# Patient Record
Sex: FEMALE | Race: WHITE | Employment: OTHER | URBAN - NONMETROPOLITAN AREA
[De-identification: names, ages, dates, MRNs, and addresses within clinical notes are randomized per-mention and may not be internally consistent; named-entity substitution may affect disease eponyms.]

---

## 2017-01-12 ENCOUNTER — TELEPHONE (OUTPATIENT)
Dept: INTERNAL MEDICINE | Age: 72
End: 2017-01-12

## 2017-01-12 DIAGNOSIS — E78.2 MIXED HYPERLIPIDEMIA: ICD-10-CM

## 2017-01-12 DIAGNOSIS — I10 UNSPECIFIED ESSENTIAL HYPERTENSION: ICD-10-CM

## 2017-01-12 RX ORDER — PRAVASTATIN SODIUM 40 MG
40 TABLET ORAL DAILY
Qty: 90 TABLET | Refills: 1 | Status: SHIPPED | OUTPATIENT
Start: 2017-01-12 | End: 2017-07-14 | Stop reason: SDUPTHER

## 2017-01-12 RX ORDER — LISINOPRIL AND HYDROCHLOROTHIAZIDE 25; 20 MG/1; MG/1
TABLET ORAL
Qty: 90 TABLET | Refills: 1 | Status: SHIPPED | OUTPATIENT
Start: 2017-01-12 | End: 2017-07-14 | Stop reason: SDUPTHER

## 2017-01-17 ENCOUNTER — OFFICE VISIT (OUTPATIENT)
Dept: INTERNAL MEDICINE | Age: 72
End: 2017-01-17

## 2017-01-17 VITALS
WEIGHT: 163 LBS | BODY MASS INDEX: 27.16 KG/M2 | HEIGHT: 65 IN | DIASTOLIC BLOOD PRESSURE: 74 MMHG | SYSTOLIC BLOOD PRESSURE: 130 MMHG | HEART RATE: 64 BPM

## 2017-01-17 DIAGNOSIS — Z23 NEED FOR PROPHYLACTIC VACCINATION AND INOCULATION AGAINST INFLUENZA: ICD-10-CM

## 2017-01-17 DIAGNOSIS — N32.81 OVERACTIVE BLADDER: ICD-10-CM

## 2017-01-17 DIAGNOSIS — I10 ESSENTIAL HYPERTENSION: Primary | ICD-10-CM

## 2017-01-17 DIAGNOSIS — E78.2 MIXED HYPERLIPIDEMIA: ICD-10-CM

## 2017-01-17 DIAGNOSIS — R10.13 DYSPEPSIA: ICD-10-CM

## 2017-01-17 PROCEDURE — G0008 ADMIN INFLUENZA VIRUS VAC: HCPCS | Performed by: INTERNAL MEDICINE

## 2017-01-17 PROCEDURE — 99214 OFFICE O/P EST MOD 30 MIN: CPT | Performed by: INTERNAL MEDICINE

## 2017-01-17 PROCEDURE — 90686 IIV4 VACC NO PRSV 0.5 ML IM: CPT | Performed by: INTERNAL MEDICINE

## 2017-01-17 RX ORDER — OXYBUTYNIN CHLORIDE 5 MG/1
5 TABLET ORAL 2 TIMES DAILY
Qty: 180 TABLET | Refills: 1 | Status: SHIPPED | OUTPATIENT
Start: 2017-01-17 | End: 2017-07-18 | Stop reason: SDUPTHER

## 2017-01-17 RX ORDER — FAMOTIDINE 40 MG/1
TABLET, FILM COATED ORAL
Qty: 90 TABLET | Refills: 1 | Status: SHIPPED | OUTPATIENT
Start: 2017-01-17 | End: 2017-03-20

## 2017-01-17 RX ORDER — POLYETHYLENE GLYCOL 3350 17 G/17G
17 POWDER, FOR SOLUTION ORAL NIGHTLY
COMMUNITY
End: 2017-02-08

## 2017-01-17 RX ORDER — AMLODIPINE BESYLATE 10 MG/1
10 TABLET ORAL DAILY
Qty: 90 TABLET | Refills: 1 | Status: SHIPPED | OUTPATIENT
Start: 2017-01-17 | End: 2017-07-18 | Stop reason: SDUPTHER

## 2017-02-03 DIAGNOSIS — K59.01 SLOW TRANSIT CONSTIPATION: ICD-10-CM

## 2017-02-08 RX ORDER — POLYETHYLENE GLYCOL 3350 17 G/17G
POWDER, FOR SOLUTION ORAL
Qty: 527 G | Refills: 3 | Status: SHIPPED | OUTPATIENT
Start: 2017-02-08 | End: 2017-07-18 | Stop reason: SDUPTHER

## 2017-07-14 DIAGNOSIS — E78.2 MIXED HYPERLIPIDEMIA: ICD-10-CM

## 2017-07-14 DIAGNOSIS — I10 UNSPECIFIED ESSENTIAL HYPERTENSION: ICD-10-CM

## 2017-07-14 RX ORDER — LISINOPRIL AND HYDROCHLOROTHIAZIDE 25; 20 MG/1; MG/1
TABLET ORAL
Qty: 90 TABLET | Refills: 1 | Status: SHIPPED | OUTPATIENT
Start: 2017-07-14 | End: 2018-01-17 | Stop reason: SDUPTHER

## 2017-07-14 RX ORDER — FAMOTIDINE 40 MG/1
40 TABLET, FILM COATED ORAL EVERY EVENING
Qty: 90 TABLET | Refills: 1 | OUTPATIENT
Start: 2017-07-14 | End: 2018-01-09 | Stop reason: SDUPTHER

## 2017-07-14 RX ORDER — PRAVASTATIN SODIUM 40 MG
40 TABLET ORAL EVERY EVENING
Qty: 90 TABLET | Refills: 1 | Status: SHIPPED | OUTPATIENT
Start: 2017-07-14 | End: 2018-01-09 | Stop reason: SDUPTHER

## 2017-07-18 ENCOUNTER — HOSPITAL ENCOUNTER (OUTPATIENT)
Age: 72
Discharge: HOME | End: 2017-07-18
Payer: MEDICARE

## 2017-07-18 ENCOUNTER — OFFICE VISIT (OUTPATIENT)
Dept: INTERNAL MEDICINE CLINIC | Age: 72
End: 2017-07-18
Payer: MEDICARE

## 2017-07-18 VITALS
DIASTOLIC BLOOD PRESSURE: 74 MMHG | WEIGHT: 171 LBS | SYSTOLIC BLOOD PRESSURE: 134 MMHG | HEIGHT: 65 IN | BODY MASS INDEX: 28.49 KG/M2 | HEART RATE: 68 BPM

## 2017-07-18 DIAGNOSIS — K59.01 SLOW TRANSIT CONSTIPATION: ICD-10-CM

## 2017-07-18 DIAGNOSIS — N32.81 OVERACTIVE BLADDER: ICD-10-CM

## 2017-07-18 DIAGNOSIS — I10 ESSENTIAL HYPERTENSION: ICD-10-CM

## 2017-07-18 DIAGNOSIS — N30.00 ACUTE CYSTITIS WITHOUT HEMATURIA: Primary | ICD-10-CM

## 2017-07-18 DIAGNOSIS — R73.9 HYPERGLYCEMIA: ICD-10-CM

## 2017-07-18 DIAGNOSIS — M17.11 PRIMARY OSTEOARTHRITIS OF RIGHT KNEE: ICD-10-CM

## 2017-07-18 DIAGNOSIS — J30.2 SEASONAL ALLERGIC RHINITIS, UNSPECIFIED ALLERGIC RHINITIS TRIGGER: ICD-10-CM

## 2017-07-18 DIAGNOSIS — Z23 NEED FOR PROPHYLACTIC VACCINATION AGAINST STREPTOCOCCUS PNEUMONIAE (PNEUMOCOCCUS): ICD-10-CM

## 2017-07-18 DIAGNOSIS — E78.2 MIXED HYPERLIPIDEMIA: ICD-10-CM

## 2017-07-18 LAB
ALT SERPL-CCNC: 13 U/L (ref 11–66)
ANION GAP SERPL CALCULATED.3IONS-SCNC: 13 MEQ/L (ref 8–16)
AVERAGE GLUCOSE: 105 MG/DL (ref 70–126)
BACTERIA: ABNORMAL /HPF
BILIRUBIN URINE: NEGATIVE
BLOOD, URINE: NEGATIVE
BUN BLDV-MCNC: 13 MG/DL (ref 7–22)
CALCIUM SERPL-MCNC: 10.2 MG/DL (ref 8.5–10.5)
CASTS 2: ABNORMAL /LPF
CASTS UA: ABNORMAL /LPF
CHARACTER, URINE: CLEAR
CHLORIDE BLD-SCNC: 99 MEQ/L (ref 98–111)
CO2: 30 MEQ/L (ref 23–33)
COLOR: YELLOW
CREAT SERPL-MCNC: 0.6 MG/DL (ref 0.4–1.2)
CRYSTALS, UA: ABNORMAL
EPITHELIAL CELLS, UA: ABNORMAL /HPF
GFR SERPL CREATININE-BSD FRML MDRD: > 90 ML/MIN/1.73M2
GLUCOSE BLD-MCNC: 108 MG/DL (ref 70–108)
GLUCOSE URINE: NEGATIVE MG/DL
HBA1C MFR BLD: 5.5 % (ref 4.4–6.4)
KETONES, URINE: NEGATIVE
LEUKOCYTE ESTERASE, URINE: ABNORMAL
MISCELLANEOUS 2: ABNORMAL
NITRITE, URINE: NEGATIVE
PH UA: 6
POTASSIUM SERPL-SCNC: 3.7 MEQ/L (ref 3.5–5.2)
PROTEIN UA: NEGATIVE
RBC URINE: ABNORMAL /HPF
RENAL EPITHELIAL, UA: ABNORMAL
SODIUM BLD-SCNC: 142 MEQ/L (ref 135–145)
SPECIFIC GRAVITY, URINE: 1.01 (ref 1–1.03)
UROBILINOGEN, URINE: 0.2 EU/DL
WBC UA: ABNORMAL /HPF
YEAST: ABNORMAL

## 2017-07-18 PROCEDURE — 83036 HEMOGLOBIN GLYCOSYLATED A1C: CPT

## 2017-07-18 PROCEDURE — G0009 ADMIN PNEUMOCOCCAL VACCINE: HCPCS | Performed by: INTERNAL MEDICINE

## 2017-07-18 PROCEDURE — 87184 SC STD DISK METHOD PER PLATE: CPT

## 2017-07-18 PROCEDURE — 84460 ALANINE AMINO (ALT) (SGPT): CPT

## 2017-07-18 PROCEDURE — 80048 BASIC METABOLIC PNL TOTAL CA: CPT

## 2017-07-18 PROCEDURE — 87077 CULTURE AEROBIC IDENTIFY: CPT

## 2017-07-18 PROCEDURE — 87186 SC STD MICRODIL/AGAR DIL: CPT

## 2017-07-18 PROCEDURE — 87086 URINE CULTURE/COLONY COUNT: CPT

## 2017-07-18 PROCEDURE — 81001 URINALYSIS AUTO W/SCOPE: CPT

## 2017-07-18 PROCEDURE — 36415 COLL VENOUS BLD VENIPUNCTURE: CPT

## 2017-07-18 PROCEDURE — 99214 OFFICE O/P EST MOD 30 MIN: CPT | Performed by: INTERNAL MEDICINE

## 2017-07-18 PROCEDURE — 90670 PCV13 VACCINE IM: CPT | Performed by: INTERNAL MEDICINE

## 2017-07-18 RX ORDER — POLYETHYLENE GLYCOL 3350 17 G/17G
POWDER, FOR SOLUTION ORAL
Qty: 527 G | Refills: 3 | Status: SHIPPED | OUTPATIENT
Start: 2017-07-18 | End: 2018-01-09 | Stop reason: SDUPTHER

## 2017-07-18 RX ORDER — OXYBUTYNIN CHLORIDE 5 MG/1
5 TABLET ORAL 2 TIMES DAILY
Qty: 180 TABLET | Refills: 1 | Status: SHIPPED | OUTPATIENT
Start: 2017-07-18 | End: 2018-01-09 | Stop reason: SDUPTHER

## 2017-07-18 RX ORDER — AMLODIPINE BESYLATE 10 MG/1
10 TABLET ORAL DAILY
Qty: 90 TABLET | Refills: 1 | Status: SHIPPED | OUTPATIENT
Start: 2017-07-18 | End: 2018-01-09 | Stop reason: SDUPTHER

## 2017-07-18 RX ORDER — CIPROFLOXACIN 500 MG/1
500 TABLET, FILM COATED ORAL 2 TIMES DAILY
Qty: 20 TABLET | Refills: 0 | Status: SHIPPED | OUTPATIENT
Start: 2017-07-18 | End: 2017-07-28

## 2017-07-18 RX ORDER — FLUTICASONE PROPIONATE 50 MCG
1 SPRAY, SUSPENSION (ML) NASAL DAILY
Qty: 1 BOTTLE | Refills: 5 | Status: SHIPPED | OUTPATIENT
Start: 2017-07-18 | End: 2018-05-17

## 2017-07-18 ASSESSMENT — PATIENT HEALTH QUESTIONNAIRE - PHQ9
SUM OF ALL RESPONSES TO PHQ QUESTIONS 1-9: 0
2. FEELING DOWN, DEPRESSED OR HOPELESS: 0
1. LITTLE INTEREST OR PLEASURE IN DOING THINGS: 0
SUM OF ALL RESPONSES TO PHQ9 QUESTIONS 1 & 2: 0

## 2017-07-20 ENCOUNTER — TELEPHONE (OUTPATIENT)
Dept: INTERNAL MEDICINE CLINIC | Age: 72
End: 2017-07-20

## 2017-07-20 LAB
ORGANISM: ABNORMAL
URINE CULTURE REFLEX: ABNORMAL

## 2017-08-17 ENCOUNTER — TELEPHONE (OUTPATIENT)
Dept: INTERNAL MEDICINE CLINIC | Age: 72
End: 2017-08-17

## 2017-08-21 DIAGNOSIS — R30.0 DYSURIA: Primary | ICD-10-CM

## 2017-08-22 ENCOUNTER — HOSPITAL ENCOUNTER (OUTPATIENT)
Age: 72
Discharge: HOME | End: 2017-08-22
Payer: MEDICARE

## 2017-08-22 LAB
BACTERIA: ABNORMAL /HPF
BILIRUBIN URINE: NEGATIVE
BLOOD, URINE: NEGATIVE
CASTS 2: ABNORMAL /LPF
CASTS UA: ABNORMAL /LPF
CHARACTER, URINE: CLEAR
COLOR: YELLOW
CRYSTALS, UA: ABNORMAL
EPITHELIAL CELLS, UA: ABNORMAL /HPF
GLUCOSE URINE: NEGATIVE MG/DL
KETONES, URINE: NEGATIVE
LEUKOCYTE ESTERASE, URINE: ABNORMAL
MISCELLANEOUS 2: ABNORMAL
NITRITE, URINE: NEGATIVE
PH UA: 6.5
PROTEIN UA: NEGATIVE
RBC URINE: ABNORMAL /HPF
RENAL EPITHELIAL, UA: ABNORMAL
SPECIFIC GRAVITY, URINE: 1.01 (ref 1–1.03)
UROBILINOGEN, URINE: 0.2 EU/DL
WBC UA: ABNORMAL /HPF
YEAST: ABNORMAL

## 2017-08-22 PROCEDURE — 87086 URINE CULTURE/COLONY COUNT: CPT

## 2017-08-22 PROCEDURE — 81001 URINALYSIS AUTO W/SCOPE: CPT

## 2017-08-23 ENCOUNTER — TELEPHONE (OUTPATIENT)
Dept: INTERNAL MEDICINE CLINIC | Age: 72
End: 2017-08-23

## 2017-08-23 LAB
ORGANISM: ABNORMAL
URINE CULTURE REFLEX: ABNORMAL

## 2017-10-27 ENCOUNTER — HOSPITAL ENCOUNTER (EMERGENCY)
Age: 72
Discharge: HOME | End: 2017-10-27
Payer: MEDICARE

## 2017-10-27 VITALS
DIASTOLIC BLOOD PRESSURE: 72 MMHG | WEIGHT: 155 LBS | BODY MASS INDEX: 25.82 KG/M2 | TEMPERATURE: 98.7 F | HEART RATE: 108 BPM | OXYGEN SATURATION: 96 % | RESPIRATION RATE: 18 BRPM | SYSTOLIC BLOOD PRESSURE: 132 MMHG

## 2017-10-27 DIAGNOSIS — F51.02 ADJUSTMENT INSOMNIA: ICD-10-CM

## 2017-10-27 DIAGNOSIS — B37.81 THRUSH OF MOUTH AND ESOPHAGUS (HCC): Primary | ICD-10-CM

## 2017-10-27 DIAGNOSIS — Z96.651 STATUS POST RIGHT KNEE REPLACEMENT: ICD-10-CM

## 2017-10-27 DIAGNOSIS — B37.0 THRUSH OF MOUTH AND ESOPHAGUS (HCC): Primary | ICD-10-CM

## 2017-10-27 LAB
BASOPHILS # BLD: 0.4 %
BASOPHILS ABSOLUTE: 0 THOU/MM3 (ref 0–0.1)
EOSINOPHIL # BLD: 0.9 %
EOSINOPHILS ABSOLUTE: 0.1 THOU/MM3 (ref 0–0.4)
HCT VFR BLD CALC: 41.5 % (ref 37–47)
HEMOGLOBIN: 13.9 GM/DL (ref 12–16)
LYMPHOCYTES # BLD: 12.4 %
LYMPHOCYTES ABSOLUTE: 1.1 THOU/MM3 (ref 1–4.8)
MCH RBC QN AUTO: 28.3 PG (ref 27–31)
MCHC RBC AUTO-ENTMCNC: 33.4 GM/DL (ref 33–37)
MCV RBC AUTO: 85 FL (ref 81–99)
MONOCYTES # BLD: 8.5 %
MONOCYTES ABSOLUTE: 0.7 THOU/MM3 (ref 0.4–1.3)
NUCLEATED RED BLOOD CELLS: 0 /100 WBC
PDW BLD-RTO: 12.6 % (ref 11.5–14.5)
PLATELET # BLD: 278 THOU/MM3 (ref 130–400)
PMV BLD AUTO: 7.9 MCM (ref 7.4–10.4)
RBC # BLD: 4.9 MILL/MM3 (ref 4.2–5.4)
SEG NEUTROPHILS: 77.8 %
SEGMENTED NEUTROPHILS ABSOLUTE COUNT: 6.7 THOU/MM3 (ref 1.8–7.7)
WBC # BLD: 8.6 THOU/MM3 (ref 4.8–10.8)

## 2017-10-27 PROCEDURE — 85025 COMPLETE CBC W/AUTO DIFF WBC: CPT

## 2017-10-27 PROCEDURE — 36415 COLL VENOUS BLD VENIPUNCTURE: CPT

## 2017-10-27 PROCEDURE — 99214 OFFICE O/P EST MOD 30 MIN: CPT

## 2017-10-27 PROCEDURE — 99213 OFFICE O/P EST LOW 20 MIN: CPT | Performed by: NURSE PRACTITIONER

## 2017-10-27 RX ORDER — LANOLIN ALCOHOL/MO/W.PET/CERES
3 CREAM (GRAM) TOPICAL DAILY
COMMUNITY
End: 2018-05-17 | Stop reason: ALTCHOICE

## 2017-10-27 RX ORDER — ONDANSETRON 4 MG/1
4 TABLET, ORALLY DISINTEGRATING ORAL EVERY 8 HOURS PRN
Qty: 15 TABLET | Refills: 0 | Status: SHIPPED | OUTPATIENT
Start: 2017-10-27 | End: 2017-11-01

## 2017-10-27 RX ORDER — ACETAMINOPHEN 500 MG
1000 TABLET ORAL EVERY 6 HOURS PRN
COMMUNITY
End: 2018-05-17

## 2017-10-27 RX ORDER — LIDOCAINE 50 MG/G
OINTMENT TOPICAL
Qty: 10 G | Refills: 0 | Status: SHIPPED | OUTPATIENT
Start: 2017-10-27 | End: 2017-11-13

## 2017-10-27 RX ORDER — OXYCODONE HYDROCHLORIDE 5 MG/1
5 CAPSULE ORAL EVERY 4 HOURS PRN
COMMUNITY
End: 2017-11-13 | Stop reason: ALTCHOICE

## 2017-10-27 RX ORDER — HYDROCODONE BITARTRATE AND ACETAMINOPHEN 5; 325 MG/1; MG/1
1 TABLET ORAL EVERY 6 HOURS PRN
COMMUNITY
End: 2017-11-13

## 2017-10-27 ASSESSMENT — ENCOUNTER SYMPTOMS
SHORTNESS OF BREATH: 0
SINUS PAIN: 0
CHEST TIGHTNESS: 0
SORE THROAT: 0
COLOR CHANGE: 0
BACK PAIN: 0
ABDOMINAL PAIN: 0
NAUSEA: 1
DIARRHEA: 0
WHEEZING: 0
COUGH: 0
CHOKING: 0
STRIDOR: 0
SINUS PRESSURE: 0
APNEA: 0
RHINORRHEA: 0

## 2017-10-27 ASSESSMENT — PAIN DESCRIPTION - FREQUENCY: FREQUENCY: CONTINUOUS

## 2017-10-27 ASSESSMENT — PAIN SCALES - GENERAL: PAINLEVEL_OUTOF10: 3

## 2017-10-27 ASSESSMENT — PAIN DESCRIPTION - PAIN TYPE: TYPE: ACUTE PAIN

## 2017-10-27 ASSESSMENT — PAIN DESCRIPTION - DESCRIPTORS: DESCRIPTORS: CRAMPING

## 2017-10-27 ASSESSMENT — PAIN DESCRIPTION - ORIENTATION: ORIENTATION: LOWER

## 2017-10-27 ASSESSMENT — PAIN DESCRIPTION - LOCATION: LOCATION: ABDOMEN

## 2017-10-27 NOTE — ED TRIAGE NOTES
Pt ambulatory into [facility] with c/o having insomnia, lower belly pain and a bad taste in mouth  For the past five days. Pt states she had a knee replacement on 09/25/17 and had been taking oxycodone around the clock every 4 hours up until 10/9/17 then was switched to norco and took every 6 hours until 10/16/17 then changed to every 12 hours until last dose 10/23/17. Pt states at that changed to tylenol pm every 12 hours and then just extra strength tylenol. Pt called surgeon and was instructed to go to dr to be checked. Pt states belly pain 3. Pt states her last bm was a couple days ago and was soft brown. Pt states she is wondering if symptoms  could be from taking too much pain medication. Pt states at times she is nauseated. Pt states at night she is only getting 4-5 hours of sleep.

## 2017-10-27 NOTE — ED PROVIDER NOTES
tablet, R-1Print      polyethylene glycol (GLYCOLAX) powder take 17GM (DISSOLVED IN WATER) by mouth once daily, Disp-527 g, R-3Print      lisinopril-hydrochlorothiazide (PRINZIDE;ZESTORETIC) 20-25 MG per tablet take 1 tablet by mouth once daily, Disp-90 tablet, R-1Normal      famotidine (PEPCID) 40 MG tablet Take 1 tablet by mouth every evening, Disp-90 tablet, R-1Phone In      pravastatin (PRAVACHOL) 40 MG tablet Take 1 tablet by mouth every evening, Disp-90 tablet, R-1Normal      aspirin 81 MG tablet Take 81 mg by mouth daily Nurse in [place] wrote to take twice a day for 6 weeks after surgery 09/25/17Historical Med      Calcium (CALCIUM 500 + D) 500-125 MG-UNIT TABS Take by mouth daily Historical Med      fluticasone (FLONASE) 50 MCG/ACT nasal spray 1 spray by Nasal route daily, Disp-1 Bottle, R-5Print      Loratadine-Pseudoephedrine (CLARITIN-D 24 HOUR PO) Take by mouth daily as needed Historical Med      naproxen-diphenhydramine 220-25 MG TABS Take 1 tablet by mouth nightly as needed Historical Med      CRANBERRY-VITAMIN C PO Take 4,200 mg by mouth dailyHistorical Med      cephALEXin (KEFLEX) 500 MG capsule Take 500 mg by mouth Take 4 caps prior to dental appointment. Historical Med      vitamin D 1000 UNITS CAPS Take by mouth 2 times daily Historical Med      vitamin E 400 UNIT capsule Take 400 Units by mouth daily. Historical Med             ALLERGIES     Patient is is allergic to pcn [penicillins] and sulfa antibiotics. FAMILY HISTORY     Patient's family history includes Cancer in her father; Heart Failure in her mother. SOCIAL HISTORY     Patient  reports that she has never smoked. She has never used smokeless tobacco. She reports that she drinks alcohol. She reports that she does not use drugs. PHYSICAL EXAM     ED TRIAGE VITALS  BP: 132/72, Temp: 98.7 °F (37.1 °C), Pulse: 108, Resp: 18, SpO2: 96 %  Physical Exam   Constitutional: She is oriented to person, place, and time.  Vital signs are normal. She appears well-developed and well-nourished. She is active and cooperative. Non-toxic appearance. She does not have a sickly appearance. She does not appear ill. No distress. HENT:   Head: Normocephalic and atraumatic. Right Ear: Hearing, tympanic membrane, external ear and ear canal normal.   Left Ear: Hearing, tympanic membrane, external ear and ear canal normal.   Nose: Nose normal. Right sinus exhibits no maxillary sinus tenderness and no frontal sinus tenderness. Left sinus exhibits no maxillary sinus tenderness and no frontal sinus tenderness. Mouth/Throat: Uvula is midline, oropharynx is clear and moist and mucous membranes are normal. Oral lesions present. No oropharyngeal exudate. Thick discoloration noted unable to wipe off. Eyes: Conjunctivae, EOM and lids are normal. Pupils are equal, round, and reactive to light. Neck: Trachea normal and normal range of motion. Neck supple. Cardiovascular: Normal rate, regular rhythm, S1 normal, S2 normal, normal heart sounds and intact distal pulses. Pulmonary/Chest: Effort normal and breath sounds normal. She has no decreased breath sounds. She has no wheezes. She has no rhonchi. She has no rales. Musculoskeletal: Normal range of motion. Neurological: She is alert and oriented to person, place, and time. No cranial nerve deficit or sensory deficit. GCS eye subscore is 4. GCS verbal subscore is 5. GCS motor subscore is 6. Skin: Skin is warm and dry. She is not diaphoretic. Psychiatric: She has a normal mood and affect. Her behavior is normal. Judgment and thought content normal.   Nursing note and vitals reviewed.       DIAGNOSTIC RESULTS   Labs:  Results for orders placed or performed during the hospital encounter of 10/27/17   CBC Auto Differential   Result Value Ref Range    WBC 8.6 4.8 - 10.8 thou/mm3    RBC 4.90 4.20 - 5.40 mill/mm3    Hemoglobin 13.9 12.0 - 16.0 gm/dl    Hematocrit 41.5 37.0 - 47.0 %    MCV 85 81 - 99 fL    M[facility]

## 2017-10-30 ENCOUNTER — NURSE TRIAGE (OUTPATIENT)
Dept: ADMINISTRATIVE | Age: 72
End: 2017-10-30

## 2017-10-30 NOTE — TELEPHONE ENCOUNTER
Answer Assessment - Initial Assessment Questions  1. REASON FOR CALL or QUESTION: \"What is your reason for calling today? \" or \"How can I best help you? \" or \"What question do you have that I can help answer? \"      Blessinggina Bourgeois was told to swish and swallow her Mystatin for Thrush. She was calling to double check the prescription instructions.     Protocols used: INFORMATION ONLY CALL-ADULT-

## 2017-11-13 ENCOUNTER — OFFICE VISIT (OUTPATIENT)
Dept: INTERNAL MEDICINE CLINIC | Age: 72
End: 2017-11-13
Payer: MEDICARE

## 2017-11-13 ENCOUNTER — HOSPITAL ENCOUNTER (OUTPATIENT)
Age: 72
Discharge: HOME | End: 2017-11-13
Payer: MEDICARE

## 2017-11-13 VITALS
HEIGHT: 65 IN | WEIGHT: 150 LBS | SYSTOLIC BLOOD PRESSURE: 128 MMHG | DIASTOLIC BLOOD PRESSURE: 70 MMHG | BODY MASS INDEX: 24.99 KG/M2 | HEART RATE: 88 BPM

## 2017-11-13 DIAGNOSIS — R43.8 METALLIC TASTE: ICD-10-CM

## 2017-11-13 DIAGNOSIS — R68.83 CHILLS (WITHOUT FEVER): ICD-10-CM

## 2017-11-13 DIAGNOSIS — R53.83 FATIGUE, UNSPECIFIED TYPE: ICD-10-CM

## 2017-11-13 DIAGNOSIS — R73.9 HYPERGLYCEMIA: ICD-10-CM

## 2017-11-13 DIAGNOSIS — B37.0 THRUSH: Primary | ICD-10-CM

## 2017-11-13 DIAGNOSIS — J30.2 ACUTE SEASONAL ALLERGIC RHINITIS DUE TO OTHER ALLERGEN: ICD-10-CM

## 2017-11-13 DIAGNOSIS — I10 ESSENTIAL HYPERTENSION: ICD-10-CM

## 2017-11-13 DIAGNOSIS — E78.2 MIXED HYPERLIPIDEMIA: ICD-10-CM

## 2017-11-13 DIAGNOSIS — R00.1 BRADYCARDIA: ICD-10-CM

## 2017-11-13 LAB
ALBUMIN SERPL-MCNC: 5.1 G/DL (ref 3.5–5.1)
ALP BLD-CCNC: 60 U/L (ref 38–126)
ALT SERPL-CCNC: 11 U/L (ref 11–66)
AMORPHOUS: ABNORMAL
ANION GAP SERPL CALCULATED.3IONS-SCNC: 18 MEQ/L (ref 8–16)
AST SERPL-CCNC: 15 U/L (ref 5–40)
BACTERIA: ABNORMAL /HPF
BILIRUB SERPL-MCNC: 0.6 MG/DL (ref 0.3–1.2)
BILIRUBIN URINE: ABNORMAL
BLOOD, URINE: ABNORMAL
BUN BLDV-MCNC: 12 MG/DL (ref 7–22)
CALCIUM SERPL-MCNC: 10.4 MG/DL (ref 8.5–10.5)
CASTS 2: ABNORMAL /LPF
CASTS UA: ABNORMAL /LPF
CHARACTER, URINE: CLEAR
CHLORIDE BLD-SCNC: 97 MEQ/L (ref 98–111)
CO2: 29 MEQ/L (ref 23–33)
COLOR: YELLOW
CREAT SERPL-MCNC: 0.6 MG/DL (ref 0.4–1.2)
CRYSTALS, UA: ABNORMAL
EPITHELIAL CELLS, UA: ABNORMAL /HPF
GFR SERPL CREATININE-BSD FRML MDRD: > 90 ML/MIN/1.73M2
GLUCOSE BLD-MCNC: 117 MG/DL (ref 70–108)
GLUCOSE URINE: NEGATIVE MG/DL
ICTOTEST: NEGATIVE
KETONES, URINE: NEGATIVE
LDL CHOLESTEROL DIRECT: 90.9 MG/DL
LEUKOCYTE ESTERASE, URINE: ABNORMAL
MISCELLANEOUS 2: ABNORMAL
MUCUS: ABNORMAL
NITRITE, URINE: NEGATIVE
PH UA: 6.5
POTASSIUM SERPL-SCNC: 3.3 MEQ/L (ref 3.5–5.2)
PROTEIN UA: ABNORMAL
RBC URINE: ABNORMAL /HPF
RENAL EPITHELIAL, UA: ABNORMAL
SODIUM BLD-SCNC: 144 MEQ/L (ref 135–145)
SPECIFIC GRAVITY, URINE: 1.02 (ref 1–1.03)
TOTAL PROTEIN: 7.7 G/DL (ref 6.1–8)
TSH SERPL DL<=0.05 MIU/L-ACNC: 1.05 UIU/ML (ref 0.4–4.2)
UROBILINOGEN, URINE: 1 EU/DL
VITAMIN B-12: 460 PG/ML (ref 211–911)
WBC UA: ABNORMAL /HPF
YEAST: ABNORMAL

## 2017-11-13 PROCEDURE — 82607 VITAMIN B-12: CPT

## 2017-11-13 PROCEDURE — 80053 COMPREHEN METABOLIC PANEL: CPT

## 2017-11-13 PROCEDURE — 81001 URINALYSIS AUTO W/SCOPE: CPT

## 2017-11-13 PROCEDURE — 83721 ASSAY OF BLOOD LIPOPROTEIN: CPT

## 2017-11-13 PROCEDURE — 36415 COLL VENOUS BLD VENIPUNCTURE: CPT

## 2017-11-13 PROCEDURE — 84443 ASSAY THYROID STIM HORMONE: CPT

## 2017-11-13 PROCEDURE — 87086 URINE CULTURE/COLONY COUNT: CPT

## 2017-11-13 PROCEDURE — 99214 OFFICE O/P EST MOD 30 MIN: CPT | Performed by: INTERNAL MEDICINE

## 2017-11-13 PROCEDURE — 84630 ASSAY OF ZINC: CPT

## 2017-11-13 RX ORDER — CLOTRIMAZOLE 10 MG/1
10 LOZENGE ORAL; TOPICAL
Qty: 50 TABLET | Refills: 0 | Status: SHIPPED | OUTPATIENT
Start: 2017-11-13 | End: 2017-11-23

## 2017-11-13 NOTE — PROGRESS NOTES
Chief Complaint   Patient presents with    Other     ER vs 10/27/17 - Thrush    Other     SP-Rt total knee    Nasal Congestion     X 1 month-little drainage-productive cough off and on    Other     trouble sleeping    Hypertension    Hyperlipidemia       This patient presents for medical evaluation. This is a 6 months follow up visit of chronic issues but has multiple acute issues - poor appetite, metallic taste, coating on tongue, trouble sleeping, nasal congestion, cough. She had right TKA with Dr. Belvie Oppenheim at [facility] 9/25/17 - had some issues with hypoxia and was there 3 days. She is complaining of fatigue, poor appetite, nausea, even since being off pain medication since 10/23/17. She states it has improved slowly after surgery. She was in ER and diagnosed with thrush 10/27/17 - treated with Nystatin x 12 days, given Lidocaine ointment for right knee pain but didn't like the feel of it. Doing better with blue emu spray. Her tongue has changed from black to white/yellow coating. Still has a metallic taste. Will check B12, zinc, TSH, and standard labs -see below. Will give a round of Clotrimazole troches - still looks like thrush. Fatigue may be from not sleeping well due to knee pain - continue Bakari Emu spray, try melatonin, or Tylenol PM.    Allergic rhinitis - she is having nasal congestion, and PND with cough. Likely due to allergic rhinitis as no longer using Flonase or antihistamine - suggested she get back on this. She tripped over string at [facility] and fell onto right arm - skinned arm. 2 months later the arm was still aching so went to Dr. Libby Olivares, initially given steroid injection and then MRI. She has a torn RTC and she was planning on having surgery in May 2017 when daughter is out of school to help her. She still has not had this done, as symptoms improved. Hypokalemia - normal potassium 3/2017, 7/2017. Check now.     GERD/NSAID use -She had left knee arthroplasty weakness  Dermatological ROS: negative for - rash or skin lesion changes    Blood pressure 128/70, pulse 88, height 5' 4.96\" (1.65 m), weight 150 lb (68 kg), not currently breastfeeding. Physical Examination: General appearance - alert, well appearing, and in no distress  Mental status - alert, oriented to person, place, and time  Mouth - yellow/brown coating on tongue. Neck - supple, no significant adenopathy, no JVD, or carotid bruits  Chest - clear to auscultation, no wheezes, rales or rhonchi, symmetric air entry  Heart - normal rate, regular rhythm, no murmurs, rubs, clicks or gallops  Abdomen - soft, nontender, nondistended  Neurological - alert, oriented, normal speech, no focal findings or movement disorder noted  Extremities - peripheral pulses normal, no edema, no clubbing or cyanosis  Skin - normal coloration and turgor, warm, dry    Diagnostic Data:  Reviewed labs from 10/2017. Results for orders placed or performed during the hospital encounter of 10/27/17   CBC Auto Differential   Result Value Ref Range    WBC 8.6 4.8 - 10.8 thou/mm3    RBC 4.90 4.20 - 5.40 mill/mm3    Hemoglobin 13.9 12.0 - 16.0 gm/dl    Hematocrit 41.5 37.0 - 47.0 %    MCV 85 81 - 99 fL    MCH 28.3 27.0 - 31.0 pg    MCHC 33.4 33.0 - 37.0 gm/dl    RDW 12.6 11.5 - 14.5 %    Platelets 589 933 - 157 thou/mm3    MPV 7.9 7.4 - 10.4 mcm   Differential   Result Value Ref Range    Seg Neutrophils 77.8 %    Lymphocytes 12.4 %    Monocytes 8.5 %    Eosinophils 0.9 %    Basophils 0.4 %    nRBC 0 /100 wbc    Segs Absolute 6.7 1.8 - 7.7 thou/mm3    Lymphocytes # 1.1 1.0 - 4.8 thou/mm3    Monocytes # 0.7 0.4 - 1.3 thou/mm3    Eosinophils # 0.1 0.0 - 0.4 thou/mm3    Basophils # 0.0 0.0 - 0.1 thou/mm3       Assessment/Plan:  1. Thrush  clotrimazole (MYCELEX) 10 MG dl   2. Metallic taste  Vitamin Q24    Zinc   3. Fatigue, unspecified type  TSH with Reflex    Urinalysis Reflex to Culture   4.  Chills (without fever)  Urinalysis Reflex to Culture   5. Essential hypertension  Comprehensive Metabolic Panel   6. Bradycardia     7. Acute seasonal allergic rhinitis due to other allergen     8. Mixed hyperlipidemia  Comprehensive Metabolic Panel    LDL Cholesterol, Direct   9. Hyperglycemia  Comprehensive Metabolic Panel     Orders Placed This Encounter   Procedures    Vitamin B12     Standing Status:   Future     Number of Occurrences:   1     Standing Expiration Date:   11/13/2018    Zinc     Standing Status:   Future     Number of Occurrences:   1     Standing Expiration Date:   11/13/2018    TSH with Reflex     Standing Status:   Future     Number of Occurrences:   1     Standing Expiration Date:   11/13/2018    Comprehensive Metabolic Panel     Standing Status:   Future     Number of Occurrences:   1     Standing Expiration Date:   11/13/2018    LDL Cholesterol, Direct     Standing Status:   Future     Number of Occurrences:   1     Standing Expiration Date:   11/13/2018    Urinalysis Reflex to Culture     Standing Status:   Future     Number of Occurrences:   1     Standing Expiration Date:   11/13/2018     Order Specific Question:   SPECIFY(EX-CATH,MIDSTREAM,CYSTO,ETC)? Answer:   Midstream     Labs due now. Thrush, metallic taste, fatigue, chills - even after Nystatin she is still having symptoms of thrush - treat with troches, order labs to rule out nutritional issues. Check TSH, UA for fatigue and chills - normal CBC a couple weeks ago. Hypertension/bradycardia -off Metoprolol and HR in the 60-70's now. Monitor edema as on higher dose of Norvasc - none now. Asymptomatic. BP controlled. Continue lisinopril/HCTZ and Norvasc. Allergic rhinitis -restart Zyrtec and Flonase daily and monitor symptoms. Hyperlipidemia - LDL and Triglycerides are at goal on 40 mg Pravastatin and no fenofibrate. Continue to hold fenofibrate. Check CMP and LDL now.     Hyperglycemia - stopped Glucophage as HgA1c 5.7 1/2015 and 5.7 7/2016, 5.5

## 2017-11-14 LAB
ORGANISM: ABNORMAL
URINE CULTURE REFLEX: ABNORMAL

## 2017-11-15 LAB — ZINC: 91 UG/DL (ref 60–120)

## 2017-11-22 ENCOUNTER — TELEPHONE (OUTPATIENT)
Dept: INTERNAL MEDICINE CLINIC | Age: 72
End: 2017-11-22

## 2017-11-22 DIAGNOSIS — R82.90 ABNORMAL URINE FINDINGS: Primary | ICD-10-CM

## 2017-11-22 RX ORDER — POTASSIUM CHLORIDE 20 MEQ/1
20 TABLET, EXTENDED RELEASE ORAL DAILY
Qty: 30 TABLET | Refills: 0 | OUTPATIENT
Start: 2017-11-22 | End: 2018-01-09

## 2017-11-22 NOTE — TELEPHONE ENCOUNTER
----- Message from Harmony Euceda MD sent at 11/22/2017  9:44 AM EST -----  Ask patient how her tongue is after troches. Her potassium a little low - start KCL 20 meq daily #30 no refills. Repeat potassium level on Monday. She has RBC's in urine with mixed growth. Order renal ultrasound, repeat UA/reflux culture with potassium level on Monday.

## 2017-11-24 ENCOUNTER — HOSPITAL ENCOUNTER (OUTPATIENT)
Dept: ULTRASOUND IMAGING | Age: 72
Discharge: HOME | End: 2017-11-24
Payer: MEDICARE

## 2017-11-24 DIAGNOSIS — R82.90 ABNORMAL URINE FINDINGS: ICD-10-CM

## 2017-11-24 PROCEDURE — 76775 US EXAM ABDO BACK WALL LIM: CPT

## 2017-11-26 DIAGNOSIS — R31.29 MICROSCOPIC HEMATURIA: Primary | ICD-10-CM

## 2017-11-26 DIAGNOSIS — E87.6 HYPOKALEMIA: ICD-10-CM

## 2017-11-27 ENCOUNTER — OFFICE VISIT (OUTPATIENT)
Dept: INTERNAL MEDICINE CLINIC | Age: 72
End: 2017-11-27
Payer: MEDICARE

## 2017-11-27 ENCOUNTER — HOSPITAL ENCOUNTER (OUTPATIENT)
Age: 72
Discharge: HOME | End: 2017-11-27
Payer: MEDICARE

## 2017-11-27 VITALS
DIASTOLIC BLOOD PRESSURE: 80 MMHG | HEIGHT: 65 IN | SYSTOLIC BLOOD PRESSURE: 148 MMHG | WEIGHT: 151 LBS | HEART RATE: 80 BPM | BODY MASS INDEX: 25.16 KG/M2

## 2017-11-27 DIAGNOSIS — R31.29 MICROSCOPIC HEMATURIA: ICD-10-CM

## 2017-11-27 DIAGNOSIS — B37.0 THRUSH: Primary | ICD-10-CM

## 2017-11-27 DIAGNOSIS — E87.6 HYPOKALEMIA: ICD-10-CM

## 2017-11-27 LAB
BACTERIA: ABNORMAL /HPF
BILIRUBIN URINE: NEGATIVE
BLOOD, URINE: ABNORMAL
CASTS 2: ABNORMAL /LPF
CASTS UA: ABNORMAL /LPF
CHARACTER, URINE: CLEAR
COLOR: YELLOW
CRYSTALS, UA: ABNORMAL
EPITHELIAL CELLS, UA: ABNORMAL /HPF
GLUCOSE URINE: NEGATIVE MG/DL
KETONES, URINE: NEGATIVE
LEUKOCYTE ESTERASE, URINE: ABNORMAL
MISCELLANEOUS 2: ABNORMAL
NITRITE, URINE: NEGATIVE
PH UA: 7
POTASSIUM SERPL-SCNC: 3.6 MEQ/L (ref 3.5–5.2)
PROTEIN UA: NEGATIVE
RBC URINE: ABNORMAL /HPF
RENAL EPITHELIAL, UA: ABNORMAL
SPECIFIC GRAVITY, URINE: 1.01 (ref 1–1.03)
UROBILINOGEN, URINE: 0.2 EU/DL
WBC UA: ABNORMAL /HPF
YEAST: ABNORMAL

## 2017-11-27 PROCEDURE — 84132 ASSAY OF SERUM POTASSIUM: CPT

## 2017-11-27 PROCEDURE — 87086 URINE CULTURE/COLONY COUNT: CPT

## 2017-11-27 PROCEDURE — 36415 COLL VENOUS BLD VENIPUNCTURE: CPT

## 2017-11-27 PROCEDURE — 99213 OFFICE O/P EST LOW 20 MIN: CPT | Performed by: INTERNAL MEDICINE

## 2017-11-27 PROCEDURE — 81001 URINALYSIS AUTO W/SCOPE: CPT

## 2017-11-27 RX ORDER — CIPROFLOXACIN 500 MG/1
500 TABLET, FILM COATED ORAL 2 TIMES DAILY
Qty: 6 TABLET | Refills: 0 | Status: SHIPPED | OUTPATIENT
Start: 2017-11-27 | End: 2018-01-09 | Stop reason: ALTCHOICE

## 2017-11-27 RX ORDER — CLOTRIMAZOLE 10 MG/1
10 LOZENGE ORAL; TOPICAL
COMMUNITY
End: 2017-11-27 | Stop reason: SDUPTHER

## 2017-11-27 RX ORDER — CLOTRIMAZOLE 10 MG/1
10 LOZENGE ORAL; TOPICAL
Qty: 60 TROCHE | Refills: 0 | Status: SHIPPED | OUTPATIENT
Start: 2017-11-27 | End: 2018-01-09 | Stop reason: ALTCHOICE

## 2017-11-27 NOTE — PROGRESS NOTES
Refill    ciprofloxacin (CIPRO) 500 MG tablet Take 1 tablet by mouth 2 times daily 6 tablet 0    clotrimazole (MYCELEX) 10 MG santiago Take 1 tablet by mouth 5 times daily 60 Santiago 0    potassium chloride (KLOR-CON M) 20 MEQ extended release tablet Take 1 tablet by mouth daily 30 tablet 0    melatonin 3 MG TABS tablet Take 3 mg by mouth daily      acetaminophen (TYLENOL) 500 MG tablet Take 1,000 mg by mouth every 6 hours as needed for Pain      amLODIPine (NORVASC) 10 MG tablet Take 1 tablet by mouth daily 90 tablet 1    oxybutynin (DITROPAN) 5 MG tablet Take 1 tablet by mouth 2 times daily 180 tablet 1    polyethylene glycol (GLYCOLAX) powder take 17GM (DISSOLVED IN WATER) by mouth once daily 527 g 3    fluticasone (FLONASE) 50 MCG/ACT nasal spray 1 spray by Nasal route daily 1 Bottle 5    lisinopril-hydrochlorothiazide (PRINZIDE;ZESTORETIC) 20-25 MG per tablet take 1 tablet by mouth once daily 90 tablet 1    famotidine (PEPCID) 40 MG tablet Take 1 tablet by mouth every evening 90 tablet 1    pravastatin (PRAVACHOL) 40 MG tablet Take 1 tablet by mouth every evening 90 tablet 1    Loratadine-Pseudoephedrine (CLARITIN-D 24 HOUR PO) Take by mouth daily as needed       aspirin 81 MG tablet Take 81 mg by mouth daily       vitamin D 1000 UNITS CAPS Take by mouth 2 times daily       Calcium (CALCIUM 500 + D) 500-125 MG-UNIT TABS Take by mouth daily       CRANBERRY-VITAMIN C PO Take 4,200 mg by mouth daily      cephALEXin (KEFLEX) 500 MG capsule Take 500 mg by mouth Take 4 caps prior to dental appointment.  vitamin E 400 UNIT capsule Take 400 Units by mouth daily. No current facility-administered medications for this visit.         Allergies   Allergen Reactions    Pcn [Penicillins] Hives and Rash    Sulfa Antibiotics Rash       Review of Systems - General ROS: negative for - fever or chills   Psychological ROS: negative for - anxiety, depression  Hematological and Lymphatic ROS: No history in 7 years - 12/1/15 (but had normal colonoscopy except tortuous colon and hemorrhoids - patient wants to wait till 2018). Reportedly per Flavio Pino there is no vaccine record in paper chart. Will need patient to check at home and if no documentation - will start administering. Natali Reese MD    [facility]  [facility]. [place] [address]  [address]  [address]  Dept: [phone]  Dept Fax: [phone] : [phone]

## 2017-11-28 LAB
ORGANISM: ABNORMAL
URINE CULTURE REFLEX: ABNORMAL

## 2017-11-30 ENCOUNTER — TELEPHONE (OUTPATIENT)
Dept: INTERNAL MEDICINE CLINIC | Age: 72
End: 2017-11-30

## 2017-11-30 NOTE — TELEPHONE ENCOUNTER
----- Message from Mani Contreras MD sent at 11/29/2017  8:06 AM EST -----  Let her know there is no specific growth on culture.

## 2017-12-11 ENCOUNTER — TELEPHONE (OUTPATIENT)
Dept: INTERNAL MEDICINE CLINIC | Age: 72
End: 2017-12-11

## 2017-12-11 NOTE — TELEPHONE ENCOUNTER
----- Message from Alex Harman MD sent at 12/10/2017  8:32 PM EST -----  She was to take Cipro x 3 days then repeat UA but I don't see that she did this. Please call her and find out what happened. I also thought she was going to finish up the first set of troches and I was going to order more - don't see that I ordered it during visit. Call her and ask if tongue still an issue and order another round of troches if she only did one.

## 2018-01-08 ENCOUNTER — HOSPITAL ENCOUNTER (OUTPATIENT)
Age: 73
Discharge: HOME | End: 2018-01-08
Payer: MEDICARE

## 2018-01-08 DIAGNOSIS — R31.29 MICROSCOPIC HEMATURIA: ICD-10-CM

## 2018-01-08 LAB
BACTERIA: NORMAL
BILIRUBIN URINE: NEGATIVE
BLOOD, URINE: NEGATIVE
CASTS: NORMAL /LPF
CASTS: NORMAL /LPF
CHARACTER, URINE: CLEAR
COLOR: YELLOW
CRYSTALS: NORMAL
EPITHELIAL CELLS, UA: NORMAL /HPF
GLUCOSE, URINE: NEGATIVE MG/DL
KETONES, URINE: NEGATIVE
LEUKOCYTE ESTERASE, URINE: NEGATIVE
MISCELLANEOUS LAB TEST RESULT: NORMAL
NITRITE, URINE: NEGATIVE
PH UA: 6.5
PROTEIN UA: NEGATIVE MG/DL
RBC URINE: NORMAL /HPF
RENAL EPITHELIAL, UA: NORMAL
SPECIFIC GRAVITY UA: 1.01 (ref 1–1.03)
UROBILINOGEN, URINE: 0.2 EU/DL
WBC UA: NORMAL /HPF
YEAST: NORMAL

## 2018-01-08 PROCEDURE — 81001 URINALYSIS AUTO W/SCOPE: CPT

## 2018-01-09 ENCOUNTER — OFFICE VISIT (OUTPATIENT)
Dept: INTERNAL MEDICINE CLINIC | Age: 73
End: 2018-01-09
Payer: MEDICARE

## 2018-01-09 ENCOUNTER — HOSPITAL ENCOUNTER (OUTPATIENT)
Age: 73
Discharge: HOME | End: 2018-01-09
Payer: MEDICARE

## 2018-01-09 VITALS
DIASTOLIC BLOOD PRESSURE: 82 MMHG | HEART RATE: 80 BPM | BODY MASS INDEX: 24.99 KG/M2 | WEIGHT: 150 LBS | HEIGHT: 65 IN | SYSTOLIC BLOOD PRESSURE: 138 MMHG

## 2018-01-09 DIAGNOSIS — R31.29 MICROSCOPIC HEMATURIA: Primary | ICD-10-CM

## 2018-01-09 DIAGNOSIS — E78.2 MIXED HYPERLIPIDEMIA: ICD-10-CM

## 2018-01-09 DIAGNOSIS — I10 ESSENTIAL HYPERTENSION: ICD-10-CM

## 2018-01-09 DIAGNOSIS — E87.6 HYPOKALEMIA: ICD-10-CM

## 2018-01-09 DIAGNOSIS — N32.81 OVERACTIVE BLADDER: ICD-10-CM

## 2018-01-09 DIAGNOSIS — K59.01 SLOW TRANSIT CONSTIPATION: ICD-10-CM

## 2018-01-09 LAB — POTASSIUM SERPL-SCNC: 3.7 MEQ/L (ref 3.5–5.2)

## 2018-01-09 PROCEDURE — 84132 ASSAY OF SERUM POTASSIUM: CPT

## 2018-01-09 PROCEDURE — 99214 OFFICE O/P EST MOD 30 MIN: CPT | Performed by: INTERNAL MEDICINE

## 2018-01-09 PROCEDURE — 36415 COLL VENOUS BLD VENIPUNCTURE: CPT

## 2018-01-09 RX ORDER — FAMOTIDINE 40 MG/1
40 TABLET, FILM COATED ORAL EVERY EVENING
Qty: 90 TABLET | Refills: 1 | Status: SHIPPED | OUTPATIENT
Start: 2018-01-09 | End: 2018-05-17

## 2018-01-09 RX ORDER — OXYBUTYNIN CHLORIDE 5 MG/1
5 TABLET ORAL 2 TIMES DAILY
Qty: 180 TABLET | Refills: 1 | Status: SHIPPED | OUTPATIENT
Start: 2018-01-09 | End: 2018-05-17 | Stop reason: SDUPTHER

## 2018-01-09 RX ORDER — AMLODIPINE BESYLATE 10 MG/1
10 TABLET ORAL DAILY
Qty: 90 TABLET | Refills: 1 | Status: SHIPPED | OUTPATIENT
Start: 2018-01-09 | End: 2018-05-17 | Stop reason: SDUPTHER

## 2018-01-09 RX ORDER — POLYETHYLENE GLYCOL 3350 17 G/17G
POWDER, FOR SOLUTION ORAL
Qty: 527 G | Refills: 3 | Status: SHIPPED | OUTPATIENT
Start: 2018-01-09 | End: 2019-01-09 | Stop reason: SDUPTHER

## 2018-01-09 RX ORDER — PRAVASTATIN SODIUM 40 MG
40 TABLET ORAL EVERY EVENING
Qty: 90 TABLET | Refills: 1 | Status: SHIPPED | OUTPATIENT
Start: 2018-01-09 | End: 2018-05-17 | Stop reason: SDUPTHER

## 2018-01-09 NOTE — PROGRESS NOTES
Chief Complaint   Patient presents with    Hypertension    Hyperlipidemia    Gastroesophageal Reflux    Other     hypokalemia    Other     microscopic hematuria     This patient presents for medical evaluation. This is a 6 month follow up of chronic issues. Microscopic hematuria - probable due to left renal cyst on u/s, persistent hematuria - added Cipro x 3 days and repeated UA. If still has microscopic hematuria after Cipro - send to Urology. UA still with RBC - send to Urology for further work-up. Hypokalemia - she supplemented for a month and has been off it for 2 weeks. She is complaining of leg cramps. Will check potassium now and consider continuing forever at 20 meq daily - most likely due to HCTZ. Offered to change HCTZ to something else but she would rather stay on this regimen. Reema Regalado - She is eating better and taste sensation more normal.      OA - No longer taking NSAID or any pain medication. She is just on Tylenol PM at bedtime. She is sleeping much better at night. Hypertension - BP controlled, asymptomatic. BMP 11/2017. Continue lisinopril/HCTZ, Norvasc. Hyperglycemia - 117, 11/2017. Continue diet control. Hyperlipidemia - LDL 90 11/2017, normal liver function 11/2017. Continue pravastatin. GERD - stable, continue Pepcid. OAB - stable, continue Ditropan. All other issues stable. Refilled meds - labs done 11/2017. Need to see what potassium is then order labs for 5/2018.     Patient Active Problem List   Diagnosis    Metabolic syndrome    Essential hypertension    Overactive bladder    Bradycardia    Insomnia    GERD (gastroesophageal reflux disease)    DJD (degenerative joint disease) of knee    Abnormal EKG    Hypoxemia    Pneumonia    HTN (hypertension)    Malignant melanoma ([facility] [address]    S/P knee replacement    Atelectasis of right lung    Dyspepsia    Mixed hyperlipidemia    Hyperglycemia       Current Outpatient Prescriptions Urology - Bunny Dominguez MD   2. Hypokalemia  Potassium   3. Essential hypertension  amLODIPine (NORVASC) 10 MG tablet   4. Mixed hyperlipidemia  pravastatin (PRAVACHOL) 40 MG tablet   5. Slow transit constipation  polyethylene glycol (GLYCOLAX) powder   6. Overactive bladder  oxybutynin (DITROPAN) 5 MG tablet     Orders Placed This Encounter   Procedures    Potassium     Standing Status:   Future     Standing Expiration Date:   1/9/2019   [facility] Urology - Bunny Dominguez MD     Referral Priority:   Routine     Referral Type:   Consult for Advice and Opinion     Referral Reason:   Specialty Services Required     Referred to Provider:   Bridger Adams MD     Requested Specialty:   Urology     Number of Visits Requested:   1     Labs due now. Microscopic hematuria - treaedt with Cipro x 3 days and repeated UA - still with increased RBCs. May be due to renal cyst seen on renal ultrasound. But will send to Urology for cystoscopy and to complete work up of microscopic hematuria. Hypokalemia - repeat potassium level, continue supplement for now. Hypertension - BP controlled, continue lisinopril/HCTZ, and Norvasc. Hyperlipidemia - controlled, continue pravastatin. Constipation - continue Miralax. OAB - stable, continue Ditropan. GERD - stable, continue Pepcid. Will schedule follow up appointment in 4 months. Continue medications at current doses. Potassium due now. Pneumovax 5/2011 per her report. She is not interested in tetanus today. She thought she may have had a tetanus vaccine at urgent care - will call to see if they have any record. She had mammogram 6/2017 and pap 2/24/14. She did not get Zoster vaccine. Colonoscopy was done 12/1/2008 with Dr. Florence Newton - due in 7 years - 12/1/15 (but had normal colonoscopy except tortuous colon and hemorrhoids - patient wants to wait till 2018). Reportedly per Enzo Rollins there is no vaccine record in paper chart.   Will need patient

## 2018-01-17 DIAGNOSIS — I10 ESSENTIAL HYPERTENSION: ICD-10-CM

## 2018-01-18 ENCOUNTER — TELEPHONE (OUTPATIENT)
Dept: INTERNAL MEDICINE CLINIC | Age: 73
End: 2018-01-18

## 2018-01-19 RX ORDER — LISINOPRIL AND HYDROCHLOROTHIAZIDE 25; 20 MG/1; MG/1
TABLET ORAL
Qty: 90 TABLET | Refills: 1 | Status: SHIPPED | OUTPATIENT
Start: 2018-01-19 | End: 2018-05-17 | Stop reason: SDUPTHER

## 2018-01-23 ENCOUNTER — TELEPHONE (OUTPATIENT)
Dept: INTERNAL MEDICINE CLINIC | Age: 73
End: 2018-01-23

## 2018-01-23 RX ORDER — POTASSIUM CHLORIDE 20 MEQ/1
20 TABLET, EXTENDED RELEASE ORAL DAILY
Qty: 90 TABLET | Refills: 1 | OUTPATIENT
Start: 2018-01-23 | End: 2018-05-17 | Stop reason: SDUPTHER

## 2018-01-23 NOTE — TELEPHONE ENCOUNTER
----- Message from Celeste Juárez MD sent at 1/21/2018  8:23 PM EST -----  Let her know potassium is low normal - continue potassium 20 meq daily. Give new script if needed.

## 2018-01-23 NOTE — TELEPHONE ENCOUNTER
Per pt HIP[employer], left message informing pt that the potassium level was on the low normal side and Dr. Blake Sims wants her to continue taking potassium 20 meq daily and a new prescription has been sent to   [facility] on  [address] [facility]. Asked that she call back to the office to let us know that she did receive this message.

## 2018-01-25 NOTE — TELEPHONE ENCOUNTER
I personally called patient and told her I want her to take lisinopril/HCTZ with potassium. She agreed and has been doing that.

## 2018-02-15 ENCOUNTER — OFFICE VISIT (OUTPATIENT)
Dept: UROLOGY | Age: 73
End: 2018-02-15
Payer: MEDICARE

## 2018-02-15 VITALS
BODY MASS INDEX: 25.66 KG/M2 | WEIGHT: 154 LBS | HEIGHT: 65 IN | SYSTOLIC BLOOD PRESSURE: 128 MMHG | DIASTOLIC BLOOD PRESSURE: 70 MMHG

## 2018-02-15 DIAGNOSIS — R31.29 MICROSCOPIC HEMATURIA: Primary | ICD-10-CM

## 2018-02-15 DIAGNOSIS — R33.9 INCOMPLETE EMPTYING OF BLADDER: ICD-10-CM

## 2018-02-15 LAB
BILIRUBIN URINE: NEGATIVE
BLOOD URINE, POC: NORMAL
CHARACTER, URINE: CLEAR
COLOR, URINE: YELLOW
GLUCOSE URINE: NEGATIVE MG/DL
KETONES, URINE: NEGATIVE
LEUKOCYTE CLUMPS, URINE: NORMAL
NITRITE, URINE: NEGATIVE
PH, URINE: 7
POST VOID RESIDUAL (PVR): 53 ML
PROTEIN, URINE: NEGATIVE MG/DL
SPECIFIC GRAVITY, URINE: 1.01 (ref 1–1.03)
UROBILINOGEN, URINE: 0.2 EU/DL

## 2018-02-15 PROCEDURE — 99204 OFFICE O/P NEW MOD 45 MIN: CPT | Performed by: UROLOGY

## 2018-02-15 PROCEDURE — 81003 URINALYSIS AUTO W/O SCOPE: CPT | Performed by: UROLOGY

## 2018-02-15 PROCEDURE — 51798 US URINE CAPACITY MEASURE: CPT | Performed by: UROLOGY

## 2018-02-15 PROCEDURE — 52000 CYSTOURETHROSCOPY: CPT | Performed by: UROLOGY

## 2018-02-15 ASSESSMENT — ENCOUNTER SYMPTOMS
NAUSEA: 0
SHORTNESS OF BREATH: 0
EYE REDNESS: 0
CHEST TIGHTNESS: 0
EYE PAIN: 0
BACK PAIN: 0
ABDOMINAL PAIN: 0

## 2018-02-15 ASSESSMENT — LIFESTYLE VARIABLES: TOBACCO_USE: 0

## 2018-02-15 NOTE — PROGRESS NOTES
(TYLENOL) 500 MG tablet, Take 1,000 mg by mouth every 6 hours as needed for Pain, Disp: , Rfl:     fluticasone (FLONASE) 50 MCG/ACT nasal spray, 1 spray by Nasal route daily, Disp: 1 Bottle, Rfl: 5    Loratadine-Pseudoephedrine (CLARITIN-D 24 HOUR PO), Take by mouth daily as needed , Disp: , Rfl:     aspirin 81 MG tablet, Take 81 mg by mouth daily , Disp: , Rfl:     vitamin D 1000 UNITS CAPS, Take by mouth 2 times daily , Disp: , Rfl:     Calcium (CALCIUM 500 + D) 500-125 MG-UNIT TABS, Take by mouth daily , Disp: , Rfl:     Review of Systems   Constitutional: Negative for chills and fever. Eyes: Negative for pain and redness. Respiratory: Negative for chest tightness and shortness of breath. Cardiovascular: Negative for chest pain and leg swelling. Gastrointestinal: Negative for abdominal pain and nausea. Endocrine: Negative for cold intolerance and heat intolerance. Genitourinary: Positive for hematuria (Micro). Negative for difficulty urinating, frequency and urgency. Musculoskeletal: Negative for back pain and joint swelling. Allergic/Immunologic: Negative for environmental allergies and food allergies. Neurological: Negative for dizziness and light-headedness. Hematological: Does not bruise/bleed easily. /70   Ht 5' 5\" (1.651 m)   Wt 154 lb (69.9 kg)   BMI 25.63 kg/m²     Objective:   Physical Exam   Constitutional: She is oriented to person, place, and time. Vital signs are normal. She appears well-developed and well-nourished. No distress. HENT:   Head: Normocephalic and atraumatic. Mouth/Throat: Oropharynx is clear and moist and mucous membranes are normal. No oropharyngeal exudate. Eyes: EOM are normal. Pupils are equal, round, and reactive to light. Right eye exhibits no discharge. Left eye exhibits no discharge. No scleral icterus. Neck: Trachea normal. No JVD present. No tracheal deviation present. No thyroid mass present.    Cardiovascular: Normal rate and

## 2018-03-02 ENCOUNTER — HOSPITAL ENCOUNTER (OUTPATIENT)
Dept: CT IMAGING | Age: 73
Discharge: HOME | End: 2018-03-02
Payer: MEDICARE

## 2018-03-02 DIAGNOSIS — R31.29 MICROSCOPIC HEMATURIA: ICD-10-CM

## 2018-03-02 LAB — POC CREATININE WHOLE BLOOD: 0.8 MG/DL (ref 0.5–1.2)

## 2018-03-02 PROCEDURE — 82565 ASSAY OF CREATININE: CPT

## 2018-03-02 PROCEDURE — 6360000004 HC RX CONTRAST MEDICATION: Performed by: UROLOGY

## 2018-03-02 PROCEDURE — 74178 CT ABD&PLV WO CNTR FLWD CNTR: CPT

## 2018-03-02 RX ADMIN — IOPAMIDOL 85 ML: 755 INJECTION, SOLUTION INTRAVENOUS at 08:22

## 2018-03-09 ENCOUNTER — OFFICE VISIT (OUTPATIENT)
Dept: UROLOGY | Age: 73
End: 2018-03-09
Payer: MEDICARE

## 2018-03-09 VITALS
SYSTOLIC BLOOD PRESSURE: 126 MMHG | DIASTOLIC BLOOD PRESSURE: 84 MMHG | BODY MASS INDEX: 25.83 KG/M2 | WEIGHT: 155 LBS | HEIGHT: 65 IN

## 2018-03-09 DIAGNOSIS — R31.29 MICROSCOPIC HEMATURIA: Primary | ICD-10-CM

## 2018-03-09 PROCEDURE — 99213 OFFICE O/P EST LOW 20 MIN: CPT | Performed by: NURSE PRACTITIONER

## 2018-03-09 ASSESSMENT — ENCOUNTER SYMPTOMS
VOMITING: 0
ABDOMINAL PAIN: 0
NAUSEA: 0

## 2018-05-17 ENCOUNTER — OFFICE VISIT (OUTPATIENT)
Dept: INTERNAL MEDICINE CLINIC | Age: 73
End: 2018-05-17
Payer: MEDICARE

## 2018-05-17 VITALS
DIASTOLIC BLOOD PRESSURE: 70 MMHG | HEIGHT: 65 IN | SYSTOLIC BLOOD PRESSURE: 136 MMHG | BODY MASS INDEX: 26.41 KG/M2 | WEIGHT: 158.5 LBS | HEART RATE: 68 BPM

## 2018-05-17 DIAGNOSIS — E78.2 MIXED HYPERLIPIDEMIA: ICD-10-CM

## 2018-05-17 DIAGNOSIS — I10 ESSENTIAL HYPERTENSION: ICD-10-CM

## 2018-05-17 DIAGNOSIS — E87.6 HYPOKALEMIA: ICD-10-CM

## 2018-05-17 DIAGNOSIS — N32.81 OVERACTIVE BLADDER: ICD-10-CM

## 2018-05-17 DIAGNOSIS — R73.9 HYPERGLYCEMIA: ICD-10-CM

## 2018-05-17 DIAGNOSIS — J30.89 CHRONIC NONSEASONAL ALLERGIC RHINITIS DUE TO OTHER ALLERGEN: Primary | ICD-10-CM

## 2018-05-17 DIAGNOSIS — R10.13 DYSPEPSIA: ICD-10-CM

## 2018-05-17 PROCEDURE — 99214 OFFICE O/P EST MOD 30 MIN: CPT | Performed by: INTERNAL MEDICINE

## 2018-05-17 PROCEDURE — 93000 ELECTROCARDIOGRAM COMPLETE: CPT | Performed by: INTERNAL MEDICINE

## 2018-05-17 RX ORDER — FLUTICASONE PROPIONATE 50 MCG
2 SPRAY, SUSPENSION (ML) NASAL DAILY
Qty: 1 BOTTLE | Refills: 5 | Status: SHIPPED | OUTPATIENT
Start: 2018-05-17 | End: 2018-08-30 | Stop reason: ALTCHOICE

## 2018-05-17 RX ORDER — FAMOTIDINE 40 MG/1
40 TABLET, FILM COATED ORAL EVERY EVENING
Qty: 90 TABLET | Refills: 1 | Status: CANCELLED | OUTPATIENT
Start: 2018-05-17

## 2018-05-17 RX ORDER — LEVOCETIRIZINE DIHYDROCHLORIDE 5 MG/1
5 TABLET, FILM COATED ORAL NIGHTLY
Qty: 30 TABLET | Refills: 5 | Status: SHIPPED | OUTPATIENT
Start: 2018-05-17 | End: 2018-08-23

## 2018-05-17 RX ORDER — OXYBUTYNIN CHLORIDE 5 MG/1
5 TABLET ORAL 2 TIMES DAILY
Qty: 180 TABLET | Refills: 1 | Status: SHIPPED | OUTPATIENT
Start: 2018-05-17 | End: 2019-01-08 | Stop reason: SDUPTHER

## 2018-05-17 RX ORDER — VITAMIN E 268 MG
400 CAPSULE ORAL DAILY
COMMUNITY
End: 2020-12-10

## 2018-05-17 RX ORDER — POTASSIUM CHLORIDE 20 MEQ/1
20 TABLET, EXTENDED RELEASE ORAL DAILY
Qty: 90 TABLET | Refills: 1 | Status: SHIPPED | OUTPATIENT
Start: 2018-05-17 | End: 2019-01-08 | Stop reason: SDUPTHER

## 2018-05-17 RX ORDER — FAMOTIDINE 20 MG/1
20 TABLET, FILM COATED ORAL 2 TIMES DAILY
Qty: 60 TABLET | Refills: 3 | Status: SHIPPED | OUTPATIENT
Start: 2018-05-17 | End: 2018-09-22 | Stop reason: SDUPTHER

## 2018-05-17 RX ORDER — AMLODIPINE BESYLATE 10 MG/1
10 TABLET ORAL DAILY
Qty: 90 TABLET | Refills: 1 | Status: SHIPPED | OUTPATIENT
Start: 2018-05-17 | End: 2019-01-08 | Stop reason: SDUPTHER

## 2018-05-17 RX ORDER — PRAVASTATIN SODIUM 40 MG
40 TABLET ORAL EVERY EVENING
Qty: 90 TABLET | Refills: 1 | Status: SHIPPED | OUTPATIENT
Start: 2018-05-17 | End: 2019-01-08 | Stop reason: SDUPTHER

## 2018-05-17 RX ORDER — LISINOPRIL AND HYDROCHLOROTHIAZIDE 25; 20 MG/1; MG/1
TABLET ORAL
Qty: 90 TABLET | Refills: 1 | Status: SHIPPED | OUTPATIENT
Start: 2018-05-17 | End: 2019-01-08 | Stop reason: SDUPTHER

## 2018-05-27 PROBLEM — J30.9 ALLERGIC RHINITIS DUE TO ALLERGEN: Status: ACTIVE | Noted: 2018-05-27

## 2018-07-10 ENCOUNTER — TELEPHONE (OUTPATIENT)
Dept: INTERNAL MEDICINE CLINIC | Age: 73
End: 2018-07-10

## 2018-07-10 NOTE — TELEPHONE ENCOUNTER
Received call from [employer] stating that Milvia Sunshine is scheduled for a breast biopsy on 7/13/18 and they would like patient to hold her ASA if okay with you. Okay to hold ?

## 2018-07-10 NOTE — TELEPHONE ENCOUNTER
Okay per verbal order Dr. Michelle Black to hold ASA for procedure. Left message for Bard Romo at [facility] that Dr. Michelle Black is okay with patient holding ASA for the breast biopsy.

## 2018-07-23 ENCOUNTER — TELEPHONE (OUTPATIENT)
Dept: INTERNAL MEDICINE CLINIC | Age: 73
End: 2018-07-23

## 2018-07-25 ENCOUNTER — TELEPHONE (OUTPATIENT)
Dept: INTERNAL MEDICINE CLINIC | Age: 73
End: 2018-07-25

## 2018-07-25 DIAGNOSIS — F41.8 SITUATIONAL ANXIETY: ICD-10-CM

## 2018-07-25 RX ORDER — LORAZEPAM 0.5 MG/1
0.5 TABLET ORAL SEE ADMIN INSTRUCTIONS
Qty: 4 TABLET | Refills: 0 | Status: SHIPPED | OUTPATIENT
Start: 2018-07-25 | End: 2018-08-24

## 2018-07-29 ENCOUNTER — TELEPHONE (OUTPATIENT)
Dept: INTERNAL MEDICINE CLINIC | Age: 73
End: 2018-07-29

## 2018-07-29 NOTE — TELEPHONE ENCOUNTER
Pt called on Saturday, 7/28 at 2:22pm concerning rx for Ativan. She says rx from her PCP did not go through.  I called in rx for Ativan 0.5mg, take 1/2 to 1 tablet 30 min before flight, may repeat if needed, #4.

## 2018-07-29 NOTE — TELEPHONE ENCOUNTER
Script was printed and waiting at office - Miguel Angel Nolen called in this weekend at request of patient. Please shred printed script at office.

## 2018-08-23 ENCOUNTER — HOSPITAL ENCOUNTER (EMERGENCY)
Age: 73
Discharge: HOME | End: 2018-08-23
Payer: MEDICARE

## 2018-08-23 ENCOUNTER — HOSPITAL ENCOUNTER (EMERGENCY)
Dept: GENERAL RADIOLOGY | Age: 73
Discharge: HOME | End: 2018-08-23
Payer: MEDICARE

## 2018-08-23 VITALS
HEIGHT: 65 IN | HEART RATE: 72 BPM | WEIGHT: 161.38 LBS | DIASTOLIC BLOOD PRESSURE: 70 MMHG | TEMPERATURE: 97.6 F | SYSTOLIC BLOOD PRESSURE: 136 MMHG | OXYGEN SATURATION: 98 % | BODY MASS INDEX: 26.89 KG/M2 | RESPIRATION RATE: 16 BRPM

## 2018-08-23 DIAGNOSIS — J20.9 ACUTE BRONCHITIS WITH BRONCHOSPASM: ICD-10-CM

## 2018-08-23 DIAGNOSIS — J01.10 ACUTE NON-RECURRENT FRONTAL SINUSITIS: Primary | ICD-10-CM

## 2018-08-23 PROCEDURE — 99213 OFFICE O/P EST LOW 20 MIN: CPT

## 2018-08-23 PROCEDURE — 99214 OFFICE O/P EST MOD 30 MIN: CPT | Performed by: NURSE PRACTITIONER

## 2018-08-23 PROCEDURE — 94640 AIRWAY INHALATION TREATMENT: CPT

## 2018-08-23 PROCEDURE — 6360000002 HC RX W HCPCS: Performed by: NURSE PRACTITIONER

## 2018-08-23 PROCEDURE — 71046 X-RAY EXAM CHEST 2 VIEWS: CPT

## 2018-08-23 RX ORDER — ALBUTEROL SULFATE 2.5 MG/3ML
2.5 SOLUTION RESPIRATORY (INHALATION) ONCE
Status: COMPLETED | OUTPATIENT
Start: 2018-08-23 | End: 2018-08-23

## 2018-08-23 RX ORDER — ALBUTEROL SULFATE 90 UG/1
2 AEROSOL, METERED RESPIRATORY (INHALATION) EVERY 4 HOURS PRN
Qty: 1 INHALER | Refills: 0 | Status: SHIPPED | OUTPATIENT
Start: 2018-08-23 | End: 2019-01-08

## 2018-08-23 RX ORDER — DEXTROMETHORPHAN HYDROBROMIDE AND PROMETHAZINE HYDROCHLORIDE 15; 6.25 MG/5ML; MG/5ML
5 SYRUP ORAL NIGHTLY PRN
Qty: 35 ML | Refills: 0 | Status: SHIPPED | OUTPATIENT
Start: 2018-08-23 | End: 2018-08-30

## 2018-08-23 RX ORDER — PREDNISONE 20 MG/1
20 TABLET ORAL 2 TIMES DAILY
Qty: 10 TABLET | Refills: 0 | Status: SHIPPED | OUTPATIENT
Start: 2018-08-23 | End: 2018-08-28

## 2018-08-23 RX ORDER — DOXYCYCLINE HYCLATE 100 MG/1
100 CAPSULE ORAL 2 TIMES DAILY
Qty: 14 CAPSULE | Refills: 0 | Status: SHIPPED | OUTPATIENT
Start: 2018-08-23 | End: 2018-08-30

## 2018-08-23 RX ADMIN — ALBUTEROL SULFATE 2.5 MG: 2.5 SOLUTION RESPIRATORY (INHALATION) at 15:19

## 2018-08-23 ASSESSMENT — ENCOUNTER SYMPTOMS
CHEST TIGHTNESS: 1
SINUS CONGESTION: 1
TROUBLE SWALLOWING: 0
VOICE CHANGE: 0
SINUS PAIN: 0
SORE THROAT: 1
NAUSEA: 0
SINUS PRESSURE: 1
COUGH: 1
RHINORRHEA: 1
SHORTNESS OF BREATH: 0
STRIDOR: 0
WHEEZING: 0
FACIAL SWELLING: 0
VOMITING: 0

## 2018-08-23 NOTE — ED PROVIDER NOTES
for congestion, ear pain, rhinorrhea, sinus pressure (frontal) and sore throat. Negative for ear discharge, facial swelling, nosebleeds, postnasal drip, sinus pain, sneezing, trouble swallowing and voice change. Eyes: Negative for photophobia and visual disturbance. Respiratory: Positive for cough and chest tightness. Negative for shortness of breath, wheezing and stridor. Cardiovascular: Negative for chest pain, palpitations and leg swelling. Gastrointestinal: Negative for nausea and vomiting. Musculoskeletal: Negative for myalgias. Skin: Negative for pallor and rash. Neurological: Positive for headaches (worse with coughing). Negative for dizziness. Hematological: Negative for adenopathy. PAST MEDICAL HISTORY         Diagnosis Date    Abnormal EKG     normal stress test 4/7719    Complication of anesthesia     difficulty breathing after surgery- transferrred from [facility] to [facility] [place]. Angeline's for 3 days, O2 for about 5 days    Diabetes mellitus ([address]     History type 2 - lost weight - diet controlled    Hyperlipidemia     Hypertension     Melanoma in situ ([address] 6/2012    of chest - Dr. Bejarano Stain - margins clear - neg sentinal lymph node    Melanoma in situ of lower extremity ([address]     excision- x2    Metabolic syndrome     much improved - normal triglycerides, weight loss of 50#    OA (osteoarthritis)     left knee       SURGICAL HISTORY     Patient  has a past surgical history that includes Bladder surgery; Knee arthroscopy (Left, 2007); Skin cancer excision; pre-malignant / benign skin lesion excision (Right, 2009); skin biopsy; Skin cancer excision (6/27/13); malignant skin lesion excision (Left, 7/31/2013); Knee arthroscopy (Left, 1/2014); Skin cancer excision; Tonsillectomy (1966); Hysterectomy (11/1971); eye surgery (Bilateral, 3/2016, 4/2016); Colonoscopy; Skin cancer excision (Left, 03/24/2017);  Total knee arthroplasty (Right, 09/2017); and Total knee arthroplasty (Left, 06/17/2015). CURRENT MEDICATIONS       Discharge Medication List as of 8/23/2018  3:58 PM      CONTINUE these medications which have NOT CHANGED    Details   LORazepam (ATIVAN) 0.5 MG tablet Take 1 tablet by mouth See Admin Instructions for 30 days. Take 1/2 to 1 tablet 30 minutes before flight - may repeat dose if not effective. ., Disp-4 tablet, R-0Print      Diphenhydramine-APAP, sleep, (TYLENOL PM EXTRA STRENGTH PO) Take 2 tablets by mouth nightlyHistorical Med      vitamin E 400 UNIT capsule Take 400 Units by mouth dailyHistorical Med      potassium chloride (KLOR-CON M) 20 MEQ extended release tablet Take 1 tablet by mouth daily, Disp-90 tablet, R-1Refill when dueNormal      lisinopril-hydrochlorothiazide (PRINZIDE;ZESTORETIC) 20-25 MG per tablet take 1 tablet by mouth once daily, Disp-90 tablet, R-1Refill when dueNormal      pravastatin (PRAVACHOL) 40 MG tablet Take 1 tablet by mouth every evening, Disp-90 tablet, R-1Refill when dueNormal      oxybutynin (DITROPAN) 5 MG tablet Take 1 tablet by mouth 2 times daily, Disp-180 tablet, R-1Refill when dueNormal      amLODIPine (NORVASC) 10 MG tablet Take 1 tablet by mouth daily, Disp-90 tablet, R-1Refill when dueNormal      famotidine (PEPCID) 20 MG tablet Take 1 tablet by mouth 2 times daily, Disp-60 tablet, R-3Normal      fluticasone (FLONASE) 50 MCG/ACT nasal spray 2 sprays by Nasal route daily, Disp-1 Bottle, R-5Normal      polyethylene glycol (GLYCOLAX) powder take 17GM (DISSOLVED IN WATER) by mouth once daily, Disp-527 g, R-3Normal      Loratadine-Pseudoephedrine (CLARITIN-D 24 HOUR PO) Take by mouth daily as needed Historical Med      aspirin 81 MG tablet Take 81 mg by mouth daily Historical Med      vitamin D 1000 UNITS CAPS Take by mouth 2 times daily Historical Med      Calcium (CALCIUM 500 + D) 500-125 MG-UNIT TABS Take by mouth daily Historical Med             ALLERGIES     Patient is is allergic to pcn [penicillins] and sulfa antibiotics.     FAMILY HISTORY     Patient's family history includes Cancer in her father; Heart Failure in her mother. SOCIAL HISTORY     Patient  reports that she has never smoked. She has never used smokeless tobacco. She reports that she drinks alcohol. She reports that she does not use drugs. PHYSICAL EXAM     ED TRIAGE VITALS  BP: 136/70, Temp: 97.6 °F (36.4 °C), Pulse: 72, Resp: 16, SpO2: 98 %  Physical Exam   Constitutional: She is oriented to person, place, and time. She appears well-developed and well-nourished. Non-toxic appearance. She does not have a sickly appearance. She does not appear ill. No distress. HENT:   Head: Normocephalic and atraumatic. Head is without right periorbital erythema and without left periorbital erythema. Right Ear: Hearing, tympanic membrane, external ear and ear canal normal.   Left Ear: Hearing, tympanic membrane, external ear and ear canal normal.   Nose: Mucosal edema (bilateral) present. No rhinorrhea. Right sinus exhibits no maxillary sinus tenderness and no frontal sinus tenderness. Left sinus exhibits no maxillary sinus tenderness and no frontal sinus tenderness. Mouth/Throat: Uvula is midline, oropharynx is clear and moist and mucous membranes are normal. No trismus in the jaw. No uvula swelling. No oropharyngeal exudate, posterior oropharyngeal edema or posterior oropharyngeal erythema. Neck: Normal range of motion. Neck supple. Cardiovascular: Normal rate, regular rhythm, S1 normal, S2 normal and normal heart sounds. Exam reveals no gallop, no S4, no distant heart sounds and no friction rub. No murmur heard. Pulmonary/Chest: Effort normal. No accessory muscle usage or stridor. No respiratory distress. She has no decreased breath sounds. She has wheezes (expiratory LLL). She has no rhonchi. She has no rales. She exhibits no tenderness. Lymphadenopathy:     She has no cervical adenopathy. Neurological: She is alert and oriented to person, place, and time.

## 2018-08-27 ASSESSMENT — ENCOUNTER SYMPTOMS: PHOTOPHOBIA: 0

## 2018-08-30 ENCOUNTER — HOSPITAL ENCOUNTER (EMERGENCY)
Age: 73
Discharge: HOME | End: 2018-08-30
Payer: MEDICARE

## 2018-08-30 VITALS
OXYGEN SATURATION: 98 % | BODY MASS INDEX: 26.63 KG/M2 | DIASTOLIC BLOOD PRESSURE: 77 MMHG | RESPIRATION RATE: 16 BRPM | HEART RATE: 93 BPM | WEIGHT: 160 LBS | SYSTOLIC BLOOD PRESSURE: 152 MMHG | TEMPERATURE: 97.5 F

## 2018-08-30 DIAGNOSIS — J01.11 ACUTE RECURRENT FRONTAL SINUSITIS: Primary | ICD-10-CM

## 2018-08-30 PROCEDURE — 99213 OFFICE O/P EST LOW 20 MIN: CPT

## 2018-08-30 PROCEDURE — 99213 OFFICE O/P EST LOW 20 MIN: CPT | Performed by: NURSE PRACTITIONER

## 2018-08-30 RX ORDER — DEXTROMETHORPHAN HYDROBROMIDE AND PROMETHAZINE HYDROCHLORIDE 15; 6.25 MG/5ML; MG/5ML
5 SYRUP ORAL NIGHTLY PRN
Qty: 35 ML | Refills: 0 | Status: SHIPPED | OUTPATIENT
Start: 2018-08-30 | End: 2018-09-06

## 2018-08-30 RX ORDER — ACETAMINOPHEN 325 MG/1
650 TABLET ORAL EVERY 6 HOURS PRN
COMMUNITY
End: 2020-08-28

## 2018-08-30 RX ORDER — GUAIFENESIN 600 MG/1
1200 TABLET, EXTENDED RELEASE ORAL 2 TIMES DAILY
Qty: 40 TABLET | Refills: 0 | Status: SHIPPED | OUTPATIENT
Start: 2018-08-30 | End: 2018-09-09

## 2018-08-30 ASSESSMENT — ENCOUNTER SYMPTOMS
NAUSEA: 0
EYE DISCHARGE: 0
VOMITING: 0
CHEST TIGHTNESS: 0
SINUS PRESSURE: 1
SORE THROAT: 1
DIARRHEA: 0
EYE ITCHING: 0
SINUS PAIN: 0
COLOR CHANGE: 0
RHINORRHEA: 1
WHEEZING: 0
COUGH: 1
TROUBLE SWALLOWING: 0
SHORTNESS OF BREATH: 0

## 2018-08-30 ASSESSMENT — PAIN DESCRIPTION - ORIENTATION: ORIENTATION: LEFT;RIGHT

## 2018-08-30 ASSESSMENT — PAIN SCALES - GENERAL: PAINLEVEL_OUTOF10: 8

## 2018-08-30 ASSESSMENT — PAIN DESCRIPTION - LOCATION: LOCATION: EAR;HEAD

## 2018-08-30 NOTE — ED PROVIDER NOTES
[address] [address] [address]  Urgent Care Encounter       CHIEF COMPLAINT       Chief Complaint   Patient presents with    Sinusitis     Seen last week at [facility] urgent care completed doxycyckline still sick- headache, slight cough    Otalgia     bilateral       Nurses Notes reviewed and I agree except as noted in the HPI. HISTORY OF PRESENT ILLNESS   Wally Rico is a 68 y.o. female who presents For complaints of sinus pressure and bilateral ear pain. The patient states that she was seen last week at [facility] [place] [address] [facility] urgent care and recently finished her prescription for doxycycline. She states that this provided her with no relief. She has been taking the prednisone as prescribed. She states this did not provide any significant relief for her symptoms so far. The patient has been using the cough syrup, but states this has provided her with relief of cough at night. She has been using her Flonase as prescribed by her PCP. She has not been using any saline washes. The patient denies fever, chills, nausea, vomiting, chest pain, shortness of breath, wheezing, nausea, vomiting, diarrhea, or headaches. Luiz Quiles HPI    REVIEW OF SYSTEMS     Review of Systems   Constitutional: Negative for activity change, appetite change, fatigue and fever. HENT: Positive for congestion, ear pain, postnasal drip, rhinorrhea, sinus pressure and sore throat. Negative for sinus pain, sneezing and trouble swallowing. Eyes: Negative for discharge and itching. Respiratory: Positive for cough. Negative for chest tightness, shortness of breath and wheezing. Cardiovascular: Negative for chest pain. Gastrointestinal: Negative for diarrhea, nausea and vomiting. Musculoskeletal: Negative for arthralgias and myalgias. Skin: Negative for color change, pallor and rash. Allergic/Immunologic: Negative for food allergies. Neurological: Negative for headaches.        PAST MEDICAL HISTORY         Diagnosis Date    Abnormal EKG lisinopril-hydrochlorothiazide (PRINZIDE;ZESTORETIC) 20-25 MG per tablet take 1 tablet by mouth once daily, Disp-90 tablet, R-1Refill when dueNormal      pravastatin (PRAVACHOL) 40 MG tablet Take 1 tablet by mouth every evening, Disp-90 tablet, R-1Refill when dueNormal      oxybutynin (DITROPAN) 5 MG tablet Take 1 tablet by mouth 2 times daily, Disp-180 tablet, R-1Refill when dueNormal      amLODIPine (NORVASC) 10 MG tablet Take 1 tablet by mouth daily, Disp-90 tablet, R-1Refill when dueNormal      famotidine (PEPCID) 20 MG tablet Take 1 tablet by mouth 2 times daily, Disp-60 tablet, R-3Normal      aspirin 81 MG tablet Take 81 mg by mouth daily Historical Med      vitamin D 1000 UNITS CAPS Take by mouth 2 times daily Historical Med      Calcium (CALCIUM 500 + D) 500-125 MG-UNIT TABS Take by mouth daily Historical Med      Diphenhydramine-APAP, sleep, (TYLENOL PM EXTRA STRENGTH PO) Take 2 tablets by mouth nightlyHistorical Med      polyethylene glycol (GLYCOLAX) powder take 17GM (DISSOLVED IN WATER) by mouth once daily, Disp-527 g, R-3Normal      Loratadine-Pseudoephedrine (CLARITIN-D 24 HOUR PO) Take by mouth daily as needed Historical Med             ALLERGIES     Patient is is allergic to pcn [penicillins] and sulfa antibiotics. Patients   Immunization History   Administered Date(s) Administered    Influenza Virus Vaccine 10/31/2013, 01/29/2015, 01/19/2016    Influenza, Tonya Kc, 3 yrs and older, IM, PF (Fluzone 3 yrs and older or Afluria 5 yrs and older) 01/17/2017    Pneumococcal 13-valent Conjugate Hejarod Wolfdy) 07/18/2017       FAMILY HISTORY     Patient's family history includes Cancer in her father; Heart Failure in her mother. SOCIAL HISTORY     Patient  reports that she has never smoked. She has never used smokeless tobacco. She reports that she drinks alcohol. She reports that she does not use drugs.     PHYSICAL EXAM     ED TRIAGE VITALS  BP: (!) 152/77, Temp: 97.5 °F (36.4 °C), Pulse: 93, Resp: 16, SpO2: 98 %,Estimated body mass index is 26.63 kg/m² as calculated from the following:    Height as of 8/23/18: 5' 5\" (1.651 m). Weight as of this encounter: 160 lb (72.6 kg). ,No LMP recorded. Patient has had a hysterectomy. Physical Exam   Constitutional: She is oriented to person, place, and time. Vital signs are normal. She appears well-developed and well-nourished. She is active and cooperative. Non-toxic appearance. She does not have a sickly appearance. She does not appear ill. No distress. HENT:   Head: Normocephalic and atraumatic. Right Ear: Hearing, external ear and ear canal normal. A middle ear effusion is present. Left Ear: Hearing, external ear and ear canal normal. A middle ear effusion is present. Nose: Sinus tenderness present. Right sinus exhibits maxillary sinus tenderness and frontal sinus tenderness. Left sinus exhibits maxillary sinus tenderness and frontal sinus tenderness. Mouth/Throat: Uvula is midline and mucous membranes are normal. Posterior oropharyngeal erythema present. No oropharyngeal exudate, posterior oropharyngeal edema or tonsillar abscesses. Cardiovascular: Normal rate and regular rhythm. Pulmonary/Chest: Effort normal and breath sounds normal.   Neurological: She is alert and oriented to person, place, and time. Skin: Skin is warm and dry. No rash noted. Nursing note and vitals reviewed. DIAGNOSTIC RESULTS     Labs:No results found for this visit on 08/30/18. IMAGING:    No orders to display         EKG:      URGENT CARE COURSE:     Vitals:    08/30/18 1153   BP: (!) 152/77   Pulse: 93   Resp: 16   Temp: 97.5 °F (36.4 °C)   SpO2: 98%   Weight: 160 lb (72.6 kg)       Medications - No data to display         PROCEDURES:  None    FINAL IMPRESSION      1. Acute recurrent frontal sinusitis          DISPOSITION/PLAN     The patient's physical exam was consistent with acute recurrent frontal sinusitis.   I did discuss the patient's most likely viral.

## 2018-08-30 NOTE — ED NOTES
MRN:[id number]                      After Visit Summary   8/7/2018    Carie Vaughn    MRN: [id number]           Visit Information        Department      8/7/2018  6:58 AM [facility]          Review of your medicines      UNREVIEWED medicines. Ask your doctor about these medicines        Dose / Directions    COD LIVER 4000-400 UNIT/5ML Oil        None Entered   Refills:  0       HYDROcodone-acetaminophen 5-325 MG per tablet   Commonly known as:  NORCO        Dose:  1 tablet   Take 1 tablet by mouth every 4 hours as needed for pain   Quantity:  15 tablet   Refills:  0       Levothyroxine Sodium 125 MCG Caps        Quantity:  30 capsule   Refills:  0       MACROBID 100 MG capsule   Used for:  Acute cystitis   Generic drug:  nitroFURantoin (macrocrystal-monohydrate)        1 CAPSULE EVERY 12 HOURS WITH FOOD   Quantity:  14   Refills:  0       ondansetron 4 MG ODT tab   Commonly known as:  ZOFRAN ODT   Ask about: Should I take this medication?        Dose:  4 mg   Take 1 tablet (4 mg) by mouth every 8 hours as needed for nausea   Quantity:  10 tablet   Refills:  0       PROBIOTIC DAILY Caps        Refills:  0       VITAMIN C PO        1 TABLET 3 TIMES DAILY   Refills:  0                Protect others around you: Learn how to safely use, store and throw away your medicines at www.disposemymeds.org.         Follow-ups after your visit        Your next 10 appointments already scheduled     Aug 07, 2018  8:30 AM CDT   US PARACENTESIS with DOMINICUS4,  IMAGING NURSE,  BODY RAD   [facility] ([facility])    [address] [place] [id number] [phone]           Bring a list of your medicines to the exam. Include vitamins, minerals and over-the-counter drugs.  Tell your doctor in advance:   If you are or may be pregnant.   If you are taking Coumadin (or any other blood thinners) 5 days prior to the exam for any special instructions.  Reviewed inhaler and netti pot use with candido Verbalized understanding     Sada Medina RN  08/30/18 5756   If you are diabetic to determine if your insulin needs have to be adjusted for the exam.  IF YOUR DOCTOR ALSO PRESCRIBED SEDATION DURING THE EXAM (medicine to help you relax): You will receive separate instructions about driving, eating, and additional tests that may be necessary prior to your exam day.  Please call the Imaging Department at your exam site with any questions.               Care Instructions        Further instructions from your care team       Paracentesis Discharge Instructions     After you go home:      You may resume your normal diet.    Care of Puncture Site:      For the first 48 hrs, check your puncture site every couple hours while you are awake     If there is a bandaid - you may remove it tomorrow morning    You may shower tomorrow    No tub baths, whirlpools or swimming until your puncture site has fully healed    Fluid may leak from the site. Change the bandaid as needed - keep the site dry    If the fluid leaks for more than 48 hours, call your ordering provider     Activity:      You may go back to normal activity in 24 hours     Wait 48 hours before lifting, straining, exercise or other strenuous activity    Medicines:      You may resume all your medications    For minor pain, you may take Acetaminophen (Tylenol) or Ibuprofen (Advil)            Call the provider who ordered this procedure if:      The site is red, swollen, hot or tender    Blood or fluid is draining from the site    Chills or a fever greater than 101 F (38 C)    Pain that is getting worse    Leaking from the site that does not stop    Any questions or concerns      If you have questions call:        [facility] Radiology Dept @ [phone]      The provider who performed your procedure was _________________.     Additional Information About Your Visit        [employer] Information     [employer] lets you send messages to your doctor, view your test results, renew your prescriptions, schedule appointments and  "more. To sign up, go to [email or web address] . Click on \"Log in\" on the left side of the screen, which will take you to the Welcome page. Then click on \"Sign up Now\" on the right side of the page.     You will be asked to enter the access code listed below, as well as some personal information. Please follow the directions to create your username and password.     Your access code is: [email or web address]  Expires: 2018  9:10 AM     Your access code will  in 90 days. If you need help or a new code, please call your [facility] clinic or [phone].        Care EveryWhere ID     This is your Care EveryWhere ID. This could be used by other organizations to access your [facility] medical records  [id number]        Your Vitals Were     Blood Pressure Pulse Temperature Respirations Pulse Oximetry       157/75 (BP Location: Left arm) 92 95.7  F (35.4  C) (Oral) 16 99%        Primary Care Provider Office Phone # Fax #    S[facility]ows Bharath Almeida [phone]      Equal Access to Services     [facility] AH: Hadii blas smith hadkaelo Soramonita, waaxda luqadaha, qaybta kaalmada ademandoyaandree, hernandez cazares . So [facility] [phone].    ATENCIÓN: Si habla español, tiene a sifuentes disposición servicios gratuitos de asistencia lingüística. Llame al [phone].    We comply with applicable federal civil rights laws and [place] laws. We do not discriminate on the basis of race, color, national origin, age, disability, sex, sexual orientation, or gender identity.            Thank you!     Thank you for choosing [facility] for your care. Our goal is always to provide you with excellent care. Hearing back from our patients is one way we can continue to improve our services. Please take a few minutes to complete the written survey that you may receive in the mail after you visit with us. Thank you!             Medication List: This is a list of all your medications and when to take them. Check marks below " indicate your daily home schedule. Keep this list as a reference.      Medications           Morning Afternoon Evening Bedtime As Needed    COD LIVER 4000-400 UNIT/5ML Oil   None Entered                                HYDROcodone-acetaminophen 5-325 MG per tablet   Commonly known as:  NORCO   Take 1 tablet by mouth every 4 hours as needed for pain                                Levothyroxine Sodium 125 MCG Caps                                MACROBID 100 MG capsule   1 CAPSULE EVERY 12 HOURS WITH FOOD   Generic drug:  nitroFURantoin (macrocrystal-monohydrate)                                PROBIOTIC DAILY Caps                                VITAMIN C PO   1 TABLET 3 TIMES DAILY                                  ASK your doctor about these medications           Morning Afternoon Evening Bedtime As Needed    ondansetron 4 MG ODT tab   Commonly known as:  ZOFRAN ODT   Take 1 tablet (4 mg) by mouth every 8 hours as needed for nausea   Ask about: Should I take this medication?

## 2018-09-14 ENCOUNTER — TELEPHONE (OUTPATIENT)
Dept: INTERNAL MEDICINE CLINIC | Age: 73
End: 2018-09-14

## 2018-09-14 NOTE — TELEPHONE ENCOUNTER
Patient is requesting an appt for sinus, if any available appt she would like to make one please.  Her appt is in November, just requesting sooner please Problem: Depressive Symptoms  Goal: Depressive Symptoms  Signs and symptoms of listed problems will be absent or manageable.     Interventions to focus on decreasing symptoms of depression, decreasing self-injurious behaviors, elimination of suicidal ideation and elevation of mood. Additional interventions to focus on identifying and managing feelings, stress management, exercise, and healthy coping skills.    Outcome: No Change  48 hour nursing assessment:  Pt evaluation continues. Assessed mood, anxiety, thoughts, and behavior. Is progressing towards goals. Encourage participation in groups and developing healthy coping skills. Pt denies auditory or visual  hallucinations. Refer to daily team meeting notes for individualized plan of care. Will continue to assess.    Comments:   48 hour nursing assessment:  Pt evaluation continues. Assessed mood, anxiety, thoughts, and behavior. Is progressing towards goals. Encourage participation in groups and developing healthy coping skills. Pt denies auditory or visual  hallucinations. Refer to daily team meeting notes for individualized plan of care. Will continue to assess.       In the milieu for lunch only. Denies any discomfort. States he is feeling very tired today and that is why he is staying in his room. Reading a book currently. Denies SI/SIB. Denies any side effects to medications.

## 2018-09-22 DIAGNOSIS — R10.13 DYSPEPSIA: ICD-10-CM

## 2018-09-25 RX ORDER — FAMOTIDINE 20 MG/1
TABLET, FILM COATED ORAL
Qty: 60 TABLET | Refills: 5 | Status: SHIPPED | OUTPATIENT
Start: 2018-09-25 | End: 2019-01-08 | Stop reason: SDUPTHER

## 2018-10-08 ENCOUNTER — OFFICE VISIT (OUTPATIENT)
Dept: INTERNAL MEDICINE CLINIC | Age: 73
End: 2018-10-08
Payer: MEDICARE

## 2018-10-08 VITALS
HEIGHT: 65 IN | BODY MASS INDEX: 27.49 KG/M2 | SYSTOLIC BLOOD PRESSURE: 124 MMHG | DIASTOLIC BLOOD PRESSURE: 68 MMHG | WEIGHT: 165 LBS | HEART RATE: 80 BPM

## 2018-10-08 DIAGNOSIS — J30.89 NON-SEASONAL ALLERGIC RHINITIS, UNSPECIFIED TRIGGER: ICD-10-CM

## 2018-10-08 DIAGNOSIS — E78.2 MIXED HYPERLIPIDEMIA: ICD-10-CM

## 2018-10-08 DIAGNOSIS — I10 ESSENTIAL HYPERTENSION: ICD-10-CM

## 2018-10-08 DIAGNOSIS — Z23 NEED FOR PROPHYLACTIC VACCINATION AGAINST STREPTOCOCCUS PNEUMONIAE (PNEUMOCOCCUS) AND INFLUENZA: ICD-10-CM

## 2018-10-08 DIAGNOSIS — Z23 NEED FOR PROPHYLACTIC VACCINATION AGAINST STREPTOCOCCUS PNEUMONIAE (PNEUMOCOCCUS): ICD-10-CM

## 2018-10-08 DIAGNOSIS — Z23 NEED FOR PROPHYLACTIC VACCINATION AND INOCULATION AGAINST INFLUENZA: ICD-10-CM

## 2018-10-08 DIAGNOSIS — Z85.820 HISTORY OF MELANOMA: ICD-10-CM

## 2018-10-08 DIAGNOSIS — J34.89 SINUS PRESSURE: Primary | ICD-10-CM

## 2018-10-08 PROCEDURE — G0008 ADMIN INFLUENZA VIRUS VAC: HCPCS | Performed by: INTERNAL MEDICINE

## 2018-10-08 PROCEDURE — 90732 PPSV23 VACC 2 YRS+ SUBQ/IM: CPT | Performed by: INTERNAL MEDICINE

## 2018-10-08 PROCEDURE — 90662 IIV NO PRSV INCREASED AG IM: CPT | Performed by: INTERNAL MEDICINE

## 2018-10-08 PROCEDURE — 99214 OFFICE O/P EST MOD 30 MIN: CPT | Performed by: INTERNAL MEDICINE

## 2018-10-08 PROCEDURE — G0009 ADMIN PNEUMOCOCCAL VACCINE: HCPCS | Performed by: INTERNAL MEDICINE

## 2018-10-08 RX ORDER — LEVOCETIRIZINE DIHYDROCHLORIDE 5 MG/1
5 TABLET, FILM COATED ORAL NIGHTLY
COMMUNITY
End: 2018-12-28

## 2018-10-08 ASSESSMENT — PATIENT HEALTH QUESTIONNAIRE - PHQ9
SUM OF ALL RESPONSES TO PHQ QUESTIONS 1-9: 0
SUM OF ALL RESPONSES TO PHQ QUESTIONS 1-9: 0
1. LITTLE INTEREST OR PLEASURE IN DOING THINGS: 0
SUM OF ALL RESPONSES TO PHQ9 QUESTIONS 1 & 2: 0
2. FEELING DOWN, DEPRESSED OR HOPELESS: 0

## 2018-10-08 NOTE — PROGRESS NOTES
After obtaining consent, and per orders of Dr. Chun López, injection of Fluzone HD given in Right deltoid by Dyana Carmen. Patient instructed to remain in clinic for 20 minutes afterwards, and to report any adverse reaction to me immediately.
record in paper chart. Will need patient to check at home and if no documentation - will start administering. Did flu and Pneumonia vaccine today - of note see called later and said right arm was red and sore, had one day of nausea, diarrhea but resolved quickly - unsure if related to vaccine. Veronica Saenz MD    [facility] PROF[facility]AL [facility]  PHYSICIANS [facility]. [place] [address]  [address]  [address] [place] [address]  Dept: [phone]  Dept Fax: [phone] : [phone]

## 2018-10-12 ENCOUNTER — TELEPHONE (OUTPATIENT)
Dept: INTERNAL MEDICINE CLINIC | Age: 73
End: 2018-10-12

## 2018-10-18 ENCOUNTER — HOSPITAL ENCOUNTER (OUTPATIENT)
Dept: CT IMAGING | Age: 73
Discharge: HOME | End: 2018-10-18
Payer: MEDICARE

## 2018-10-18 DIAGNOSIS — J30.89 NON-SEASONAL ALLERGIC RHINITIS, UNSPECIFIED TRIGGER: ICD-10-CM

## 2018-10-18 DIAGNOSIS — J34.89 SINUS PRESSURE: ICD-10-CM

## 2018-10-18 PROCEDURE — 70486 CT MAXILLOFACIAL W/O DYE: CPT

## 2018-12-28 RX ORDER — LEVOCETIRIZINE DIHYDROCHLORIDE 5 MG/1
TABLET, FILM COATED ORAL
Qty: 30 TABLET | Refills: 5 | Status: SHIPPED | OUTPATIENT
Start: 2018-12-28 | End: 2019-04-08 | Stop reason: SDUPTHER

## 2019-01-08 ENCOUNTER — OFFICE VISIT (OUTPATIENT)
Dept: INTERNAL MEDICINE CLINIC | Age: 74
End: 2019-01-08
Payer: MEDICARE

## 2019-01-08 VITALS
DIASTOLIC BLOOD PRESSURE: 82 MMHG | HEIGHT: 64 IN | HEART RATE: 80 BPM | WEIGHT: 170.5 LBS | SYSTOLIC BLOOD PRESSURE: 138 MMHG | BODY MASS INDEX: 29.11 KG/M2

## 2019-01-08 DIAGNOSIS — I10 ESSENTIAL HYPERTENSION: ICD-10-CM

## 2019-01-08 DIAGNOSIS — Z12.11 COLON CANCER SCREENING: ICD-10-CM

## 2019-01-08 DIAGNOSIS — J34.2 DEVIATED NASAL SEPTUM: ICD-10-CM

## 2019-01-08 DIAGNOSIS — J30.89 NON-SEASONAL ALLERGIC RHINITIS DUE TO OTHER ALLERGIC TRIGGER: Primary | ICD-10-CM

## 2019-01-08 DIAGNOSIS — R10.13 DYSPEPSIA: ICD-10-CM

## 2019-01-08 DIAGNOSIS — E87.6 HYPOKALEMIA: ICD-10-CM

## 2019-01-08 DIAGNOSIS — E78.2 MIXED HYPERLIPIDEMIA: ICD-10-CM

## 2019-01-08 DIAGNOSIS — Z78.0 POST-MENOPAUSAL: ICD-10-CM

## 2019-01-08 DIAGNOSIS — N32.81 OVERACTIVE BLADDER: ICD-10-CM

## 2019-01-08 PROCEDURE — 99213 OFFICE O/P EST LOW 20 MIN: CPT | Performed by: INTERNAL MEDICINE

## 2019-01-08 RX ORDER — FAMOTIDINE 20 MG/1
TABLET, FILM COATED ORAL
Qty: 180 TABLET | Refills: 1 | Status: SHIPPED | OUTPATIENT
Start: 2019-01-08 | End: 2019-04-08 | Stop reason: SDUPTHER

## 2019-01-08 RX ORDER — MONTELUKAST SODIUM 10 MG/1
10 TABLET ORAL EVERY MORNING
COMMUNITY
End: 2019-06-13 | Stop reason: ALTCHOICE

## 2019-01-08 RX ORDER — AMLODIPINE BESYLATE 10 MG/1
10 TABLET ORAL DAILY
Qty: 90 TABLET | Refills: 1 | Status: SHIPPED | OUTPATIENT
Start: 2019-01-08 | End: 2019-02-02

## 2019-01-08 RX ORDER — POTASSIUM CHLORIDE 20 MEQ/1
20 TABLET, EXTENDED RELEASE ORAL DAILY
Qty: 90 TABLET | Refills: 1 | Status: SHIPPED | OUTPATIENT
Start: 2019-01-08 | End: 2019-08-12 | Stop reason: SDUPTHER

## 2019-01-08 RX ORDER — PRAVASTATIN SODIUM 40 MG
40 TABLET ORAL EVERY EVENING
Qty: 90 TABLET | Refills: 1 | Status: SHIPPED | OUTPATIENT
Start: 2019-01-08 | End: 2019-08-03 | Stop reason: SDUPTHER

## 2019-01-08 RX ORDER — LISINOPRIL AND HYDROCHLOROTHIAZIDE 25; 20 MG/1; MG/1
TABLET ORAL
Qty: 90 TABLET | Refills: 1 | Status: SHIPPED | OUTPATIENT
Start: 2019-01-08 | End: 2019-08-03 | Stop reason: SDUPTHER

## 2019-01-08 RX ORDER — OXYBUTYNIN CHLORIDE 5 MG/1
5 TABLET ORAL 2 TIMES DAILY
Qty: 180 TABLET | Refills: 1 | Status: SHIPPED | OUTPATIENT
Start: 2019-01-08 | End: 2019-08-12 | Stop reason: SDUPTHER

## 2019-01-09 DIAGNOSIS — K59.01 SLOW TRANSIT CONSTIPATION: ICD-10-CM

## 2019-01-11 RX ORDER — POLYETHYLENE GLYCOL 3350 17 G/17G
POWDER, FOR SOLUTION ORAL
Qty: 527 G | Refills: 3 | Status: SHIPPED | OUTPATIENT
Start: 2019-01-11 | End: 2019-08-12 | Stop reason: SDUPTHER

## 2019-01-20 PROBLEM — J34.2 DEVIATED NASAL SEPTUM: Status: ACTIVE | Noted: 2019-01-20

## 2019-01-23 ENCOUNTER — HOSPITAL ENCOUNTER (OUTPATIENT)
Age: 74
Discharge: HOME | End: 2019-01-23
Payer: MEDICARE

## 2019-01-23 DIAGNOSIS — I10 ESSENTIAL HYPERTENSION: ICD-10-CM

## 2019-01-23 DIAGNOSIS — E78.2 MIXED HYPERLIPIDEMIA: ICD-10-CM

## 2019-01-23 DIAGNOSIS — E87.6 HYPOKALEMIA: ICD-10-CM

## 2019-01-23 DIAGNOSIS — R73.9 HYPERGLYCEMIA: ICD-10-CM

## 2019-01-23 LAB
ALT SERPL-CCNC: 12 U/L (ref 11–66)
ANION GAP SERPL CALCULATED.3IONS-SCNC: 14 MEQ/L (ref 8–16)
AVERAGE GLUCOSE: 108 MG/DL (ref 70–126)
BASOPHILS # BLD: 1.1 %
BASOPHILS ABSOLUTE: 0.1 THOU/MM3 (ref 0–0.1)
BUN BLDV-MCNC: 15 MG/DL (ref 7–22)
CALCIUM SERPL-MCNC: 10.1 MG/DL (ref 8.5–10.5)
CHLORIDE BLD-SCNC: 100 MEQ/L (ref 98–111)
CHOLESTEROL, TOTAL: 152 MG/DL (ref 100–199)
CO2: 29 MEQ/L (ref 23–33)
CREAT SERPL-MCNC: 0.7 MG/DL (ref 0.4–1.2)
EOSINOPHIL # BLD: 2.3 %
EOSINOPHILS ABSOLUTE: 0.2 THOU/MM3 (ref 0–0.4)
ERYTHROCYTE [DISTWIDTH] IN BLOOD BY AUTOMATED COUNT: 11.9 % (ref 11.5–14.5)
ERYTHROCYTE [DISTWIDTH] IN BLOOD BY AUTOMATED COUNT: 37.7 FL (ref 35–45)
GFR SERPL CREATININE-BSD FRML MDRD: 82 ML/MIN/1.73M2
GLUCOSE BLD-MCNC: 114 MG/DL (ref 70–108)
HBA1C MFR BLD: 5.6 % (ref 4.4–6.4)
HCT VFR BLD CALC: 45.2 % (ref 37–47)
HDLC SERPL-MCNC: 58 MG/DL
HEMOGLOBIN: 14.3 GM/DL (ref 12–16)
IMMATURE GRANS (ABS): 0.02 THOU/MM3 (ref 0–0.07)
IMMATURE GRANULOCYTES: 0.3 %
LDL CHOLESTEROL CALCULATED: 77 MG/DL
LYMPHOCYTES # BLD: 13.7 %
LYMPHOCYTES ABSOLUTE: 0.9 THOU/MM3 (ref 1–4.8)
MCH RBC QN AUTO: 27.4 PG (ref 26–33)
MCHC RBC AUTO-ENTMCNC: 31.6 GM/DL (ref 32.2–35.5)
MCV RBC AUTO: 86.6 FL (ref 81–99)
MONOCYTES # BLD: 7.8 %
MONOCYTES ABSOLUTE: 0.5 THOU/MM3 (ref 0.4–1.3)
NUCLEATED RED BLOOD CELLS: 0 /100 WBC
PLATELET # BLD: 249 THOU/MM3 (ref 130–400)
PMV BLD AUTO: 11.1 FL (ref 9.4–12.4)
POTASSIUM SERPL-SCNC: 4.3 MEQ/L (ref 3.5–5.2)
RBC # BLD: 5.22 MILL/MM3 (ref 4.2–5.4)
SEG NEUTROPHILS: 74.8 %
SEGMENTED NEUTROPHILS ABSOLUTE COUNT: 4.9 THOU/MM3 (ref 1.8–7.7)
SODIUM BLD-SCNC: 143 MEQ/L (ref 135–145)
TRIGL SERPL-MCNC: 84 MG/DL (ref 0–199)
WBC # BLD: 6.6 THOU/MM3 (ref 4.8–10.8)

## 2019-01-23 PROCEDURE — 80048 BASIC METABOLIC PNL TOTAL CA: CPT

## 2019-01-23 PROCEDURE — 36415 COLL VENOUS BLD VENIPUNCTURE: CPT

## 2019-01-23 PROCEDURE — 83036 HEMOGLOBIN GLYCOSYLATED A1C: CPT

## 2019-01-23 PROCEDURE — 85025 COMPLETE CBC W/AUTO DIFF WBC: CPT

## 2019-01-23 PROCEDURE — 84460 ALANINE AMINO (ALT) (SGPT): CPT

## 2019-01-23 PROCEDURE — 80061 LIPID PANEL: CPT

## 2019-01-25 ENCOUNTER — TELEPHONE (OUTPATIENT)
Dept: INTERNAL MEDICINE CLINIC | Age: 74
End: 2019-01-25

## 2019-01-31 DIAGNOSIS — I10 ESSENTIAL HYPERTENSION: ICD-10-CM

## 2019-02-02 RX ORDER — AMLODIPINE BESYLATE 10 MG/1
TABLET ORAL
Qty: 90 TABLET | Refills: 1 | Status: SHIPPED | OUTPATIENT
Start: 2019-02-02 | End: 2019-08-12 | Stop reason: SDUPTHER

## 2019-02-08 ENCOUNTER — HOSPITAL ENCOUNTER (OUTPATIENT)
Dept: WOMENS IMAGING | Age: 74
Discharge: HOME | End: 2019-02-08
Payer: MEDICARE

## 2019-02-08 DIAGNOSIS — Z78.0 POST-MENOPAUSAL: ICD-10-CM

## 2019-02-08 PROCEDURE — 77080 DXA BONE DENSITY AXIAL: CPT

## 2019-02-14 ENCOUNTER — TELEPHONE (OUTPATIENT)
Dept: INTERNAL MEDICINE CLINIC | Age: 74
End: 2019-02-14

## 2019-02-15 ENCOUNTER — HOSPITAL ENCOUNTER (EMERGENCY)
Age: 74
Discharge: HOME | End: 2019-02-15
Payer: MEDICARE

## 2019-02-15 VITALS
DIASTOLIC BLOOD PRESSURE: 59 MMHG | TEMPERATURE: 97.3 F | SYSTOLIC BLOOD PRESSURE: 121 MMHG | WEIGHT: 167 LBS | OXYGEN SATURATION: 99 % | HEART RATE: 69 BPM | BODY MASS INDEX: 28.44 KG/M2 | RESPIRATION RATE: 18 BRPM

## 2019-02-15 DIAGNOSIS — R30.0 DYSURIA: Primary | ICD-10-CM

## 2019-02-15 DIAGNOSIS — N30.00 ACUTE CYSTITIS WITHOUT HEMATURIA: ICD-10-CM

## 2019-02-15 LAB
BILIRUBIN URINE: NEGATIVE
BLOOD, URINE: ABNORMAL
CHARACTER, URINE: CLEAR
COLOR: YELLOW
GLUCOSE, URINE: NEGATIVE MG/DL
KETONES, URINE: NEGATIVE
LEUKOCYTES, UA: ABNORMAL
NITRATE, UA: NEGATIVE
PH UA: 7.5 (ref 5–9)
PROTEIN UA: NEGATIVE MG/DL
REFLEX TO URINE C & S: ABNORMAL
SPECIFIC GRAVITY UA: 1.01 (ref 1–1.03)
UROBILINOGEN, URINE: 1 EU/DL (ref 0–1)

## 2019-02-15 PROCEDURE — 87086 URINE CULTURE/COLONY COUNT: CPT

## 2019-02-15 PROCEDURE — 81003 URINALYSIS AUTO W/O SCOPE: CPT

## 2019-02-15 PROCEDURE — 99213 OFFICE O/P EST LOW 20 MIN: CPT | Performed by: NURSE PRACTITIONER

## 2019-02-15 PROCEDURE — 99213 OFFICE O/P EST LOW 20 MIN: CPT

## 2019-02-15 RX ORDER — NITROFURANTOIN 25; 75 MG/1; MG/1
100 CAPSULE ORAL 2 TIMES DAILY
Qty: 20 CAPSULE | Refills: 0 | Status: SHIPPED | OUTPATIENT
Start: 2019-02-15 | End: 2019-02-15

## 2019-02-15 RX ORDER — PHENAZOPYRIDINE HYDROCHLORIDE 100 MG/1
100 TABLET, FILM COATED ORAL 3 TIMES DAILY PRN
Qty: 9 TABLET | Refills: 0 | Status: SHIPPED | OUTPATIENT
Start: 2019-02-15 | End: 2019-02-18

## 2019-02-15 RX ORDER — FLUCONAZOLE 150 MG/1
150 TABLET ORAL ONCE
Qty: 1 TABLET | Refills: 0 | Status: SHIPPED | OUTPATIENT
Start: 2019-02-15 | End: 2019-02-15

## 2019-02-15 RX ORDER — NITROFURANTOIN 25; 75 MG/1; MG/1
100 CAPSULE ORAL 2 TIMES DAILY
Qty: 20 CAPSULE | Refills: 0 | Status: SHIPPED | OUTPATIENT
Start: 2019-02-15 | End: 2019-02-25

## 2019-02-15 ASSESSMENT — ENCOUNTER SYMPTOMS
DIARRHEA: 0
WHEEZING: 0
BACK PAIN: 0
COUGH: 0
SHORTNESS OF BREATH: 0
VOMITING: 0
ABDOMINAL PAIN: 1
CONSTIPATION: 0
CHEST TIGHTNESS: 0
STRIDOR: 0
NAUSEA: 0

## 2019-02-15 ASSESSMENT — PAIN DESCRIPTION - FREQUENCY: FREQUENCY: INTERMITTENT

## 2019-02-15 ASSESSMENT — PAIN DESCRIPTION - PAIN TYPE: TYPE: ACUTE PAIN

## 2019-02-15 ASSESSMENT — PAIN SCALES - GENERAL: PAINLEVEL_OUTOF10: 5

## 2019-02-15 ASSESSMENT — PAIN DESCRIPTION - DESCRIPTORS: DESCRIPTORS: BURNING

## 2019-02-16 LAB
ORGANISM: ABNORMAL
URINE CULTURE, ROUTINE: ABNORMAL

## 2019-02-18 ENCOUNTER — TELEPHONE (OUTPATIENT)
Dept: INTERNAL MEDICINE CLINIC | Age: 74
End: 2019-02-18

## 2019-04-08 ENCOUNTER — OFFICE VISIT (OUTPATIENT)
Dept: INTERNAL MEDICINE CLINIC | Age: 74
End: 2019-04-08
Payer: MEDICARE

## 2019-04-08 VITALS
DIASTOLIC BLOOD PRESSURE: 84 MMHG | HEART RATE: 68 BPM | HEIGHT: 64 IN | SYSTOLIC BLOOD PRESSURE: 132 MMHG | BODY MASS INDEX: 28.6 KG/M2 | WEIGHT: 167.5 LBS

## 2019-04-08 DIAGNOSIS — E78.2 MIXED HYPERLIPIDEMIA: ICD-10-CM

## 2019-04-08 DIAGNOSIS — I10 ESSENTIAL HYPERTENSION: Primary | ICD-10-CM

## 2019-04-08 DIAGNOSIS — J30.89 NON-SEASONAL ALLERGIC RHINITIS DUE TO OTHER ALLERGIC TRIGGER: ICD-10-CM

## 2019-04-08 DIAGNOSIS — K59.04 CHRONIC IDIOPATHIC CONSTIPATION: ICD-10-CM

## 2019-04-08 DIAGNOSIS — E87.6 HYPOKALEMIA: ICD-10-CM

## 2019-04-08 DIAGNOSIS — R73.9 HYPERGLYCEMIA: ICD-10-CM

## 2019-04-08 DIAGNOSIS — R10.13 DYSPEPSIA: ICD-10-CM

## 2019-04-08 DIAGNOSIS — R31.29 MICROSCOPIC HEMATURIA: ICD-10-CM

## 2019-04-08 DIAGNOSIS — N32.81 OAB (OVERACTIVE BLADDER): ICD-10-CM

## 2019-04-08 PROCEDURE — 99214 OFFICE O/P EST MOD 30 MIN: CPT | Performed by: INTERNAL MEDICINE

## 2019-04-08 PROCEDURE — 93000 ELECTROCARDIOGRAM COMPLETE: CPT | Performed by: INTERNAL MEDICINE

## 2019-04-08 RX ORDER — AZELASTINE 1 MG/ML
2 SPRAY, METERED NASAL NIGHTLY
Status: ON HOLD | COMMUNITY
End: 2019-06-28

## 2019-04-08 RX ORDER — FLUTICASONE PROPIONATE 50 MCG
2 SPRAY, SUSPENSION (ML) NASAL DAILY
Status: ON HOLD | COMMUNITY
End: 2019-06-28

## 2019-04-08 RX ORDER — FAMOTIDINE 20 MG/1
TABLET, FILM COATED ORAL
Qty: 90 TABLET | Refills: 1 | Status: SHIPPED | OUTPATIENT
Start: 2019-04-08 | End: 2019-08-12 | Stop reason: SDUPTHER

## 2019-04-08 RX ORDER — LEVOCETIRIZINE DIHYDROCHLORIDE 5 MG/1
TABLET, FILM COATED ORAL
Qty: 45 TABLET | Refills: 3 | Status: SHIPPED | OUTPATIENT
Start: 2019-04-08 | End: 2019-06-13 | Stop reason: ALTCHOICE

## 2019-04-08 RX ORDER — MULTIVIT-MIN/IRON/FOLIC ACID/K 18-600-40
1 CAPSULE ORAL 2 TIMES DAILY
COMMUNITY

## 2019-04-08 RX ORDER — ACETAMINOPHEN 160 MG
1 TABLET,DISINTEGRATING ORAL DAILY
COMMUNITY

## 2019-04-08 ASSESSMENT — PATIENT HEALTH QUESTIONNAIRE - PHQ9
2. FEELING DOWN, DEPRESSED OR HOPELESS: 0
SUM OF ALL RESPONSES TO PHQ QUESTIONS 1-9: 0
1. LITTLE INTEREST OR PLEASURE IN DOING THINGS: 0
SUM OF ALL RESPONSES TO PHQ QUESTIONS 1-9: 0
SUM OF ALL RESPONSES TO PHQ9 QUESTIONS 1 & 2: 0

## 2019-04-08 NOTE — PROGRESS NOTES
Chief Complaint   Patient presents with    Hypertension     3-4 Month Follow-up    Hyperlipidemia     This patient presents for medical evaluation. This is a 3 month follow up visit. She had her colonoscopy done - 2/18/19, impressively long and looping colon, hypertrophied anal papilla, grade 2 internal hemorrhoids without hemorrhage, repeat in 10 year but will be 83 and would not do unless very good health as difficult with looping colon. Allergic rhinitis - with PND, cough, clear sputum, sinus pressure. Increased Flonase to 2 sprays each nostril daily, stopped Claritin D and recommended Xyzal .  Suggested backing off Benadryl at night. She did those changes - and added alec pot. She saw ENT - Dr. Erick Bocanegra - he again gave same suggestions. If not improving - consider CT sinuses - Ordered at last visit as sinus pressure, cough, PND ongoing for the previous 6 months (seen by me, urgent care and ENT for same issues). Ordered allergist referral.   She did see allergist - she is allergic to dog and cat - she has a dog. She states she is sleeping with dog and will not give up dog. Suggested not sleeping with dog at least.  She states she started another nasal spray with allergist and combination has improved symptoms. Reviewed CT and explained results - anatomy may be contributing to symptoms. But she is feeling better with treatment as above, so not wanting to see ENT. CT results:  1. No evidence to suggest acute sinusitis.       2. Mild mucosal thickening is present within the bilateral maxillary sinuses. Mucosal coaptation also causes mild narrowing of the left ostiomeatal unit.       3. 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 She is now taking allergy shots, as she was having sedation with anti-histamine. She is continuing anti-histamine and nasal spray till shots are in her system.   Allergist is managing. Told her to decrease Xyzal to 1/2 tab at night. History of melanoma - multiple lesions removed. She is seen regularly by Dermatology but she is not satisfied with office at Dr. Austin Briceno - will send to Dr. Jw Tolentino. Due to see 4/2019 for follow up of multiple melanomas- appointment tomorrow. HM - DEXA reviewed from 2/8/19 - normal, mammogram - she had bx done 7/2018 - due for ultrasound 1/2019 and still not done - she will schdule, and colonoscopy done 2/2019 - due in 10 years - but will be 81 yo and high risk. Microscopic hematuria - probable due to left renal cyst on u/s, persistent hematuria - added Cipro x 3 days and repeated UA. If still has microscopic hematuria after Cipro - send to Urology. UA still with RBC - send to Urology for further work-up. She saw them and had normal cystoscopy, CT urogram and cytology. Need to do yearly cytology- - due in 2/2019 - I will order as no follow up with Urology. UA with trace blood 2/2019, cytology was not ordered. Will ask Urology how to order as not coming up in [address] [place] [address] Rd. Hypokalemia - most likely due to HCTZ. Offered to change HCTZ to something else but she would rather stay on this regimen. Potassium 3.7 1/2018 - repeat potassium 4.3 1/2019 on KCL 20 meq daily. OA - No longer taking NSAID or any pain medication. She is just on Tylenol PM at bedtime - now no longer taking it. She is sleeping much better at night. Hypertension - BP controlled, asymptomatic. BMP 1/2019. Continue lisinopril/HCTZ, Norvasc. Hyperglycemia - 114, 1/2019. Continue diet control. HgA1c 5.6 1/2019    Hyperlipidemia - LDL 77 1/2019, normal liver function 1/2019. Continue pravastatin. GERD - stable, decrease Pepcid 20 mg daily as off ibuprofen. Constipation - very happy with Miralax. OAB - stable, continue Ditropan. All other issues stable.       Patient Active Problem List   Diagnosis    Metabolic syndrome    Essential hypertension    Overactive bladder    Bradycardia    Insomnia    GERD (gastroesophageal reflux disease)    DJD (degenerative joint disease) of knee    Abnormal EKG    Hypoxemia    Pneumonia    HTN (hypertension)    Malignant melanoma (HCC)    S/P knee replacement    Atelectasis of right lung    Dyspepsia    Mixed hyperlipidemia    Hyperglycemia    Allergic rhinitis due to allergen    Deviated nasal septum       Current Outpatient Medications   Medication Sig Dispense Refill    Calcium Carb-Cholecalciferol (CALCIUM 500+D3) 500-400 MG-UNIT TABS Take by mouth 2 times daily      Cholecalciferol (VITAMIN D3) 2000 units CAPS Take by mouth 2 times daily      fluticasone (FLONASE) 50 MCG/ACT nasal spray 2 sprays by Each Nare route daily      azelastine (ASTELIN) 0.1 % nasal spray 2 sprays by Nasal route nightly Use in each nostril as directed      famotidine (PEPCID) 20 MG tablet take 1 tablet by mouth daily 90 tablet 1    levocetirizine (XYZAL) 5 MG tablet take 0.5 tablet at bedtime 45 tablet 3    amLODIPine (NORVASC) 10 MG tablet take 1 tablet by mouth once daily 90 tablet 1    polyethylene glycol (GLYCOLAX) powder take 17GM (DISSOLVED IN WATER) by mouth once daily 527 g 3    montelukast (SINGULAIR) 10 MG tablet Take 10 mg by mouth every morning      oxybutynin (DITROPAN) 5 MG tablet Take 1 tablet by mouth 2 times daily 180 tablet 1    pravastatin (PRAVACHOL) 40 MG tablet Take 1 tablet by mouth every evening 90 tablet 1    lisinopril-hydrochlorothiazide (PRINZIDE;ZESTORETIC) 20-25 MG per tablet take 1 tablet by mouth once daily 90 tablet 1    potassium chloride (KLOR-CON M) 20 MEQ extended release tablet Take 1 tablet by mouth daily 90 tablet 1    vitamin E 400 UNIT capsule Take 400 Units by mouth daily      aspirin 81 MG tablet Take 81 mg by mouth daily       acetaminophen (TYLENOL) 325 MG tablet Take 650 mg by mouth every 6 hours as needed for Pain      Diphenhydramine-APAP, sleep, (TYLENOL PM EXTRA STRENGTH PO) Take 2 tablets by mouth nightly as needed        No current facility-administered medications for this visit. Allergies   Allergen Reactions    Pcn [Penicillins] Hives and Rash    Sulfa Antibiotics Rash       Review of Systems - General ROS: negative for - fever or chills   Psychological ROS: negative for - anxiety, depression  Hematological and Lymphatic ROS: No history of blood clots or bleeding disorder. Respiratory ROS: no shortness of breath, wheezing, positive cough from PND  Cardiovascular ROS: no chest pain or dyspnea on exertion  Gastrointestinal ROS: no abdominal pain, change in bowel habits, or black or bloody stools - positive constipation - uses Miralax daily prn  Genito-Urinary ROS: no dysuria, trouble voiding, or hematuria  Musculoskeletal ROS: negative for - muscle pain or muscular weakness  Neurological ROS: negative for - dizziness, memory loss, seizures, tremors, visual changes or weakness  Dermatological ROS: negative for - rash or skin lesion changes    Blood pressure 132/84, pulse 68, height 5' 4.25\" (1.632 m), weight 167 lb 8 oz (76 kg), not currently breastfeeding. Physical Examination: General appearance - alert, well appearing, and in no distress  Mental status - alert, oriented to person, place, and time  Head - no maxillary sinus tenderness  Neck - supple, no significant adenopathy, no JVD, or carotid bruits  Chest - clear to auscultation, no wheezes, rales or rhonchi, symmetric air entry  Heart - normal rate, regular rhythm, no murmurs, rubs, clicks or gallops  Abdomen - soft, nontender, nondistended  Neurological - alert, oriented, normal speech, no focal findings or movement disorder noted  Extremities - peripheral pulses normal, no edema, no clubbing or cyanosis  Skin - normal coloration and turgor, warm, dry    Diagnostic Data:  Labs reviewed from 1/2019 with patient.     Results for orders placed or performed during the hospital encounter of 02/15/19 Urine Culture   Result Value Ref Range    Urine Culture, Routine (A)      Growth of Contaminants. The mixture of organisms present  are not a common cause of urinary tract infections and  probably represent skin saleem or distal urethral saleem. Organism Growth of Contaminants (A)    UA without Microscopic Reflex C&S   Result Value Ref Range    Glucose, Urine Negative NEGATIVE mg/dl    Bilirubin Urine Negative NEGATIVE    Ketones, Urine Negative NEGATIVE    Specific Gravity, UA 1.015 1.002 - 1.03    Blood, Urine Trace-intact NEGATIVE    pH, UA 7.50 5.0 - 9.0    Protein, UA Negative NEGATIVE mg/dl    Urobilinogen, Urine 1.00 0.0 - 1.0 eu/dl    Nitrate, UA Negative NEGATIVE    LEUKOCYTES, UA Small (A) NEGATIVE    Color, UA Yellow STRAW-YELL    Character, Urine Clear CLEAR-SL C    REFLEX TO URINE C & S NOT INDICATED        Assessment/Plan:   Diagnosis Orders   1. Essential hypertension  Basic Metabolic Panel   2. Mixed hyperlipidemia  ALT   3. Dyspepsia  famotidine (PEPCID) 20 MG tablet   4. OAB (overactive bladder)     5. Hypokalemia     6. Hyperglycemia  Hemoglobin A1C   7. Chronic idiopathic constipation     8. Microscopic hematuria     9. Non-seasonal allergic rhinitis due to other allergic trigger  levocetirizine (XYZAL) 5 MG tablet     Orders Placed This Encounter   Procedures    Basic Metabolic Panel     Standing Status:   Future     Standing Expiration Date:   4/7/2020    ALT     Standing Status:   Future     Standing Expiration Date:   4/7/2020    Hemoglobin A1C     Standing Status:   Future     Standing Expiration Date:   4/7/2020    EKG 12 Lead     Order Specific Question:   Reason for Exam?     Answer:   Hypertension     Hypertension - BP controlled, continue lisinopril/HCTZ, and Norvasc. Due for BMP in 7/2019. Hyperlipidemia - controlled, continue pravastatin. Dyspepsia - stable, continue Pepcid 20 mg daily. OAB - stable, continue Ditropan.     Hypokalemia - likely due to HCTZ, monitor potassium level on supplement. Hyperglycemia - mild and stable, monitor labs periodically. Constipation - continue Miralax. Microscopic hematuria - treated with Cipro x 3 days and repeated UA - still with increased RBCs. May be due to renal cyst seen on renal ultrasound. Sent to Urology for cystoscopy and to complete work up of microscopic hematuria. She saw them and had normal cystoscopy, CT urogram and cytology. Need to do yearly cytology. Allergic rhinitis/frequent sinusitis - continue Flonase and Astelin NS, ween antihistamine as allergy shots take effect. She is allergic to dog and cat - she has a dog. History of multiple melanomas - would like referral to new dermatologist.  She has appt tomorrow with Dr. Radha Castillo at the time she was to follow up with Dr. Phillip Cunha.  - She will make appointment for mammogram on her own. Will schedule follow up appointment in 4 months to review chronic issues. Continue medications at current doses. Labs due in July 2019.  - Pneumovax 5/2011 per her report. She is not interested in tetanus today. She thought she may have had a tetanus vaccine at urgent care - will call to see if they have any record. She had mammogram 6/2017 and pap 2/24/14. She did not get Zoster vaccine. Colonoscopy was done 12/1/2008 with Dr. Cassidy Jang - due in 7 years - 12/1/15 (but had normal colonoscopy except tortuous colon and hemorrhoids - patient wants to wait till 2018). Reportedly per Santi Kebede there is no vaccine record in paper chart. Will need patient to check at home and if no documentation - will start administering. Did flu and Pneumonia vaccine - of note she called later and said right arm was red and sore, had one day of nausea, diarrhea but resolved quickly - unsure if related to vaccine. Nabila Bond MD    [facility] [employer]  [employer] [place] [address] [address]  Dept: [phone]  Dept Fax: 21 : [phone]

## 2019-04-09 ENCOUNTER — OFFICE VISIT (OUTPATIENT)
Dept: DERMATOLOGY | Age: 74
End: 2019-04-09
Payer: MEDICARE

## 2019-04-09 VITALS — BODY MASS INDEX: 28.58 KG/M2 | WEIGHT: 167.8 LBS

## 2019-04-09 DIAGNOSIS — L81.4 LENTIGO: ICD-10-CM

## 2019-04-09 DIAGNOSIS — L81.9 DYSCHROMIA: ICD-10-CM

## 2019-04-09 DIAGNOSIS — L57.0 ACTINIC KERATOSIS: Primary | ICD-10-CM

## 2019-04-09 DIAGNOSIS — L82.1 SEBORRHEIC KERATOSES: ICD-10-CM

## 2019-04-09 DIAGNOSIS — L57.8 ACTINIC SKIN DAMAGE: ICD-10-CM

## 2019-04-09 DIAGNOSIS — D48.5 NEOPLASM OF UNCERTAIN BEHAVIOR OF SKIN: ICD-10-CM

## 2019-04-09 PROCEDURE — 88305 TISSUE EXAM BY PATHOLOGIST: CPT | Performed by: DERMATOLOGY

## 2019-04-09 PROCEDURE — 17000 DESTRUCT PREMALG LESION: CPT | Performed by: DERMATOLOGY

## 2019-04-09 PROCEDURE — 99203 OFFICE O/P NEW LOW 30 MIN: CPT | Performed by: DERMATOLOGY

## 2019-04-09 PROCEDURE — 11102 TANGNTL BX SKIN SINGLE LES: CPT | Performed by: DERMATOLOGY

## 2019-04-09 PROCEDURE — 17003 DESTRUCT PREMALG LES 2-14: CPT | Performed by: DERMATOLOGY

## 2019-04-09 NOTE — PATIENT INSTRUCTIONS
-Scab may take 1-2 weeks to fall off  -pain may last up to 3 days  -within hours after treatment a blister may form, do not break the blisters  -clear drainage is normal       We will call with biopsy results in 7-10 days  Call us in 2 weeks if you have not heard about your biopsy results       Keep bandage or dressing on for 24 hours  Remove bandage or dressing after 24 hours  Wash with warm water and soap  Apply Vaseline or Aquaphor daily until healed  Cover with bandage         Please feel free to call or email the office with any questions. Patient Education        Actinic Keratosis: Care Instructions  Your Care Instructions  Actinic keratosis is a skin growth caused by sun damage. It can turn into skin cancer, but this isn't common. Actinic keratoses, also called solar keratoses, are small red, brown, or skin-colored scaly patches. They are most common on the face, neck, hands, and forearms. Your doctor can remove these growths by freezing or scraping them off or by putting medicines on them. Follow-up care is a key part of your treatment and safety. Be sure to make and go to all appointments, and call your doctor if you are having problems. It's also a good idea to know your test results and keep a list of the medicines you take. How can you care for yourself at home? · If your doctor told you how to care for the treated area, follow your doctor's instructions. If you did not get instructions, follow this general advice:  ? Wash around the area with clean water 2 times a day. Don't use hydrogen peroxide or alcohol, which can slow healing. ? You may cover the area with a thin layer of petroleum jelly, such as Vaseline, and a nonstick bandage. ? Apply more petroleum jelly and replace the bandage as needed. To prevent actinic keratosis  · Always wear sunscreen on exposed skin. Make sure to use a broad-spectrum sunscreen that has a sun protection factor (SPF) of 30 or higher.  Use it every day, even when it is cloudy. · Wear long sleeves, a hat, and pants if you are going to be outdoors for a long time. · Avoid the sun between 10 a.m. and 4 p.m., the peak time for UV rays. · Do not use tanning booths or sunlamps. When should you call for help? Watch closely for changes in your health, and be sure to contact your doctor if:    · You have symptoms of infection, such as:  ? Increased pain, swelling, warmth, or redness. ? Red streaks leading from the area. ? Pus draining from the area. ? A fever. Where can you learn more? Go to https://[employer]. [employer]. org and sign in to your [employer] account. Enter L364 in the Songkick box to learn more about \"Actinic Keratosis: Care Instructions. \"     If you do not have an account, please click on the \"Sign Up Now\" link. Current as of: April 17, 2018  Content Version: 11.9  © 9856-8613 [employer]. Care instructions adapted under license by [facility] ([facility]). If you have questions about a medical condition or this instruction, always ask your healthcare professional. [place] [address] any warranty or liability for your use of this information. Patient Education        Seborrheic Keratosis: Care Instructions  Your Care Instructions  Seborrheic keratoses are raised skin growths that look scaly or warty. They usually look like they were stuck onto the skin. They most often grow in groups on the back or chest and are more common in older people. A seborrheic keratosis can be tan or dark brown. A seborrheic keratosis is not a mole and is almost always harmless. But it is still a good idea to check your skin regularly. Sometimes a seborrheic keratosis can itch. Scratching it can cause it to bleed and sometimes even scar. A seborrheic keratosis is removed only if it bothers you. The doctor will freeze it or scrape it off with a tool. The doctor can also use a laser to remove a seborrheic keratosis.  Treatment usually results in normal-looking skin, but it can leave a light or dark katie or even a scar on the skin. Follow-up care is a key part of your treatment and safety. Be sure to make and go to all appointments, and call your doctor if you are having problems. It's also a good idea to know your test results and keep a list of the medicines you take. How can you care for yourself at home? · If clothing irritates your seborrheic keratosis, cover it with a bandage to prevent rubbing and bleeding. · If you have a seborrheic keratosis removed, clean the area with soap and water two times a day unless your doctor gives you different instructions. Don't use hydrogen peroxide or alcohol, which can slow healing. ? You may cover the wound with a thin layer of petroleum jelly, such as Vaseline, and a nonstick bandage. · Check all the skin on your body once a month for skin growths or other changes, such as color and feel of the skin. ?  front of a full-length mirror. Look carefully at the front and back of your body. Then look at your right and left sides with your arms raised. ? Bend your elbows and look carefully at your forearms, the back of your upper arms, and your palms. ? Look at your feet, the soles of your feet, and the spaces between your toes. ? Use a hand mirror to look at the back of your legs, the back of your neck, and your back, rear end (buttocks), and genital area. Part the hair on your head to look at your scalp. · If you see a change in a skin growth, contact your doctor. Look for:  ? A mole that bleeds. ? A fast-growing mole. ? A scaly or crusted growth on the skin. ? A sore that will not heal.  When should you call for help?   Call your doctor now or seek immediate medical care if:    · You have an area of normal skin that suddenly changes in shape, size, or how it looks.     · Your skin is badly broken from scratching.     · You have signs of infection such as:  ? Pain, warmth, or

## 2019-04-09 NOTE — PROGRESS NOTES
4/9/2019    Subjective   CC:  Chief Complaint   Patient presents with    Rash     behind Rt. ear    Pruritis     rash behind Rt. ear     HPI:  Pt is a 68 y.o. female new pt h/o MM and NMSC presents for FBSE     scaly lesions behind Rt ear x 6 months. Itching   Pt. Of Dr. Angeli Carter with Hx, of SCCIS &  Lt. Shoulder  Removed 2013  Melanoma Lt.  Thigh Removed 2013  Melanoma In Situ Upper back -removed per Dr. Kem Norton, last surgery 2017  Having skin checks every 6 months    Pt denies h/o other solid organ malignancy     Sunscreen: yes   Burns: yes   Tanning bed use: no  Melanoma family or personal hx: yes   NMSC family or personal hx: yes -   H/o eczema: no  H/o psoriasis: no            Past Medical Hx:   Past Medical History:   Diagnosis Date    Abnormal EKG     normal stress test [id number]/4093    Complication of anesthesia     difficulty breathing after surgery- transferrred from [facility] to [address] [place] [address]. Angeline's for 3 days, O2 for about 5 days    Diabetes mellitus ([address]     History type 2 - lost weight - diet controlled    Hyperlipidemia     Hypertension     Melanoma in situ ([address] 6/2012    of chest - Dr. Kem Norton - margins clear - neg sentinal lymph node    Melanoma in situ of lower extremity ([address]     excision- x2    Metabolic syndrome     much improved - normal triglycerides, weight loss of 50#    OA (osteoarthritis)     left knee     Past Social Hx:  Social History     Tobacco Use    Smoking status: Never Smoker    Smokeless tobacco: Never Used   Substance Use Topics    Alcohol use: Yes     Comment: not very much     Past Family Hx:   Family History   Problem Relation Age of Onset    Heart Failure Mother     Cancer Father         lung       ROS: yes seasonal allergies, no fever, no  easy bruisability and/or prolonged bleeding after procedures, no joint pain, yes itching, no new lesions       Physical Examination:  Gen: alert,  Nad, normal mood/affect  Examination was performed of the following:   psych/neuro, scalp/hair, head/face, conjunctivae/eyelids, gums/teeth/lips, neck, breast/axilla/chest, abdomen, back, RUE, LUE, RLE, LLE, nails/digits, oral mucosa/tongue, genitalia/groin/buttocks and lymphatic     Severe Dermatoheliosis face/neck/chest/BUE/BLE   2-6mm lentiginous macules, angiomatous papules, nevoid macules and stuck on waxy papules and plaques on trunk BUE/BLE  Scaly pink papules x 8 on arms/neck/back  Rt. Calf- 7mm irregularly pigmented macule (A)  Well healed surgical scars on back/thighs     No lymphadenopathy  Assessment/Plan  1-Neoplasm NOS (A)   DDX: Melanoma In Situ vs. ATN   tANGENTIAL SHAVE BX; SPEC SENT TO PATH  F/u result when whan avial   2. Actinic Keratosis: precancerous etiology reviewed; Ln2x 8  3. Diffuse Seb keratosis:  Best tx lesion vaporization with ablative laser/electrocautery. Advised pt that this is not covered by insurance and cosmetic; pt defers  4. Chronic Sundamage:   ABCDE reviewed  Sunprotection/suneducation reviewed at length   Notify office ASAP re any new concerning lesion   5. H/o MM and NMSC:  No evid of recurrence     RTC per path; if path benign; recommend q 4mo FBSE     Daniel LOPEZ, tracy scribing for and in the presence of Masoud Griffin MD, 4/9/2019 at 11:00 AM.    Lázaro Krishnamurthy MD, personally performed the services described in this documentation as scribed by Sebastian Jara  in my presence, and it is both accurate and complete.        Daina Smalls MD FAAD   Board-Certified Dermatologist

## 2019-04-10 ENCOUNTER — TELEPHONE (OUTPATIENT)
Dept: INTERNAL MEDICINE CLINIC | Age: 74
End: 2019-04-10

## 2019-04-10 NOTE — TELEPHONE ENCOUNTER
Contacted Urology office and you use the cytology , non-gyn and free type what you need .  ( [id number] )

## 2019-04-10 NOTE — TELEPHONE ENCOUNTER
----- Message from Junito Castro MD sent at 4/8/2019 11:46 AM EDT -----  Ask Urology how to order urine cytology.

## 2019-04-13 DIAGNOSIS — R31.29 MICROSCOPIC HEMATURIA: Primary | ICD-10-CM

## 2019-04-16 ENCOUNTER — TELEPHONE (OUTPATIENT)
Dept: DERMATOLOGY | Age: 74
End: 2019-04-16

## 2019-04-16 ENCOUNTER — TELEPHONE (OUTPATIENT)
Dept: INTERNAL MEDICINE CLINIC | Age: 74
End: 2019-04-16

## 2019-04-16 NOTE — TELEPHONE ENCOUNTER
----- Message from Dameon Hinkel MD sent at 4/15/2019  1:53 PM EDT -----  Benign.  No further tx     Vicenta Albright MD FAAD   Board-Certified Dermatologist

## 2019-04-16 NOTE — TELEPHONE ENCOUNTER
Notified patient that Dr. Joey Lugo has ordered a urine for cytology due to her persistent microscopic blood in her urine. Patient verbalized understanding and will get the urine done at [facility] in the [address] [place] [address].

## 2019-05-06 ENCOUNTER — NURSE ONLY (OUTPATIENT)
Dept: LAB | Age: 74
End: 2019-05-06

## 2019-05-06 DIAGNOSIS — I10 ESSENTIAL HYPERTENSION: ICD-10-CM

## 2019-05-06 DIAGNOSIS — R31.29 MICROSCOPIC HEMATURIA: ICD-10-CM

## 2019-05-06 DIAGNOSIS — R73.9 HYPERGLYCEMIA: ICD-10-CM

## 2019-05-06 DIAGNOSIS — E78.2 MIXED HYPERLIPIDEMIA: ICD-10-CM

## 2019-05-06 LAB
ALT SERPL-CCNC: 11 U/L (ref 11–66)
ANION GAP SERPL CALCULATED.3IONS-SCNC: 13 MEQ/L (ref 8–16)
AVERAGE GLUCOSE: 102 MG/DL (ref 70–126)
BUN BLDV-MCNC: 16 MG/DL (ref 7–22)
CALCIUM SERPL-MCNC: 10 MG/DL (ref 8.5–10.5)
CHLORIDE BLD-SCNC: 97 MEQ/L (ref 98–111)
CO2: 29 MEQ/L (ref 23–33)
CREAT SERPL-MCNC: 0.7 MG/DL (ref 0.4–1.2)
GFR SERPL CREATININE-BSD FRML MDRD: 82 ML/MIN/1.73M2
GLUCOSE BLD-MCNC: 116 MG/DL (ref 70–108)
HBA1C MFR BLD: 5.4 % (ref 4.4–6.4)
POTASSIUM SERPL-SCNC: 3.4 MEQ/L (ref 3.5–5.2)
SODIUM BLD-SCNC: 139 MEQ/L (ref 135–145)

## 2019-05-07 ENCOUNTER — TELEPHONE (OUTPATIENT)
Dept: INTERNAL MEDICINE CLINIC | Age: 74
End: 2019-05-07

## 2019-05-09 ENCOUNTER — TELEPHONE (OUTPATIENT)
Dept: INTERNAL MEDICINE CLINIC | Age: 74
End: 2019-05-09

## 2019-05-09 NOTE — TELEPHONE ENCOUNTER
----- Message from Familia Gomez MD sent at 5/9/2019  1:48 PM EDT -----  Let patient know no cancer cells in urine specimen.

## 2019-06-13 ENCOUNTER — OFFICE VISIT (OUTPATIENT)
Dept: INTERNAL MEDICINE CLINIC | Age: 74
End: 2019-06-13
Payer: MEDICARE

## 2019-06-13 VITALS
RESPIRATION RATE: 14 BRPM | WEIGHT: 166 LBS | BODY MASS INDEX: 28.34 KG/M2 | HEART RATE: 65 BPM | HEIGHT: 64 IN | DIASTOLIC BLOOD PRESSURE: 70 MMHG | SYSTOLIC BLOOD PRESSURE: 124 MMHG

## 2019-06-13 DIAGNOSIS — E78.2 MIXED HYPERLIPIDEMIA: ICD-10-CM

## 2019-06-13 DIAGNOSIS — E87.6 HYPOKALEMIA: ICD-10-CM

## 2019-06-13 DIAGNOSIS — I10 ESSENTIAL HYPERTENSION: ICD-10-CM

## 2019-06-13 DIAGNOSIS — R26.81 UNSTEADY GAIT: Primary | ICD-10-CM

## 2019-06-13 PROCEDURE — 99214 OFFICE O/P EST MOD 30 MIN: CPT | Performed by: NURSE PRACTITIONER

## 2019-06-13 RX ORDER — AMLODIPINE BESYLATE 10 MG/1
TABLET ORAL
Qty: 90 TABLET | Refills: 1 | Status: CANCELLED | OUTPATIENT
Start: 2019-06-13

## 2019-06-13 RX ORDER — LISINOPRIL AND HYDROCHLOROTHIAZIDE 25; 20 MG/1; MG/1
TABLET ORAL
Qty: 90 TABLET | Refills: 1 | Status: CANCELLED | OUTPATIENT
Start: 2019-06-13

## 2019-06-13 RX ORDER — PRAVASTATIN SODIUM 40 MG
40 TABLET ORAL EVERY EVENING
Qty: 90 TABLET | Refills: 1 | Status: CANCELLED | OUTPATIENT
Start: 2019-06-13

## 2019-06-13 NOTE — PROGRESS NOTES
diet controlled    Hyperlipidemia     Hypertension     Melanoma in situ ([facility] [address] 6/2012    of chest - Dr. Vivi Figueroa - margins clear - neg sentinal lymph node    Melanoma in situ of lower extremity ([facility] [address]     excision- x2    Metabolic syndrome     much improved - normal triglycerides, weight loss of 50#    OA (osteoarthritis)     left knee        Past Surgical History:   Procedure Laterality Date    BLADDER SURGERY      COLONOSCOPY      EYE SURGERY Bilateral 3/2016, 4/2016    Dr. Boyd Chase  11/1971    reomoval of left ovary - fibroid tumor    KNEE ARTHROSCOPY Left 2007    KNEE ARTHROSCOPY Left 1/2014    Dr. Lucia Vidales Left 7/31/2013    Left inner thigh    PRE-MALIGNANT / BENIGN SKIN LESION EXCISION Right 2009    ankle - skin graft, Dr. Berry Files      right leg melanoma    SKIN CANCER EXCISION  6/27/13    r arm    SKIN CANCER EXCISION      melanoma of chest - in situ    SKIN CANCER EXCISION Left 03/24/2017    Upper Back--Melanoma    TONSILLECTOMY  1966    TOTAL KNEE ARTHROPLASTY Right 09/2017    Dr. Monika Cates - Marilyn Carr Left 06/17/2015    total - Dr. Mindy Helton        Family History   Problem Relation Age of Onset    Heart Failure Mother     Cancer Father         lung        Social History     Socioeconomic History    Marital status:       Spouse name: Not on file    Number of children: Not on file    Years of education: Not on file    Highest education level: Not on file   Occupational History    Not on file   Social Needs    Financial resource strain: Not on file    Food insecurity:     Worry: Not on file     Inability: Not on file    Transportation needs:     Medical: Not on file     Non-medical: Not on file   Tobacco Use    Smoking status: Never Smoker    Smokeless tobacco: Never Used   Substance and Sexual Activity    Alcohol use: Yes     Comment: not very much    Drug use: No    Sexual activity: Not on file   Lifestyle    Physical activity:     Days per week: Not on file     Minutes per session: Not on file    Stress: Not on file   Relationships    Social connections:     Talks on phone: Not on file     Gets together: Not on file     Attends Buddhist service: Not on file     Active member of club or organization: Not on file     Attends meetings of clubs or organizations: Not on file     Relationship status: Not on file    Intimate partner violence:     Fear of current or ex partner: Not on file     Emotionally abused: Not on file     Physically abused: Not on file     Forced sexual activity: Not on file   Other Topics Concern    Not on file   Social History Narrative    Not on file       Prior to Admission medications    Medication Sig Start Date End Date Taking?  Authorizing Provider   Calcium Carb-Cholecalciferol (CALCIUM 500+D3) 500-400 MG-UNIT TABS Take by mouth 2 times daily   Yes Historical Provider, MD   Cholecalciferol (VITAMIN D3) 2000 units CAPS Take by mouth 2 times daily   Yes Historical Provider, MD   fluticasone (FLONASE) 50 MCG/ACT nasal spray 2 sprays by Each Nare route daily   Yes Historical Provider, MD   azelastine (ASTELIN) 0.1 % nasal spray 2 sprays by Nasal route nightly Use in each nostril as directed   Yes Historical Provider, MD   famotidine (PEPCID) 20 MG tablet take 1 tablet by mouth daily 4/8/19  Yes Tono Santiago MD   amLODIPine (NORVASC) 10 MG tablet take 1 tablet by mouth once daily 2/2/19  Yes Tono Santiago MD   polyethylene glycol [facility]) powder take 17GM (DISSOLVED IN WATER) by mouth once daily 1/11/19  Yes Tono Santiago MD   oxybutynin (DITROPAN) 5 MG tablet Take 1 tablet by mouth 2 times daily 1/8/19 1/8/20 Yes Tono Santiago MD   pravastatin (PRAVACHOL) 40 MG tablet Take 1 tablet by mouth every evening 1/8/19  Yes Tono Santiago MD   lisinopril-hydrochlorothiazide (PRINZIDE;ZESTORETIC) 20-25 MG per tablet take 1 dentition intact. Neck - supple, no significant adenopathy, no JVD. Chest - No distress. No increased work of breathing. Clear to auscultation in all fields, no wheezes, rales or rhonchi, symmetric air entry. Heart - normal rate, regular rhythm, normal S1, S2, no murmurs, rubs  or gallops  Abdomen -soft, nontender, nondistended  Neurological - alert, oriented, cranial nerves II-XII intact without noted deficits, motor and sensation are grossly intact bilateral upper and lower extremities. Extremities - peripheral pulses normal, no pedal edema, no clubbing or cyanosis  Skin - warm and dry, no rashes, lesions, or wounds evident on exposed skin    Diagnostic Data:  I have reviewed recent diagnostic testing including labs, EKG, radiology results. Please see EKG for interpretation. Lab Results   Component Value Date     05/06/2019    K 3.4 05/06/2019    CL 97 05/06/2019    CO2 29 05/06/2019    BUN 16 05/06/2019    CREATININE 0.7 05/06/2019    GLUCOSE 116 05/06/2019    GLUCOSE 108 05/29/2012    CALCIUM 10.0 05/06/2019      Lab Results   Component Value Date    LABA1C 5.4 05/06/2019     No results found for: EAG  Lab Results   Component Value Date    CHOL 152 01/23/2019    CHOL 190 03/16/2017    CHOL 195 01/27/2015     Lab Results   Component Value Date    TRIG 84 01/23/2019    TRIG 81 03/16/2017    TRIG 122 01/27/2015     Lab Results   Component Value Date    HDL 58 01/23/2019    HDL 64 03/16/2017    HDL 58 01/27/2015     Lab Results   Component Value Date    LDLCALC 77 01/23/2019    LDLCALC 110 03/16/2017    LDLCALC 113 01/27/2015     No results found for: LABVLDL, VLDL  No results found for: CHOLHDLRATIO  Lab Results   Component Value Date    WBC 6.6 01/23/2019    HGB 14.3 01/23/2019    HCT 45.2 01/23/2019    MCV 86.6 01/23/2019     01/23/2019         Assessment/Plan:   Diagnosis Orders   1.  Unsteady gait  Basic Metabolic Panel    Magnesium    CBC With Auto Differential    CT HEAD WO CONTRAST VL DUP CAROTID BILATERAL   2. Essential hypertension     3. Hypokalemia  Basic Metabolic Panel    Magnesium    CBC With Auto Differential   4. Mixed hyperlipidemia         Orders Placed This Encounter   Procedures    CT HEAD WO CONTRAST     Standing Status:   Future     Standing Expiration Date:   6/13/2020     Order Specific Question:   Reason for exam:     Answer:   unsteady gait, falls    VL DUP CAROTID BILATERAL     Standing Status:   Future     Standing Expiration Date:   6/13/2020     Order Specific Question:   Reason for exam:     Answer:   unsteady gait, falls    Basic Metabolic Panel     Standing Status:   Future     Standing Expiration Date:   6/13/2020    Magnesium     Standing Status:   Future     Standing Expiration Date:   6/13/2020    CBC With Auto Differential     Standing Status:   Future     Standing Expiration Date:   6/13/2020     1) Unsteady gait   - Orthostatic without significant changes. - Believe this is occurring with head turning. Pt does not experience dizziness with these episodes. Will check carotids and CT head. - Also will check electrolytes and renal function in addition to CBC. - EKG next visit. - Report any repeat episodes immediately or new symptoms.   - Discussed fall prevention and safety. Pt is exhibiting stable gait when evaluating walking in office today with two 180 degree turns. 2) HTN, stable. 3) History hypokalemia, on KCL 20 meq daily, recheck BMP  4) HLD, on Pravachol 40 mg daily. Will schedule follow up appointment in 2 weeks     Electronically signed by NARAYAN Turpin CNP 06/22/19 8:56 PM     [address] [place], [address]     Phone number: [phone]  Fax number: [phone]

## 2019-06-28 ENCOUNTER — HOSPITAL ENCOUNTER (INPATIENT)
Age: 74
LOS: 10 days | Discharge: NURSING, REHAB OR LONG-TERM CARE | DRG: 025 | End: 2019-07-08
Attending: INTERNAL MEDICINE | Admitting: INTERNAL MEDICINE
Payer: MEDICARE

## 2019-06-28 ENCOUNTER — HOSPITAL ENCOUNTER (OUTPATIENT)
Age: 74
Discharge: HOME | DRG: 025 | End: 2019-06-28
Payer: MEDICARE

## 2019-06-28 ENCOUNTER — HOSPITAL ENCOUNTER (OUTPATIENT)
Dept: CT IMAGING | Age: 74
Discharge: HOME | DRG: 025 | End: 2019-06-28
Payer: MEDICARE

## 2019-06-28 ENCOUNTER — APPOINTMENT (OUTPATIENT)
Dept: MRI IMAGING | Age: 74
DRG: 025 | End: 2019-06-28
Payer: MEDICARE

## 2019-06-28 ENCOUNTER — APPOINTMENT (OUTPATIENT)
Dept: CT IMAGING | Age: 74
DRG: 025 | End: 2019-06-28
Payer: MEDICARE

## 2019-06-28 ENCOUNTER — HOSPITAL ENCOUNTER (OUTPATIENT)
Dept: INTERVENTIONAL RADIOLOGY/VASCULAR | Age: 74
Discharge: HOME | DRG: 025 | End: 2019-06-28
Payer: MEDICARE

## 2019-06-28 DIAGNOSIS — G93.89 BRAIN MASS: Primary | ICD-10-CM

## 2019-06-28 DIAGNOSIS — E87.6 HYPOKALEMIA: ICD-10-CM

## 2019-06-28 DIAGNOSIS — R26.81 UNSTEADY GAIT: ICD-10-CM

## 2019-06-28 LAB
ANION GAP SERPL CALCULATED.3IONS-SCNC: 13 MEQ/L (ref 8–16)
ANION GAP SERPL CALCULATED.3IONS-SCNC: 13 MEQ/L (ref 8–16)
BASOPHILS # BLD: 0.6 %
BASOPHILS # BLD: 0.6 %
BASOPHILS ABSOLUTE: 0 THOU/MM3 (ref 0–0.1)
BASOPHILS ABSOLUTE: 0 THOU/MM3 (ref 0–0.1)
BUN BLDV-MCNC: 11 MG/DL (ref 7–22)
BUN BLDV-MCNC: 11 MG/DL (ref 7–22)
CALCIUM SERPL-MCNC: 10.4 MG/DL (ref 8.5–10.5)
CALCIUM SERPL-MCNC: 10.6 MG/DL (ref 8.5–10.5)
CHLORIDE BLD-SCNC: 99 MEQ/L (ref 98–111)
CHLORIDE BLD-SCNC: 99 MEQ/L (ref 98–111)
CO2: 31 MEQ/L (ref 23–33)
CO2: 32 MEQ/L (ref 23–33)
CREAT SERPL-MCNC: 0.6 MG/DL (ref 0.4–1.2)
CREAT SERPL-MCNC: 0.6 MG/DL (ref 0.4–1.2)
EOSINOPHIL # BLD: 1.1 %
EOSINOPHIL # BLD: 1.2 %
EOSINOPHILS ABSOLUTE: 0.1 THOU/MM3 (ref 0–0.4)
EOSINOPHILS ABSOLUTE: 0.1 THOU/MM3 (ref 0–0.4)
ERYTHROCYTE [DISTWIDTH] IN BLOOD BY AUTOMATED COUNT: 12.3 % (ref 11.5–14.5)
ERYTHROCYTE [DISTWIDTH] IN BLOOD BY AUTOMATED COUNT: 12.4 % (ref 11.5–14.5)
ERYTHROCYTE [DISTWIDTH] IN BLOOD BY AUTOMATED COUNT: 38.6 FL (ref 35–45)
ERYTHROCYTE [DISTWIDTH] IN BLOOD BY AUTOMATED COUNT: 39.1 FL (ref 35–45)
GFR SERPL CREATININE-BSD FRML MDRD: > 90 ML/MIN/1.73M2
GFR SERPL CREATININE-BSD FRML MDRD: > 90 ML/MIN/1.73M2
GLUCOSE BLD-MCNC: 117 MG/DL (ref 70–108)
GLUCOSE BLD-MCNC: 121 MG/DL (ref 70–108)
HCT VFR BLD CALC: 42.5 % (ref 37–47)
HCT VFR BLD CALC: 43.5 % (ref 37–47)
HEMOGLOBIN: 13.7 GM/DL (ref 12–16)
HEMOGLOBIN: 14 GM/DL (ref 12–16)
IMMATURE GRANS (ABS): 0.02 THOU/MM3 (ref 0–0.07)
IMMATURE GRANS (ABS): 0.02 THOU/MM3 (ref 0–0.07)
IMMATURE GRANULOCYTES: 0.3 %
IMMATURE GRANULOCYTES: 0.3 %
LYMPHOCYTES # BLD: 11.6 %
LYMPHOCYTES # BLD: 12.4 %
LYMPHOCYTES ABSOLUTE: 0.8 THOU/MM3 (ref 1–4.8)
LYMPHOCYTES ABSOLUTE: 0.9 THOU/MM3 (ref 1–4.8)
MAGNESIUM: 1.9 MG/DL (ref 1.6–2.4)
MCH RBC QN AUTO: 27.8 PG (ref 26–33)
MCH RBC QN AUTO: 28.1 PG (ref 26–33)
MCHC RBC AUTO-ENTMCNC: 32.2 GM/DL (ref 32.2–35.5)
MCHC RBC AUTO-ENTMCNC: 32.2 GM/DL (ref 32.2–35.5)
MCV RBC AUTO: 86.3 FL (ref 81–99)
MCV RBC AUTO: 87.1 FL (ref 81–99)
MONOCYTES # BLD: 6.7 %
MONOCYTES # BLD: 7.3 %
MONOCYTES ABSOLUTE: 0.4 THOU/MM3 (ref 0.4–1.3)
MONOCYTES ABSOLUTE: 0.6 THOU/MM3 (ref 0.4–1.3)
NUCLEATED RED BLOOD CELLS: 0 /100 WBC
NUCLEATED RED BLOOD CELLS: 0 /100 WBC
OSMOLALITY CALCULATION: 285.4 MOSMOL/KG (ref 275–300)
PLATELET # BLD: 222 THOU/MM3 (ref 130–400)
PLATELET # BLD: 223 THOU/MM3 (ref 130–400)
PMV BLD AUTO: 10.8 FL (ref 9.4–12.4)
PMV BLD AUTO: 11 FL (ref 9.4–12.4)
POTASSIUM SERPL-SCNC: 3.5 MEQ/L (ref 3.5–5.2)
POTASSIUM SERPL-SCNC: 4 MEQ/L (ref 3.5–5.2)
RBC # BLD: 4.88 MILL/MM3 (ref 4.2–5.4)
RBC # BLD: 5.04 MILL/MM3 (ref 4.2–5.4)
SEG NEUTROPHILS: 78.8 %
SEG NEUTROPHILS: 79.1 %
SEGMENTED NEUTROPHILS ABSOLUTE COUNT: 5.3 THOU/MM3 (ref 1.8–7.7)
SEGMENTED NEUTROPHILS ABSOLUTE COUNT: 6.2 THOU/MM3 (ref 1.8–7.7)
SODIUM BLD-SCNC: 143 MEQ/L (ref 135–145)
SODIUM BLD-SCNC: 144 MEQ/L (ref 135–145)
WBC # BLD: 6.7 THOU/MM3 (ref 4.8–10.8)
WBC # BLD: 7.9 THOU/MM3 (ref 4.8–10.8)

## 2019-06-28 PROCEDURE — 2580000003 HC RX 258: Performed by: INTERNAL MEDICINE

## 2019-06-28 PROCEDURE — 2709999900 HC NON-CHARGEABLE SUPPLY

## 2019-06-28 PROCEDURE — 70553 MRI BRAIN STEM W/O & W/DYE: CPT

## 2019-06-28 PROCEDURE — 6360000004 HC RX CONTRAST MEDICATION: Performed by: NURSE PRACTITIONER

## 2019-06-28 PROCEDURE — 99284 EMERGENCY DEPT VISIT MOD MDM: CPT

## 2019-06-28 PROCEDURE — 83735 ASSAY OF MAGNESIUM: CPT

## 2019-06-28 PROCEDURE — 6360000004 HC RX CONTRAST MEDICATION: Performed by: INTERNAL MEDICINE

## 2019-06-28 PROCEDURE — 6360000002 HC RX W HCPCS: Performed by: NURSE PRACTITIONER

## 2019-06-28 PROCEDURE — 85025 COMPLETE CBC W/AUTO DIFF WBC: CPT

## 2019-06-28 PROCEDURE — 74178 CT ABD&PLV WO CNTR FLWD CNTR: CPT

## 2019-06-28 PROCEDURE — 70450 CT HEAD/BRAIN W/O DYE: CPT

## 2019-06-28 PROCEDURE — 6360000002 HC RX W HCPCS: Performed by: INTERNAL MEDICINE

## 2019-06-28 PROCEDURE — 96374 THER/PROPH/DIAG INJ IV PUSH: CPT

## 2019-06-28 PROCEDURE — 1200000003 HC TELEMETRY R&B

## 2019-06-28 PROCEDURE — 80048 BASIC METABOLIC PNL TOTAL CA: CPT

## 2019-06-28 PROCEDURE — 6370000000 HC RX 637 (ALT 250 FOR IP): Performed by: INTERNAL MEDICINE

## 2019-06-28 PROCEDURE — 71270 CT THORAX DX C-/C+: CPT

## 2019-06-28 PROCEDURE — 99223 1ST HOSP IP/OBS HIGH 75: CPT | Performed by: INTERNAL MEDICINE

## 2019-06-28 PROCEDURE — 99223 1ST HOSP IP/OBS HIGH 75: CPT | Performed by: NEUROLOGICAL SURGERY

## 2019-06-28 PROCEDURE — 36415 COLL VENOUS BLD VENIPUNCTURE: CPT

## 2019-06-28 PROCEDURE — A9579 GAD-BASE MR CONTRAST NOS,1ML: HCPCS | Performed by: NURSE PRACTITIONER

## 2019-06-28 PROCEDURE — 93880 EXTRACRANIAL BILAT STUDY: CPT

## 2019-06-28 RX ORDER — SODIUM CHLORIDE 0.9 % (FLUSH) 0.9 %
10 SYRINGE (ML) INJECTION EVERY 12 HOURS SCHEDULED
Status: DISCONTINUED | OUTPATIENT
Start: 2019-06-28 | End: 2019-07-03

## 2019-06-28 RX ORDER — LISINOPRIL 20 MG/1
20 TABLET ORAL DAILY
Status: DISCONTINUED | OUTPATIENT
Start: 2019-06-28 | End: 2019-07-08 | Stop reason: HOSPADM

## 2019-06-28 RX ORDER — POTASSIUM CHLORIDE 20 MEQ/1
40 TABLET, EXTENDED RELEASE ORAL PRN
Status: DISCONTINUED | OUTPATIENT
Start: 2019-06-28 | End: 2019-07-03

## 2019-06-28 RX ORDER — DEXAMETHASONE SODIUM PHOSPHATE 4 MG/ML
6 INJECTION, SOLUTION INTRA-ARTICULAR; INTRALESIONAL; INTRAMUSCULAR; INTRAVENOUS; SOFT TISSUE EVERY 6 HOURS
Status: DISCONTINUED | OUTPATIENT
Start: 2019-06-28 | End: 2019-07-06

## 2019-06-28 RX ORDER — ACETAMINOPHEN 325 MG/1
650 TABLET ORAL EVERY 4 HOURS PRN
Status: DISCONTINUED | OUTPATIENT
Start: 2019-06-28 | End: 2019-07-03

## 2019-06-28 RX ORDER — FAMOTIDINE 20 MG/1
20 TABLET, FILM COATED ORAL DAILY
Status: DISCONTINUED | OUTPATIENT
Start: 2019-06-28 | End: 2019-07-08 | Stop reason: HOSPADM

## 2019-06-28 RX ORDER — PRAVASTATIN SODIUM 40 MG
40 TABLET ORAL EVERY EVENING
Status: DISCONTINUED | OUTPATIENT
Start: 2019-06-28 | End: 2019-07-08 | Stop reason: HOSPADM

## 2019-06-28 RX ORDER — LISINOPRIL AND HYDROCHLOROTHIAZIDE 25; 20 MG/1; MG/1
1 TABLET ORAL DAILY
Status: DISCONTINUED | OUTPATIENT
Start: 2019-06-28 | End: 2019-06-28 | Stop reason: SDUPTHER

## 2019-06-28 RX ORDER — POTASSIUM CHLORIDE 7.45 MG/ML
10 INJECTION INTRAVENOUS PRN
Status: DISCONTINUED | OUTPATIENT
Start: 2019-06-28 | End: 2019-07-03

## 2019-06-28 RX ORDER — AMLODIPINE BESYLATE 10 MG/1
10 TABLET ORAL DAILY
Status: DISCONTINUED | OUTPATIENT
Start: 2019-06-28 | End: 2019-07-08 | Stop reason: HOSPADM

## 2019-06-28 RX ORDER — HYDROCHLOROTHIAZIDE 25 MG/1
25 TABLET ORAL DAILY
Status: DISCONTINUED | OUTPATIENT
Start: 2019-06-28 | End: 2019-07-08 | Stop reason: HOSPADM

## 2019-06-28 RX ORDER — SODIUM CHLORIDE 0.9 % (FLUSH) 0.9 %
10 SYRINGE (ML) INJECTION PRN
Status: DISCONTINUED | OUTPATIENT
Start: 2019-06-28 | End: 2019-07-03

## 2019-06-28 RX ORDER — OXYBUTYNIN CHLORIDE 5 MG/1
5 TABLET ORAL 2 TIMES DAILY
Status: DISCONTINUED | OUTPATIENT
Start: 2019-06-28 | End: 2019-07-08 | Stop reason: HOSPADM

## 2019-06-28 RX ORDER — ONDANSETRON 2 MG/ML
4 INJECTION INTRAMUSCULAR; INTRAVENOUS EVERY 6 HOURS PRN
Status: DISCONTINUED | OUTPATIENT
Start: 2019-06-28 | End: 2019-07-08 | Stop reason: HOSPADM

## 2019-06-28 RX ADMIN — PRAVASTATIN SODIUM 40 MG: 40 TABLET ORAL at 21:10

## 2019-06-28 RX ADMIN — IOPAMIDOL 80 ML: 755 INJECTION, SOLUTION INTRAVENOUS at 17:30

## 2019-06-28 RX ADMIN — DEXAMETHASONE SODIUM PHOSPHATE 6 MG: 4 INJECTION, SOLUTION INTRAMUSCULAR; INTRAVENOUS at 18:12

## 2019-06-28 RX ADMIN — LISINOPRIL 20 MG: 20 TABLET ORAL at 21:09

## 2019-06-28 RX ADMIN — AMLODIPINE BESYLATE 10 MG: 10 TABLET ORAL at 21:09

## 2019-06-28 RX ADMIN — Medication 10 ML: at 21:12

## 2019-06-28 RX ADMIN — OXYBUTYNIN CHLORIDE 5 MG: 5 TABLET ORAL at 21:10

## 2019-06-28 RX ADMIN — HYDROCHLOROTHIAZIDE 25 MG: 25 TABLET ORAL at 21:10

## 2019-06-28 RX ADMIN — GADOTERIDOL 15 ML: 279.3 INJECTION, SOLUTION INTRAVENOUS at 17:40

## 2019-06-28 RX ADMIN — FAMOTIDINE 20 MG: 20 TABLET ORAL at 21:09

## 2019-06-28 RX ADMIN — DEXAMETHASONE SODIUM PHOSPHATE 6 MG: 4 INJECTION, SOLUTION INTRAMUSCULAR; INTRAVENOUS at 12:04

## 2019-06-28 ASSESSMENT — PAIN SCALES - GENERAL
PAINLEVEL_OUTOF10: 0

## 2019-06-28 ASSESSMENT — ENCOUNTER SYMPTOMS
RHINORRHEA: 0
WHEEZING: 0
COUGH: 0
NAUSEA: 0
PHOTOPHOBIA: 0
BLOOD IN STOOL: 0
COLOR CHANGE: 0
SHORTNESS OF BREATH: 0
SINUS PRESSURE: 0
VOMITING: 0
EYE REDNESS: 0
SORE THROAT: 0
BACK PAIN: 0
CONSTIPATION: 0
DIARRHEA: 0
CHEST TIGHTNESS: 0
VOICE CHANGE: 0
ABDOMINAL DISTENTION: 0
APNEA: 0
ABDOMINAL PAIN: 0

## 2019-06-28 NOTE — ED PROVIDER NOTES
[facility]    CHIEF COMPLAINT       Chief Complaint   Patient presents with    Other     abnormal CT of head       Nurses Notes reviewed and I agree except as noted in the HPI. HISTORY OF PRESENT ILLNESS    Manpreet Medellin buddy 76 y.o. female who presents to the ED for evaluation of an abnormal CT scan this morning ordered by her PCP for multiple recent falls and a new onset unsteady gait. The CT showed a rounded area of hypoattenuation with surrounding hypoattenuation within the right cerebellar hemisphere which results in mass effect and shift of the midline structures towards the left. The patietn was sent to the ED for further evaluation and imaging. The patient denies any lightheadedness or dizziness before her falls or any dizziness when standing stating that it just \"feels like she loses control\". The patient has a documented history of hypertension, hyperlpidemia, melanoma which was successfully removed without chemotherapy and diabetes. The patient denies any other symptoms or relevant history at this time. HPI was provided by the patient. REVIEW OF SYSTEMS     Review of Systems   Constitutional: Negative for appetite change, chills, diaphoresis, fatigue, fever and unexpected weight change. Abnormal CT of brain      HENT: Negative for congestion, hearing loss, postnasal drip, rhinorrhea, sinus pressure, sore throat and voice change. Eyes: Negative for photophobia, redness and visual disturbance. Respiratory: Negative for cough, chest tightness, shortness of breath and wheezing. Cardiovascular: Negative for chest pain and palpitations. Gastrointestinal: Negative for abdominal distention, abdominal pain, blood in stool, constipation, diarrhea, nausea and vomiting. Endocrine: Negative for cold intolerance, heat intolerance, polydipsia, polyphagia and polyuria.    Genitourinary: Negative for difficulty urinating, dysuria, flank pain, frequency and vaginal pain.   Musculoskeletal: Negative for arthralgias, back pain, gait problem, joint swelling, neck pain and neck stiffness. Skin: Negative for color change and rash. Allergic/Immunologic: Negative for immunocompromised state. Neurological: Negative for dizziness, tremors, weakness, light-headedness, numbness and headaches. Gait change    Hematological: Does not bruise/bleed easily. Psychiatric/Behavioral: Negative for behavioral problems, confusion, decreased concentration, hallucinations, self-injury and suicidal ideas. The patient is not nervous/anxious. PAST MEDICAL HISTORY     Past Medical History:   Diagnosis Date    Abnormal EKG     normal stress test 0/3858    Complication of anesthesia     difficulty breathing after surgery- [facility] to [facility]. Angeline's for 3 days, O2 for about 5 days    Diabetes mellitus ([facility] [address]     History type 2 - lost weight - diet controlled    Hyperlipidemia     Hypertension     Melanoma in situ ([address] 6/2012    of chest - Dr. Tc White - margins clear - neg sentinal lymph node    Melanoma in situ of lower extremity ([address]     excision- x2    Metabolic syndrome     much improved - normal triglycerides, weight loss of 50#    OA (osteoarthritis)     left knee       SURGICALHISTORY      has a past surgical history that includes Bladder surgery; Knee arthroscopy (Left, 2007); Skin cancer excision; pre-malignant / benign skin lesion excision (Right, 2009); skin biopsy; Skin cancer excision (6/27/13); malignant skin lesion excision (Left, 7/31/2013); Knee arthroscopy (Left, 1/2014); Skin cancer excision; Tonsillectomy (1966); Hysterectomy (11/1971); eye surgery (Bilateral, 3/2016, 4/2016); Colonoscopy; Skin cancer excision (Left, 03/24/2017); Total knee arthroplasty (Right, 09/2017); and Total knee arthroplasty (Left, 06/17/2015).     CURRENT MEDICATIONS       Current Discharge Medication List      CONTINUE these medications which have NOT CHANGED    Details Calcium Carb-Cholecalciferol (CALCIUM 500+D3) 500-400 MG-UNIT TABS Take by mouth 2 times daily      Cholecalciferol (VITAMIN D3) 2000 units CAPS Take by mouth 2 times daily      fluticasone (FLONASE) 50 MCG/ACT nasal spray 2 sprays by Each Nare route daily      azelastine (ASTELIN) 0.1 % nasal spray 2 sprays by Nasal route nightly Use in each nostril as directed      famotidine (PEPCID) 20 MG tablet take 1 tablet by mouth daily  Qty: 90 tablet, Refills: 1    Associated Diagnoses: Dyspepsia      amLODIPine (NORVASC) 10 MG tablet take 1 tablet by mouth once daily  Qty: 90 tablet, Refills: 1    Comments: Refill when due  Associated Diagnoses: Essential hypertension      polyethylene glycol (GLYCOLAX) powder take 17GM (DISSOLVED IN WATER) by mouth once daily  Qty: 527 g, Refills: 3    Associated Diagnoses: Slow transit constipation      oxybutynin (DITROPAN) 5 MG tablet Take 1 tablet by mouth 2 times daily  Qty: 180 tablet, Refills: 1    Comments: Refill when due  Associated Diagnoses: Overactive bladder      pravastatin (PRAVACHOL) 40 MG tablet Take 1 tablet by mouth every evening  Qty: 90 tablet, Refills: 1    Comments: Refill when due  Associated Diagnoses: Mixed hyperlipidemia      lisinopril-hydrochlorothiazide (PRINZIDE;ZESTORETIC) 20-25 MG per tablet take 1 tablet by mouth once daily  Qty: 90 tablet, Refills: 1    Comments: Refill when due  Associated Diagnoses: Essential hypertension      potassium chloride (KLOR-CON M) 20 MEQ extended release tablet Take 1 tablet by mouth daily  Qty: 90 tablet, Refills: 1    Comments: Refill when due  Associated Diagnoses: Hypokalemia      acetaminophen (TYLENOL) 325 MG tablet Take 650 mg by mouth every 6 hours as needed for Pain      vitamin E 400 UNIT capsule Take 400 Units by mouth daily      aspirin 81 MG tablet Take 81 mg by mouth daily              ALLERGIES     is allergic to pcn [penicillins] and sulfa antibiotics.     FAMILY HISTORY     indicated that her mother is (has no administration in time range)   sodium chloride flush 0.9 % injection 10 mL (has no administration in time range)   magnesium hydroxide (MILK OF MAGNESIA) 400 MG/5ML suspension 30 mL (has no administration in time range)   ondansetron (ZOFRAN) injection 4 mg (has no administration in time range)   potassium chloride (KLOR-CON M) extended release tablet 40 mEq (has no administration in time range)     Or   potassium bicarb-citric acid (EFFER-K) effervescent tablet 40 mEq (has no administration in time range)     Or   potassium chloride 10 mEq/100 mL IVPB (Peripheral Line) (has no administration in time range)   acetaminophen (TYLENOL) tablet 650 mg (has no administration in time range)       Patient was seenindependently by myself. The patient's final impression and disposition and plan was determined by myself. CRITICAL CARE:   None    CONSULTS:  None    PROCEDURES:  None    FINAL IMPRESSION     1. Brain mass          DISPOSITION/PLAN   Patient admitted to the hospital service    PATIENT REFERREDTO:  Dianelys Saldana MD  [facility], [address]  [place] [address]  [phone]            DISCHARGE MEDICATIONS:  Current Discharge Medication List          (Please note that portions of this note were completed with a voice recognition program.  Efforts were made to edit the dictations but occasionally words are mis-transcribed.)    Scribe:  Deanne Doran 6/28/19 11:46 AM Scribing for and in the presence of Keon Saha CNP. Signed by: Juanita Ayala, 06/28/19 2:43 PM    Provider:  I personally performed the services described in the documentation,reviewed and edited the documentation which was dictated to the scribe in my presence, and it accurately records my words and actions.     Keon Saha CNP 06/28/19 2:43 PM    Angie Saha, NARAYAN - MIRIAM         [employer], NARAYAN - CNP  06/28/19 [id number]

## 2019-06-28 NOTE — CONSULTS
Consults       Attending Note:    Patient seen and examined in conjunction with neurosurgery SAMANTHA Gonzalez PA-C). Discussed with ER team and hospitalsit team l as well. All data and imaging reviewed by myself. I agree with examination assessment and plan as documented below. Please See my additional comments below for updated orders and plan. -This is a 76years old female who presented to the ER because of history of a progressive balance issues, gated changes and multiple falls over the last month. -Patient underwent brain MRI that showed isolated  right cerebellar intra parenchymal lesion.  - On on the top of the differential diagnosis list if this lesion are metastatic lesion versus a primary high-grade glioma. -Karnofsky performance score: at least 70%. -Given the finding of patient MRI(isolated right cerebellar intra parenchymal lesion), her current neurological exam and her current Karnofsky performance score I would recommend for the patient surgical intervention in the form of right suboccipital craniotomy for surgical resection of patient right cerebellar lesion with 2 main goals; obtaining  A tissue for  Diagnosis and and resection /debulking lesion.  -I will discuss with other treatment teams this recommendation to make sure that everybody in the same page. -In mean time, patient needs:  · CT chest /abdomen and pelvis out any primary and staging. · Dexamethasone 4 cc  every 6 hours. · Pre op clearness for possible surgical intervention.   - I discussed with patient and her daughter her current condition and the finding of her brain imaging studies. Also I discussed with them my recommendation and treatment plan. - All questions and concerns were addressed and answered  - Neurosurgery will follow.     MD Jose [address], [place], [address]                                          NEUROSURGICAL CONSULTATION NOTE       Alexandre Ely  Alcohol use: Yes     Comment: not very much       FAMILY HISTORY:  Family History   Problem Relation Age of Onset    Heart Failure Mother     Cancer Father         lung       LABS  None    RADIOLOGY:  Pertinent images have been reviewed. MRI with and without contrast of the brain. CT with and without contrast of chest abdomen and pelvis. EXAMINATION:  Physical Exam   Constitutional: She is oriented to person, place, and time. She appears well-developed and well-nourished. HENT:   Head: Normocephalic and atraumatic. Eyes: Pupils are equal, round, and reactive to light. Conjunctivae and EOM are normal.   Neck: Normal range of motion. Cardiovascular: Normal rate, regular rhythm and normal heart sounds. Exam reveals no friction rub. No murmur heard. Pulmonary/Chest: Effort normal and breath sounds normal. No respiratory distress. She has no wheezes. Abdominal: Soft. Bowel sounds are normal. She exhibits no distension. There is no tenderness. Musculoskeletal: Normal range of motion. Neurological: She is alert and oriented to person, place, and time. She has normal strength. No cranial nerve deficit or sensory deficit. Patient is stable and neurologically intact on exam without significant deficits. And patient's family do relate recent increase in general fatigue. Skin: Skin is warm and dry. Psychiatric: She has a normal mood and affect.  Her behavior is normal. Judgment and thought content normal.              Mani Weston  Electronically signed 6/28/2019 at 14:14

## 2019-06-29 LAB
ANION GAP SERPL CALCULATED.3IONS-SCNC: 12 MEQ/L (ref 8–16)
BUN BLDV-MCNC: 13 MG/DL (ref 7–22)
CALCIUM SERPL-MCNC: 10.3 MG/DL (ref 8.5–10.5)
CHLORIDE BLD-SCNC: 100 MEQ/L (ref 98–111)
CO2: 28 MEQ/L (ref 23–33)
CREAT SERPL-MCNC: 0.7 MG/DL (ref 0.4–1.2)
ERYTHROCYTE [DISTWIDTH] IN BLOOD BY AUTOMATED COUNT: 12 % (ref 11.5–14.5)
ERYTHROCYTE [DISTWIDTH] IN BLOOD BY AUTOMATED COUNT: 37.5 FL (ref 35–45)
GFR SERPL CREATININE-BSD FRML MDRD: 82 ML/MIN/1.73M2
GLUCOSE BLD-MCNC: 145 MG/DL (ref 70–108)
GLUCOSE BLD-MCNC: 198 MG/DL (ref 70–108)
HCT VFR BLD CALC: 41.6 % (ref 37–47)
HEMOGLOBIN: 13.5 GM/DL (ref 12–16)
MCH RBC QN AUTO: 27.8 PG (ref 26–33)
MCHC RBC AUTO-ENTMCNC: 32.5 GM/DL (ref 32.2–35.5)
MCV RBC AUTO: 85.8 FL (ref 81–99)
PLATELET # BLD: 249 THOU/MM3 (ref 130–400)
PMV BLD AUTO: 10.7 FL (ref 9.4–12.4)
POTASSIUM REFLEX MAGNESIUM: 3.6 MEQ/L (ref 3.5–5.2)
RBC # BLD: 4.85 MILL/MM3 (ref 4.2–5.4)
SODIUM BLD-SCNC: 140 MEQ/L (ref 135–145)
WBC # BLD: 12.4 THOU/MM3 (ref 4.8–10.8)

## 2019-06-29 PROCEDURE — 99232 SBSQ HOSP IP/OBS MODERATE 35: CPT | Performed by: NEUROLOGICAL SURGERY

## 2019-06-29 PROCEDURE — 99233 SBSQ HOSP IP/OBS HIGH 50: CPT | Performed by: INTERNAL MEDICINE

## 2019-06-29 PROCEDURE — 97535 SELF CARE MNGMENT TRAINING: CPT

## 2019-06-29 PROCEDURE — 1200000003 HC TELEMETRY R&B

## 2019-06-29 PROCEDURE — 97166 OT EVAL MOD COMPLEX 45 MIN: CPT

## 2019-06-29 PROCEDURE — 2580000003 HC RX 258: Performed by: INTERNAL MEDICINE

## 2019-06-29 PROCEDURE — 85027 COMPLETE CBC AUTOMATED: CPT

## 2019-06-29 PROCEDURE — 36415 COLL VENOUS BLD VENIPUNCTURE: CPT

## 2019-06-29 PROCEDURE — 80048 BASIC METABOLIC PNL TOTAL CA: CPT

## 2019-06-29 PROCEDURE — 6370000000 HC RX 637 (ALT 250 FOR IP): Performed by: INTERNAL MEDICINE

## 2019-06-29 PROCEDURE — 82948 REAGENT STRIP/BLOOD GLUCOSE: CPT

## 2019-06-29 PROCEDURE — 97163 PT EVAL HIGH COMPLEX 45 MIN: CPT

## 2019-06-29 PROCEDURE — 2709999900 HC NON-CHARGEABLE SUPPLY

## 2019-06-29 PROCEDURE — 6360000002 HC RX W HCPCS: Performed by: INTERNAL MEDICINE

## 2019-06-29 PROCEDURE — 97530 THERAPEUTIC ACTIVITIES: CPT

## 2019-06-29 RX ADMIN — OXYBUTYNIN CHLORIDE 5 MG: 5 TABLET ORAL at 22:47

## 2019-06-29 RX ADMIN — DEXAMETHASONE SODIUM PHOSPHATE 6 MG: 4 INJECTION, SOLUTION INTRAMUSCULAR; INTRAVENOUS at 05:38

## 2019-06-29 RX ADMIN — DEXAMETHASONE SODIUM PHOSPHATE 6 MG: 4 INJECTION, SOLUTION INTRAMUSCULAR; INTRAVENOUS at 11:32

## 2019-06-29 RX ADMIN — AMLODIPINE BESYLATE 10 MG: 10 TABLET ORAL at 11:30

## 2019-06-29 RX ADMIN — DEXAMETHASONE SODIUM PHOSPHATE 6 MG: 4 INJECTION, SOLUTION INTRAMUSCULAR; INTRAVENOUS at 19:06

## 2019-06-29 RX ADMIN — FAMOTIDINE 20 MG: 20 TABLET ORAL at 11:28

## 2019-06-29 RX ADMIN — LISINOPRIL 20 MG: 20 TABLET ORAL at 11:28

## 2019-06-29 RX ADMIN — PRAVASTATIN SODIUM 40 MG: 40 TABLET ORAL at 22:47

## 2019-06-29 RX ADMIN — OXYBUTYNIN CHLORIDE 5 MG: 5 TABLET ORAL at 11:28

## 2019-06-29 RX ADMIN — DEXAMETHASONE SODIUM PHOSPHATE 6 MG: 4 INJECTION, SOLUTION INTRAMUSCULAR; INTRAVENOUS at 00:26

## 2019-06-29 RX ADMIN — Medication 10 ML: at 22:47

## 2019-06-29 RX ADMIN — HYDROCHLOROTHIAZIDE 25 MG: 25 TABLET ORAL at 11:28

## 2019-06-29 RX ADMIN — Medication 10 ML: at 11:34

## 2019-06-29 ASSESSMENT — PAIN SCALES - GENERAL
PAINLEVEL_OUTOF10: 0

## 2019-06-29 NOTE — PROGRESS NOTES
grandsons graduation. Pt reporting she was very indep at home with all ADL tasks. Pt has family and friends who would be able to assist her. Cognition/Orientation:     Cognition Comment: Pt following all commands. Pt dmeo 2 instances of not remembering activity going to complete after finishing another    ADL;s:  Grooming: Contact guard assistance(standing at sink for washing hands)  LE Dressing: Stand by assistance(seated EOB )  Toileting: Contact guard assistance  Additional Comments: pt requiring cue to wash hands after toilet use       Functional Mobility:  Bed mobility  Supine to Sit: Supervision(HOB flat, no bedrail)  Sit to Supine: Supervision  Scooting: Supervision    Functional Mobility  Functional - Mobility Device: No device  Assist Level: Contact guard assistance  Functional Mobility Comments: into hallways navigating around obstacles with distractions in place. Pt requiring 1 verbal cue to look up when lunch cart coming slow. Pt unsteady on feet during but no LOB requiring assistance to correct. Balance:  Balance  Sitting Balance: Supervision  Standing Balance: Contact guard assistance(to SBA at times )  Standing Balance  Time: x1 min   Activity: ADL tasks     Transfers:  Sit to stand: Contact guard assistance  Stand to sit: Contact guard assistance  Toilet Transfers  Equipment Used: Standard toilet  Toilet Transfer: Contact guard assistance    Upper Extremity Assessment:   LUE AROM : WNL  RUE AROM : WNL    LUE Strength  Gross LUE Strength: WFL  RUE Strength  Gross RUE Strength: WFL    Sensation  Overall Sensation Status: WNL       Activity Tolerance: Patient Tolerated treatment well       Assessment:  Assessment: Pt admitted with decreased balance with findings of brain mass. Pt demo increased need for assistance during ADL asks for balance tasks d/t increased unsteadiness during.  Pt would benefit from skilled OT Services to imrpove her balance and indep during aDL asks as well as educating on safety during aDL tasks. Pt will need to be reassessed once surgery completed. Performance deficits / Impairments: Decreased safe awareness, Decreased endurance, Decreased balance, Decreased ADL status  Prognosis: Good  REQUIRES OT FOLLOW UP: Yes  Decision Making: Medium Complexity  Safety Devices in place: Yes  Type of devices: All fall risk precautions in place, Gait belt, Bed alarm in place, Call light within reach, Left in bed, Nurse notified, Patient at risk for falls    Treatment Initiated: Treatment and education initiated within context of evaluation. Evaluation time included review of current medical information, gathering information related to past medical, social and functional history, completion of standardized testing, formal and informal observation of tasks, assessment of data and development of plan of care and goals. Treatment time included skilled education and facilitation of tasks to increase safety and independence with ADL's for improved functional independence and quality of life. Discharge Recommendations:  Continue to assess pending progress, Patient would benefit from continued therapy after discharge(will continue to assess and monitor for needs once having surgery )    Patient Education:  Patient Education: OT POC, safeyt with ADL tasks   Barriers to Learning: none     Equipment Recommendations:   Other: will continue to monitor     Plan:  Times per week: 5x  Current Treatment Recommendations: Balance Training, Endurance Training, Patient/Caregiver Education & Training, Self-Care / ADL, Safety Education & Training    Goals:  Patient goals : go home   Short term goals  Time Frame for Short term goals: 2 weeks   Short term goal 1: Pt to complete BADL routine with SBA and no vcs for safety   Short term goal 2: Pt to dmeo dynamic standing balance > 4 min with no UE support, LOB and SBA in prep for simple homemaking tasks   Long term goals  Time Frame for Long term goals : not est d/t

## 2019-06-29 NOTE — PLAN OF CARE
Problem: Falls - Risk of:  Goal: Will remain free from falls  Description  Will remain free from falls  Outcome: Ongoing  Note:   Call light in reach, bed in lowest position, and bed alarm activated. Education given on use of call light before ambulation and when in need of assistance. Patient expressed understanding. Hourly visual checks performed and charted. Toileting offered to patient. No falls this shift, at any time. Arm band and falling star in place. Will continue to monitor. Problem: Discharge Planning:  Goal: Discharged to appropriate level of care  Description  Discharged to appropriate level of care  Outcome: Ongoing  Note:   Pt's discharge planning is currently ongoing, pt came from home with her , pt to be discharged to an appropriate facility at the appropriate time. Problem: Cardiac Output - Decreased:  Goal: Hemodynamic stability will improve  Description  Hemodynamic stability will improve  Outcome: Ongoing  Note:      06/29/19 0352   Vital Signs   Pulse 58   Heart Rate Source Monitor   Resp 16   BP (!) 120/55   BP Location Left upper arm   BP Upper/Lower Upper   MAP (mmHg) 78   Height and Weight   Weight 164 lb 9.6 oz (74.7 kg)   Weight Method Bed scale   BMI (Calculated) 28.3   Oxygen Therapy   SpO2 94 %   O2 Device None (Room air)   Pt's vital signs are stable at this time, pulse is slightly bradicardic, diastolic is slightly hypotensive, will continue to monitor. Problem: Mental Status - Impaired:  Goal: Mental status will be restored to baseline  Description  Mental status will be restored to baseline  Outcome: Ongoing  Note:   Pt is a/o x4, pt's speech is clear and appropriate, pt able to express and needs and any concerns at this time but will continue to monitor.      Problem: Pain:  Goal: Control of acute pain  Description  Control of acute pain  Outcome: Ongoing  Note:   Pt has denied pain this entire shift, pt has Tylenol ordered PRN, pt able to reposition

## 2019-06-29 NOTE — FLOWSHEET NOTE
06/29/19 1102   Provider Notification   Reason for Communication Review case  (notify of consult)   Provider Name Dr. Pretty Lima   Provider Notification Physician   Method of Communication Call   Response No new orders   Notification Time [phone] serve message sent to Dr. Pretty Lima to notify of consult for right cerebellar lesion.  Physician stated to add to list

## 2019-06-29 NOTE — PROGRESS NOTES
 dexamethasone  6 mg Intravenous Q6H    amLODIPine  10 mg Oral Daily    famotidine  20 mg Oral Daily    oxybutynin  5 mg Oral BID    pravastatin  40 mg Oral QPM    sodium chloride flush  10 mL Intravenous 2 times per day    lisinopril  20 mg Oral Daily    And    hydrochlorothiazide  25 mg Oral Daily       Vital Signs:   BP (!) 120/55   Pulse 58   Temp 98.3 °F (36.8 °C) (Oral)   Resp 16   Ht 5' 4\" (1.626 m)   Wt 164 lb 9.6 oz (74.7 kg)   SpO2 94%   BMI 28.25 kg/m²      Intake/Output Summary (Last 24 hours) at 6/29/2019 1010  Last data filed at 6/29/2019 0417  Gross per 24 hour   Intake 1400 ml   Output --   Net 1400 ml        General:   Resting in chair  HEENT:  normocephalic and atraumatic. No scleral icterus. PERR. Neck: supple. No JVD. No thyromegaly. Lungs: clear to auscultation. No retractions  Cardiac: RRR without murmur. Abdomen: soft. Nontender. Bowel sounds positive. Extremities:  No clubbing, cyanosis, or edema x 4. Vasculature: capillary refill < 3 seconds. Palpable LE pulses bilaterally. Skin:  warm and dry. Psych:  Alert and oriented x3. Affect appropriate  Lymph:  No supraclavicular adenopathy. Neurologic:  No focal deficit. No seizures. Data: (All radiographs, tracings, PFTs, and imaging are personally viewed and interpreted unless otherwise noted).     MRI brain with cerebellar mass on the right   CT abd/pelvis without evidence of malignancy per radiology      Electronically signed by Erich Corrales MD on 6/29/2019 at 10:10 AM

## 2019-06-30 LAB
ANION GAP SERPL CALCULATED.3IONS-SCNC: 14 MEQ/L (ref 8–16)
BUN BLDV-MCNC: 18 MG/DL (ref 7–22)
CALCIUM SERPL-MCNC: 9.9 MG/DL (ref 8.5–10.5)
CHLORIDE BLD-SCNC: 100 MEQ/L (ref 98–111)
CO2: 27 MEQ/L (ref 23–33)
CREAT SERPL-MCNC: 0.8 MG/DL (ref 0.4–1.2)
ERYTHROCYTE [DISTWIDTH] IN BLOOD BY AUTOMATED COUNT: 12.4 % (ref 11.5–14.5)
ERYTHROCYTE [DISTWIDTH] IN BLOOD BY AUTOMATED COUNT: 38.5 FL (ref 35–45)
GFR SERPL CREATININE-BSD FRML MDRD: 70 ML/MIN/1.73M2
GLUCOSE BLD-MCNC: 166 MG/DL (ref 70–108)
GLUCOSE BLD-MCNC: 262 MG/DL (ref 70–108)
GLUCOSE BLD-MCNC: 286 MG/DL (ref 70–108)
HCT VFR BLD CALC: 39.9 % (ref 37–47)
HEMOGLOBIN: 12.9 GM/DL (ref 12–16)
MCH RBC QN AUTO: 28 PG (ref 26–33)
MCHC RBC AUTO-ENTMCNC: 32.3 GM/DL (ref 32.2–35.5)
MCV RBC AUTO: 86.6 FL (ref 81–99)
PLATELET # BLD: 254 THOU/MM3 (ref 130–400)
PMV BLD AUTO: 10.8 FL (ref 9.4–12.4)
POTASSIUM REFLEX MAGNESIUM: 4 MEQ/L (ref 3.5–5.2)
RBC # BLD: 4.61 MILL/MM3 (ref 4.2–5.4)
SODIUM BLD-SCNC: 141 MEQ/L (ref 135–145)
WBC # BLD: 21.3 THOU/MM3 (ref 4.8–10.8)

## 2019-06-30 PROCEDURE — 36415 COLL VENOUS BLD VENIPUNCTURE: CPT

## 2019-06-30 PROCEDURE — 6360000002 HC RX W HCPCS: Performed by: INTERNAL MEDICINE

## 2019-06-30 PROCEDURE — 85027 COMPLETE CBC AUTOMATED: CPT

## 2019-06-30 PROCEDURE — 2580000003 HC RX 258: Performed by: INTERNAL MEDICINE

## 2019-06-30 PROCEDURE — 6370000000 HC RX 637 (ALT 250 FOR IP): Performed by: INTERNAL MEDICINE

## 2019-06-30 PROCEDURE — 82948 REAGENT STRIP/BLOOD GLUCOSE: CPT

## 2019-06-30 PROCEDURE — 99232 SBSQ HOSP IP/OBS MODERATE 35: CPT | Performed by: PHYSICIAN ASSISTANT

## 2019-06-30 PROCEDURE — 80048 BASIC METABOLIC PNL TOTAL CA: CPT

## 2019-06-30 PROCEDURE — 99232 SBSQ HOSP IP/OBS MODERATE 35: CPT | Performed by: INTERNAL MEDICINE

## 2019-06-30 PROCEDURE — 1200000003 HC TELEMETRY R&B

## 2019-06-30 RX ORDER — NICOTINE POLACRILEX 4 MG
15 LOZENGE BUCCAL PRN
Status: DISCONTINUED | OUTPATIENT
Start: 2019-06-30 | End: 2019-07-08 | Stop reason: HOSPADM

## 2019-06-30 RX ORDER — DEXTROSE MONOHYDRATE 50 MG/ML
100 INJECTION, SOLUTION INTRAVENOUS PRN
Status: DISCONTINUED | OUTPATIENT
Start: 2019-06-30 | End: 2019-07-03

## 2019-06-30 RX ORDER — DEXTROSE MONOHYDRATE 25 G/50ML
12.5 INJECTION, SOLUTION INTRAVENOUS PRN
Status: DISCONTINUED | OUTPATIENT
Start: 2019-06-30 | End: 2019-07-03

## 2019-06-30 RX ADMIN — AMLODIPINE BESYLATE 10 MG: 10 TABLET ORAL at 11:45

## 2019-06-30 RX ADMIN — DEXAMETHASONE SODIUM PHOSPHATE 6 MG: 4 INJECTION, SOLUTION INTRAMUSCULAR; INTRAVENOUS at 11:46

## 2019-06-30 RX ADMIN — LISINOPRIL 20 MG: 20 TABLET ORAL at 11:45

## 2019-06-30 RX ADMIN — HYDROCHLOROTHIAZIDE 25 MG: 25 TABLET ORAL at 11:45

## 2019-06-30 RX ADMIN — PRAVASTATIN SODIUM 40 MG: 40 TABLET ORAL at 18:08

## 2019-06-30 RX ADMIN — OXYBUTYNIN CHLORIDE 5 MG: 5 TABLET ORAL at 22:47

## 2019-06-30 RX ADMIN — Medication 10 ML: at 08:44

## 2019-06-30 RX ADMIN — DEXAMETHASONE SODIUM PHOSPHATE 6 MG: 4 INJECTION, SOLUTION INTRAMUSCULAR; INTRAVENOUS at 18:09

## 2019-06-30 RX ADMIN — DEXAMETHASONE SODIUM PHOSPHATE 6 MG: 4 INJECTION, SOLUTION INTRAMUSCULAR; INTRAVENOUS at 05:53

## 2019-06-30 RX ADMIN — FAMOTIDINE 20 MG: 20 TABLET ORAL at 08:43

## 2019-06-30 RX ADMIN — Medication 10 ML: at 22:47

## 2019-06-30 RX ADMIN — DEXAMETHASONE SODIUM PHOSPHATE 6 MG: 4 INJECTION, SOLUTION INTRAMUSCULAR; INTRAVENOUS at 23:58

## 2019-06-30 RX ADMIN — DEXAMETHASONE SODIUM PHOSPHATE 6 MG: 4 INJECTION, SOLUTION INTRAMUSCULAR; INTRAVENOUS at 00:51

## 2019-06-30 RX ADMIN — OXYBUTYNIN CHLORIDE 5 MG: 5 TABLET ORAL at 08:43

## 2019-06-30 ASSESSMENT — PAIN SCALES - GENERAL
PAINLEVEL_OUTOF10: 0

## 2019-06-30 ASSESSMENT — ENCOUNTER SYMPTOMS
APNEA: 0
CHEST TIGHTNESS: 0
ABDOMINAL PAIN: 0
SHORTNESS OF BREATH: 0
ABDOMINAL DISTENTION: 0

## 2019-06-30 NOTE — PROGRESS NOTES
metastases. COMPARISON: CT urogram dated 3/2/2018 and CTA chest dated 6/19/2015. TECHNIQUE: Helical CT of the chest, abdomen and pelvis following oral contrast administration and before and after intravenous administration of 80 mL Isovue-370 injected in left arm. Sagittal and coronal reformatted images were also created. FINDINGS: CHEST: There is a 1.4 x 0.7 cm hypodense nodule in the right lobe of the thyroid gland which has decreased in size in the interval since prior CT. No axillary, mediastinal or hilar lymphadenopathy is identified. There is small calcified nodule in the right upper lobe and right hilar calcification as evidence for old granulomatous disease. There is minimal nodularity in the superior segment of the right lower lobe posteriorly. These were present on prior exam in 2015 and mildly less conspicuous on the current exam as evidence for benignity. There is a 3 mm nodule in the left lower lobe seen on image 40. The lungs otherwise appear clear. No aggressive osseous lesions are identified in the chest. Abdomen/pelvis: The liver and gallbladder are unremarkable. There is a small hiatal hernia, similar to prior exam. Adrenal glands are unremarkable. There is again noted to be a irregular fat containing lesion at the inferior pole of the right kidney measuring 2.8 x 2.1 cm, not significant changed in size and appearance compared to prior exam. A smaller fat-containing focus at the superior pole the right kidney is also stable. There is a 4.6 x 3.9 cm partially exophytic cysts at the superior pole of the left kidney which is slightly increased in size in the interval. Kidneys are otherwise unremarkable. There are calcified granulomas within the spleen similar to prior exam. Pancreas is unremarkable. No retroperitoneal or mesenteric lymphadenopathy is identified. There is mild circumferential rectal wall thickening. The bowel otherwise appears within normal limits. The bladder is unremarkable.  The evidence for benignity. There is a 3 mm nodule in the left lower lobe seen on image 40. The lungs otherwise appear clear. No aggressive osseous lesions are identified in the chest. Abdomen/pelvis: The liver and gallbladder are unremarkable. There is a small hiatal hernia, similar to prior exam. Adrenal glands are unremarkable. There is again noted to be a irregular fat containing lesion at the inferior pole of the right kidney measuring 2.8 x 2.1 cm, not significant changed in size and appearance compared to prior exam. A smaller fat-containing focus at the superior pole the right kidney is also stable. There is a 4.6 x 3.9 cm partially exophytic cysts at the superior pole of the left kidney which is slightly increased in size in the interval. Kidneys are otherwise unremarkable. There are calcified granulomas within the spleen similar to prior exam. Pancreas is unremarkable. No retroperitoneal or mesenteric lymphadenopathy is identified. There is mild circumferential rectal wall thickening. The bowel otherwise appears within normal limits. The bladder is unremarkable. The uterus is surgically absent. No free fluid is identified. No aggressive osseous lesions are identified within the visualized osseous structures. 1. No evidence of metastatic disease in the chest, abdomen or pelvis. 2. 1.4 cm hypodense nodule in the right lobe of the thyroid gland is decreased in size compared to prior chest CT. 3. 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 4. Mild rectal wall thickening. Correlate with visual inspection. **This report has been created using voice recognition software. It may contain minor errors which are inherent in voice recognition technology. ** Final report electronically signed by Dr. Gauri Christianson MD on 6/28/2019 5:54 PM    Vl Dup Carotid Bilateral    Result Date: 6/28/2019  PROCEDURE: VL DUP CAROTID BILATERAL CLINICAL INFORMATION: Unsteady gait COMPARISON: No prior study.  TECHNIQUE:

## 2019-06-30 NOTE — CONSULTS
BILIDIR 0.2 01/27/2014    LABALBU 5.1 11/13/2017       No results found for: INR  No results found for: PTINR    TUMOR MARKERS:    No results found for: PSA, CEA, , AS1078,       IMAGING:     LATEST RADIOLOGY RESULTS:  Ct Abdomen Pelvis W Wo Contrast Additional Contrast? None    Result Date: 6/28/2019  PROCEDURE: CT CHEST W WO CONTRAST, CT ABDOMEN PELVIS W WO CONTRAST CLINICAL INFORMATION: Rule out additional lesions/metastases . Newly found brain mass. 1. No evidence of metastatic disease in the chest, abdomen or pelvis. 2. 1.4 cm hypodense nodule in the right lobe of the thyroid gland is decreased in size compared to prior chest CT. 3. 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 4. Mild rectal wall thickening. Correlate with visual inspection. Ct Head Wo Contrast    Result Date: 6/28/2019  PROCEDURE: CT HEAD WO CONTRAST CLINICAL INFORMATION: Unsteady gait. COMPARISON: CT of the head dated February 22, 2008     There is a rounded area of hypoattenuation with surrounding hypoattenuation within the right cerebellar hemisphere which results in mass effect and shift of the midline structures towards the left. There is also suggestion of herniation of the cerebellar  tonsils through the foramen magnum and this also causes mass effect and effacement of the fourth ventricle. The findings are suspicious for an underlying mass. The mass effect also results in hydrocephalus with the third ventricle and temporal horns of the lateral ventricles being dilated. Results were conveyed to Carrie Amaro CNP at 1045 hours on 6/28/2019. Ct Chest W Wo Contrast    Result Date: 6/28/2019  PROCEDURE: CT CHEST W WO CONTRAST, CT ABDOMEN PELVIS W WO CONTRAST CLINICAL INFORMATION:     1. No evidence of metastatic disease in the chest, abdomen or pelvis. 2. 1.4 cm hypodense nodule in the right lobe of the thyroid gland is decreased in size compared to prior chest CT.  3. Stable appearing angiomyolipomas in vermis from edema causing mass effect on the  brain stem at the foramen magnum with the tip of the foramen magnum extending approximately 1.4 cm below the level of the foramen magnum. No other intra or extra-axial enhancing lesion is identified. There is moderate supratentorial ventriculomegaly. No  focal areas of restricted diffusion are present to include the lesion. There are mild scattered focal areas of T2/FLAIR prolongation elsewhere in the subcortical deep white matter is evidence for chronic microvascular angiopathy. The major vascular flow voids appear patent. Orbits are unremarkable. There are mucous retention cysts in the right maxillary sinus. Mastoid air cells are clear. 2.5 cm enhancing intra-axial mass in the right cerebellar hemisphere with pronounced perilesional edema causing expansion and mass effect of the right cerebellar hemisphere and vermis with near complete effacement of the fourth ventricle and moderate supratentorial ventriculomegaly, as well as, mass effect on the brainstem at the foramen magnum. A solitary metastatic lesion is favored over a high-grade glioma. STAGING:     Cancer Staging  No diagnosis of malignancy     ASSESSMENT and PLAN:     Logan Workman  Is a 77 y/o woman who presented new onset and progressive gait unsteadiness and falls. She has no history of a primary malignancy. Imaging has revealed a solitary abnormal focus in the right cerebellum. Her remaining imaging does not find a specific abnormality to suggest a primary malignancy. Thyroid nodule has decreased with interval imaging. Rectal wall thickening is non-specific; however, I would recommend colonoscopy. Regarding the cerebellar mass, while imaging is not typical for a high grade glioma, besides being a solitary metastatic lesion, it could represent a low grade glioma that has begun transforming. I would recommend proceeding with primary resection:  1.   Pathology provides tissue diagnosis

## 2019-07-01 ENCOUNTER — HOSPITAL ENCOUNTER (OUTPATIENT)
Dept: RADIATION ONCOLOGY | Age: 74
Discharge: HOME | End: 2019-07-01
Payer: MEDICARE

## 2019-07-01 ENCOUNTER — ANESTHESIA EVENT (OUTPATIENT)
Dept: OPERATING ROOM | Age: 74
DRG: 025 | End: 2019-07-01
Payer: MEDICARE

## 2019-07-01 LAB
ANION GAP SERPL CALCULATED.3IONS-SCNC: 12 MEQ/L (ref 8–16)
BILIRUBIN URINE: NEGATIVE
BLOOD, URINE: NEGATIVE
BUN BLDV-MCNC: 25 MG/DL (ref 7–22)
CALCIUM SERPL-MCNC: 9.7 MG/DL (ref 8.5–10.5)
CHARACTER, URINE: CLEAR
CHLORIDE BLD-SCNC: 102 MEQ/L (ref 98–111)
CO2: 29 MEQ/L (ref 23–33)
COLOR: YELLOW
CREAT SERPL-MCNC: 0.6 MG/DL (ref 0.4–1.2)
ERYTHROCYTE [DISTWIDTH] IN BLOOD BY AUTOMATED COUNT: 12.2 % (ref 11.5–14.5)
ERYTHROCYTE [DISTWIDTH] IN BLOOD BY AUTOMATED COUNT: 37.1 FL (ref 35–45)
GFR SERPL CREATININE-BSD FRML MDRD: > 90 ML/MIN/1.73M2
GLUCOSE BLD-MCNC: 159 MG/DL (ref 70–108)
GLUCOSE BLD-MCNC: 161 MG/DL (ref 70–108)
GLUCOSE BLD-MCNC: 163 MG/DL (ref 70–108)
GLUCOSE BLD-MCNC: 168 MG/DL (ref 70–108)
GLUCOSE URINE: NEGATIVE MG/DL
HCT VFR BLD CALC: 39 % (ref 37–47)
HEMOGLOBIN: 12.8 GM/DL (ref 12–16)
KETONES, URINE: NEGATIVE
LEUKOCYTE ESTERASE, URINE: NEGATIVE
MAGNESIUM: 2 MG/DL (ref 1.6–2.4)
MCH RBC QN AUTO: 27.8 PG (ref 26–33)
MCHC RBC AUTO-ENTMCNC: 32.8 GM/DL (ref 32.2–35.5)
MCV RBC AUTO: 84.6 FL (ref 81–99)
NITRITE, URINE: NEGATIVE
PH UA: 7 (ref 5–9)
PLATELET # BLD: 241 THOU/MM3 (ref 130–400)
PMV BLD AUTO: 11 FL (ref 9.4–12.4)
POTASSIUM REFLEX MAGNESIUM: 3.3 MEQ/L (ref 3.5–5.2)
PROTEIN UA: NEGATIVE
RBC # BLD: 4.61 MILL/MM3 (ref 4.2–5.4)
SODIUM BLD-SCNC: 143 MEQ/L (ref 135–145)
SPECIFIC GRAVITY, URINE: 1.01 (ref 1–1.03)
UROBILINOGEN, URINE: 0.2 EU/DL (ref 0–1)
WBC # BLD: 17 THOU/MM3 (ref 4.8–10.8)

## 2019-07-01 PROCEDURE — 99232 SBSQ HOSP IP/OBS MODERATE 35: CPT | Performed by: PHYSICIAN ASSISTANT

## 2019-07-01 PROCEDURE — 83735 ASSAY OF MAGNESIUM: CPT

## 2019-07-01 PROCEDURE — 2709999900 HC NON-CHARGEABLE SUPPLY

## 2019-07-01 PROCEDURE — 97110 THERAPEUTIC EXERCISES: CPT

## 2019-07-01 PROCEDURE — 85027 COMPLETE CBC AUTOMATED: CPT

## 2019-07-01 PROCEDURE — 81003 URINALYSIS AUTO W/O SCOPE: CPT

## 2019-07-01 PROCEDURE — 97116 GAIT TRAINING THERAPY: CPT

## 2019-07-01 PROCEDURE — 1200000003 HC TELEMETRY R&B

## 2019-07-01 PROCEDURE — 6370000000 HC RX 637 (ALT 250 FOR IP): Performed by: PHYSICIAN ASSISTANT

## 2019-07-01 PROCEDURE — 82948 REAGENT STRIP/BLOOD GLUCOSE: CPT

## 2019-07-01 PROCEDURE — 97530 THERAPEUTIC ACTIVITIES: CPT

## 2019-07-01 PROCEDURE — 6360000002 HC RX W HCPCS: Performed by: INTERNAL MEDICINE

## 2019-07-01 PROCEDURE — 99233 SBSQ HOSP IP/OBS HIGH 50: CPT | Performed by: INTERNAL MEDICINE

## 2019-07-01 PROCEDURE — 36415 COLL VENOUS BLD VENIPUNCTURE: CPT

## 2019-07-01 PROCEDURE — 97535 SELF CARE MNGMENT TRAINING: CPT

## 2019-07-01 PROCEDURE — 80048 BASIC METABOLIC PNL TOTAL CA: CPT

## 2019-07-01 PROCEDURE — 6370000000 HC RX 637 (ALT 250 FOR IP): Performed by: INTERNAL MEDICINE

## 2019-07-01 PROCEDURE — 2580000003 HC RX 258: Performed by: INTERNAL MEDICINE

## 2019-07-01 RX ORDER — POTASSIUM CHLORIDE 20 MEQ/1
40 TABLET, EXTENDED RELEASE ORAL ONCE
Status: COMPLETED | OUTPATIENT
Start: 2019-07-01 | End: 2019-07-01

## 2019-07-01 RX ADMIN — DEXAMETHASONE SODIUM PHOSPHATE 6 MG: 4 INJECTION, SOLUTION INTRAMUSCULAR; INTRAVENOUS at 18:21

## 2019-07-01 RX ADMIN — FAMOTIDINE 20 MG: 20 TABLET ORAL at 11:19

## 2019-07-01 RX ADMIN — POTASSIUM CHLORIDE 40 MEQ: 20 TABLET, EXTENDED RELEASE ORAL at 19:50

## 2019-07-01 RX ADMIN — PRAVASTATIN SODIUM 40 MG: 40 TABLET ORAL at 22:22

## 2019-07-01 RX ADMIN — AMLODIPINE BESYLATE 10 MG: 10 TABLET ORAL at 11:18

## 2019-07-01 RX ADMIN — LISINOPRIL 20 MG: 20 TABLET ORAL at 11:17

## 2019-07-01 RX ADMIN — INSULIN LISPRO 1 UNITS: 100 INJECTION, SOLUTION INTRAVENOUS; SUBCUTANEOUS at 13:30

## 2019-07-01 RX ADMIN — Medication 10 ML: at 11:17

## 2019-07-01 RX ADMIN — OXYBUTYNIN CHLORIDE 5 MG: 5 TABLET ORAL at 22:22

## 2019-07-01 RX ADMIN — DEXAMETHASONE SODIUM PHOSPHATE 6 MG: 4 INJECTION, SOLUTION INTRAMUSCULAR; INTRAVENOUS at 11:18

## 2019-07-01 RX ADMIN — HYDROCHLOROTHIAZIDE 25 MG: 25 TABLET ORAL at 11:17

## 2019-07-01 RX ADMIN — INSULIN LISPRO 2 UNITS: 100 INJECTION, SOLUTION INTRAVENOUS; SUBCUTANEOUS at 00:02

## 2019-07-01 RX ADMIN — INSULIN LISPRO 1 UNITS: 100 INJECTION, SOLUTION INTRAVENOUS; SUBCUTANEOUS at 22:22

## 2019-07-01 RX ADMIN — Medication 10 ML: at 22:21

## 2019-07-01 RX ADMIN — OXYBUTYNIN CHLORIDE 5 MG: 5 TABLET ORAL at 11:17

## 2019-07-01 RX ADMIN — INSULIN LISPRO 1 UNITS: 100 INJECTION, SOLUTION INTRAVENOUS; SUBCUTANEOUS at 18:21

## 2019-07-01 ASSESSMENT — ENCOUNTER SYMPTOMS
ABDOMINAL DISTENTION: 0
APNEA: 0
ABDOMINAL PAIN: 0
SHORTNESS OF BREATH: 0

## 2019-07-01 ASSESSMENT — PAIN SCALES - GENERAL: PAINLEVEL_OUTOF10: 0

## 2019-07-01 NOTE — PROGRESS NOTES
established goals. no noted decreased endurance, only rest breaks while seated on commode and while standing by stairs with handrail for support. Activity Tolerance:  Patient tolerance of  treatment: good. Equipment Recommendations:Equipment Needed: No  Discharge Recommendations:  Discharge Recommendations: Continue to assess pending progress, Patient would benefit from continued therapy after discharge    Plan: Times per week: 6 X N  Times per day: Daily  Current Treatment Recommendations: Strengthening, Gait Training, Stair training, Balance Training, Functional Mobility Training, Transfer Training, Endurance Training, Home Exercise Program, Equipment Evaluation, Education, & procurement    Patient Education  Patient Education: Plan of Care, Home Exercise Program, Family Education    Goals:  Patient goals : see neuro sx for a plan  Short term goals  Time Frame for Short term goals: by discharge   Short term goal 1: supine to sit and return with Mod I to get in and out of ankit   Short term goal 2: sit to stand with Mod I to get on and off various surfaces  Short term goal 3: ambulation with /without  feet with no deviation in path or LOB with S to walk community distances   Short term goal 4: ascend/descend 2 steps with HR and S to enter home  Short term goal 5: Improved tinetti score > 26 to reduce risk of fall  Long term goals  Time Frame for Long term goals : NA due to short ELOS    Following session, patient left in safe position with all fall risk precautions in place. Junior Shirley.  Narciso Judge, Opplandenise Adin 8

## 2019-07-01 NOTE — PROGRESS NOTES
Assessment and Plan:        1. Right Cerebellar Mass/Unsteady gait/Falls: Concerning for malignancy. Discussed with NS and noe, possibly low grade glioma. Evidence of mass effect. High concern for patient safety given her multiple falls. Will stay here until surgery is completed while working with therapy.         - NS consulted. confirmed OR on wednesday        - steroids started        -  CT abd/pelvis/chest with no sign of malignant foci  2. HTN: continue home meds  3. Pre-op assessment: Estimated Risk Probability for Perioperative Myocardial Infarction or Cardiac Arrest 0.34 %. Per RCRI 30 day risk for death, MI, or cardiac arrest is 3.9%. >4 METs, able to climb flights of stairs prior to having gait issues, lives independently at home. No further workup indicated. Patient is of acceptable risk for this neurosurgical procedure. 4. Thyroid nodule: Follow up outpatient with PCP, decreased in size  5. Rectal wall thickening: non-specific. Follow up with PCP, consider colonoscopy. 6. Steroid induced leukocytosis: 21k, daily cbc. Patient doing well. 7. Hypokalemia: at 3.0 will replace. CC:  Falls  HPI: She first notice unsteadiness on May 17th while at her grandsons graduation she made a turn and lost her balance and fell and hit her head. Since then she noticed several other episodes of unsteadiness. Her PCP ordered a CT of her head and a large cerebellar mass on the right side. She was thus brought to the ED for expedited workup and possible neurosurgical management. She has a prior history of melanoma resection (0231,0913). She was started on steroids and admitted for expedited workup/treatment. MRI brain shows possible glioma. Neurosurgery planning OR on Wednesday. ROS (12 point review of systems completed. Pertinent positives noted. Otherwise ROS is negative) : Continues to feel improved. No new complaints. Awaiting OR   Denies headache/dizziness/confusion/vision changes.    PMH:  Per

## 2019-07-01 NOTE — PROGRESS NOTES
according to [facility] protocol. Disequilibrium. History of melanoma. COMPARISON: CT head from the same date. TECHNIQUE: Multiplanar and multiple spin echo T1 and T2-weighted images were obtained through the brain before and after the administration of 15 mL ProHance injected in the left AC. FINDINGS: There is a 2.5 x 2.2 cm intra-axial low T1 hyperintense T2 signal mass in the right cerebellar hemisphere with prominent peripheral and irregular central enhancement following contrast administration. There is a hemosiderin rim at the margins of the lesion. There is prominent perilesional edema involving the right cerebellar hemisphere and vermis causing mass effect and near complete effacement of the fourth ventricle. There is expansion of the cerebellar vermis from edema causing mass effect on the  brain stem at the foramen magnum with the tip of the foramen magnum extending approximately 1.4 cm below the level of the foramen magnum. No other intra or extra-axial enhancing lesion is identified. There is moderate supratentorial ventriculomegaly. No  focal areas of restricted diffusion are present to include the lesion. There are mild scattered focal areas of T2/FLAIR prolongation elsewhere in the subcortical deep white matter is evidence for chronic microvascular angiopathy. The major vascular flow voids appear patent. Orbits are unremarkable. There are mucous retention cysts in the right maxillary sinus. Mastoid air cells are clear. 2.5 cm enhancing intra-axial mass in the right cerebellar hemisphere with pronounced perilesional edema causing expansion and mass effect of the right cerebellar hemisphere and vermis with near complete effacement of the fourth ventricle and moderate supratentorial ventriculomegaly, as well as, mass effect on the brainstem at the foramen magnum. A solitary metastatic lesion is favored over a high-grade glioma. **This report has been created using voice recognition software.  It may

## 2019-07-01 NOTE — PLAN OF CARE
Problem: Falls - Risk of:  Goal: Will remain free from falls  Description  Will remain free from falls  Outcome: Met This Shift  Note:   Free from falls this shift, at this time. Call light and bedside table within reach. Bed alarm on. Bed wheels locked and bed in lowest position. Pt oriented to own abilities and is encouraged to use call light for needs. Problem: Discharge Planning:  Goal: Participates in care planning  Description  Participates in care planning  Outcome: Met This Shift  Note:   Patient able to participate in d/c planning without difficulty. Problem: Cardiac Output - Decreased:  Goal: Hemodynamic stability will improve  Description  Hemodynamic stability will improve  Outcome: Met This Shift  Note:   Vital signs within normal limits. Problem: Pain:  Goal: Pain level will decrease  Description  Pain level will decrease  Outcome: Met This Shift  Note:   Denies pain this shift, at this time. Problem: Skin Integrity - Impaired:  Goal: Will show no infection signs and symptoms  Description  Will show no infection signs and symptoms  Outcome: Met This Shift  Note:   No signs or symptoms of infection. Goal: Absence of new skin breakdown  Description  Absence of new skin breakdown  Outcome: Met This Shift  Note:   No new skin breakdown noted with each assessment. Patient able to turn and reposition self. Problem: Physical Regulation:  Goal: Prevent transmision of infection  Description  Prevent transmision of infection  Outcome: Met This Shift  Note:   Contact isolation protocol in place. Patient aware of reason for this. Care plan reviewed with patient. Patient verbalizes understanding of the plan of care and contributed to goal setting.

## 2019-07-02 ENCOUNTER — APPOINTMENT (OUTPATIENT)
Dept: CT IMAGING | Age: 74
DRG: 025 | End: 2019-07-02
Payer: MEDICARE

## 2019-07-02 LAB
ABO: NORMAL
ANION GAP SERPL CALCULATED.3IONS-SCNC: 12 MEQ/L (ref 8–16)
ANTIBODY SCREEN: NORMAL
APTT: 21.3 SECONDS (ref 22–38)
BUN BLDV-MCNC: 21 MG/DL (ref 7–22)
CALCIUM SERPL-MCNC: 9.2 MG/DL (ref 8.5–10.5)
CHLORIDE BLD-SCNC: 103 MEQ/L (ref 98–111)
CO2: 27 MEQ/L (ref 23–33)
CREAT SERPL-MCNC: 0.6 MG/DL (ref 0.4–1.2)
ERYTHROCYTE [DISTWIDTH] IN BLOOD BY AUTOMATED COUNT: 12.3 % (ref 11.5–14.5)
ERYTHROCYTE [DISTWIDTH] IN BLOOD BY AUTOMATED COUNT: 38.3 FL (ref 35–45)
GFR SERPL CREATININE-BSD FRML MDRD: > 90 ML/MIN/1.73M2
GLUCOSE BLD-MCNC: 172 MG/DL (ref 70–108)
GLUCOSE BLD-MCNC: 174 MG/DL (ref 70–108)
GLUCOSE BLD-MCNC: 178 MG/DL (ref 70–108)
GLUCOSE BLD-MCNC: 196 MG/DL (ref 70–108)
GLUCOSE BLD-MCNC: 208 MG/DL (ref 70–108)
HCT VFR BLD CALC: 40.4 % (ref 37–47)
HEMOGLOBIN: 13.2 GM/DL (ref 12–16)
INR BLD: 0.96 (ref 0.85–1.13)
MCH RBC QN AUTO: 28 PG (ref 26–33)
MCHC RBC AUTO-ENTMCNC: 32.7 GM/DL (ref 32.2–35.5)
MCV RBC AUTO: 85.8 FL (ref 81–99)
PLATELET # BLD: 214 THOU/MM3 (ref 130–400)
PMV BLD AUTO: 11.3 FL (ref 9.4–12.4)
POTASSIUM REFLEX MAGNESIUM: 3.7 MEQ/L (ref 3.5–5.2)
RBC # BLD: 4.71 MILL/MM3 (ref 4.2–5.4)
RH FACTOR: NORMAL
SODIUM BLD-SCNC: 142 MEQ/L (ref 135–145)
WBC # BLD: 12.5 THOU/MM3 (ref 4.8–10.8)

## 2019-07-02 PROCEDURE — 86923 COMPATIBILITY TEST ELECTRIC: CPT

## 2019-07-02 PROCEDURE — 6360000002 HC RX W HCPCS: Performed by: INTERNAL MEDICINE

## 2019-07-02 PROCEDURE — 6370000000 HC RX 637 (ALT 250 FOR IP): Performed by: INTERNAL MEDICINE

## 2019-07-02 PROCEDURE — 85730 THROMBOPLASTIN TIME PARTIAL: CPT

## 2019-07-02 PROCEDURE — 82948 REAGENT STRIP/BLOOD GLUCOSE: CPT

## 2019-07-02 PROCEDURE — 85610 PROTHROMBIN TIME: CPT

## 2019-07-02 PROCEDURE — 6370000000 HC RX 637 (ALT 250 FOR IP): Performed by: PHYSICIAN ASSISTANT

## 2019-07-02 PROCEDURE — 99233 SBSQ HOSP IP/OBS HIGH 50: CPT | Performed by: NEUROLOGICAL SURGERY

## 2019-07-02 PROCEDURE — 36415 COLL VENOUS BLD VENIPUNCTURE: CPT

## 2019-07-02 PROCEDURE — 97116 GAIT TRAINING THERAPY: CPT

## 2019-07-02 PROCEDURE — 99232 SBSQ HOSP IP/OBS MODERATE 35: CPT | Performed by: INTERNAL MEDICINE

## 2019-07-02 PROCEDURE — 86850 RBC ANTIBODY SCREEN: CPT

## 2019-07-02 PROCEDURE — 97530 THERAPEUTIC ACTIVITIES: CPT

## 2019-07-02 PROCEDURE — 97110 THERAPEUTIC EXERCISES: CPT

## 2019-07-02 PROCEDURE — 85027 COMPLETE CBC AUTOMATED: CPT

## 2019-07-02 PROCEDURE — 1200000000 HC SEMI PRIVATE

## 2019-07-02 PROCEDURE — 86901 BLOOD TYPING SEROLOGIC RH(D): CPT

## 2019-07-02 PROCEDURE — 2709999900 HC NON-CHARGEABLE SUPPLY

## 2019-07-02 PROCEDURE — 86900 BLOOD TYPING SEROLOGIC ABO: CPT

## 2019-07-02 PROCEDURE — 70450 CT HEAD/BRAIN W/O DYE: CPT

## 2019-07-02 PROCEDURE — 2580000003 HC RX 258: Performed by: INTERNAL MEDICINE

## 2019-07-02 PROCEDURE — 80048 BASIC METABOLIC PNL TOTAL CA: CPT

## 2019-07-02 RX ORDER — SODIUM CHLORIDE 9 MG/ML
INJECTION, SOLUTION INTRAVENOUS CONTINUOUS
Status: DISCONTINUED | OUTPATIENT
Start: 2019-07-02 | End: 2019-07-03

## 2019-07-02 RX ADMIN — INSULIN LISPRO 1 UNITS: 100 INJECTION, SOLUTION INTRAVENOUS; SUBCUTANEOUS at 11:57

## 2019-07-02 RX ADMIN — Medication 10 ML: at 20:28

## 2019-07-02 RX ADMIN — OXYBUTYNIN CHLORIDE 5 MG: 5 TABLET ORAL at 09:34

## 2019-07-02 RX ADMIN — INSULIN LISPRO 1 UNITS: 100 INJECTION, SOLUTION INTRAVENOUS; SUBCUTANEOUS at 16:48

## 2019-07-02 RX ADMIN — OXYBUTYNIN CHLORIDE 5 MG: 5 TABLET ORAL at 20:28

## 2019-07-02 RX ADMIN — Medication 10 ML: at 09:34

## 2019-07-02 RX ADMIN — FAMOTIDINE 20 MG: 20 TABLET ORAL at 11:54

## 2019-07-02 RX ADMIN — DEXAMETHASONE SODIUM PHOSPHATE 6 MG: 4 INJECTION, SOLUTION INTRAMUSCULAR; INTRAVENOUS at 06:13

## 2019-07-02 RX ADMIN — INSULIN LISPRO 1 UNITS: 100 INJECTION, SOLUTION INTRAVENOUS; SUBCUTANEOUS at 20:27

## 2019-07-02 RX ADMIN — INSULIN LISPRO 1 UNITS: 100 INJECTION, SOLUTION INTRAVENOUS; SUBCUTANEOUS at 09:36

## 2019-07-02 RX ADMIN — PRAVASTATIN SODIUM 40 MG: 40 TABLET ORAL at 20:28

## 2019-07-02 RX ADMIN — AMLODIPINE BESYLATE 10 MG: 10 TABLET ORAL at 09:33

## 2019-07-02 RX ADMIN — DEXAMETHASONE SODIUM PHOSPHATE 6 MG: 4 INJECTION, SOLUTION INTRAMUSCULAR; INTRAVENOUS at 11:54

## 2019-07-02 RX ADMIN — DEXAMETHASONE SODIUM PHOSPHATE 6 MG: 4 INJECTION, SOLUTION INTRAMUSCULAR; INTRAVENOUS at 00:22

## 2019-07-02 RX ADMIN — DEXAMETHASONE SODIUM PHOSPHATE 6 MG: 4 INJECTION, SOLUTION INTRAMUSCULAR; INTRAVENOUS at 17:38

## 2019-07-02 RX ADMIN — LISINOPRIL 20 MG: 20 TABLET ORAL at 09:33

## 2019-07-02 ASSESSMENT — PAIN SCALES - GENERAL
PAINLEVEL_OUTOF10: 0

## 2019-07-02 NOTE — PROGRESS NOTES
goals; obtaining  A tissue for  Diagnosis and and resection /debulking lesion.  -I will discuss with other treatment teams hospitalist and radiation oncology team and they are in agreement. -Given the overall results and imaging studies and patient's group. 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 - Today, I had long discussed with patient and her family the recommended neurosurgical intervention again and the alternative treatment options, as well as the associated  benefits and risks. - I discussed with the patient and her family the possible complication of surgery including excessive blood loss requiring transfusion, infection that may become meningitis, problem with heart, lung and kidney as a result of general anesthesia such as heart attack, pneumonia, kidney shutdown and stroke. We also discussed the possible complication of the operations in and around the brain such as, death paralysis problems with vision problems with speech, seizures, weakness on one side or the other of the body postoperative meningitis postoperative development of a blood clot that may require another operation, leakage of fluid from the wound that may require another operation, deafness, problems with swallowing and with the ability to close the eye (that may be temporary or permanent). - I told patient there is no guarantee to achieve a total resection of the tumor. I told them based of final results of the pathology she  may need another treatment modalities. I discussed the potential risks in front of  neurosurgery PA Michell Medina). - All questions and concerns were answered and addressed.    - Patient and her family elected to proceeded with recommended neurosurgical intervention.  - The plan now for patient is to schedule

## 2019-07-02 NOTE — FLOWSHEET NOTE
Jose Macario 60  OCCUPATIONAL THERAPY MISSED TREATMENT NOTE  [facility] NEUROSCIENCES 4A  [id number]      Date: 2019  Patient Name: Devorah Cantor        CSN: [id number]   : 1945  (76 y.o.)  Gender: female   Referring Practitioner: Dr. Nataliia Garcia  Diagnosis: brain mass          REASON FOR MISSED TREATMENT: Multiple attempts to see Pt: Pt with PT, Pt eating, Pt just back to bed & wanting to rest. Will check back 7/3/19

## 2019-07-03 ENCOUNTER — APPOINTMENT (OUTPATIENT)
Dept: CT IMAGING | Age: 74
DRG: 025 | End: 2019-07-03
Payer: MEDICARE

## 2019-07-03 ENCOUNTER — ANESTHESIA (OUTPATIENT)
Dept: OPERATING ROOM | Age: 74
DRG: 025 | End: 2019-07-03
Payer: MEDICARE

## 2019-07-03 VITALS
SYSTOLIC BLOOD PRESSURE: 87 MMHG | RESPIRATION RATE: 12 BRPM | DIASTOLIC BLOOD PRESSURE: 54 MMHG | TEMPERATURE: 99 F | OXYGEN SATURATION: 99 %

## 2019-07-03 LAB
ANION GAP SERPL CALCULATED.3IONS-SCNC: 12 MEQ/L (ref 8–16)
BUN BLDV-MCNC: 26 MG/DL (ref 7–22)
CALCIUM SERPL-MCNC: 9.2 MG/DL (ref 8.5–10.5)
CHLORIDE BLD-SCNC: 103 MEQ/L (ref 98–111)
CO2: 26 MEQ/L (ref 23–33)
CREAT SERPL-MCNC: 0.7 MG/DL (ref 0.4–1.2)
ERYTHROCYTE [DISTWIDTH] IN BLOOD BY AUTOMATED COUNT: 12.1 % (ref 11.5–14.5)
ERYTHROCYTE [DISTWIDTH] IN BLOOD BY AUTOMATED COUNT: 38 FL (ref 35–45)
GFR SERPL CREATININE-BSD FRML MDRD: 82 ML/MIN/1.73M2
GLUCOSE BLD-MCNC: 133 MG/DL (ref 70–108)
GLUCOSE BLD-MCNC: 142 MG/DL (ref 70–108)
GLUCOSE BLD-MCNC: 175 MG/DL (ref 70–108)
GLUCOSE BLD-MCNC: 185 MG/DL (ref 70–108)
GLUCOSE BLD-MCNC: 198 MG/DL (ref 70–108)
HCT VFR BLD CALC: 42.2 % (ref 37–47)
HEMOGLOBIN: 13.6 GM/DL (ref 12–16)
MCH RBC QN AUTO: 27.8 PG (ref 26–33)
MCHC RBC AUTO-ENTMCNC: 32.2 GM/DL (ref 32.2–35.5)
MCV RBC AUTO: 86.3 FL (ref 81–99)
PLATELET # BLD: 240 THOU/MM3 (ref 130–400)
PMV BLD AUTO: 11.2 FL (ref 9.4–12.4)
POTASSIUM REFLEX MAGNESIUM: 4.1 MEQ/L (ref 3.5–5.2)
RBC # BLD: 4.89 MILL/MM3 (ref 4.2–5.4)
SODIUM BLD-SCNC: 141 MEQ/L (ref 135–145)
SODIUM BLD-SCNC: 143 MEQ/L (ref 135–145)
SODIUM BLD-SCNC: 145 MEQ/L (ref 135–145)
WBC # BLD: 11.9 THOU/MM3 (ref 4.8–10.8)

## 2019-07-03 PROCEDURE — 84295 ASSAY OF SERUM SODIUM: CPT

## 2019-07-03 PROCEDURE — 3700000000 HC ANESTHESIA ATTENDED CARE: Performed by: NEUROLOGICAL SURGERY

## 2019-07-03 PROCEDURE — 6360000002 HC RX W HCPCS: Performed by: NURSE ANESTHETIST, CERTIFIED REGISTERED

## 2019-07-03 PROCEDURE — 2500000003 HC RX 250 WO HCPCS: Performed by: NURSE ANESTHETIST, CERTIFIED REGISTERED

## 2019-07-03 PROCEDURE — 2720000010 HC SURG SUPPLY STERILE: Performed by: NEUROLOGICAL SURGERY

## 2019-07-03 PROCEDURE — 3700000001 HC ADD 15 MINUTES (ANESTHESIA): Performed by: NEUROLOGICAL SURGERY

## 2019-07-03 PROCEDURE — 2580000003 HC RX 258: Performed by: NEUROLOGICAL SURGERY

## 2019-07-03 PROCEDURE — 6360000002 HC RX W HCPCS: Performed by: PHYSICIAN ASSISTANT

## 2019-07-03 PROCEDURE — 2709999900 HC NON-CHARGEABLE SUPPLY

## 2019-07-03 PROCEDURE — 82948 REAGENT STRIP/BLOOD GLUCOSE: CPT

## 2019-07-03 PROCEDURE — 7100000001 HC PACU RECOVERY - ADDTL 15 MIN: Performed by: NEUROLOGICAL SURGERY

## 2019-07-03 PROCEDURE — 6370000000 HC RX 637 (ALT 250 FOR IP): Performed by: ANESTHESIOLOGY

## 2019-07-03 PROCEDURE — 99223 1ST HOSP IP/OBS HIGH 75: CPT | Performed by: INTERNAL MEDICINE

## 2019-07-03 PROCEDURE — 00BC0ZX EXCISION OF CEREBELLUM, OPEN APPROACH, DIAGNOSTIC: ICD-10-PCS | Performed by: NEUROLOGICAL SURGERY

## 2019-07-03 PROCEDURE — 80048 BASIC METABOLIC PNL TOTAL CA: CPT

## 2019-07-03 PROCEDURE — 88341 IMHCHEM/IMCYTCHM EA ADD ANTB: CPT

## 2019-07-03 PROCEDURE — 6360000002 HC RX W HCPCS: Performed by: INTERNAL MEDICINE

## 2019-07-03 PROCEDURE — 2000000000 HC ICU R&B

## 2019-07-03 PROCEDURE — 88342 IMHCHEM/IMCYTCHM 1ST ANTB: CPT

## 2019-07-03 PROCEDURE — 6370000000 HC RX 637 (ALT 250 FOR IP): Performed by: PHYSICIAN ASSISTANT

## 2019-07-03 PROCEDURE — P9045 ALBUMIN (HUMAN), 5%, 250 ML: HCPCS | Performed by: NURSE ANESTHETIST, CERTIFIED REGISTERED

## 2019-07-03 PROCEDURE — 88331 PATH CONSLTJ SURG 1 BLK 1SPC: CPT

## 2019-07-03 PROCEDURE — 2709999900 HC NON-CHARGEABLE SUPPLY: Performed by: NEUROLOGICAL SURGERY

## 2019-07-03 PROCEDURE — 3600000016 HC SURGERY LEVEL 6 ADDTL 15MIN: Performed by: NEUROLOGICAL SURGERY

## 2019-07-03 PROCEDURE — 88307 TISSUE EXAM BY PATHOLOGIST: CPT

## 2019-07-03 PROCEDURE — 7100000000 HC PACU RECOVERY - FIRST 15 MIN: Performed by: NEUROLOGICAL SURGERY

## 2019-07-03 PROCEDURE — 2580000003 HC RX 258: Performed by: INTERNAL MEDICINE

## 2019-07-03 PROCEDURE — 2500000003 HC RX 250 WO HCPCS: Performed by: NEUROLOGICAL SURGERY

## 2019-07-03 PROCEDURE — 61518 REMOVAL OF BRAIN LESION: CPT | Performed by: NEUROLOGICAL SURGERY

## 2019-07-03 PROCEDURE — 61781 SCAN PROC CRANIAL INTRA: CPT | Performed by: NEUROLOGICAL SURGERY

## 2019-07-03 PROCEDURE — 99231 SBSQ HOSP IP/OBS SF/LOW 25: CPT | Performed by: HOSPITALIST

## 2019-07-03 PROCEDURE — C1713 ANCHOR/SCREW BN/BN,TIS/BN: HCPCS | Performed by: NEUROLOGICAL SURGERY

## 2019-07-03 PROCEDURE — 6370000000 HC RX 637 (ALT 250 FOR IP): Performed by: NEUROLOGICAL SURGERY

## 2019-07-03 PROCEDURE — 85027 COMPLETE CBC AUTOMATED: CPT

## 2019-07-03 PROCEDURE — 3600000006 HC SURGERY LEVEL 6 BASE: Performed by: NEUROLOGICAL SURGERY

## 2019-07-03 PROCEDURE — 70450 CT HEAD/BRAIN W/O DYE: CPT

## 2019-07-03 PROCEDURE — 2580000003 HC RX 258: Performed by: NURSE ANESTHETIST, CERTIFIED REGISTERED

## 2019-07-03 PROCEDURE — 36415 COLL VENOUS BLD VENIPUNCTURE: CPT

## 2019-07-03 DEVICE — DURAGEN® PLUS DURAL REGENERATION MATRIX, 2 IN X 2 IN (5 CM X 5 CM)
Type: IMPLANTABLE DEVICE | Status: FUNCTIONAL
Brand: DURAGEN® PLUS

## 2019-07-03 DEVICE — BURR HOLE COVER, WITH TAB, 20MM
Type: IMPLANTABLE DEVICE | Status: FUNCTIONAL
Brand: UNIVERSAL NEURO 3

## 2019-07-03 DEVICE — SCREW UN3 SLFTP 1.5X4MM: Type: IMPLANTABLE DEVICE | Status: FUNCTIONAL

## 2019-07-03 DEVICE — SCREW, AXS, SELF-DRILLING
Type: IMPLANTABLE DEVICE | Status: FUNCTIONAL
Brand: UNIVERSAL NEURO 3

## 2019-07-03 DEVICE — BURR HOLE COVER, WITH TAB, 7MM
Type: IMPLANTABLE DEVICE | Status: FUNCTIONAL
Brand: UNIVERSAL NEURO 3

## 2019-07-03 RX ORDER — FENTANYL CITRATE 50 UG/ML
INJECTION, SOLUTION INTRAMUSCULAR; INTRAVENOUS PRN
Status: DISCONTINUED | OUTPATIENT
Start: 2019-07-03 | End: 2019-07-03 | Stop reason: SDUPTHER

## 2019-07-03 RX ORDER — DEXAMETHASONE SODIUM PHOSPHATE 4 MG/ML
INJECTION, SOLUTION INTRA-ARTICULAR; INTRALESIONAL; INTRAMUSCULAR; INTRAVENOUS; SOFT TISSUE PRN
Status: DISCONTINUED | OUTPATIENT
Start: 2019-07-03 | End: 2019-07-03 | Stop reason: SDUPTHER

## 2019-07-03 RX ORDER — GINSENG 100 MG
CAPSULE ORAL PRN
Status: DISCONTINUED | OUTPATIENT
Start: 2019-07-03 | End: 2019-07-03 | Stop reason: HOSPADM

## 2019-07-03 RX ORDER — DOCUSATE SODIUM 100 MG/1
100 CAPSULE, LIQUID FILLED ORAL 2 TIMES DAILY
Status: DISCONTINUED | OUTPATIENT
Start: 2019-07-03 | End: 2019-07-08 | Stop reason: HOSPADM

## 2019-07-03 RX ORDER — FENTANYL CITRATE 50 UG/ML
50 INJECTION, SOLUTION INTRAMUSCULAR; INTRAVENOUS EVERY 5 MIN PRN
Status: DISCONTINUED | OUTPATIENT
Start: 2019-07-03 | End: 2019-07-03 | Stop reason: HOSPADM

## 2019-07-03 RX ORDER — MORPHINE SULFATE 2 MG/ML
2 INJECTION, SOLUTION INTRAMUSCULAR; INTRAVENOUS EVERY 5 MIN PRN
Status: DISCONTINUED | OUTPATIENT
Start: 2019-07-03 | End: 2019-07-03 | Stop reason: HOSPADM

## 2019-07-03 RX ORDER — SODIUM CHLORIDE 0.9 % (FLUSH) 0.9 %
10 SYRINGE (ML) INJECTION PRN
Status: DISCONTINUED | OUTPATIENT
Start: 2019-07-03 | End: 2019-07-08 | Stop reason: HOSPADM

## 2019-07-03 RX ORDER — ONDANSETRON 2 MG/ML
INJECTION INTRAMUSCULAR; INTRAVENOUS PRN
Status: DISCONTINUED | OUTPATIENT
Start: 2019-07-03 | End: 2019-07-03 | Stop reason: SDUPTHER

## 2019-07-03 RX ORDER — ONDANSETRON 2 MG/ML
4 INJECTION INTRAMUSCULAR; INTRAVENOUS
Status: DISCONTINUED | OUTPATIENT
Start: 2019-07-03 | End: 2019-07-03 | Stop reason: HOSPADM

## 2019-07-03 RX ORDER — LABETALOL 20 MG/4 ML (5 MG/ML) INTRAVENOUS SYRINGE
5 EVERY 5 MIN PRN
Status: DISCONTINUED | OUTPATIENT
Start: 2019-07-03 | End: 2019-07-03 | Stop reason: HOSPADM

## 2019-07-03 RX ORDER — DIPHENHYDRAMINE HYDROCHLORIDE 50 MG/ML
12.5 INJECTION INTRAMUSCULAR; INTRAVENOUS
Status: DISCONTINUED | OUTPATIENT
Start: 2019-07-03 | End: 2019-07-03 | Stop reason: HOSPADM

## 2019-07-03 RX ORDER — SUCCINYLCHOLINE/SOD CL,ISO/PF 200MG/10ML
SYRINGE (ML) INTRAVENOUS PRN
Status: DISCONTINUED | OUTPATIENT
Start: 2019-07-03 | End: 2019-07-03 | Stop reason: SDUPTHER

## 2019-07-03 RX ORDER — PROPOFOL 10 MG/ML
INJECTION, EMULSION INTRAVENOUS PRN
Status: DISCONTINUED | OUTPATIENT
Start: 2019-07-03 | End: 2019-07-03 | Stop reason: SDUPTHER

## 2019-07-03 RX ORDER — ONDANSETRON 2 MG/ML
4 INJECTION INTRAMUSCULAR; INTRAVENOUS EVERY 6 HOURS PRN
Status: DISCONTINUED | OUTPATIENT
Start: 2019-07-03 | End: 2019-07-03 | Stop reason: SDUPTHER

## 2019-07-03 RX ORDER — SODIUM CHLORIDE 9 MG/ML
INJECTION, SOLUTION INTRAVENOUS CONTINUOUS PRN
Status: DISCONTINUED | OUTPATIENT
Start: 2019-07-03 | End: 2019-07-03 | Stop reason: SDUPTHER

## 2019-07-03 RX ORDER — ROCURONIUM BROMIDE 10 MG/ML
INJECTION, SOLUTION INTRAVENOUS PRN
Status: DISCONTINUED | OUTPATIENT
Start: 2019-07-03 | End: 2019-07-03 | Stop reason: SDUPTHER

## 2019-07-03 RX ORDER — 3% SODIUM CHLORIDE 3 G/100ML
25 INJECTION, SOLUTION INTRAVENOUS CONTINUOUS
Status: DISCONTINUED | OUTPATIENT
Start: 2019-07-03 | End: 2019-07-04

## 2019-07-03 RX ORDER — SODIUM CHLORIDE 0.9 % (FLUSH) 0.9 %
10 SYRINGE (ML) INJECTION EVERY 12 HOURS SCHEDULED
Status: DISCONTINUED | OUTPATIENT
Start: 2019-07-03 | End: 2019-07-08 | Stop reason: HOSPADM

## 2019-07-03 RX ORDER — MEPERIDINE HYDROCHLORIDE 25 MG/ML
12.5 INJECTION INTRAMUSCULAR; INTRAVENOUS; SUBCUTANEOUS EVERY 5 MIN PRN
Status: DISCONTINUED | OUTPATIENT
Start: 2019-07-03 | End: 2019-07-03 | Stop reason: HOSPADM

## 2019-07-03 RX ORDER — SODIUM CHLORIDE 9 MG/ML
INJECTION, SOLUTION INTRAVENOUS CONTINUOUS
Status: DISCONTINUED | OUTPATIENT
Start: 2019-07-03 | End: 2019-07-04

## 2019-07-03 RX ORDER — HYDROCODONE BITARTRATE AND ACETAMINOPHEN 5; 325 MG/1; MG/1
1 TABLET ORAL EVERY 6 HOURS PRN
Status: DISCONTINUED | OUTPATIENT
Start: 2019-07-03 | End: 2019-07-08 | Stop reason: HOSPADM

## 2019-07-03 RX ORDER — ALBUMIN, HUMAN INJ 5% 5 %
SOLUTION INTRAVENOUS PRN
Status: DISCONTINUED | OUTPATIENT
Start: 2019-07-03 | End: 2019-07-03 | Stop reason: SDUPTHER

## 2019-07-03 RX ORDER — CLINDAMYCIN PHOSPHATE 150 MG/ML
INJECTION, SOLUTION INTRAVENOUS PRN
Status: DISCONTINUED | OUTPATIENT
Start: 2019-07-03 | End: 2019-07-03 | Stop reason: SDUPTHER

## 2019-07-03 RX ORDER — EPHEDRINE SULFATE/0.9% NACL/PF 50 MG/5 ML
SYRINGE (ML) INTRAVENOUS PRN
Status: DISCONTINUED | OUTPATIENT
Start: 2019-07-03 | End: 2019-07-03 | Stop reason: SDUPTHER

## 2019-07-03 RX ORDER — LIDOCAINE HYDROCHLORIDE 20 MG/ML
INJECTION, SOLUTION INTRAVENOUS PRN
Status: DISCONTINUED | OUTPATIENT
Start: 2019-07-03 | End: 2019-07-03 | Stop reason: SDUPTHER

## 2019-07-03 RX ORDER — MANNITOL 250 MG/ML
INJECTION, SOLUTION INTRAVENOUS PRN
Status: DISCONTINUED | OUTPATIENT
Start: 2019-07-03 | End: 2019-07-03 | Stop reason: SDUPTHER

## 2019-07-03 RX ORDER — HYDRALAZINE HYDROCHLORIDE 20 MG/ML
5 INJECTION INTRAMUSCULAR; INTRAVENOUS EVERY 10 MIN PRN
Status: DISCONTINUED | OUTPATIENT
Start: 2019-07-03 | End: 2019-07-03 | Stop reason: HOSPADM

## 2019-07-03 RX ADMIN — CLINDAMYCIN PHOSPHATE 600 MG: 150 INJECTION, SOLUTION INTRAVENOUS at 10:00

## 2019-07-03 RX ADMIN — FENTANYL CITRATE 100 MCG: 50 INJECTION INTRAMUSCULAR; INTRAVENOUS at 11:17

## 2019-07-03 RX ADMIN — INSULIN HUMAN 2 UNITS: 100 INJECTION, SOLUTION PARENTERAL at 16:08

## 2019-07-03 RX ADMIN — PHENYLEPHRINE HYDROCHLORIDE 100 MCG: 10 INJECTION INTRAVENOUS at 11:35

## 2019-07-03 RX ADMIN — Medication 10 MG: at 10:04

## 2019-07-03 RX ADMIN — SODIUM CHLORIDE: 9 INJECTION, SOLUTION INTRAVENOUS at 10:02

## 2019-07-03 RX ADMIN — MANNITOL 50 G: 12.5 INJECTION, SOLUTION INTRAVENOUS at 11:46

## 2019-07-03 RX ADMIN — PHENYLEPHRINE HYDROCHLORIDE 100 MCG: 10 INJECTION INTRAVENOUS at 11:53

## 2019-07-03 RX ADMIN — PHENYLEPHRINE HYDROCHLORIDE 100 MCG: 10 INJECTION INTRAVENOUS at 09:28

## 2019-07-03 RX ADMIN — DEXAMETHASONE SODIUM PHOSPHATE 10 MG: 4 INJECTION, SOLUTION INTRAMUSCULAR; INTRAVENOUS at 09:38

## 2019-07-03 RX ADMIN — SODIUM CHLORIDE: 9 INJECTION, SOLUTION INTRAVENOUS at 09:07

## 2019-07-03 RX ADMIN — Medication 10 MG: at 10:29

## 2019-07-03 RX ADMIN — ROCURONIUM BROMIDE 5 MG: 10 INJECTION INTRAVENOUS at 09:15

## 2019-07-03 RX ADMIN — Medication 10 MG: at 11:14

## 2019-07-03 RX ADMIN — Medication 10 MG: at 10:21

## 2019-07-03 RX ADMIN — ONDANSETRON 4 MG: 2 INJECTION INTRAMUSCULAR; INTRAVENOUS at 08:05

## 2019-07-03 RX ADMIN — PHENYLEPHRINE HYDROCHLORIDE 100 MCG: 10 INJECTION INTRAVENOUS at 12:56

## 2019-07-03 RX ADMIN — ALBUMIN (HUMAN) 250 ML: 12.5 SOLUTION INTRAVENOUS at 12:47

## 2019-07-03 RX ADMIN — DEXAMETHASONE SODIUM PHOSPHATE 6 MG: 4 INJECTION, SOLUTION INTRAMUSCULAR; INTRAVENOUS at 18:07

## 2019-07-03 RX ADMIN — FENTANYL CITRATE 50 MCG: 50 INJECTION INTRAMUSCULAR; INTRAVENOUS at 14:40

## 2019-07-03 RX ADMIN — LIDOCAINE HYDROCHLORIDE 100 MG: 20 INJECTION, SOLUTION INTRAVENOUS at 09:15

## 2019-07-03 RX ADMIN — FENTANYL CITRATE 50 MCG: 50 INJECTION INTRAMUSCULAR; INTRAVENOUS at 09:09

## 2019-07-03 RX ADMIN — DOCUSATE SODIUM 100 MG: 100 CAPSULE, LIQUID FILLED ORAL at 20:44

## 2019-07-03 RX ADMIN — HYDROMORPHONE HYDROCHLORIDE 0.5 MG: 1 INJECTION, SOLUTION INTRAMUSCULAR; INTRAVENOUS; SUBCUTANEOUS at 21:25

## 2019-07-03 RX ADMIN — DEXAMETHASONE SODIUM PHOSPHATE 6 MG: 4 INJECTION, SOLUTION INTRAMUSCULAR; INTRAVENOUS at 00:08

## 2019-07-03 RX ADMIN — AMINOLEVULINIC ACID HYDROCHLORIDE 1479 MG: 1500 POWDER, FOR SOLUTION ORAL at 06:00

## 2019-07-03 RX ADMIN — DEXAMETHASONE SODIUM PHOSPHATE 6 MG: 4 INJECTION, SOLUTION INTRAMUSCULAR; INTRAVENOUS at 23:57

## 2019-07-03 RX ADMIN — ONDANSETRON HYDROCHLORIDE 4 MG: 4 INJECTION, SOLUTION INTRAMUSCULAR; INTRAVENOUS at 14:10

## 2019-07-03 RX ADMIN — Medication 10 MG: at 12:09

## 2019-07-03 RX ADMIN — FENTANYL CITRATE 100 MCG: 50 INJECTION INTRAMUSCULAR; INTRAVENOUS at 10:10

## 2019-07-03 RX ADMIN — ROCURONIUM BROMIDE 10 MG: 10 INJECTION INTRAVENOUS at 11:03

## 2019-07-03 RX ADMIN — SODIUM CHLORIDE: 9 INJECTION, SOLUTION INTRAVENOUS at 00:08

## 2019-07-03 RX ADMIN — PHENYLEPHRINE HYDROCHLORIDE 100 MCG: 10 INJECTION INTRAVENOUS at 12:44

## 2019-07-03 RX ADMIN — SUGAMMADEX 150 MG: 100 INJECTION, SOLUTION INTRAVENOUS at 15:18

## 2019-07-03 RX ADMIN — FENTANYL CITRATE 50 MCG: 50 INJECTION INTRAMUSCULAR; INTRAVENOUS at 09:17

## 2019-07-03 RX ADMIN — PHENYLEPHRINE HYDROCHLORIDE 100 MCG: 10 INJECTION INTRAVENOUS at 11:51

## 2019-07-03 RX ADMIN — SODIUM CHLORIDE: 9 INJECTION, SOLUTION INTRAVENOUS at 11:41

## 2019-07-03 RX ADMIN — SODIUM CHLORIDE 25 ML/HR: 3 INJECTION, SOLUTION INTRAVENOUS at 18:33

## 2019-07-03 RX ADMIN — INSULIN LISPRO 1 UNITS: 100 INJECTION, SOLUTION INTRAVENOUS; SUBCUTANEOUS at 18:10

## 2019-07-03 RX ADMIN — OXYBUTYNIN CHLORIDE 5 MG: 5 TABLET ORAL at 20:44

## 2019-07-03 RX ADMIN — FENTANYL CITRATE 50 MCG: 50 INJECTION INTRAMUSCULAR; INTRAVENOUS at 13:13

## 2019-07-03 RX ADMIN — Medication 10 MG: at 11:34

## 2019-07-03 RX ADMIN — HYDROMORPHONE HYDROCHLORIDE 0.5 MG: 1 INJECTION, SOLUTION INTRAMUSCULAR; INTRAVENOUS; SUBCUTANEOUS at 17:58

## 2019-07-03 RX ADMIN — PROPOFOL 200 MG: 10 INJECTION, EMULSION INTRAVENOUS at 09:15

## 2019-07-03 RX ADMIN — Medication 10 MG: at 10:15

## 2019-07-03 RX ADMIN — PRAVASTATIN SODIUM 40 MG: 40 TABLET ORAL at 20:44

## 2019-07-03 RX ADMIN — Medication 120 MG: at 09:15

## 2019-07-03 RX ADMIN — SODIUM CHLORIDE: 9 INJECTION, SOLUTION INTRAVENOUS at 16:13

## 2019-07-03 RX ADMIN — DEXAMETHASONE SODIUM PHOSPHATE 6 MG: 4 INJECTION, SOLUTION INTRAMUSCULAR; INTRAVENOUS at 05:08

## 2019-07-03 RX ADMIN — PHENYLEPHRINE HYDROCHLORIDE 100 MCG: 10 INJECTION INTRAVENOUS at 12:36

## 2019-07-03 RX ADMIN — ROCURONIUM BROMIDE 45 MG: 10 INJECTION INTRAVENOUS at 09:26

## 2019-07-03 RX ADMIN — ROCURONIUM BROMIDE 20 MG: 10 INJECTION INTRAVENOUS at 12:53

## 2019-07-03 ASSESSMENT — PAIN DESCRIPTION - FREQUENCY: FREQUENCY: CONTINUOUS

## 2019-07-03 ASSESSMENT — PULMONARY FUNCTION TESTS
PIF_VALUE: 21
PIF_VALUE: 20
PIF_VALUE: 21
PIF_VALUE: 15
PIF_VALUE: 15
PIF_VALUE: 19
PIF_VALUE: 14
PIF_VALUE: 20
PIF_VALUE: 21
PIF_VALUE: 16
PIF_VALUE: 21
PIF_VALUE: 20
PIF_VALUE: 21
PIF_VALUE: 20
PIF_VALUE: 17
PIF_VALUE: 15
PIF_VALUE: 15
PIF_VALUE: 20
PIF_VALUE: 21
PIF_VALUE: 19
PIF_VALUE: 21
PIF_VALUE: 15
PIF_VALUE: 21
PIF_VALUE: 16
PIF_VALUE: 18
PIF_VALUE: 21
PIF_VALUE: 21
PIF_VALUE: 1
PIF_VALUE: 21
PIF_VALUE: 15
PIF_VALUE: 19
PIF_VALUE: 1
PIF_VALUE: 18
PIF_VALUE: 20
PIF_VALUE: 20
PIF_VALUE: 21
PIF_VALUE: 20
PIF_VALUE: 21
PIF_VALUE: 20
PIF_VALUE: 16
PIF_VALUE: 15
PIF_VALUE: 21
PIF_VALUE: 20
PIF_VALUE: 21
PIF_VALUE: 4
PIF_VALUE: 21
PIF_VALUE: 21
PIF_VALUE: 15
PIF_VALUE: 16
PIF_VALUE: 21
PIF_VALUE: 20
PIF_VALUE: 21
PIF_VALUE: 2
PIF_VALUE: 3
PIF_VALUE: 21
PIF_VALUE: 19
PIF_VALUE: 21
PIF_VALUE: 0
PIF_VALUE: 20
PIF_VALUE: 21
PIF_VALUE: 19
PIF_VALUE: 20
PIF_VALUE: 20
PIF_VALUE: 19
PIF_VALUE: 21
PIF_VALUE: 18
PIF_VALUE: 21
PIF_VALUE: 20
PIF_VALUE: 15
PIF_VALUE: 18
PIF_VALUE: 20
PIF_VALUE: 15
PIF_VALUE: 21
PIF_VALUE: 16
PIF_VALUE: 21
PIF_VALUE: 19
PIF_VALUE: 20
PIF_VALUE: 15
PIF_VALUE: 18
PIF_VALUE: 21
PIF_VALUE: 19
PIF_VALUE: 20
PIF_VALUE: 22
PIF_VALUE: 21
PIF_VALUE: 21
PIF_VALUE: 20
PIF_VALUE: 19
PIF_VALUE: 21
PIF_VALUE: 15
PIF_VALUE: 21
PIF_VALUE: 21
PIF_VALUE: 19
PIF_VALUE: 18
PIF_VALUE: 15
PIF_VALUE: 1
PIF_VALUE: 15
PIF_VALUE: 21
PIF_VALUE: 18
PIF_VALUE: 21
PIF_VALUE: 16
PIF_VALUE: 19
PIF_VALUE: 21
PIF_VALUE: 21
PIF_VALUE: 20
PIF_VALUE: 20
PIF_VALUE: 21
PIF_VALUE: 20
PIF_VALUE: 19
PIF_VALUE: 21
PIF_VALUE: 18
PIF_VALUE: 21
PIF_VALUE: 15
PIF_VALUE: 21
PIF_VALUE: 16
PIF_VALUE: 18
PIF_VALUE: 18
PIF_VALUE: 21
PIF_VALUE: 21
PIF_VALUE: 15
PIF_VALUE: 16
PIF_VALUE: 21
PIF_VALUE: 14
PIF_VALUE: 21
PIF_VALUE: 21
PIF_VALUE: 20
PIF_VALUE: 16
PIF_VALUE: 21
PIF_VALUE: 21
PIF_VALUE: 20
PIF_VALUE: 15
PIF_VALUE: 20
PIF_VALUE: 21
PIF_VALUE: 20
PIF_VALUE: 21
PIF_VALUE: 21
PIF_VALUE: 20
PIF_VALUE: 21
PIF_VALUE: 20
PIF_VALUE: 20
PIF_VALUE: 21
PIF_VALUE: 18
PIF_VALUE: 20
PIF_VALUE: 21
PIF_VALUE: 19
PIF_VALUE: 21
PIF_VALUE: 15
PIF_VALUE: 20
PIF_VALUE: 19
PIF_VALUE: 21
PIF_VALUE: 20
PIF_VALUE: 21
PIF_VALUE: 16
PIF_VALUE: 21
PIF_VALUE: 19
PIF_VALUE: 21
PIF_VALUE: 16
PIF_VALUE: 21
PIF_VALUE: 19
PIF_VALUE: 20
PIF_VALUE: 21
PIF_VALUE: 15
PIF_VALUE: 21
PIF_VALUE: 20
PIF_VALUE: 15
PIF_VALUE: 21
PIF_VALUE: 20
PIF_VALUE: 21
PIF_VALUE: 1
PIF_VALUE: 21
PIF_VALUE: 15
PIF_VALUE: 18
PIF_VALUE: 20
PIF_VALUE: 22
PIF_VALUE: 21
PIF_VALUE: 20
PIF_VALUE: 21
PIF_VALUE: 16
PIF_VALUE: 20
PIF_VALUE: 21
PIF_VALUE: 21
PIF_VALUE: 18
PIF_VALUE: 20
PIF_VALUE: 21
PIF_VALUE: 22
PIF_VALUE: 21
PIF_VALUE: 21
PIF_VALUE: 15
PIF_VALUE: 21
PIF_VALUE: 1
PIF_VALUE: 21
PIF_VALUE: 18
PIF_VALUE: 21
PIF_VALUE: 21
PIF_VALUE: 1
PIF_VALUE: 20
PIF_VALUE: 16
PIF_VALUE: 20
PIF_VALUE: 21
PIF_VALUE: 14
PIF_VALUE: 21
PIF_VALUE: 1
PIF_VALUE: 20
PIF_VALUE: 19
PIF_VALUE: 21
PIF_VALUE: 22
PIF_VALUE: 15
PIF_VALUE: 21
PIF_VALUE: 20
PIF_VALUE: 21
PIF_VALUE: 20
PIF_VALUE: 21
PIF_VALUE: 15
PIF_VALUE: 18
PIF_VALUE: 12
PIF_VALUE: 21
PIF_VALUE: 18
PIF_VALUE: 15
PIF_VALUE: 19
PIF_VALUE: 15
PIF_VALUE: 21
PIF_VALUE: 18
PIF_VALUE: 4
PIF_VALUE: 16
PIF_VALUE: 21
PIF_VALUE: 21
PIF_VALUE: 20
PIF_VALUE: 18
PIF_VALUE: 18
PIF_VALUE: 15
PIF_VALUE: 15
PIF_VALUE: 21
PIF_VALUE: 20
PIF_VALUE: 21
PIF_VALUE: 15
PIF_VALUE: 21
PIF_VALUE: 19
PIF_VALUE: 15
PIF_VALUE: 21
PIF_VALUE: 15
PIF_VALUE: 21
PIF_VALUE: 19
PIF_VALUE: 15
PIF_VALUE: 19
PIF_VALUE: 20
PIF_VALUE: 21
PIF_VALUE: 17
PIF_VALUE: 21
PIF_VALUE: 15
PIF_VALUE: 21
PIF_VALUE: 15
PIF_VALUE: 19
PIF_VALUE: 21
PIF_VALUE: 15
PIF_VALUE: 19
PIF_VALUE: 21
PIF_VALUE: 21
PIF_VALUE: 19
PIF_VALUE: 21
PIF_VALUE: 21
PIF_VALUE: 15
PIF_VALUE: 21
PIF_VALUE: 20
PIF_VALUE: 21
PIF_VALUE: 16
PIF_VALUE: 21
PIF_VALUE: 20
PIF_VALUE: 21
PIF_VALUE: 21
PIF_VALUE: 19
PIF_VALUE: 20
PIF_VALUE: 21
PIF_VALUE: 20
PIF_VALUE: 16
PIF_VALUE: 15
PIF_VALUE: 21
PIF_VALUE: 20
PIF_VALUE: 21
PIF_VALUE: 20
PIF_VALUE: 21
PIF_VALUE: 15
PIF_VALUE: 20
PIF_VALUE: 21
PIF_VALUE: 21
PIF_VALUE: 19
PIF_VALUE: 15
PIF_VALUE: 21
PIF_VALUE: 19
PIF_VALUE: 18
PIF_VALUE: 20
PIF_VALUE: 20
PIF_VALUE: 18
PIF_VALUE: 20
PIF_VALUE: 21
PIF_VALUE: 20
PIF_VALUE: 18
PIF_VALUE: 19
PIF_VALUE: 20
PIF_VALUE: 21
PIF_VALUE: 21
PIF_VALUE: 17
PIF_VALUE: 20
PIF_VALUE: 20
PIF_VALUE: 21
PIF_VALUE: 15
PIF_VALUE: 20
PIF_VALUE: 21
PIF_VALUE: 18
PIF_VALUE: 15
PIF_VALUE: 21
PIF_VALUE: 21
PIF_VALUE: 20
PIF_VALUE: 21
PIF_VALUE: 21
PIF_VALUE: 20
PIF_VALUE: 20
PIF_VALUE: 21
PIF_VALUE: 21
PIF_VALUE: 15
PIF_VALUE: 15
PIF_VALUE: 21

## 2019-07-03 ASSESSMENT — PAIN SCALES - GENERAL
PAINLEVEL_OUTOF10: 2
PAINLEVEL_OUTOF10: 0
PAINLEVEL_OUTOF10: 0
PAINLEVEL_OUTOF10: 7
PAINLEVEL_OUTOF10: 0
PAINLEVEL_OUTOF10: 1
PAINLEVEL_OUTOF10: 4

## 2019-07-03 ASSESSMENT — PAIN DESCRIPTION - DESCRIPTORS: DESCRIPTORS: ACHING

## 2019-07-03 ASSESSMENT — PAIN DESCRIPTION - LOCATION: LOCATION: NECK

## 2019-07-03 ASSESSMENT — ENCOUNTER SYMPTOMS
RESPIRATORY NEGATIVE: 1
PHOTOPHOBIA: 1
VOICE CHANGE: 1
GASTROINTESTINAL NEGATIVE: 1
ALLERGIC/IMMUNOLOGIC NEGATIVE: 1

## 2019-07-03 ASSESSMENT — PAIN DESCRIPTION - PROGRESSION
CLINICAL_PROGRESSION: NOT CHANGED

## 2019-07-03 ASSESSMENT — PAIN DESCRIPTION - PAIN TYPE: TYPE: SURGICAL PAIN

## 2019-07-03 NOTE — ANESTHESIA PRE PROCEDURE
40 mg  40 mg Oral QPM Randal Pavon MD   40 mg at 07/02/19 2028    sodium chloride flush 0.9 % injection 10 mL  10 mL Intravenous 2 times per day Randal Pavon MD   10 mL at 07/02/19 2028    sodium chloride flush 0.9 % injection 10 mL  10 mL Intravenous PRN Randal Pavon MD        magnesium hydroxide (MILK OF MAGNESIA) 400 MG/5ML suspension 30 mL  30 mL Oral Daily PRN Randal Pavon MD        ondansetron [facility]) injection 4 mg  4 mg Intravenous Q6H PRN Randal Pavon MD   4 mg at 07/03/19 0805    potassium chloride (KLOR-CON M) extended release tablet 40 mEq  40 mEq Oral PRN Randal Pavon MD        Or    potassium bicarb-citric acid (EFFER-K) effervescent tablet 40 mEq  40 mEq Oral PRN Randal Pavon MD        Or   [facility] potassium chloride 10 mEq/100 mL IVPB (Peripheral Line)  10 mEq Intravenous PRN Randal Pavon MD        acetaminophen (TYLENOL) tablet 650 mg  650 mg Oral Q4H PRN Randal Pavon MD        lisinopril (PRINIVIL;ZESTRIL) tablet 20 mg  20 mg Oral Daily Randal Pavon MD   20 mg at 07/02/19 8078    And    [Held by provider] hydrochlorothiazide (HYDRODIURIL) tablet 25 mg  25 mg Oral Daily Randal Pavon MD   25 mg at 07/01/19 1117     Facility-Administered Medications Ordered in Other Encounters   Medication Dose Route Frequency Provider Last Rate Last Dose    clindamycin (CLEOCIN) injection    PRN NARAYAN Em CRNA   600 mg at 07/03/19 1000    fentaNYL (SUBLIMAZE) injection    PRN NARAYAN Em CRNA   50 mcg at 07/03/19 1313    phenylephrine (MINDY-SYNEPHRINE) injection    PRN NARAYAN Em CRNA   100 mcg at 07/03/19 1256    lidocaine (cardiac) (XYLOCAINE) injection    PRN NARAYAN Em CRNA   100 mg at 07/03/19 0915    propofol injection    PRN NARAYAN Em CRNA   200 mg at 07/03/19 0915    rocuronium (ZEMURON) injection    PRN NARAYAN Em - CRNA   20 mg at 07/03/19 1253    succinylcholine extremity ([address]     excision- x2    Metabolic syndrome     much improved - normal triglycerides, weight loss of 50#    OA (osteoarthritis)     left knee       Past Surgical History:        Procedure Laterality Date    BLADDER SURGERY      COLONOSCOPY      EYE SURGERY Bilateral 3/2016, 4/2016    Dr. Gini Mcneal  11/1971    reomoval of left ovary - fibroid tumor    JOINT REPLACEMENT      both knees    KNEE ARTHROSCOPY Left 2007    KNEE ARTHROSCOPY Left 1/2014    Dr. Adalgisa Zamarripa Left 7/31/2013    Left inner thigh    PRE-MALIGNANT / BENIGN SKIN LESION EXCISION Right 2009    ankle - skin graft, Dr. Radha Cortes      right leg melanoma    SKIN CANCER EXCISION  6/27/13    r arm    SKIN CANCER EXCISION      melanoma of chest - in situ    SKIN CANCER EXCISION Left 03/24/2017    Upper Back--Melanoma    TONSILLECTOMY  1966    TOTAL KNEE ARTHROPLASTY Right 09/2017    Dr. Juliana Mahan Left 06/17/2015    total - Dr. Kevin Salgado       Social History:    Social History     Tobacco Use    Smoking status: Never Smoker    Smokeless tobacco: Never Used   Substance Use Topics    Alcohol use: Yes     Comment: not very much                                Counseling given: No      Vital Signs (Current):   Vitals:    07/03/19 0005 07/03/19 0005 07/03/19 0405 07/03/19 0800   BP:  (!) 100/55 125/65 136/63   Pulse: 54 54 53 84   Resp:  16 16 18   Temp:   97.6 °F (36.4 °C) 97.7 °F (36.5 °C)   TempSrc:   Oral Oral   SpO2:  95% 94% 94%   Weight:   160 lb 4.8 oz (72.7 kg)    Height:                                                  BP Readings from Last 3 Encounters:   07/03/19 136/63   06/13/19 124/70   04/08/19 132/84       NPO Status:                                                                                 BMI:   Wt Readings from Last 3 Encounters:   07/03/19 160 lb 4.8 oz (72.7 kg)   06/13/19 166 lb (75.3 kg)

## 2019-07-03 NOTE — ANESTHESIA POSTPROCEDURE EVALUATION
Department of Anesthesiology  Postprocedure Note    Patient: Barbara Campbell  MRN: [id number]  YOB: 1945  Date of evaluation: 7/3/2019  Time:  6:44 PM     Procedure Summary     Date:  07/03/19 Room / Location:  [facility] [place]  Sanju Roseid    Anesthesia Start:  0907 Anesthesia Stop:  1542    Procedure:  RIGHT OCCIPITAL CRANIOTOMY INTRA-OP POSTERIOR FOSSA (Right Head) Diagnosis:  (BRAIN MASS)    Surgeon:  Luigi De La Vega MD Responsible Provider:  Ignacia Roberts MD    Anesthesia Type:  General ASA Status:  3          Anesthesia Type: General    Giuliano Phase I: Giuliano Score: 8    Giuliano Phase II:      Last vitals: Reviewed and per EMR flowsheets. Anesthesia Post Evaluation    Patient location during evaluation: PACU  Patient participation: complete - patient participated  Level of consciousness: awake and alert  Airway patency: patent  Nausea & Vomiting: no nausea and no vomiting  Complications: no  Cardiovascular status: hemodynamically stable  Respiratory status: acceptable  Hydration status: euvolemic      [address]  POST-ANESTHESIA NOTE       Name:  Barbara Campbell                                         Age:  76 y.o.   MRN:  [id number]      Last Vitals:  /79   Pulse 93   Temp 97.7 °F (36.5 °C) (Temporal)   Resp 13   Ht 5' 4\" (1.626 m)   Wt 169 lb 1.5 oz (76.7 kg)   SpO2 98%   BMI 29.02 kg/m²   Patient Vitals for the past 4 hrs:   BP Temp Temp src Pulse Resp SpO2 Weight   07/03/19 1658 137/79 -- -- 93 13 98 % 169 lb 1.5 oz (76.7 kg)   07/03/19 1650 124/61 -- -- 96 16 98 % --   07/03/19 1645 (!) 141/58 -- -- 100 18 97 % --   07/03/19 1640 120/64 -- -- 93 16 98 % --   07/03/19 1630 131/67 -- -- 94 12 97 % --   07/03/19 1625 127/67 -- -- 94 11 98 % --   07/03/19 1620 125/65 -- -- 95 19 99 % --   07/03/19 1615 130/63 -- -- 97 11 98 % --   07/03/19 1610 127/61 -- -- 97 10 99 % --   07/03/19 1605 (!) 123/57 -- -- 96 13 98 % --   07/03/19 1600 136/64 -- -- 100 23 99 % --   07/03/19 1551

## 2019-07-04 LAB
ANION GAP SERPL CALCULATED.3IONS-SCNC: 13 MEQ/L (ref 8–16)
BUN BLDV-MCNC: 21 MG/DL (ref 7–22)
CALCIUM SERPL-MCNC: 8.7 MG/DL (ref 8.5–10.5)
CHLORIDE BLD-SCNC: 108 MEQ/L (ref 98–111)
CO2: 25 MEQ/L (ref 23–33)
CREAT SERPL-MCNC: 0.6 MG/DL (ref 0.4–1.2)
ERYTHROCYTE [DISTWIDTH] IN BLOOD BY AUTOMATED COUNT: 12.6 % (ref 11.5–14.5)
ERYTHROCYTE [DISTWIDTH] IN BLOOD BY AUTOMATED COUNT: 40.6 FL (ref 35–45)
GFR SERPL CREATININE-BSD FRML MDRD: > 90 ML/MIN/1.73M2
GLUCOSE BLD-MCNC: 125 MG/DL (ref 70–108)
GLUCOSE BLD-MCNC: 141 MG/DL (ref 70–108)
GLUCOSE BLD-MCNC: 147 MG/DL (ref 70–108)
GLUCOSE BLD-MCNC: 153 MG/DL (ref 70–108)
GLUCOSE BLD-MCNC: 166 MG/DL (ref 70–108)
GLUCOSE BLD-MCNC: 212 MG/DL (ref 70–108)
HCT VFR BLD CALC: 39.1 % (ref 37–47)
HEMOGLOBIN: 12.5 GM/DL (ref 12–16)
MCH RBC QN AUTO: 28.3 PG (ref 26–33)
MCHC RBC AUTO-ENTMCNC: 32 GM/DL (ref 32.2–35.5)
MCV RBC AUTO: 88.7 FL (ref 81–99)
PLATELET # BLD: 186 THOU/MM3 (ref 130–400)
PMV BLD AUTO: 11 FL (ref 9.4–12.4)
POTASSIUM REFLEX MAGNESIUM: 4.7 MEQ/L (ref 3.5–5.2)
RBC # BLD: 4.41 MILL/MM3 (ref 4.2–5.4)
SODIUM BLD-SCNC: 141 MEQ/L (ref 135–145)
SODIUM BLD-SCNC: 145 MEQ/L (ref 135–145)
SODIUM BLD-SCNC: 146 MEQ/L (ref 135–145)
SODIUM BLD-SCNC: 146 MEQ/L (ref 135–145)
WBC # BLD: 16 THOU/MM3 (ref 4.8–10.8)

## 2019-07-04 PROCEDURE — 80048 BASIC METABOLIC PNL TOTAL CA: CPT

## 2019-07-04 PROCEDURE — 6370000000 HC RX 637 (ALT 250 FOR IP): Performed by: PHYSICIAN ASSISTANT

## 2019-07-04 PROCEDURE — 2000000000 HC ICU R&B

## 2019-07-04 PROCEDURE — 6360000002 HC RX W HCPCS: Performed by: NURSE PRACTITIONER

## 2019-07-04 PROCEDURE — 99233 SBSQ HOSP IP/OBS HIGH 50: CPT | Performed by: INTERNAL MEDICINE

## 2019-07-04 PROCEDURE — 99024 POSTOP FOLLOW-UP VISIT: CPT | Performed by: PHYSICIAN ASSISTANT

## 2019-07-04 PROCEDURE — 6360000002 HC RX W HCPCS: Performed by: PHYSICIAN ASSISTANT

## 2019-07-04 PROCEDURE — 36415 COLL VENOUS BLD VENIPUNCTURE: CPT

## 2019-07-04 PROCEDURE — 84295 ASSAY OF SERUM SODIUM: CPT

## 2019-07-04 PROCEDURE — APPSS45 APP SPLIT SHARED TIME 31-45 MINUTES: Performed by: NURSE PRACTITIONER

## 2019-07-04 PROCEDURE — 82948 REAGENT STRIP/BLOOD GLUCOSE: CPT

## 2019-07-04 PROCEDURE — 85027 COMPLETE CBC AUTOMATED: CPT

## 2019-07-04 PROCEDURE — 2709999900 HC NON-CHARGEABLE SUPPLY

## 2019-07-04 PROCEDURE — 2580000003 HC RX 258: Performed by: PHYSICIAN ASSISTANT

## 2019-07-04 RX ORDER — MANNITOL 250 MG/ML
50 INJECTION, SOLUTION INTRAVENOUS ONCE
Status: COMPLETED | OUTPATIENT
Start: 2019-07-04 | End: 2019-07-04

## 2019-07-04 RX ORDER — SODIUM CHLORIDE 9 MG/ML
INJECTION, SOLUTION INTRAVENOUS CONTINUOUS
Status: DISCONTINUED | OUTPATIENT
Start: 2019-07-04 | End: 2019-07-04

## 2019-07-04 RX ADMIN — AMLODIPINE BESYLATE 10 MG: 10 TABLET ORAL at 08:52

## 2019-07-04 RX ADMIN — INSULIN LISPRO 1 UNITS: 100 INJECTION, SOLUTION INTRAVENOUS; SUBCUTANEOUS at 18:11

## 2019-07-04 RX ADMIN — DOCUSATE SODIUM 100 MG: 100 CAPSULE, LIQUID FILLED ORAL at 21:07

## 2019-07-04 RX ADMIN — HYDROCODONE BITARTRATE AND ACETAMINOPHEN 1 TABLET: 5; 325 TABLET ORAL at 22:54

## 2019-07-04 RX ADMIN — PRAVASTATIN SODIUM 40 MG: 40 TABLET ORAL at 18:11

## 2019-07-04 RX ADMIN — SODIUM CHLORIDE, PRESERVATIVE FREE 10 ML: 5 INJECTION INTRAVENOUS at 21:08

## 2019-07-04 RX ADMIN — SODIUM CHLORIDE, PRESERVATIVE FREE 10 ML: 5 INJECTION INTRAVENOUS at 08:52

## 2019-07-04 RX ADMIN — HYDROCODONE BITARTRATE AND ACETAMINOPHEN 1 TABLET: 5; 325 TABLET ORAL at 09:00

## 2019-07-04 RX ADMIN — OXYBUTYNIN CHLORIDE 5 MG: 5 TABLET ORAL at 21:07

## 2019-07-04 RX ADMIN — HYALURONIDASE (HUMAN RECOMBINANT) 15 UNITS: 150 INJECTION, SOLUTION SUBCUTANEOUS at 22:43

## 2019-07-04 RX ADMIN — MANNITOL 50 G: 12.5 INJECTION, SOLUTION INTRAVENOUS at 12:05

## 2019-07-04 RX ADMIN — DEXAMETHASONE SODIUM PHOSPHATE 6 MG: 4 INJECTION, SOLUTION INTRAMUSCULAR; INTRAVENOUS at 13:03

## 2019-07-04 RX ADMIN — DEXAMETHASONE SODIUM PHOSPHATE 6 MG: 4 INJECTION, SOLUTION INTRAMUSCULAR; INTRAVENOUS at 08:40

## 2019-07-04 RX ADMIN — INSULIN LISPRO 1 UNITS: 100 INJECTION, SOLUTION INTRAVENOUS; SUBCUTANEOUS at 21:07

## 2019-07-04 RX ADMIN — LISINOPRIL 20 MG: 20 TABLET ORAL at 08:52

## 2019-07-04 RX ADMIN — INSULIN LISPRO 1 UNITS: 100 INJECTION, SOLUTION INTRAVENOUS; SUBCUTANEOUS at 13:04

## 2019-07-04 RX ADMIN — MANNITOL 50 G: 12.5 INJECTION, SOLUTION INTRAVENOUS at 21:07

## 2019-07-04 RX ADMIN — DOCUSATE SODIUM 100 MG: 100 CAPSULE, LIQUID FILLED ORAL at 08:51

## 2019-07-04 RX ADMIN — OXYBUTYNIN CHLORIDE 5 MG: 5 TABLET ORAL at 08:52

## 2019-07-04 RX ADMIN — FAMOTIDINE 20 MG: 20 TABLET ORAL at 08:52

## 2019-07-04 RX ADMIN — DEXAMETHASONE SODIUM PHOSPHATE 6 MG: 4 INJECTION, SOLUTION INTRAMUSCULAR; INTRAVENOUS at 18:12

## 2019-07-04 ASSESSMENT — ENCOUNTER SYMPTOMS
COLOR CHANGE: 0
COUGH: 0
PHOTOPHOBIA: 1
BACK PAIN: 0
WHEEZING: 0
TROUBLE SWALLOWING: 0
ABDOMINAL PAIN: 0
ABDOMINAL DISTENTION: 0
SHORTNESS OF BREATH: 0
NAUSEA: 0
SORE THROAT: 0
VOMITING: 0
APNEA: 0
CHEST TIGHTNESS: 0

## 2019-07-04 ASSESSMENT — PAIN SCALES - GENERAL
PAINLEVEL_OUTOF10: 5
PAINLEVEL_OUTOF10: 0
PAINLEVEL_OUTOF10: 0
PAINLEVEL_OUTOF10: 4

## 2019-07-04 ASSESSMENT — PAIN DESCRIPTION - ONSET: ONSET: ON-GOING

## 2019-07-04 ASSESSMENT — PAIN DESCRIPTION - FREQUENCY: FREQUENCY: CONTINUOUS

## 2019-07-04 ASSESSMENT — PAIN DESCRIPTION - PAIN TYPE: TYPE: SURGICAL PAIN

## 2019-07-04 ASSESSMENT — PAIN - FUNCTIONAL ASSESSMENT: PAIN_FUNCTIONAL_ASSESSMENT: ACTIVITIES ARE NOT PREVENTED

## 2019-07-04 ASSESSMENT — PAIN DESCRIPTION - DESCRIPTORS: DESCRIPTORS: DULL

## 2019-07-04 ASSESSMENT — PAIN DESCRIPTION - LOCATION: LOCATION: HEAD

## 2019-07-04 ASSESSMENT — PAIN DESCRIPTION - ORIENTATION: ORIENTATION: POSTERIOR;MID

## 2019-07-04 NOTE — OP NOTE
tissue at this time, I proceeded  with performing a meticulous hemostasis. As I were satisfied with the  hemostasis, I applied Avitene and FloSeal and then I did a copious  irrigation. Then, as the irrigated  Fluid kept returning back as a clear fluid and no obvious bleed, I proceeded  with closing the dura in a water-tight fashion and I  supported it with a  piece of DuraGen and TISSEEL. Next, we returned back the bone flap and we  secured the bone flap with screws and plates. We did a meticulous  irrigation again. After that we closed the muscles and the wound in a standard  fashion. The patient tolerated the procedure very well without  intraoperative complications. At the end of the surgery, patient was  flipped back to supine position and she was extubated. Then patient was transferred to the PACU for further observation and treatment.                Caro Akbar MD  Electronically signed by me on 7/3/2019

## 2019-07-04 NOTE — PROGRESS NOTES
Assessment and Plan:          1. Right Cerebellar Mass, Glioma tumor: surgery completed 7/3, with Sy, patient must remain in dark room for 48 hours post procedure. 3% saline for reduction of cerebral edema post op. Goal sodium 145-155. Most recent 146,  Q 2 hour sodiums, Dexamethasone 6 mg Q 6 hours   2. HTN: continue home med, Cardene if needed for B/P less then 160.   3. Thyroid nodule: Follow up outpatient with PCP, decreased in size   4. Rectal wall thickening: non-specific. Follow up with PCP, consider colonoscopy. 5. Steroid induced leukocytosis: 16k, daily cbc. Patient doing well. Monitor and trend, insulin as needed AC/HS   6. Hypokalemia: resolved;  4.1,  Protocol in place        CC:  Tumor resection   HPI: Treva Mar is a 76year old female who presented to the ED after abnormal CT of head with primary care physician. She has a past medical history of OA, hypertension, hyperlipidemia, DM, and cancer. She first notice unsteadiness on May 17th while at her grandsons graduation she made a turn and lost her balance and fell and hit her head. Since then she noticed several other episodes of unsteadiness. Her PCP ordered a CT of her head and a large cerebellar mass on the right side. She was thus brought to the ED for expedited workup and possible neurosurgical management. She has a prior history of melanoma resection (2007,2011). She was started on steroids and admitted for expedited workup/treatment. 6/28 MRI brain shows possible glioma. Neurosurgery planning OR on Wednesday. 7/3 she underwent resection with Sy with Dr. Isma Mejia.  She was sent to the ICU post procedure for monitoring. She will need to remain in a dark room until Friday morning due to medication restrictions. 7/4 patient has no confusion today, tolerating diet, no weakness.  She has remained in dark room and will until Friday AM.  Continue 3% saline      Review of Systems   Constitutional: Negative for chills, fatigue and fever.   HENT: Negative for sore throat and trouble swallowing. Eyes: Positive for photophobia. Negative for visual disturbance. Respiratory: Negative for cough, chest tightness, shortness of breath and wheezing. Cardiovascular: Negative for chest pain and leg swelling. Gastrointestinal: Negative for abdominal pain, nausea and vomiting. Endocrine: Negative for polydipsia and polyphagia. Genitourinary: Negative for flank pain and hematuria. Musculoskeletal: Negative for back pain and neck pain. Skin: Negative for color change, pallor and rash. Allergic/Immunologic: Negative for food allergies and immunocompromised state. Neurological: Negative for tremors, seizures, facial asymmetry, weakness and numbness. Hematological: Negative for adenopathy. Psychiatric/Behavioral: Negative for agitation and confusion. The patient is not nervous/anxious. PMH:  Per HPI  SHX:  Lifetime non smoker, rare ETOh use, denies drug use   FHX:  Mother: heart failure Father: Cancer (lung)  Allergies: PCN, sulfa   Medications:     sodium chloride 50 mL/hr at 07/03/19 1759    sodium chloride 25 mL/hr (07/03/19 1833)    niCARdipine Stopped (07/03/19 2055)      sodium chloride flush  10 mL Intravenous 2 times per day    docusate sodium  100 mg Oral BID    insulin lispro  0-6 Units Subcutaneous TID WC    insulin lispro  0-3 Units Subcutaneous Nightly    dexamethasone  6 mg Intravenous Q6H    amLODIPine  10 mg Oral Daily    famotidine  20 mg Oral Daily    oxybutynin  5 mg Oral BID    pravastatin  40 mg Oral QPM    lisinopril  20 mg Oral Daily    And    [Held by provider] hydrochlorothiazide  25 mg Oral Daily       Vital Signs: T: 98.3 P: 59 RR: 11 B/P: 136/61: O2 Sat:95: I/O: 4657/1750  CAM-ICU:   Negative   General:   Acutely ill appearing white female   HEENT:  normocephalic and atraumatic. No scleral icterus. Neck: supple. No JVD. Lungs: clear to auscultation.   No retractions  Cardiac: RRR, no ectopy   Abdomen: soft. Nontender. Extremities:  No clubbing, cyanosis, or edema x 4. Vasculature: capillary refill < 3 seconds. + 2 pulses LE   Skin:  warm and dry. Psych:  Alert and oriented x3. Affect appropriate  Lymph:  No supraclavicular adenopathy. Neurologic:  No focal deficit. No seizures     Data: (All radiographs, tracings, PFTs, and imaging are personally viewed and interpreted unless otherwise noted). ·  Sodium 146, potassium 4.1, chloride 103, C02 26, BUN 26, creatinine 0.7, anion gap 12, glucose 142  · WBC 16.0, H/H 12.5/39.1  · INR 0.96, aptt 21.3  · MRI 2.5 cm mass right cerebellar hemisphere       Case and plan discussed with Dr. Iraj Bhatia and Dr. Payam Balderrama   Patient seen by me. Case discussed with Dr. Payam Balderrama. From hypertonic saline and administered mannitol x2. Significant improvement in mental status over the past 24 hours. Excellent postsurgical coverage. Electronically signed by Norman Bhatia MD.

## 2019-07-04 NOTE — PROGRESS NOTES
the right upper lobe and right hilar calcification as evidence for old granulomatous disease. There is minimal nodularity in the superior segment of the right lower lobe posteriorly. These were present on prior exam in 2015 and mildly less conspicuous on the current exam as evidence for benignity. There is a 3 mm nodule in the left lower lobe seen on image 40. The lungs otherwise appear clear. No aggressive osseous lesions are identified in the chest. Abdomen/pelvis: The liver and gallbladder are unremarkable. There is a small hiatal hernia, similar to prior exam. Adrenal glands are unremarkable. There is again noted to be a irregular fat containing lesion at the inferior pole of the right kidney measuring 2.8 x 2.1 cm, not significant changed in size and appearance compared to prior exam. A smaller fat-containing focus at the superior pole the right kidney is also stable. There is a 4.6 x 3.9 cm partially exophytic cysts at the superior pole of the left kidney which is slightly increased in size in the interval. Kidneys are otherwise unremarkable. There are calcified granulomas within the spleen similar to prior exam. Pancreas is unremarkable. No retroperitoneal or mesenteric lymphadenopathy is identified. There is mild circumferential rectal wall thickening. The bowel otherwise appears within normal limits. The bladder is unremarkable. The uterus is surgically absent. No free fluid is identified. No aggressive osseous lesions are identified within the visualized osseous structures. 1. No evidence of metastatic disease in the chest, abdomen or pelvis. 2. 1.4 cm hypodense nodule in the right lobe of the thyroid gland is decreased in size compared to prior chest CT. 3. 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 4. Mild rectal wall thickening. Correlate with visual inspection. **This report has been created using voice recognition software.  It may contain minor errors which are inherent in voice radiation dose to as low as reasonably achievable. FINDINGS: The patient's known right cerebellar mass is better seen on MR. This is posteriorly in the periphery. There is a significant amount of edema in the right cerebellum. There is mass effect upon the fourth ventricle. There is no hemorrhage. There is mild hydrocephalus. Both temporal horns are dilated. This is stable. There is no midline shift. The gray-white matter differentiation is preserved in the cerebral hemispheres. The paranasal sinuses and mastoid air cells are normally aerated. There is no suspicious calvarial abnormality. 1. Intra-axial right cerebellar mass with significant edema in the posterior fossa. 2. Stable mild hydrocephalus. **This report has been created using voice recognition software. It may contain minor errors which are inherent in voice recognition technology. ** Final report electronically signed by Dr. Gena Rose on 7/2/2019 4:42 PM    Ct Head Wo Contrast    Result Date: 6/28/2019  PROCEDURE: CT HEAD WO CONTRAST CLINICAL INFORMATION: Unsteady gait. COMPARISON: CT of the head dated February 22, 2008 TECHNIQUE: Noncontrast 5 mm axial images were obtained through the brain. Coronal and sagittal reformats were also performed. All CT scans at this facility use dose modulation, iterative reconstruction, and/or weight-based dosing when appropriate to reduce radiation dose to as low as reasonably achievable. FINDINGS: Hypoattenuation is present involving the right cerebellar hemisphere. There is also a rounded focus of hypoattenuation at the more posterior aspect of the right cerebellar hemisphere. This also results in mass effect with effacement of the fourth ventricle and shift of the midline structures towards the left. There is also suggestion of herniation of the cerebellar tonsils through the foramen magnum. The ventricles are enlarged and specifically the third ventricle and temporal horns are dilated.  No acute intracranial hemorrhage is identified. No extra-axial fluid collection is present. The visualized orbits, temporal bone structures and paranasal sinuses are unremarkable. The calvarium is intact without acute fracture or aggressive, bony destructive process. There is a rounded area of hypoattenuation with surrounding hypoattenuation within the right cerebellar hemisphere which results in mass effect and shift of the midline structures towards the left. There is also suggestion of herniation of the cerebellar  tonsils through the foramen magnum and this also causes mass effect and effacement of the fourth ventricle. The findings are suspicious for an underlying mass. The mass effect also results in hydrocephalus with the third ventricle and temporal horns of the lateral ventricles being dilated. Results were conveyed to Clint Obrien CNP at 1045 hours on 6/28/2019. **This report has been created using voice recognition software. It may contain minor errors which are inherent in voice recognition technology. ** Final report electronically signed by Dr. Edward Wakefield on 6/28/2019 10:46 AM    Ct Chest W Wo Contrast    Result Date: 6/28/2019  PROCEDURE: CT CHEST W WO CONTRAST, CT ABDOMEN PELVIS W WO CONTRAST CLINICAL INFORMATION: Rule out additional lesions/metastases . Newly found brain mass. Evaluate for additional metastases. COMPARISON: CT urogram dated 3/2/2018 and CTA chest dated 6/19/2015. TECHNIQUE: Helical CT of the chest, abdomen and pelvis following oral contrast administration and before and after intravenous administration of 80 mL Isovue-370 injected in left arm. Sagittal and coronal reformatted images were also created. FINDINGS: CHEST: There is a 1.4 x 0.7 cm hypodense nodule in the right lobe of the thyroid gland which has decreased in size in the interval since prior CT. No axillary, mediastinal or hilar lymphadenopathy is identified.  There is small calcified nodule in the right upper lobe and right hilar calcification as evidence for old granulomatous disease. There is minimal nodularity in the superior segment of the right lower lobe posteriorly. These were present on prior exam in 2015 and mildly less conspicuous on the current exam as evidence for benignity. There is a 3 mm nodule in the left lower lobe seen on image 40. The lungs otherwise appear clear. No aggressive osseous lesions are identified in the chest. Abdomen/pelvis: The liver and gallbladder are unremarkable. There is a small hiatal hernia, similar to prior exam. Adrenal glands are unremarkable. There is again noted to be a irregular fat containing lesion at the inferior pole of the right kidney measuring 2.8 x 2.1 cm, not significant changed in size and appearance compared to prior exam. A smaller fat-containing focus at the superior pole the right kidney is also stable. There is a 4.6 x 3.9 cm partially exophytic cysts at the superior pole of the left kidney which is slightly increased in size in the interval. Kidneys are otherwise unremarkable. There are calcified granulomas within the spleen similar to prior exam. Pancreas is unremarkable. No retroperitoneal or mesenteric lymphadenopathy is identified. There is mild circumferential rectal wall thickening. The bowel otherwise appears within normal limits. The bladder is unremarkable. The uterus is surgically absent. No free fluid is identified. No aggressive osseous lesions are identified within the visualized osseous structures. 1. No evidence of metastatic disease in the chest, abdomen or pelvis. 2. 1.4 cm hypodense nodule in the right lobe of the thyroid gland is decreased in size compared to prior chest CT. 3. 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 4. Mild rectal wall thickening. Correlate with visual inspection. **This report has been created using voice recognition software. It may contain minor errors which are inherent in voice recognition technology. ** Final

## 2019-07-05 ENCOUNTER — APPOINTMENT (OUTPATIENT)
Dept: MRI IMAGING | Age: 74
DRG: 025 | End: 2019-07-05
Payer: MEDICARE

## 2019-07-05 ENCOUNTER — APPOINTMENT (OUTPATIENT)
Dept: ULTRASOUND IMAGING | Age: 74
DRG: 025 | End: 2019-07-05
Payer: MEDICARE

## 2019-07-05 LAB
ERYTHROCYTE [DISTWIDTH] IN BLOOD BY AUTOMATED COUNT: 12.4 % (ref 11.5–14.5)
ERYTHROCYTE [DISTWIDTH] IN BLOOD BY AUTOMATED COUNT: 40 FL (ref 35–45)
GLUCOSE BLD-MCNC: 151 MG/DL (ref 70–108)
GLUCOSE BLD-MCNC: 152 MG/DL (ref 70–108)
GLUCOSE BLD-MCNC: 168 MG/DL (ref 70–108)
GLUCOSE BLD-MCNC: 234 MG/DL (ref 70–108)
HCT VFR BLD CALC: 39.4 % (ref 37–47)
HEMOGLOBIN: 12.4 GM/DL (ref 12–16)
MCH RBC QN AUTO: 27.9 PG (ref 26–33)
MCHC RBC AUTO-ENTMCNC: 31.5 GM/DL (ref 32.2–35.5)
MCV RBC AUTO: 88.7 FL (ref 81–99)
PLATELET # BLD: 164 THOU/MM3 (ref 130–400)
PMV BLD AUTO: 11.6 FL (ref 9.4–12.4)
RBC # BLD: 4.44 MILL/MM3 (ref 4.2–5.4)
WBC # BLD: 11.7 THOU/MM3 (ref 4.8–10.8)

## 2019-07-05 PROCEDURE — 2709999900 HC NON-CHARGEABLE SUPPLY

## 2019-07-05 PROCEDURE — 6370000000 HC RX 637 (ALT 250 FOR IP): Performed by: PHYSICIAN ASSISTANT

## 2019-07-05 PROCEDURE — 2060000000 HC ICU INTERMEDIATE R&B

## 2019-07-05 PROCEDURE — 70553 MRI BRAIN STEM W/O & W/DYE: CPT

## 2019-07-05 PROCEDURE — 6360000002 HC RX W HCPCS: Performed by: PHYSICIAN ASSISTANT

## 2019-07-05 PROCEDURE — 82948 REAGENT STRIP/BLOOD GLUCOSE: CPT

## 2019-07-05 PROCEDURE — 97535 SELF CARE MNGMENT TRAINING: CPT

## 2019-07-05 PROCEDURE — 2580000003 HC RX 258: Performed by: PHYSICIAN ASSISTANT

## 2019-07-05 PROCEDURE — 97110 THERAPEUTIC EXERCISES: CPT

## 2019-07-05 PROCEDURE — 99024 POSTOP FOLLOW-UP VISIT: CPT | Performed by: PHYSICIAN ASSISTANT

## 2019-07-05 PROCEDURE — 36415 COLL VENOUS BLD VENIPUNCTURE: CPT

## 2019-07-05 PROCEDURE — A9579 GAD-BASE MR CONTRAST NOS,1ML: HCPCS | Performed by: NEUROLOGICAL SURGERY

## 2019-07-05 PROCEDURE — 76770 US EXAM ABDO BACK WALL COMP: CPT

## 2019-07-05 PROCEDURE — 97530 THERAPEUTIC ACTIVITIES: CPT

## 2019-07-05 PROCEDURE — 94761 N-INVAS EAR/PLS OXIMETRY MLT: CPT

## 2019-07-05 PROCEDURE — 85027 COMPLETE CBC AUTOMATED: CPT

## 2019-07-05 PROCEDURE — 6360000004 HC RX CONTRAST MEDICATION: Performed by: NEUROLOGICAL SURGERY

## 2019-07-05 RX ADMIN — HYDROCODONE BITARTRATE AND ACETAMINOPHEN 1 TABLET: 5; 325 TABLET ORAL at 14:30

## 2019-07-05 RX ADMIN — AMLODIPINE BESYLATE 10 MG: 10 TABLET ORAL at 10:43

## 2019-07-05 RX ADMIN — OXYBUTYNIN CHLORIDE 5 MG: 5 TABLET ORAL at 10:44

## 2019-07-05 RX ADMIN — INSULIN LISPRO 1 UNITS: 100 INJECTION, SOLUTION INTRAVENOUS; SUBCUTANEOUS at 20:27

## 2019-07-05 RX ADMIN — SODIUM CHLORIDE, PRESERVATIVE FREE 10 ML: 5 INJECTION INTRAVENOUS at 09:00

## 2019-07-05 RX ADMIN — INSULIN LISPRO 1 UNITS: 100 INJECTION, SOLUTION INTRAVENOUS; SUBCUTANEOUS at 09:25

## 2019-07-05 RX ADMIN — PRAVASTATIN SODIUM 40 MG: 40 TABLET ORAL at 18:24

## 2019-07-05 RX ADMIN — DEXAMETHASONE SODIUM PHOSPHATE 6 MG: 4 INJECTION, SOLUTION INTRAMUSCULAR; INTRAVENOUS at 06:55

## 2019-07-05 RX ADMIN — DOCUSATE SODIUM 100 MG: 100 CAPSULE, LIQUID FILLED ORAL at 10:44

## 2019-07-05 RX ADMIN — DEXAMETHASONE SODIUM PHOSPHATE 6 MG: 4 INJECTION, SOLUTION INTRAMUSCULAR; INTRAVENOUS at 18:25

## 2019-07-05 RX ADMIN — OXYBUTYNIN CHLORIDE 5 MG: 5 TABLET ORAL at 20:23

## 2019-07-05 RX ADMIN — HYDROCODONE BITARTRATE AND ACETAMINOPHEN 1 TABLET: 5; 325 TABLET ORAL at 22:42

## 2019-07-05 RX ADMIN — DEXAMETHASONE SODIUM PHOSPHATE 6 MG: 4 INJECTION, SOLUTION INTRAMUSCULAR; INTRAVENOUS at 23:54

## 2019-07-05 RX ADMIN — FAMOTIDINE 20 MG: 20 TABLET ORAL at 10:44

## 2019-07-05 RX ADMIN — SODIUM CHLORIDE, PRESERVATIVE FREE 10 ML: 5 INJECTION INTRAVENOUS at 20:24

## 2019-07-05 RX ADMIN — DEXAMETHASONE SODIUM PHOSPHATE 6 MG: 4 INJECTION, SOLUTION INTRAMUSCULAR; INTRAVENOUS at 00:32

## 2019-07-05 RX ADMIN — Medication 10 ML: at 00:33

## 2019-07-05 RX ADMIN — INSULIN LISPRO 1 UNITS: 100 INJECTION, SOLUTION INTRAVENOUS; SUBCUTANEOUS at 13:39

## 2019-07-05 RX ADMIN — GADOTERIDOL 15 ML: 279.3 INJECTION, SOLUTION INTRAVENOUS at 12:59

## 2019-07-05 RX ADMIN — DEXAMETHASONE SODIUM PHOSPHATE 6 MG: 4 INJECTION, SOLUTION INTRAMUSCULAR; INTRAVENOUS at 13:41

## 2019-07-05 RX ADMIN — LISINOPRIL 20 MG: 20 TABLET ORAL at 10:42

## 2019-07-05 RX ADMIN — INSULIN LISPRO 1 UNITS: 100 INJECTION, SOLUTION INTRAVENOUS; SUBCUTANEOUS at 18:24

## 2019-07-05 RX ADMIN — DOCUSATE SODIUM 100 MG: 100 CAPSULE, LIQUID FILLED ORAL at 20:23

## 2019-07-05 ASSESSMENT — PAIN SCALES - GENERAL
PAINLEVEL_OUTOF10: 0
PAINLEVEL_OUTOF10: 2
PAINLEVEL_OUTOF10: 0
PAINLEVEL_OUTOF10: 2
PAINLEVEL_OUTOF10: 3
PAINLEVEL_OUTOF10: 0
PAINLEVEL_OUTOF10: 0

## 2019-07-05 ASSESSMENT — PAIN DESCRIPTION - PROGRESSION
CLINICAL_PROGRESSION: NOT CHANGED
CLINICAL_PROGRESSION: GRADUALLY WORSENING

## 2019-07-05 ASSESSMENT — PAIN DESCRIPTION - LOCATION
LOCATION: HEAD
LOCATION: HEAD

## 2019-07-05 ASSESSMENT — ENCOUNTER SYMPTOMS
ABDOMINAL PAIN: 0
CHEST TIGHTNESS: 0
SHORTNESS OF BREATH: 0
ABDOMINAL DISTENTION: 0
APNEA: 0

## 2019-07-05 ASSESSMENT — PAIN DESCRIPTION - FREQUENCY
FREQUENCY: CONTINUOUS
FREQUENCY: CONTINUOUS

## 2019-07-05 ASSESSMENT — PAIN DESCRIPTION - ORIENTATION
ORIENTATION: POSTERIOR
ORIENTATION: POSTERIOR

## 2019-07-05 ASSESSMENT — PAIN DESCRIPTION - DESCRIPTORS
DESCRIPTORS: ACHING
DESCRIPTORS: ACHING

## 2019-07-05 ASSESSMENT — PAIN DESCRIPTION - PAIN TYPE
TYPE: SURGICAL PAIN
TYPE: ACUTE PAIN;SURGICAL PAIN

## 2019-07-05 ASSESSMENT — PAIN - FUNCTIONAL ASSESSMENT
PAIN_FUNCTIONAL_ASSESSMENT: ACTIVITIES ARE NOT PREVENTED
PAIN_FUNCTIONAL_ASSESSMENT: PREVENTS OR INTERFERES SOME ACTIVE ACTIVITIES AND ADLS

## 2019-07-05 ASSESSMENT — PAIN DESCRIPTION - ONSET: ONSET: ON-GOING

## 2019-07-05 NOTE — PROGRESS NOTES
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 H&E slide review only to date. Special stains to be done with no preliminary before 7-8-2019. CT of abdomen noted renal anomalies therefore will order bilat renal u/s.

## 2019-07-05 NOTE — PROGRESS NOTES
Nutrition Assessment (Low Risk)    Type and Reason for Visit: Initial(LOS nutrition evaluation)    Nutrition Recommendations: Continue ADA diet as ordered. Nutrition Assessment:  Patient assessed for nutritional risk. Deemed to be at low risk at this time. Tolerating diet well with no swallowing difficulties; BMI 29.1 Will continue to monitor for changes in status.       Malnutrition Assessment:  · Malnutrition Status: No malnutrition    Nutrition Risk Level   Risk Level: Low    Nutrition Diagnosis:   · Problem: No nutrition diagnosis at this time    Nutrition Intervention:  Food and/or Delivery: Continue current diet  Nutrition Education/Counseling/Coordination of Care:  Continued Inpatient Monitoring, Education Not Indicated, Coordination of Care      Electronically signed by Adonis Horvath RD, LD on 7/5/19 at 4:09 PM    Contact Number: [phone]

## 2019-07-05 NOTE — PROGRESS NOTES
kg)   06/13/19 166 lb (75.3 kg)   04/09/19 167 lb 12.8 oz (76.1 kg)         CBC:   Recent Labs     07/03/19  0325 07/04/19  0654 07/05/19  0319   WBC 11.9* 16.0* 11.7*   HGB 13.6 12.5 12.4    186 164     BMP:    Recent Labs     07/03/19  0325  07/04/19  0005 07/04/19  0652 07/04/19  1427      < > 145 146*  146* 141   K 4.1  --   --  4.7  --      --   --  108  --    CO2 26  --   --  25  --    BUN 26*  --   --  21  --    CREATININE 0.7  --   --  0.6  --    GLUCOSE 175*  --   --  147*  --     < > = values in this interval not displayed. Calcium:  Recent Labs     07/04/19  0652   CALCIUM 8.7     Magnesium:No results for input(s): MG in the last 72 hours. Glucose:  Recent Labs     07/04/19  1809 07/04/19  2106 07/05/19  0921   POCGLU 166* 212* 152*     HgbA1C: No results for input(s): LABA1C in the last 72 hours. Lipids: No results for input(s): CHOL, TRIG, HDL, LDLCALC in the last 72 hours. Invalid input(s): LDL    Radiology reports as per the Radiologist  Radiology: Ct Abdomen Pelvis W Wo Contrast Additional Contrast? None    Result Date: 6/28/2019  PROCEDURE: CT CHEST W WO CONTRAST, CT ABDOMEN PELVIS W WO CONTRAST CLINICAL INFORMATION: Rule out additional lesions/metastases . Newly found brain mass. Evaluate for additional metastases. COMPARISON: CT urogram dated 3/2/2018 and CTA chest dated 6/19/2015. TECHNIQUE: Helical CT of the chest, abdomen and pelvis following oral contrast administration and before and after intravenous administration of 80 mL Isovue-370 injected in left arm. Sagittal and coronal reformatted images were also created. FINDINGS: CHEST: There is a 1.4 x 0.7 cm hypodense nodule in the right lobe of the thyroid gland which has decreased in size in the interval since prior CT. No axillary, mediastinal or hilar lymphadenopathy is identified. There is small calcified nodule in the right upper lobe and right hilar calcification as evidence for old granulomatous disease. There is minimal nodularity in the superior segment of the right lower lobe posteriorly. These were present on prior exam in 2015 and mildly less conspicuous on the current exam as evidence for benignity. There is a 3 mm nodule in the left lower lobe seen on image 40. The lungs otherwise appear clear. No aggressive osseous lesions are identified in the chest. Abdomen/pelvis: The liver and gallbladder are unremarkable. There is a small hiatal hernia, similar to prior exam. Adrenal glands are unremarkable. There is again noted to be a irregular fat containing lesion at the inferior pole of the right kidney measuring 2.8 x 2.1 cm, not significant changed in size and appearance compared to prior exam. A smaller fat-containing focus at the superior pole the right kidney is also stable. There is a 4.6 x 3.9 cm partially exophytic cysts at the superior pole of the left kidney which is slightly increased in size in the interval. Kidneys are otherwise unremarkable. There are calcified granulomas within the spleen similar to prior exam. Pancreas is unremarkable. No retroperitoneal or mesenteric lymphadenopathy is identified. There is mild circumferential rectal wall thickening. The bowel otherwise appears within normal limits. The bladder is unremarkable. The uterus is surgically absent. No free fluid is identified. No aggressive osseous lesions are identified within the visualized osseous structures. 1. No evidence of metastatic disease in the chest, abdomen or pelvis. 2. 1.4 cm hypodense nodule in the right lobe of the thyroid gland is decreased in size compared to prior chest CT. 3. 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 4. Mild rectal wall thickening. Correlate with visual inspection. **This report has been created using voice recognition software. It may contain minor errors which are inherent in voice recognition technology. ** Final report electronically signed by Dr. Lawanda Garcia MD on temporal bone structures and paranasal sinuses are unremarkable. The calvarium is intact without acute fracture or aggressive, bony destructive process. There is a rounded area of hypoattenuation with surrounding hypoattenuation within the right cerebellar hemisphere which results in mass effect and shift of the midline structures towards the left. There is also suggestion of herniation of the cerebellar  tonsils through the foramen magnum and this also causes mass effect and effacement of the fourth ventricle. The findings are suspicious for an underlying mass. The mass effect also results in hydrocephalus with the third ventricle and temporal horns of the lateral ventricles being dilated. Results were conveyed to Romeo Whitaker CNP at 1045 hours on 6/28/2019. **This report has been created using voice recognition software. It may contain minor errors which are inherent in voice recognition technology. ** Final report electronically signed by Dr. Suzanna Rinne on 6/28/2019 10:46 AM    Ct Chest W Wo Contrast    Result Date: 6/28/2019  PROCEDURE: CT CHEST W WO CONTRAST, CT ABDOMEN PELVIS W WO CONTRAST CLINICAL INFORMATION: Rule out additional lesions/metastases . Newly found brain mass. Evaluate for additional metastases. COMPARISON: CT urogram dated 3/2/2018 and CTA chest dated 6/19/2015. TECHNIQUE: Helical CT of the chest, abdomen and pelvis following oral contrast administration and before and after intravenous administration of 80 mL Isovue-370 injected in left arm. Sagittal and coronal reformatted images were also created. FINDINGS: CHEST: There is a 1.4 x 0.7 cm hypodense nodule in the right lobe of the thyroid gland which has decreased in size in the interval since prior CT. No axillary, mediastinal or hilar lymphadenopathy is identified. There is small calcified nodule in the right upper lobe and right hilar calcification as evidence for old granulomatous disease.  There is minimal nodularity in the superior segment of the right lower lobe posteriorly. These were present on prior exam in 2015 and mildly less conspicuous on the current exam as evidence for benignity. There is a 3 mm nodule in the left lower lobe seen on image 40. The lungs otherwise appear clear. No aggressive osseous lesions are identified in the chest. Abdomen/pelvis: The liver and gallbladder are unremarkable. There is a small hiatal hernia, similar to prior exam. Adrenal glands are unremarkable. There is again noted to be a irregular fat containing lesion at the inferior pole of the right kidney measuring 2.8 x 2.1 cm, not significant changed in size and appearance compared to prior exam. A smaller fat-containing focus at the superior pole the right kidney is also stable. There is a 4.6 x 3.9 cm partially exophytic cysts at the superior pole of the left kidney which is slightly increased in size in the interval. Kidneys are otherwise unremarkable. There are calcified granulomas within the spleen similar to prior exam. Pancreas is unremarkable. No retroperitoneal or mesenteric lymphadenopathy is identified. There is mild circumferential rectal wall thickening. The bowel otherwise appears within normal limits. The bladder is unremarkable. The uterus is surgically absent. No free fluid is identified. No aggressive osseous lesions are identified within the visualized osseous structures. 1. No evidence of metastatic disease in the chest, abdomen or pelvis. 2. 1.4 cm hypodense nodule in the right lobe of the thyroid gland is decreased in size compared to prior chest CT. 3. 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 4. Mild rectal wall thickening. Correlate with visual inspection. **This report has been created using voice recognition software. It may contain minor errors which are inherent in voice recognition technology. ** Final report electronically signed by Dr. Dorothy Trotter MD on 6/28/2019 5:54 PM    [facility] B[facility]    Result Date: 6/28/2019  PROCEDURE: VL DUP CAROTID BILATERAL CLINICAL INFORMATION: Unsteady gait COMPARISON: No prior study. TECHNIQUE: Multiple grayscale and color flow images of both carotid systems and both vertebral arteries were obtained. Spectral Doppler waveforms were generated and velocity measurements were obtained. RIGHT PSV/EDV DIST CCA-------->63/12cm/s PROX ICA-------->70/17cm/s ECA---------------->71/9cm/s VERT-------------->39/10cm/s LEFT PSV/EDV DIST CCA-------->53/13cm/s PROX ICA-------->61/21cm/s ECA---------------->90/8cm/s VERT-------------->45/11cm/s     1. RIGHT ICA . Naoma Broccoli Naoma Broccoli Unremarkable . Naoma Broccoli 2. RIGHT ECA. Naoma Broccoli Unremarkable . .... Naoma Broccoli RIGHT CCA. Naoma Broccoli Unremarkable . Naoma Broccoli 3. RIGHT VERTEBRAL. Naoma Broccoli Antegrade flow . .. ... LEFT VERTEBRAL. .. Antegrade flow. .. 4. LEFT ICA. .... Unremarkable. . 5. LEFT ECA. .. Minimal intimal thickening, no appreciable stenosis. ..... LEFT CCA. .. Unremarkable. **This report has been created using voice recognition software. It may contain minor errors which are inherent in voice recognition technology. ** Final report electronically signed by Dr. Randee Weaver on 6/28/2019 9:44 AM    Mri Brain W Wo Contrast    Result Date: 6/28/2019  PROCEDURE: MRI BRAIN W WO CONTRAST INDICATION:Abnormal ct findings with, patient with balance problem. Please do the brain MRI according to [facility] protocol. Disequilibrium. History of melanoma. COMPARISON: CT head from the same date. TECHNIQUE: Multiplanar and multiple spin echo T1 and T2-weighted images were obtained through the brain before and after the administration of 15 mL ProHance injected in the left AC. FINDINGS: There is a 2.5 x 2.2 cm intra-axial low T1 hyperintense T2 signal mass in the right cerebellar hemisphere with prominent peripheral and irregular central enhancement following contrast administration. There is a hemosiderin rim at the margins of the lesion.  There is prominent perilesional edema involving the right cerebellar hemisphere

## 2019-07-05 NOTE — PROGRESS NOTES
increased independence with functional mobility. Functional Outcome Measures: Not completed  AM-PAC Inpatient Mobility without Stair Climbing Raw Score : 15  AM-PAC Inpatient without Stair Climbing T-Scale Score : 43.03    ASSESSMENT:  Assessment: Patient progressing toward established goals. Activity Tolerance:  Patient tolerance of  treatment: good. Pt tolerated upright activity well with ambulation this date. HEP tolerated well, pt encouraged to participate in activity throughout the day with staff assist.       Equipment Recommendations:Equipment Needed: No  Discharge Recommendations:  Discharge Recommendations: Continue to assess pending progress    Plan: Times per week: 6 X N  Times per day: Daily  Current Treatment Recommendations: Strengthening, Gait Training, Stair training, Balance Training, Functional Mobility Training, Transfer Training, Endurance Training, Home Exercise Program, Equipment Evaluation, Education, & procurement    Patient Education  Patient Education: Home Exercise Program, [employer] Mobility, Transfers, Gait    Goals:  Patient goals : see neuro sx for a plan  Short term goals  Time Frame for Short term goals: by discharge   Short term goal 1: supine to sit and return with Mod I to get in and out of ankit   Short term goal 2: sit to stand with Mod I to get on and off various surfaces  Short term goal 3: ambulation with /without  feet with no deviation in path or LOB with S to walk community distances   Short term goal 4: ascend/descend 2 steps with HR and S to enter home  Short term goal 5: Improved tinetti score > 26 to reduce risk of fall  Long term goals  Time Frame for Long term goals : NA due to short ELOS    Following session, patient left in safe position with all fall risk precautions in place.

## 2019-07-06 LAB
ANION GAP SERPL CALCULATED.3IONS-SCNC: 11 MEQ/L (ref 8–16)
BUN BLDV-MCNC: 26 MG/DL (ref 7–22)
CALCIUM SERPL-MCNC: 8.8 MG/DL (ref 8.5–10.5)
CHLORIDE BLD-SCNC: 102 MEQ/L (ref 98–111)
CO2: 27 MEQ/L (ref 23–33)
CREAT SERPL-MCNC: 0.5 MG/DL (ref 0.4–1.2)
ERYTHROCYTE [DISTWIDTH] IN BLOOD BY AUTOMATED COUNT: 12.2 % (ref 11.5–14.5)
ERYTHROCYTE [DISTWIDTH] IN BLOOD BY AUTOMATED COUNT: 38.3 FL (ref 35–45)
GFR SERPL CREATININE-BSD FRML MDRD: > 90 ML/MIN/1.73M2
GLUCOSE BLD-MCNC: 133 MG/DL (ref 70–108)
GLUCOSE BLD-MCNC: 146 MG/DL (ref 70–108)
GLUCOSE BLD-MCNC: 150 MG/DL (ref 70–108)
GLUCOSE BLD-MCNC: 159 MG/DL (ref 70–108)
GLUCOSE BLD-MCNC: 177 MG/DL (ref 70–108)
HCT VFR BLD CALC: 38 % (ref 37–47)
HEMOGLOBIN: 12.3 GM/DL (ref 12–16)
MCH RBC QN AUTO: 28 PG (ref 26–33)
MCHC RBC AUTO-ENTMCNC: 32.4 GM/DL (ref 32.2–35.5)
MCV RBC AUTO: 86.6 FL (ref 81–99)
PLATELET # BLD: 155 THOU/MM3 (ref 130–400)
PMV BLD AUTO: 11 FL (ref 9.4–12.4)
POTASSIUM SERPL-SCNC: 3.9 MEQ/L (ref 3.5–5.2)
RBC # BLD: 4.39 MILL/MM3 (ref 4.2–5.4)
SODIUM BLD-SCNC: 140 MEQ/L (ref 135–145)
WBC # BLD: 10.4 THOU/MM3 (ref 4.8–10.8)

## 2019-07-06 PROCEDURE — 6370000000 HC RX 637 (ALT 250 FOR IP): Performed by: PHYSICIAN ASSISTANT

## 2019-07-06 PROCEDURE — 80048 BASIC METABOLIC PNL TOTAL CA: CPT

## 2019-07-06 PROCEDURE — 99233 SBSQ HOSP IP/OBS HIGH 50: CPT | Performed by: HOSPITALIST

## 2019-07-06 PROCEDURE — 2060000000 HC ICU INTERMEDIATE R&B

## 2019-07-06 PROCEDURE — 36415 COLL VENOUS BLD VENIPUNCTURE: CPT

## 2019-07-06 PROCEDURE — 6360000002 HC RX W HCPCS: Performed by: PHYSICIAN ASSISTANT

## 2019-07-06 PROCEDURE — 6370000000 HC RX 637 (ALT 250 FOR IP): Performed by: HOSPITALIST

## 2019-07-06 PROCEDURE — 6360000002 HC RX W HCPCS: Performed by: HOSPITALIST

## 2019-07-06 PROCEDURE — 97110 THERAPEUTIC EXERCISES: CPT

## 2019-07-06 PROCEDURE — 85027 COMPLETE CBC AUTOMATED: CPT

## 2019-07-06 PROCEDURE — 99024 POSTOP FOLLOW-UP VISIT: CPT | Performed by: PHYSICIAN ASSISTANT

## 2019-07-06 PROCEDURE — 97116 GAIT TRAINING THERAPY: CPT

## 2019-07-06 PROCEDURE — 82948 REAGENT STRIP/BLOOD GLUCOSE: CPT

## 2019-07-06 PROCEDURE — 2580000003 HC RX 258: Performed by: PHYSICIAN ASSISTANT

## 2019-07-06 PROCEDURE — 97530 THERAPEUTIC ACTIVITIES: CPT

## 2019-07-06 PROCEDURE — 2709999900 HC NON-CHARGEABLE SUPPLY

## 2019-07-06 RX ORDER — POLYETHYLENE GLYCOL 3350 17 G/17G
17 POWDER, FOR SOLUTION ORAL DAILY
Status: DISCONTINUED | OUTPATIENT
Start: 2019-07-06 | End: 2019-07-08 | Stop reason: HOSPADM

## 2019-07-06 RX ORDER — DEXAMETHASONE 4 MG/1
4 TABLET ORAL EVERY 6 HOURS SCHEDULED
Status: DISCONTINUED | OUTPATIENT
Start: 2019-07-06 | End: 2019-07-08 | Stop reason: HOSPADM

## 2019-07-06 RX ADMIN — INSULIN LISPRO 1 UNITS: 100 INJECTION, SOLUTION INTRAVENOUS; SUBCUTANEOUS at 18:51

## 2019-07-06 RX ADMIN — OXYBUTYNIN CHLORIDE 5 MG: 5 TABLET ORAL at 10:39

## 2019-07-06 RX ADMIN — OXYBUTYNIN CHLORIDE 5 MG: 5 TABLET ORAL at 21:19

## 2019-07-06 RX ADMIN — DOCUSATE SODIUM 100 MG: 100 CAPSULE, LIQUID FILLED ORAL at 21:20

## 2019-07-06 RX ADMIN — SODIUM CHLORIDE, PRESERVATIVE FREE 10 ML: 5 INJECTION INTRAVENOUS at 10:41

## 2019-07-06 RX ADMIN — DEXAMETHASONE SODIUM PHOSPHATE 6 MG: 4 INJECTION, SOLUTION INTRAMUSCULAR; INTRAVENOUS at 05:47

## 2019-07-06 RX ADMIN — HYDROCODONE BITARTRATE AND ACETAMINOPHEN 1 TABLET: 5; 325 TABLET ORAL at 10:39

## 2019-07-06 RX ADMIN — DOCUSATE SODIUM 100 MG: 100 CAPSULE, LIQUID FILLED ORAL at 10:40

## 2019-07-06 RX ADMIN — LISINOPRIL 20 MG: 20 TABLET ORAL at 10:40

## 2019-07-06 RX ADMIN — SODIUM CHLORIDE, PRESERVATIVE FREE 10 ML: 5 INJECTION INTRAVENOUS at 22:47

## 2019-07-06 RX ADMIN — POLYETHYLENE GLYCOL 3350 17 G: 17 POWDER, FOR SOLUTION ORAL at 14:24

## 2019-07-06 RX ADMIN — INSULIN LISPRO 1 UNITS: 100 INJECTION, SOLUTION INTRAVENOUS; SUBCUTANEOUS at 21:20

## 2019-07-06 RX ADMIN — DEXAMETHASONE 4 MG: 4 TABLET ORAL at 14:09

## 2019-07-06 RX ADMIN — DEXAMETHASONE 4 MG: 4 TABLET ORAL at 21:19

## 2019-07-06 RX ADMIN — PRAVASTATIN SODIUM 40 MG: 40 TABLET ORAL at 18:53

## 2019-07-06 RX ADMIN — FAMOTIDINE 20 MG: 20 TABLET ORAL at 10:40

## 2019-07-06 RX ADMIN — AMLODIPINE BESYLATE 10 MG: 10 TABLET ORAL at 10:40

## 2019-07-06 RX ADMIN — INSULIN LISPRO 1 UNITS: 100 INJECTION, SOLUTION INTRAVENOUS; SUBCUTANEOUS at 14:10

## 2019-07-06 RX ADMIN — HYDROCODONE BITARTRATE AND ACETAMINOPHEN 1 TABLET: 5; 325 TABLET ORAL at 21:20

## 2019-07-06 ASSESSMENT — PAIN DESCRIPTION - ORIENTATION: ORIENTATION: POSTERIOR;OTHER (COMMENT)

## 2019-07-06 ASSESSMENT — PAIN - FUNCTIONAL ASSESSMENT: PAIN_FUNCTIONAL_ASSESSMENT: ACTIVITIES ARE NOT PREVENTED

## 2019-07-06 ASSESSMENT — PAIN DESCRIPTION - LOCATION: LOCATION: HEAD

## 2019-07-06 ASSESSMENT — PAIN SCALES - GENERAL
PAINLEVEL_OUTOF10: 0
PAINLEVEL_OUTOF10: 2
PAINLEVEL_OUTOF10: 2
PAINLEVEL_OUTOF10: 4
PAINLEVEL_OUTOF10: 2

## 2019-07-06 ASSESSMENT — PAIN DESCRIPTION - ONSET: ONSET: ON-GOING

## 2019-07-06 ASSESSMENT — PAIN DESCRIPTION - DESCRIPTORS: DESCRIPTORS: DISCOMFORT;OTHER (COMMENT)

## 2019-07-06 ASSESSMENT — PAIN DESCRIPTION - FREQUENCY: FREQUENCY: CONTINUOUS

## 2019-07-06 ASSESSMENT — PAIN DESCRIPTION - PAIN TYPE
TYPE: ACUTE PAIN;SURGICAL PAIN
TYPE: ACUTE PAIN

## 2019-07-06 ASSESSMENT — PAIN DESCRIPTION - PROGRESSION: CLINICAL_PROGRESSION: NOT CHANGED

## 2019-07-06 NOTE — PROGRESS NOTES
[facility]  INPATIENT PHYSICAL THERAPY  DAILY NOTE  [facility] NEUROSCIENCES 4A - 4A-07/007-A    Time In: 0800  Time Out: 0841  Timed Code Treatment Minutes: 41 Minutes  Minutes: 41          Date: 2019  Patient Name: Rosaura Wolfe,  Gender:  female        MRN: [id number]  : 1945  (76 y.o.)     Referring Practitioner: Dr Hurley Dural  Diagnosis: Vern Ends  Additional Pertinent Hx: Pt admitted  due to unsteadiness with gait and work up due to brain mass found on CT. MRI done here showed right cerebellar mass. Pt is s/p craniotomy on 7/3/2019 and is s/p resection of glioma with gleolan. Prior Level of Function:  Lives With: Alone  Type of Home: House  Home Layout: One level  Home Access: Stairs to enter with rails  Entrance Stairs - Number of Steps: 2ste  Home Equipment: Rolling walker    Restrictions/Precautions:  Restrictions/Precautions: Fall Risk  Position Activity Restriction  Other position/activity restrictions: s/p Right sub occipital occipital craniotomy on 7/3/19    SUBJECTIVE: Pt is extremely pleasant and cooperative. Very highly motivated. Pt is tangential of thought and focus at times. PAIN: denies    OBJECTIVE:  Bed Mobility:  Supine to Sit: Minimal Assistance    Transfers:  Sit to Stand: Contact Guard Assistance  Stand to Sit:Contact Guard Assistance    Ambulation:  Minimal Assistance, X 1  Distance: ~ 15 feet x1 ; 40 feet x 2   Surface: Level Tile  Device:No Device  Gait Deviations: Forward Flexed Posture, Slow Veda, Decreased Step Length Bilaterally, Lean to Left, Decreased Arm Swing, Decreased Trunk Rotation, Decreased Gait Speed, Decreased Heel Strike Bilaterally, Ataxia, Moderate Path Deviations and Unsteady Gait  Balance:  Dynamic Standing Balance: Minimal Assistance; toileting tasks and standing at sink tasks;      Bean bag toss activity with pt reaching to ~ 1 inch OOBOS both lateral directions ; mild instability overall and a few minor LOB with assist

## 2019-07-06 NOTE — PROGRESS NOTES
16   Ht 5' 4\" (1.626 m)   Wt 169 lb 1.6 oz (76.7 kg)   SpO2 97%   BMI 29.03 kg/m²   Physical Exam:  Alert and attentive. Language appropriate, with no aphasia. Pupils equal.  Facial strength symmetric. Physical Exam   Constitutional: She is oriented to person, place, and time. She appears well-developed and well-nourished. HENT:   Head: Normocephalic and atraumatic. Eyes: Pupils are equal, round, and reactive to light. Conjunctivae and EOM are normal.   Neck: Normal range of motion. Cardiovascular: Normal rate, regular rhythm and normal heart sounds. Exam reveals no friction rub.   No murmur heard. Pulmonary/Chest: Effort normal and breath sounds normal. No respiratory distress. She has no wheezes. Abdominal: Soft. Bowel sounds are normal. She exhibits no distension. There is no tenderness. Musculoskeletal: Normal range of motion. Neurological: She is alert and oriented to person, place, and time. She has normal strength. No cranial nerve deficit or sensory deficit.   Stable and neurologically intact on exam.   Skin: Skin is warm and dry. Psychiatric: She has a normal mood and affect. Her behavior is normal. Judgment and thought content normal        ROS:  Review of Systems  Constitutional: Negative for activity change. Respiratory: Negative for apnea, chest tightness and shortness of breath. Cardiovascular: Negative for chest pain and leg swelling. Gastrointestinal: Negative for abdominal distention and abdominal pain. Musculoskeletal: Positive for neck pain, resolving. Negative for arthralgias and gait problem. Neurological: Negative for weakness, numbness and headaches. Psychiatric/Behavioral: Negative for agitation, behavioral problems and confusion. 24 hour intake/output:    Intake/Output Summary (Last 24 hours) at 7/6/2019 1136  Last data filed at 7/6/2019 1048  Gross per 24 hour   Intake 740 ml   Output 1600 ml   Net -860 ml     Last 3 weights:   Wt Readings from Last 3 Encounters:   07/06/19 169 lb 1.6 oz (76.7 kg)   06/13/19 166 lb (75.3 kg)   04/09/19 167 lb 12.8 oz (76.1 kg)         CBC:   Recent Labs     07/04/19  0654 07/05/19  0319 07/06/19  0340   WBC 16.0* 11.7* 10.4   HGB 12.5 12.4 12.3    164 155     BMP:    Recent Labs     07/04/19  0005 07/04/19  0652 07/04/19  1427    146*  146* 141   K  --  4.7  --    CL  --  108  --    CO2  --  25  --    BUN  --  21  --    CREATININE  --  0.6  --    GLUCOSE  --  147*  --      Calcium:  Recent Labs     07/04/19  0652   CALCIUM 8.7     Magnesium:No results for input(s): MG in the last 72 hours. Glucose:  Recent Labs     07/05/19  1822 07/05/19  2024 07/06/19  0832   POCGLU 151* 234* 133*     HgbA1C: No results for input(s): LABA1C in the last 72 hours. Lipids: No results for input(s): CHOL, TRIG, HDL, LDLCALC in the last 72 hours. Invalid input(s): LDL    Radiology reports as per the Radiologist  Radiology: Ct Abdomen Pelvis W Wo Contrast Additional Contrast? None    Result Date: 6/28/2019  PROCEDURE: CT CHEST W WO CONTRAST, CT ABDOMEN PELVIS W WO CONTRAST CLINICAL INFORMATION: Rule out additional lesions/metastases . Newly found brain mass. Evaluate for additional metastases. COMPARISON: CT urogram dated 3/2/2018 and CTA chest dated 6/19/2015. TECHNIQUE: Helical CT of the chest, abdomen and pelvis following oral contrast administration and before and after intravenous administration of 80 mL Isovue-370 injected in left arm. Sagittal and coronal reformatted images were also created. FINDINGS: CHEST: There is a 1.4 x 0.7 cm hypodense nodule in the right lobe of the thyroid gland which has decreased in size in the interval since prior CT. No axillary, mediastinal or hilar lymphadenopathy is identified. There is small calcified nodule in the right upper lobe and right hilar calcification as evidence for old granulomatous disease.  There is minimal nodularity in the superior segment of the right lower lobe HEAD WO CONTRAST CLINICAL INFORMATION: on way to ICU :  Craniectomy with tumor resection. . COMPARISON: July 2, 2019 TECHNIQUE: Noncontrast 5 mm axial images were obtained through the brain. All CT scans at this facility use dose modulation, iterative reconstruction, and/or weight-based dosing when appropriate to reduce radiation dose to as low as reasonably achievable. FINDINGS: Postsurgical changes posterior right cerebellum with edema and pneumocephalus. Findings consistent with recent craniotomy. No acute hemorrhage. Prominent ventricles/hydrocephalus. There is mass effect on the fourth ventricle with slight displacement superiorly into the left. Generalized small vessel disease and volume loss. Paranasal sinuses and mastoid air cells are clear. Postsurgical changes posterior scalp. IMPRESSION: Postsurgical changes posterior right cerebellum with edema and pneumocephalus. Mass effect on the fourth ventricle. Findings consistent with recent craniotomy. No acute hemorrhage. **This report has been created using voice recognition software. It may contain minor errors which are inherent in voice recognition technology. ** Final report electronically signed by Dr. Axel Ray on 7/3/2019 5:13 PM    Ct Head Wo Contrast    Result Date: 7/2/2019  PROCEDURE: CT HEAD WO CONTRAST CLINICAL INFORMATION: FRANDY protocol. Preoperative planning. Right cerebellar mass. COMPARISON: Brain MRI 6/28/2019. TECHNIQUE: Noncontrast 1 mm axial images were obtained through the brain. Sagittal and coronal reconstructions were obtained. These images are correlated to the [facility] protocol. 5 mm reconstructed images were also obtained. All CT scans at this facility use dose modulation, iterative reconstruction, and/or weight-based dosing when appropriate to reduce radiation dose to as low as reasonably achievable. FINDINGS: The patient's known right cerebellar mass is better seen on MR. This is posteriorly in the periphery.  There is a Joan Bulmaro Joan Bulmaro Joan Bulmaro Joan Bulmaro Joan Bulmaro Joan [place] RIGHT CCA. Joan [place] Unremarkable . Joan Bulmaro 3. RIGHT VERTEBRAL. Joan [place] Antegrade flow . .. ... LEFT VERTEBRAL. .. Antegrade flow. .. 4. LEFT ICA. .... Unremarkable. . 5. LEFT ECA. .. Minimal intimal thickening, no appreciable stenosis. ..... LEFT CCA. .. Unremarkable. **This report has been created using voice recognition software. It may contain minor errors which are inherent in voice recognition technology. ** Final report electronically signed by Dr. Jax Shell on 6/28/2019 9:44 AM    Mri Brain W Wo Contrast    Result Date: 7/5/2019  PROCEDURE: MRI BRAIN W WO CONTRAST INDICATION:Post surgical craniectomy and cerebellar tumor resection. Follow-up postoperative day 2 right occipital tumor resection. COMPARISON: CT head dated 7/3/2019 and MR brain dated 6/28/2019. TECHNIQUE: Multiplanar and multiple spin echo T1 and T2-weighted images were obtained through the brain before and after the administration of intravenous contrast. 15 mL ProHance was injected in the left wrist. FINDINGS: Redemonstration of a right suboccipital craniotomy with subjacent resection cavity in the right cerebellar hemisphere at the site of previous enhancing lesion. No residual enhancing lesion is identified. There is restricted diffusion at the margin of the  resection cavity likely secondary to surgical manipulation. There is low T1 and hyperintense T2 signal within the cavity with susceptibility as evidence for postsurgical blood. There is minimal intrinsic hyperintense T1 signal at the margins also consistent with postsurgical blood. Pericavitary edema in the right cerebral hemisphere is slightly decreased compared to presurgical exam with slightly decreased effacement of the fourth ventricle. The supratentorial ventricles are mildly decreased in size. There is persistent protrusion of the cerebellar tonsil through the foramen magnum. No other focal areas of restricted diffusion are identified.  There are mild scattered focal areas of T2/FLAIR prolongation vermis from edema causing mass effect on the  brain stem at the foramen magnum with the tip of the foramen magnum extending approximately 1.4 cm below the level of the foramen magnum. No other intra or extra-axial enhancing lesion is identified. There is moderate supratentorial ventriculomegaly. No  focal areas of restricted diffusion are present to include the lesion. There are mild scattered focal areas of T2/FLAIR prolongation elsewhere in the subcortical deep white matter is evidence for chronic microvascular angiopathy. The major vascular flow voids appear patent. Orbits are unremarkable. There are mucous retention cysts in the right maxillary sinus. Mastoid air cells are clear. 2.5 cm enhancing intra-axial mass in the right cerebellar hemisphere with pronounced perilesional edema causing expansion and mass effect of the right cerebellar hemisphere and vermis with near complete effacement of the fourth ventricle and moderate supratentorial ventriculomegaly, as well as, mass effect on the brainstem at the foramen magnum. A solitary metastatic lesion is favored over a high-grade glioma. **This report has been created using voice recognition software. It may contain minor errors which are inherent in voice recognition technology. ** Final report electronically signed by Dr. Dorothy Trotter MD on 6/28/2019 6:08 PM    A/P: S/P patient remains postoperative day #3 from suboccipital craniectomy and resection of cerebellar lesion on by Dr. Gina Paniagua, without complication. Indira Ferrara is dry dressings are intact and she is without complaints.  Recommend continuation of PT and OT as tolerated with mobilization.    We recommend that she begin a weaning schedule for the steroids to begin today and continue over a 10-day period.    TCU versus rehab consultation versus discharge planning is recommended.  Follow-up with Dr. Az Singh should be scheduled as well as a follow-up with neurosurgery to be scheduled one week

## 2019-07-06 NOTE — PROGRESS NOTES
cerebellar mass w/ significant edema in the posterior fossa. She underwent craniotomy and tumor resection on 7/3 electively and transferred to ICU post-op w/o immediate complications. She was transferred to  yesterday. Subjective (past 24 hours):   Pt denies headache, visual disturbances or dizziness. Subjective R-sided pre-op numbness/weakness improved. Denies fever/chills.       Medications:  Reviewed    Infusion Medications    niCARdipine Stopped (07/03/19 2055)     Scheduled Medications    sodium chloride flush  10 mL Intravenous 2 times per day    docusate sodium  100 mg Oral BID    insulin lispro  0-6 Units Subcutaneous TID WC    insulin lispro  0-3 Units Subcutaneous Nightly    dexamethasone  6 mg Intravenous Q6H    amLODIPine  10 mg Oral Daily    famotidine  20 mg Oral Daily    oxybutynin  5 mg Oral BID    pravastatin  40 mg Oral QPM    lisinopril  20 mg Oral Daily    And    [Held by provider] hydrochlorothiazide  25 mg Oral Daily     PRN Meds: sodium chloride flush, HYDROcodone 5 mg - acetaminophen, HYDROmorphone, glucose, glucagon (rDNA), ondansetron      Intake/Output Summary (Last 24 hours) at 7/6/2019 1154  Last data filed at 7/6/2019 1048  Gross per 24 hour   Intake 740 ml   Output 1600 ml   Net -860 ml       Diet:  DIET CARB CONTROL; Carb Control: 4 carb choices (60 gms)/meal    Exam:  BP (!) 117/59   Pulse 62   Temp 98.6 °F (37 °C) (Oral)   Resp 16   Ht 5' 4\" (1.626 m)   Wt 169 lb 1.6 oz (76.7 kg)   SpO2 97%   BMI 29.03 kg/m²     General appearance: A&O x3, Not ill or toxic, in no apparent distress  Neck: Supple, no JVD, no carotid bruits  Heart: Regular rhythm, bradycardic, normal S1 and S2, no rubs, murmurs or gallops  Lungs: clear to ascultation no rales, wheezes, or rhonchi  Abdomen: soft, non-tender, non-distended, no bruits, no masses  Extremities: no clubbing, cyanosis or edema  Neurologic: alert and oriented x 3, cranial nerves 2-12 grossly intact, motor and is minimal nodularity in the superior segment of the right lower lobe posteriorly. These were present on prior exam in 2015 and mildly less conspicuous on the current exam as evidence for benignity. There is a 3 mm nodule in the left lower lobe seen on image 40. The lungs otherwise appear clear. No aggressive osseous lesions are identified in the chest. Abdomen/pelvis: The liver and gallbladder are unremarkable. There is a small hiatal hernia, similar to prior exam. Adrenal glands are unremarkable. There is again noted to be a irregular fat containing lesion at the inferior pole of the right kidney measuring 2.8 x 2.1 cm, not significant changed in size and appearance compared to prior exam. A smaller fat-containing focus at the superior pole the right kidney is also stable. There is a 4.6 x 3.9 cm partially exophytic cysts at the superior pole of the left kidney which is slightly increased in size in the interval. Kidneys are otherwise unremarkable. There are calcified granulomas within the spleen similar to prior exam. Pancreas is unremarkable. No retroperitoneal or mesenteric lymphadenopathy is identified. There is mild circumferential rectal wall thickening. The bowel otherwise appears within normal limits. The bladder is unremarkable. The uterus is surgically absent. No free fluid is identified. No aggressive osseous lesions are identified within the visualized osseous structures. 1. No evidence of metastatic disease in the chest, abdomen or pelvis. 2. 1.4 cm hypodense nodule in the right lobe of the thyroid gland is decreased in size compared to prior chest CT. 3. 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 4. Mild rectal wall thickening. Correlate with visual inspection. **This report has been created using voice recognition software. It may contain minor errors which are inherent in voice recognition technology. ** Final report electronically signed by Dr. Germaine Thompson MD on 6/28/2019 5:54 PM    Ct Head Wo Contrast    Result Date: 7/3/2019  PROCEDURE: CT HEAD WO CONTRAST CLINICAL INFORMATION: on way to ICU :  Craniectomy with tumor resection. . COMPARISON: July 2, 2019 TECHNIQUE: Noncontrast 5 mm axial images were obtained through the brain. All CT scans at this facility use dose modulation, iterative reconstruction, and/or weight-based dosing when appropriate to reduce radiation dose to as low as reasonably achievable. FINDINGS: Postsurgical changes posterior right cerebellum with edema and pneumocephalus. Findings consistent with recent craniotomy. No acute hemorrhage. Prominent ventricles/hydrocephalus. There is mass effect on the fourth ventricle with slight displacement superiorly into the left. Generalized small vessel disease and volume loss. Paranasal sinuses and mastoid air cells are clear. Postsurgical changes posterior scalp. IMPRESSION: Postsurgical changes posterior right cerebellum with edema and pneumocephalus. Mass effect on the fourth ventricle. Findings consistent with recent craniotomy. No acute hemorrhage. **This report has been created using voice recognition software. It may contain minor errors which are inherent in voice recognition technology. ** Final report electronically signed by Dr. Lila Zamudio on 7/3/2019 5:13 PM    Ct Head Wo Contrast    Result Date: 7/2/2019  PROCEDURE: CT HEAD WO CONTRAST CLINICAL INFORMATION: FRANDY protocol. Preoperative planning. Right cerebellar mass. COMPARISON: Brain MRI 6/28/2019. TECHNIQUE: Noncontrast 1 mm axial images were obtained through the brain. Sagittal and coronal reconstructions were obtained. These images are correlated to the Garibaldi protocol. 5 mm reconstructed images were also obtained. All CT scans at this facility use dose modulation, iterative reconstruction, and/or weight-based dosing when appropriate to reduce radiation dose to as low as reasonably achievable.  FINDINGS: The patient's known right cerebellar mass is better seen on MR. This is posteriorly in the periphery. There is a significant amount of edema in the right cerebellum. There is mass effect upon the fourth ventricle. There is no hemorrhage. There is mild hydrocephalus. Both temporal horns are dilated. This is stable. There is no midline shift. The gray-white matter differentiation is preserved in the cerebral hemispheres. The paranasal sinuses and mastoid air cells are normally aerated. There is no suspicious calvarial abnormality. 1. Intra-axial right cerebellar mass with significant edema in the posterior fossa. 2. Stable mild hydrocephalus. **This report has been created using voice recognition software. It may contain minor errors which are inherent in voice recognition technology. ** Final report electronically signed by Dr. Candida Holstein on 7/2/2019 4:42 PM    Ct Head Wo Contrast    Result Date: 6/28/2019  PROCEDURE: CT HEAD WO CONTRAST CLINICAL INFORMATION: Unsteady gait. COMPARISON: CT of the head dated February 22, 2008 TECHNIQUE: Noncontrast 5 mm axial images were obtained through the brain. Coronal and sagittal reformats were also performed. All CT scans at this facility use dose modulation, iterative reconstruction, and/or weight-based dosing when appropriate to reduce radiation dose to as low as reasonably achievable. FINDINGS: Hypoattenuation is present involving the right cerebellar hemisphere. There is also a rounded focus of hypoattenuation at the more posterior aspect of the right cerebellar hemisphere. This also results in mass effect with effacement of the fourth ventricle and shift of the midline structures towards the left. There is also suggestion of herniation of the cerebellar tonsils through the foramen magnum. The ventricles are enlarged and specifically the third ventricle and temporal horns are dilated. No acute intracranial hemorrhage is identified. No extra-axial fluid collection is present.  The visualized orbits, temporal bone structures and paranasal sinuses are unremarkable. The calvarium is intact without acute fracture or aggressive, bony destructive process. There is a rounded area of hypoattenuation with surrounding hypoattenuation within the right cerebellar hemisphere which results in mass effect and shift of the midline structures towards the left. There is also suggestion of herniation of the cerebellar  tonsils through the foramen magnum and this also causes mass effect and effacement of the fourth ventricle. The findings are suspicious for an underlying mass. The mass effect also results in hydrocephalus with the third ventricle and temporal horns of the lateral ventricles being dilated. Results were conveyed to Petty Santiago CNP at 1045 hours on 6/28/2019. **This report has been created using voice recognition software. It may contain minor errors which are inherent in voice recognition technology. ** Final report electronically signed by Dr. Diana Correa on 6/28/2019 10:46 AM    Ct Chest W Wo Contrast    Result Date: 6/28/2019  PROCEDURE: CT CHEST W WO CONTRAST, CT ABDOMEN PELVIS W WO CONTRAST CLINICAL INFORMATION: Rule out additional lesions/metastases . Newly found brain mass. Evaluate for additional metastases. COMPARISON: CT urogram dated 3/2/2018 and CTA chest dated 6/19/2015. TECHNIQUE: Helical CT of the chest, abdomen and pelvis following oral contrast administration and before and after intravenous administration of 80 mL Isovue-370 injected in left arm. Sagittal and coronal reformatted images were also created. FINDINGS: CHEST: There is a 1.4 x 0.7 cm hypodense nodule in the right lobe of the thyroid gland which has decreased in size in the interval since prior CT. No axillary, mediastinal or hilar lymphadenopathy is identified. There is small calcified nodule in the right upper lobe and right hilar calcification as evidence for old granulomatous disease.  There is minimal nodularity in the superior

## 2019-07-07 LAB
ANION GAP SERPL CALCULATED.3IONS-SCNC: 9 MEQ/L (ref 8–16)
BASOPHILS # BLD: 0.1 %
BASOPHILS ABSOLUTE: 0 THOU/MM3 (ref 0–0.1)
BUN BLDV-MCNC: 24 MG/DL (ref 7–22)
CALCIUM SERPL-MCNC: 8.5 MG/DL (ref 8.5–10.5)
CHLORIDE BLD-SCNC: 104 MEQ/L (ref 98–111)
CO2: 29 MEQ/L (ref 23–33)
CREAT SERPL-MCNC: 0.5 MG/DL (ref 0.4–1.2)
EOSINOPHIL # BLD: 0 %
EOSINOPHILS ABSOLUTE: 0 THOU/MM3 (ref 0–0.4)
ERYTHROCYTE [DISTWIDTH] IN BLOOD BY AUTOMATED COUNT: 12 % (ref 11.5–14.5)
ERYTHROCYTE [DISTWIDTH] IN BLOOD BY AUTOMATED COUNT: 37.3 FL (ref 35–45)
GFR SERPL CREATININE-BSD FRML MDRD: > 90 ML/MIN/1.73M2
GLUCOSE BLD-MCNC: 156 MG/DL (ref 70–108)
GLUCOSE BLD-MCNC: 164 MG/DL (ref 70–108)
GLUCOSE BLD-MCNC: 175 MG/DL (ref 70–108)
GLUCOSE BLD-MCNC: 202 MG/DL (ref 70–108)
GLUCOSE BLD-MCNC: 208 MG/DL (ref 70–108)
HCT VFR BLD CALC: 36.6 % (ref 37–47)
HEMOGLOBIN: 11.8 GM/DL (ref 12–16)
IMMATURE GRANS (ABS): 0.14 THOU/MM3 (ref 0–0.07)
IMMATURE GRANULOCYTES: 1.5 %
LYMPHOCYTES # BLD: 5 %
LYMPHOCYTES ABSOLUTE: 0.5 THOU/MM3 (ref 1–4.8)
MCH RBC QN AUTO: 27.6 PG (ref 26–33)
MCHC RBC AUTO-ENTMCNC: 32.2 GM/DL (ref 32.2–35.5)
MCV RBC AUTO: 85.7 FL (ref 81–99)
MONOCYTES # BLD: 4.7 %
MONOCYTES ABSOLUTE: 0.5 THOU/MM3 (ref 0.4–1.3)
NUCLEATED RED BLOOD CELLS: 0 /100 WBC
PLATELET # BLD: 151 THOU/MM3 (ref 130–400)
PMV BLD AUTO: 11.2 FL (ref 9.4–12.4)
POTASSIUM SERPL-SCNC: 4.4 MEQ/L (ref 3.5–5.2)
RBC # BLD: 4.27 MILL/MM3 (ref 4.2–5.4)
SEG NEUTROPHILS: 88.7 %
SEGMENTED NEUTROPHILS ABSOLUTE COUNT: 8.5 THOU/MM3 (ref 1.8–7.7)
SODIUM BLD-SCNC: 142 MEQ/L (ref 135–145)
WBC # BLD: 9.6 THOU/MM3 (ref 4.8–10.8)

## 2019-07-07 PROCEDURE — 6370000000 HC RX 637 (ALT 250 FOR IP): Performed by: PHYSICIAN ASSISTANT

## 2019-07-07 PROCEDURE — 97535 SELF CARE MNGMENT TRAINING: CPT

## 2019-07-07 PROCEDURE — 6360000002 HC RX W HCPCS: Performed by: HOSPITALIST

## 2019-07-07 PROCEDURE — 2580000003 HC RX 258: Performed by: PHYSICIAN ASSISTANT

## 2019-07-07 PROCEDURE — 2709999900 HC NON-CHARGEABLE SUPPLY

## 2019-07-07 PROCEDURE — 6370000000 HC RX 637 (ALT 250 FOR IP): Performed by: HOSPITALIST

## 2019-07-07 PROCEDURE — 82948 REAGENT STRIP/BLOOD GLUCOSE: CPT

## 2019-07-07 PROCEDURE — 85025 COMPLETE CBC W/AUTO DIFF WBC: CPT

## 2019-07-07 PROCEDURE — 80048 BASIC METABOLIC PNL TOTAL CA: CPT

## 2019-07-07 PROCEDURE — 97530 THERAPEUTIC ACTIVITIES: CPT

## 2019-07-07 PROCEDURE — 99232 SBSQ HOSP IP/OBS MODERATE 35: CPT | Performed by: HOSPITALIST

## 2019-07-07 PROCEDURE — 99024 POSTOP FOLLOW-UP VISIT: CPT | Performed by: PHYSICIAN ASSISTANT

## 2019-07-07 PROCEDURE — 36415 COLL VENOUS BLD VENIPUNCTURE: CPT

## 2019-07-07 PROCEDURE — 2060000000 HC ICU INTERMEDIATE R&B

## 2019-07-07 RX ADMIN — INSULIN LISPRO 1 UNITS: 100 INJECTION, SOLUTION INTRAVENOUS; SUBCUTANEOUS at 21:31

## 2019-07-07 RX ADMIN — INSULIN LISPRO 1 UNITS: 100 INJECTION, SOLUTION INTRAVENOUS; SUBCUTANEOUS at 12:56

## 2019-07-07 RX ADMIN — POLYETHYLENE GLYCOL 3350 17 G: 17 POWDER, FOR SOLUTION ORAL at 08:05

## 2019-07-07 RX ADMIN — HYDROCHLOROTHIAZIDE 25 MG: 25 TABLET ORAL at 08:05

## 2019-07-07 RX ADMIN — SODIUM CHLORIDE, PRESERVATIVE FREE 10 ML: 5 INJECTION INTRAVENOUS at 21:24

## 2019-07-07 RX ADMIN — OXYBUTYNIN CHLORIDE 5 MG: 5 TABLET ORAL at 08:05

## 2019-07-07 RX ADMIN — LISINOPRIL 20 MG: 20 TABLET ORAL at 08:05

## 2019-07-07 RX ADMIN — OXYBUTYNIN CHLORIDE 5 MG: 5 TABLET ORAL at 21:23

## 2019-07-07 RX ADMIN — PRAVASTATIN SODIUM 40 MG: 40 TABLET ORAL at 17:14

## 2019-07-07 RX ADMIN — DOCUSATE SODIUM 100 MG: 100 CAPSULE, LIQUID FILLED ORAL at 21:23

## 2019-07-07 RX ADMIN — DEXAMETHASONE 4 MG: 4 TABLET ORAL at 08:05

## 2019-07-07 RX ADMIN — DOCUSATE SODIUM 100 MG: 100 CAPSULE, LIQUID FILLED ORAL at 08:04

## 2019-07-07 RX ADMIN — DEXAMETHASONE 4 MG: 4 TABLET ORAL at 02:25

## 2019-07-07 RX ADMIN — DEXAMETHASONE 4 MG: 4 TABLET ORAL at 12:59

## 2019-07-07 RX ADMIN — SODIUM CHLORIDE, PRESERVATIVE FREE 10 ML: 5 INJECTION INTRAVENOUS at 08:04

## 2019-07-07 RX ADMIN — INSULIN LISPRO 1 UNITS: 100 INJECTION, SOLUTION INTRAVENOUS; SUBCUTANEOUS at 18:15

## 2019-07-07 RX ADMIN — FAMOTIDINE 20 MG: 20 TABLET ORAL at 08:05

## 2019-07-07 RX ADMIN — INSULIN LISPRO 2 UNITS: 100 INJECTION, SOLUTION INTRAVENOUS; SUBCUTANEOUS at 08:06

## 2019-07-07 RX ADMIN — DEXAMETHASONE 4 MG: 4 TABLET ORAL at 21:23

## 2019-07-07 RX ADMIN — AMLODIPINE BESYLATE 10 MG: 10 TABLET ORAL at 08:04

## 2019-07-07 ASSESSMENT — PAIN DESCRIPTION - LOCATION
LOCATION: HEAD
LOCATION: HEAD

## 2019-07-07 ASSESSMENT — PAIN DESCRIPTION - FREQUENCY: FREQUENCY: CONTINUOUS

## 2019-07-07 ASSESSMENT — PAIN SCALES - GENERAL
PAINLEVEL_OUTOF10: 1
PAINLEVEL_OUTOF10: 2

## 2019-07-07 ASSESSMENT — PAIN DESCRIPTION - PROGRESSION: CLINICAL_PROGRESSION: NOT CHANGED

## 2019-07-07 ASSESSMENT — PAIN DESCRIPTION - PAIN TYPE
TYPE: SURGICAL PAIN
TYPE: SURGICAL PAIN

## 2019-07-07 ASSESSMENT — PAIN DESCRIPTION - DESCRIPTORS: DESCRIPTORS: OTHER (COMMENT)

## 2019-07-07 ASSESSMENT — PAIN DESCRIPTION - ORIENTATION: ORIENTATION: OTHER (COMMENT)

## 2019-07-07 ASSESSMENT — PAIN DESCRIPTION - ONSET: ONSET: ON-GOING

## 2019-07-07 NOTE — PROGRESS NOTES
hilar calcification as evidence for old granulomatous disease. There is minimal nodularity in the superior segment of the right lower lobe posteriorly. These were present on prior exam in 2015 and mildly less conspicuous on the current exam as evidence for benignity. There is a 3 mm nodule in the left lower lobe seen on image 40. The lungs otherwise appear clear. No aggressive osseous lesions are identified in the chest. Abdomen/pelvis: The liver and gallbladder are unremarkable. There is a small hiatal hernia, similar to prior exam. Adrenal glands are unremarkable. There is again noted to be a irregular fat containing lesion at the inferior pole of the right kidney measuring 2.8 x 2.1 cm, not significant changed in size and appearance compared to prior exam. A smaller fat-containing focus at the superior pole the right kidney is also stable. There is a 4.6 x 3.9 cm partially exophytic cysts at the superior pole of the left kidney which is slightly increased in size in the interval. Kidneys are otherwise unremarkable. There are calcified granulomas within the spleen similar to prior exam. Pancreas is unremarkable. No retroperitoneal or mesenteric lymphadenopathy is identified. There is mild circumferential rectal wall thickening. The bowel otherwise appears within normal limits. The bladder is unremarkable. The uterus is surgically absent. No free fluid is identified. No aggressive osseous lesions are identified within the visualized osseous structures. 1. No evidence of metastatic disease in the chest, abdomen or pelvis. 2. 1.4 cm hypodense nodule in the right lobe of the thyroid gland is decreased in size compared to prior chest CT. 3. 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 4. Mild rectal wall thickening. Correlate with visual inspection. **This report has been created using voice recognition software. It may contain minor errors which are inherent in voice recognition technology. ** Final DIST CCA-------->53/13cm/s PROX ICA-------->61/21cm/s ECA---------------->90/8cm/s VERT-------------->45/11cm/s     1. RIGHT ICA . Morna Rout Morna Rout Unremarkable . Morna Rout 2. RIGHT ECA. Morna Rout Unremarkable . .... Morna Rout RIGHT CCA. Morna Rout Unremarkable . Morna Rout 3. RIGHT VERTEBRAL. Morna Rout Antegrade flow . .. ... LEFT VERTEBRAL. .. Antegrade flow. .. 4. LEFT ICA. .... Unremarkable. . 5. LEFT ECA. .. Minimal intimal thickening, no appreciable stenosis. ..... LEFT CCA. .. Unremarkable. **This report has been created using voice recognition software. It may contain minor errors which are inherent in voice recognition technology. ** Final report electronically signed by Dr. Ashley Sanches on 6/28/2019 9:44 AM    Mri Brain W Wo Contrast    Result Date: 7/5/2019  PROCEDURE: MRI BRAIN W WO CONTRAST INDICATION:Post surgical craniectomy and cerebellar tumor resection. Follow-up postoperative day 2 right occipital tumor resection. COMPARISON: CT head dated 7/3/2019 and MR brain dated 6/28/2019. TECHNIQUE: Multiplanar and multiple spin echo T1 and T2-weighted images were obtained through the brain before and after the administration of intravenous contrast. 15 mL ProHance was injected in the left wrist. FINDINGS: Redemonstration of a right suboccipital craniotomy with subjacent resection cavity in the right cerebellar hemisphere at the site of previous enhancing lesion. No residual enhancing lesion is identified. There is restricted diffusion at the margin of the  resection cavity likely secondary to surgical manipulation. There is low T1 and hyperintense T2 signal within the cavity with susceptibility as evidence for postsurgical blood. There is minimal intrinsic hyperintense T1 signal at the margins also consistent with postsurgical blood. Pericavitary edema in the right cerebral hemisphere is slightly decreased compared to presurgical exam with slightly decreased effacement of the fourth ventricle. The supratentorial ventricles are mildly decreased in size.  There is persistent prominent perilesional edema involving the right cerebellar hemisphere and vermis causing mass effect and near complete effacement of the fourth ventricle. There is expansion of the cerebellar vermis from edema causing mass effect on the  brain stem at the foramen magnum with the tip of the foramen magnum extending approximately 1.4 cm below the level of the foramen magnum. No other intra or extra-axial enhancing lesion is identified. There is moderate supratentorial ventriculomegaly. No  focal areas of restricted diffusion are present to include the lesion. There are mild scattered focal areas of T2/FLAIR prolongation elsewhere in the subcortical deep white matter is evidence for chronic microvascular angiopathy. The major vascular flow voids appear patent. Orbits are unremarkable. There are mucous retention cysts in the right maxillary sinus. Mastoid air cells are clear. 2.5 cm enhancing intra-axial mass in the right cerebellar hemisphere with pronounced perilesional edema causing expansion and mass effect of the right cerebellar hemisphere and vermis with near complete effacement of the fourth ventricle and moderate supratentorial ventriculomegaly, as well as, mass effect on the brainstem at the foramen magnum. A solitary metastatic lesion is favored over a high-grade glioma. **This report has been created using voice recognition software. It may contain minor errors which are inherent in voice recognition technology. ** Final report electronically signed by Dr. Jonathan Botello MD on 6/28/2019 6:08 PM      Diet: DIET CARB CONTROL; Carb Control: 4 carb choices (60 gms)/meal    DVT prophylaxis: [] Lovenox                                 [x] SCDs                                 [] SQ Heparin                                 [] Encourage ambulation           [] Already on Anticoagulation       Code Status: Full Code      Active Hospital Problems    Diagnosis Date Noted    Brain mass [G93.9] 06/28/2019 Patient was updated about the treatment plan, all the questions and concerns were addressed.         Electronically signed by Anam Pascal MD on 7/7/2019 at 12:46 PM

## 2019-07-07 NOTE — PLAN OF CARE
breakdown  7/7/2019 0111 by Sravanthi Erickson RN  Outcome: Met This Shift     Problem: DISCHARGE BARRIERS  Goal: Patient's continuum of care needs are met  7/7/2019 0111 by Sravanthi Erickson RN  Outcome: Ongoing  Note:   Discharge planning in process and discussed with patient/family. Social work consulted for any additional needs. Care manager aware of discharge needs. Problem: Pain:  Goal: Pain level will decrease  Description  Pain level will decrease  7/7/2019 0111 by Sravanthi Erickson RN  Outcome: Ongoing  Note:   Norco prn for pain management. Patient is able to use the pain scale. Rating her pain a 2  on pain scale. Established a pain goal of 0. Will continue to monitor.      Problem: Pain:  Goal: Control of acute pain  Description  Pain level will decrease  7/7/2019 0111 by Sravanthi Erickson RN  Outcome: Ongoing     Problem: Pain:  Goal: Control of chronic pain  Description  Control of acute pain  7/7/2019 0111 by Sravanthi Erickson RN  Outcome: Met This Shift

## 2019-07-07 NOTE — PROGRESS NOTES
Neurosurgery Progress Note    Patient:  Adonay Castillo      Unit/Bed:4A-07/007-A    YOB: 1945    MRN: [id number]     Acct: [de-identified]     Admit date: 6/28/2019    Chief Complaint   Patient presents with    Other     abnormal CT of head       Patient Seen, Chart, Physician notes, Labs, Radiology studies reviewed. 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 To Sloan, without complication. Sav Ha is AAO without complaints of HA on exam today.  Denies any additional discomfort relating continued improvement of prior dizziness and imbalance. She has been ambulating with and without assistance and notes continued improvement in gait and balance. Past, Family, Social History unchanged from admission. Diet:  DIET CARB CONTROL; Carb Control: 4 carb choices (60 gms)/meal    Medications:  Scheduled Meds:   dexamethasone  4 mg Oral 4 times per day    polyethylene glycol  17 g Oral Daily    sodium chloride flush  10 mL Intravenous 2 times per day    docusate sodium  100 mg Oral BID    insulin lispro  0-6 Units Subcutaneous TID WC    insulin lispro  0-3 Units Subcutaneous Nightly    amLODIPine  10 mg Oral Daily    famotidine  20 mg Oral Daily    oxybutynin  5 mg Oral BID    pravastatin  40 mg Oral QPM    lisinopril  20 mg Oral Daily    And    hydrochlorothiazide  25 mg Oral Daily     Continuous Infusions:  PRN Meds:sodium chloride flush, HYDROcodone 5 mg - acetaminophen, HYDROmorphone, glucose, glucagon (rDNA), ondansetron    Objective: She is sitting up in a chair comfortable without significant complaints. Her incision is dry dressings are intact. She denies headache, changes in vision, nausea or vomiting, or issues with imbalance. She continues to be happy with the outcome of the procedure.           Vitals: /62   Pulse 60   Temp 97.5 °F (36.4 °C) (Oral)   Resp 18   Ht 5' 4\" (1.626 m)   Wt 154 lb 11.2 oz (70.2 kg) SpO2 95%   BMI 26.55 kg/m²   Physical Exam:  Alert and attentive. Language appropriate, with no aphasia. Pupils equal.  Facial strength symmetric. Physical Exam   Constitutional: She is oriented to person, place, and time. She appears well-developed and well-nourished. HENT:   Head: Normocephalic and atraumatic. Eyes: Pupils are equal, round, and reactive to light. Conjunctivae and EOM are normal.   Neck: Normal range of motion. Cardiovascular: Normal rate, regular rhythm and normal heart sounds. Exam reveals no friction rub.   No murmur heard. Pulmonary/Chest: Effort normal and breath sounds normal. No respiratory distress. She has no wheezes. Abdominal: Soft. Bowel sounds are normal. She exhibits no distension. There is no tenderness. Musculoskeletal: Normal range of motion. Neurological: She is alert and oriented to person, place, and time. She has normal strength. No cranial nerve deficit or sensory deficit.   Stable and neurologically intact on exam.   Skin: Skin is warm and dry. Psychiatric: She has a normal mood and affect. Her behavior is normal. Judgment and thought content normal        ROS:  Review of Systems  Constitutional: Negative for activity change. Respiratory: Negative for apnea, chest tightness and shortness of breath.    Cardiovascular: Negative for chest pain and leg swelling. Gastrointestinal: Negative for abdominal distention and abdominal pain. Musculoskeletal: Positive for neck pain, resolving. Negative for arthralgias and gait problem. Neurological: Negative for weakness, numbness and headaches. Psychiatric/Behavioral: Negative for agitation, behavioral problems and confusion.         24 hour intake/output:    Intake/Output Summary (Last 24 hours) at 7/7/2019 1208  Last data filed at 7/7/2019 0334  Gross per 24 hour   Intake 1150 ml   Output 700 ml   Net 450 ml     Last 3 weights:   Wt Readings from Last 3 Encounters:   07/07/19 154 lb 11.2 oz (70.2 kg) conspicuous on the current exam as evidence for benignity. There is a 3 mm nodule in the left lower lobe seen on image 40. The lungs otherwise appear clear. No aggressive osseous lesions are identified in the chest. Abdomen/pelvis: The liver and gallbladder are unremarkable. There is a small hiatal hernia, similar to prior exam. Adrenal glands are unremarkable. There is again noted to be a irregular fat containing lesion at the inferior pole of the right kidney measuring 2.8 x 2.1 cm, not significant changed in size and appearance compared to prior exam. A smaller fat-containing focus at the superior pole the right kidney is also stable. There is a 4.6 x 3.9 cm partially exophytic cysts at the superior pole of the left kidney which is slightly increased in size in the interval. Kidneys are otherwise unremarkable. There are calcified granulomas within the spleen similar to prior exam. Pancreas is unremarkable. No retroperitoneal or mesenteric lymphadenopathy is identified. There is mild circumferential rectal wall thickening. The bowel otherwise appears within normal limits. The bladder is unremarkable. The uterus is surgically absent. No free fluid is identified. No aggressive osseous lesions are identified within the visualized osseous structures. 1. No evidence of metastatic disease in the chest, abdomen or pelvis. 2. 1.4 cm hypodense nodule in the right lobe of the thyroid gland is decreased in size compared to prior chest CT. 3. 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 4. Mild rectal wall thickening. Correlate with visual inspection. **This report has been created using voice recognition software. It may contain minor errors which are inherent in voice recognition technology. ** Final report electronically signed by Dr. Giovana Wheatley MD on 6/28/2019 5:54 PM    Ct Head Wo Contrast    Result Date: 7/3/2019  PROCEDURE: CT HEAD WO CONTRAST CLINICAL INFORMATION: on way to ICU :  Craniectomy with tumor resection. . COMPARISON: July 2, 2019 TECHNIQUE: Noncontrast 5 mm axial images were obtained through the brain. All CT scans at this facility use dose modulation, iterative reconstruction, and/or weight-based dosing when appropriate to reduce radiation dose to as low as reasonably achievable. FINDINGS: Postsurgical changes posterior right cerebellum with edema and pneumocephalus. Findings consistent with recent craniotomy. No acute hemorrhage. Prominent ventricles/hydrocephalus. There is mass effect on the fourth ventricle with slight displacement superiorly into the left. Generalized small vessel disease and volume loss. Paranasal sinuses and mastoid air cells are clear. Postsurgical changes posterior scalp. IMPRESSION: Postsurgical changes posterior right cerebellum with edema and pneumocephalus. Mass effect on the fourth ventricle. Findings consistent with recent craniotomy. No acute hemorrhage. **This report has been created using voice recognition software. It may contain minor errors which are inherent in voice recognition technology. ** Final report electronically signed by Dr. Wendy Camjeo on 7/3/2019 5:13 PM    Ct Head Wo Contrast    Result Date: 7/2/2019  PROCEDURE: CT HEAD WO CONTRAST CLINICAL INFORMATION: [facility] protocol. Preoperative planning. Right cerebellar mass. COMPARISON: Brain MRI 6/28/2019. TECHNIQUE: Noncontrast 1 mm axial images were obtained through the brain. Sagittal and coronal reconstructions were obtained. These images are correlated to the [facility] protocol. 5 mm reconstructed images were also obtained. All CT scans at this facility use dose modulation, iterative reconstruction, and/or weight-based dosing when appropriate to reduce radiation dose to as low as reasonably achievable. FINDINGS: The patient's known right cerebellar mass is better seen on MR. This is posteriorly in the periphery. There is a significant amount of edema in the right cerebellum.  There is mass process. There is a rounded area of hypoattenuation with surrounding hypoattenuation within the right cerebellar hemisphere which results in mass effect and shift of the midline structures towards the left. There is also suggestion of herniation of the cerebellar  tonsils through the foramen magnum and this also causes mass effect and effacement of the fourth ventricle. The findings are suspicious for an underlying mass. The mass effect also results in hydrocephalus with the third ventricle and temporal horns of the lateral ventricles being dilated. Results were conveyed to Jaky Hadley CNP at 1045 hours on 6/28/2019. **This report has been created using voice recognition software. It may contain minor errors which are inherent in voice recognition technology. ** Final report electronically signed by Dr. Khoi Eisenberg on 6/28/2019 10:46 AM    Ct Chest W Wo Contrast    Result Date: 6/28/2019  PROCEDURE: CT CHEST W WO CONTRAST, CT ABDOMEN PELVIS W WO CONTRAST CLINICAL INFORMATION: Rule out additional lesions/metastases . Newly found brain mass. Evaluate for additional metastases. COMPARISON: CT urogram dated 3/2/2018 and CTA chest dated 6/19/2015. TECHNIQUE: Helical CT of the chest, abdomen and pelvis following oral contrast administration and before and after intravenous administration of 80 mL Isovue-370 injected in left arm. Sagittal and coronal reformatted images were also created. FINDINGS: CHEST: There is a 1.4 x 0.7 cm hypodense nodule in the right lobe of the thyroid gland which has decreased in size in the interval since prior CT. No axillary, mediastinal or hilar lymphadenopathy is identified. There is small calcified nodule in the right upper lobe and right hilar calcification as evidence for old granulomatous disease. There is minimal nodularity in the superior segment of the right lower lobe posteriorly.  These were present on prior exam in 2015 and mildly less conspicuous on the current exam as evidence for benignity. There is a 3 mm nodule in the left lower lobe seen on image 40. The lungs otherwise appear clear. No aggressive osseous lesions are identified in the chest. Abdomen/pelvis: The liver and gallbladder are unremarkable. There is a small hiatal hernia, similar to prior exam. Adrenal glands are unremarkable. There is again noted to be a irregular fat containing lesion at the inferior pole of the right kidney measuring 2.8 x 2.1 cm, not significant changed in size and appearance compared to prior exam. A smaller fat-containing focus at the superior pole the right kidney is also stable. There is a 4.6 x 3.9 cm partially exophytic cysts at the superior pole of the left kidney which is slightly increased in size in the interval. Kidneys are otherwise unremarkable. There are calcified granulomas within the spleen similar to prior exam. Pancreas is unremarkable. No retroperitoneal or mesenteric lymphadenopathy is identified. There is mild circumferential rectal wall thickening. The bowel otherwise appears within normal limits. The bladder is unremarkable. The uterus is surgically absent. No free fluid is identified. No aggressive osseous lesions are identified within the visualized osseous structures. 1. No evidence of metastatic disease in the chest, abdomen or pelvis. 2. 1.4 cm hypodense nodule in the right lobe of the thyroid gland is decreased in size compared to prior chest CT. 3. 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 4. Mild rectal wall thickening. Correlate with visual inspection. **This report has been created using voice recognition software. It may contain minor errors which are inherent in voice recognition technology. ** Final report electronically signed by Dr. Juan Jin MD on 6/28/2019 5:54 PM    Us Renal Complete    Result Date: 7/6/2019  PROCEDURE: US RENAL COMPLETE CLINICAL INFORMATION: renal anomalies on CT of abdomen and new cerebellar mass with clear Minor Ronde Minor Ronde ... Minor Ronde LEFT VERTEBRAL. .. Antegrade flow. .. 4. LEFT ICA. .... Unremarkable. . 5. LEFT ECA. .. Minimal intimal thickening, no appreciable stenosis. ..... LEFT CCA. .. Unremarkable. **This report has been created using voice recognition software. It may contain minor errors which are inherent in voice recognition technology. ** Final report electronically signed by Dr. Chino Shipman on 6/28/2019 9:44 AM    Mri Brain W Wo Contrast    Result Date: 7/5/2019  PROCEDURE: MRI BRAIN W WO CONTRAST INDICATION:Post surgical craniectomy and cerebellar tumor resection. Follow-up postoperative day 2 right occipital tumor resection. COMPARISON: CT head dated 7/3/2019 and MR brain dated 6/28/2019. TECHNIQUE: Multiplanar and multiple spin echo T1 and T2-weighted images were obtained through the brain before and after the administration of intravenous contrast. 15 mL ProHance was injected in the left wrist. FINDINGS: Redemonstration of a right suboccipital craniotomy with subjacent resection cavity in the right cerebellar hemisphere at the site of previous enhancing lesion. No residual enhancing lesion is identified. There is restricted diffusion at the margin of the  resection cavity likely secondary to surgical manipulation. There is low T1 and hyperintense T2 signal within the cavity with susceptibility as evidence for postsurgical blood. There is minimal intrinsic hyperintense T1 signal at the margins also consistent with postsurgical blood. Pericavitary edema in the right cerebral hemisphere is slightly decreased compared to presurgical exam with slightly decreased effacement of the fourth ventricle. The supratentorial ventricles are mildly decreased in size. There is persistent protrusion of the cerebellar tonsil through the foramen magnum. No other focal areas of restricted diffusion are identified.  There are mild scattered focal areas of T2/FLAIR prolongation in the subcortical deep white matter as evidence for chronic microvascular angiopathy, stable compared to prior exam. No new focal areas of abnormal nodular enhancement are identified within the parenchyma. The pituitary gland is mildly prominent, stable compared to prior exam. The major vascular flow voids appear patent. Orbits are unremarkable. Paranasal sinuses are clear. Mastoid  air cells are clear. Redemonstration of right suboccipital craniotomy with underlying resection cavity in the right cerebral hemisphere without evidence of residual enhancing tumor. There is persistent Pericavitary edema, slightly decreased compared to presurgical exam, with slightly decreased effacement of the fourth ventricle and mild decreased supratentorial ventriculomegaly. **This report has been created using voice recognition software. It may contain minor errors which are inherent in voice recognition technology. ** Final report electronically signed by Dr. Arnie Ambrose MD on 7/5/2019 2:14 PM    Mri Brain W Wo Contrast    Result Date: 6/28/2019  PROCEDURE: MRI BRAIN W WO CONTRAST INDICATION:Abnormal ct findings with, patient with balance problem. Please do the brain MRI according to [facility] protocol. Disequilibrium. History of melanoma. COMPARISON: CT head from the same date. TECHNIQUE: Multiplanar and multiple spin echo T1 and T2-weighted images were obtained through the brain before and after the administration of 15 mL ProHance injected in the left AC. FINDINGS: There is a 2.5 x 2.2 cm intra-axial low T1 hyperintense T2 signal mass in the right cerebellar hemisphere with prominent peripheral and irregular central enhancement following contrast administration. There is a hemosiderin rim at the margins of the lesion. There is prominent perilesional edema involving the right cerebellar hemisphere and vermis causing mass effect and near complete effacement of the fourth ventricle.  There is expansion of the cerebellar vermis from edema causing mass effect on the  brain stem at

## 2019-07-07 NOTE — PROGRESS NOTES
of  treatment: good. Discharge Recommendations: Continue to assess pending progress, Patient would benefit from continued therapy after discharge(will continue to assess and monitor for needs once having surgery )  Equipment Recommendations: Other: will continue to monitor   Plan: Times per week: 5x  Current Treatment Recommendations: Balance Training, Endurance Training, Patient/Caregiver Education & Training, Self-Care / ADL, Safety Education & Training    Patient Education  Patient Education: Adaptive ADL Techniques    Goals  Short term goals  Time Frame for Short term goals: 2 weeks   Short term goal 1: Pt to complete BADL routine with SBA and no vcs for safety   Short term goal 2: Pt to dmeo dynamic standing balance > 4 min with no UE support, LOB and SBA in prep for simple homemaking tasks   Short term goal 3: Pt to complete mobility to/from bathroom & in hallway with CGA & min vcs for obstacles on LUE  Short term goal 4: Pt to complete grooming task with S & 0 vcs for sequencing  Long term goals  Time Frame for Long term goals : not est d/t ELOS     Following session, patient left in safe position with all fall risk precautions in place.

## 2019-07-08 ENCOUNTER — HOSPITAL ENCOUNTER (INPATIENT)
Age: 74
LOS: 10 days | Discharge: HOME HEALTH | DRG: 950 | End: 2019-07-18
Attending: FAMILY MEDICINE | Admitting: FAMILY MEDICINE
Payer: MEDICARE

## 2019-07-08 VITALS
RESPIRATION RATE: 16 BRPM | BODY MASS INDEX: 27.72 KG/M2 | HEART RATE: 51 BPM | OXYGEN SATURATION: 98 % | DIASTOLIC BLOOD PRESSURE: 64 MMHG | WEIGHT: 162.38 LBS | SYSTOLIC BLOOD PRESSURE: 133 MMHG | TEMPERATURE: 97.6 F | HEIGHT: 64 IN

## 2019-07-08 DIAGNOSIS — D49.6 BRAIN TUMOR (HCC): Primary | ICD-10-CM

## 2019-07-08 LAB
ANION GAP SERPL CALCULATED.3IONS-SCNC: 8 MEQ/L (ref 8–16)
BASOPHILS # BLD: 0.1 %
BASOPHILS ABSOLUTE: 0 THOU/MM3 (ref 0–0.1)
BUN BLDV-MCNC: 25 MG/DL (ref 7–22)
CALCIUM SERPL-MCNC: 9 MG/DL (ref 8.5–10.5)
CHLORIDE BLD-SCNC: 102 MEQ/L (ref 98–111)
CO2: 31 MEQ/L (ref 23–33)
CREAT SERPL-MCNC: 0.5 MG/DL (ref 0.4–1.2)
EOSINOPHIL # BLD: 0 %
EOSINOPHILS ABSOLUTE: 0 THOU/MM3 (ref 0–0.4)
ERYTHROCYTE [DISTWIDTH] IN BLOOD BY AUTOMATED COUNT: 12 % (ref 11.5–14.5)
ERYTHROCYTE [DISTWIDTH] IN BLOOD BY AUTOMATED COUNT: 37.1 FL (ref 35–45)
GFR SERPL CREATININE-BSD FRML MDRD: > 90 ML/MIN/1.73M2
GLUCOSE BLD-MCNC: 133 MG/DL (ref 70–108)
GLUCOSE BLD-MCNC: 142 MG/DL (ref 70–108)
GLUCOSE BLD-MCNC: 148 MG/DL (ref 70–108)
GLUCOSE BLD-MCNC: 166 MG/DL (ref 70–108)
GLUCOSE BLD-MCNC: 243 MG/DL (ref 70–108)
HCT VFR BLD CALC: 38.5 % (ref 37–47)
HEMOGLOBIN: 12.7 GM/DL (ref 12–16)
IMMATURE GRANS (ABS): 0.21 THOU/MM3 (ref 0–0.07)
IMMATURE GRANULOCYTES: 1.7 %
LYMPHOCYTES # BLD: 4.8 %
LYMPHOCYTES ABSOLUTE: 0.6 THOU/MM3 (ref 1–4.8)
MCH RBC QN AUTO: 28.1 PG (ref 26–33)
MCHC RBC AUTO-ENTMCNC: 33 GM/DL (ref 32.2–35.5)
MCV RBC AUTO: 85.2 FL (ref 81–99)
MONOCYTES # BLD: 7 %
MONOCYTES ABSOLUTE: 0.9 THOU/MM3 (ref 0.4–1.3)
NUCLEATED RED BLOOD CELLS: 0 /100 WBC
PLATELET # BLD: 154 THOU/MM3 (ref 130–400)
PMV BLD AUTO: 11.5 FL (ref 9.4–12.4)
POTASSIUM SERPL-SCNC: 4 MEQ/L (ref 3.5–5.2)
RBC # BLD: 4.52 MILL/MM3 (ref 4.2–5.4)
SEG NEUTROPHILS: 86.4 %
SEGMENTED NEUTROPHILS ABSOLUTE COUNT: 10.5 THOU/MM3 (ref 1.8–7.7)
SODIUM BLD-SCNC: 141 MEQ/L (ref 135–145)
WBC # BLD: 12.2 THOU/MM3 (ref 4.8–10.8)

## 2019-07-08 PROCEDURE — 97116 GAIT TRAINING THERAPY: CPT

## 2019-07-08 PROCEDURE — 0220000000 HC SKILLED NURSING FACILITY

## 2019-07-08 PROCEDURE — 99239 HOSP IP/OBS DSCHRG MGMT >30: CPT | Performed by: HOSPITALIST

## 2019-07-08 PROCEDURE — 36415 COLL VENOUS BLD VENIPUNCTURE: CPT

## 2019-07-08 PROCEDURE — 2580000003 HC RX 258: Performed by: PHYSICIAN ASSISTANT

## 2019-07-08 PROCEDURE — 97110 THERAPEUTIC EXERCISES: CPT

## 2019-07-08 PROCEDURE — 6360000002 HC RX W HCPCS: Performed by: HOSPITALIST

## 2019-07-08 PROCEDURE — 6370000000 HC RX 637 (ALT 250 FOR IP): Performed by: PHYSICIAN ASSISTANT

## 2019-07-08 PROCEDURE — 2709999900 HC NON-CHARGEABLE SUPPLY

## 2019-07-08 PROCEDURE — 82948 REAGENT STRIP/BLOOD GLUCOSE: CPT

## 2019-07-08 PROCEDURE — 6370000000 HC RX 637 (ALT 250 FOR IP): Performed by: HOSPITALIST

## 2019-07-08 PROCEDURE — 97530 THERAPEUTIC ACTIVITIES: CPT

## 2019-07-08 PROCEDURE — 1290000000 HC SEMI PRIVATE OTHER R&B

## 2019-07-08 PROCEDURE — 80048 BASIC METABOLIC PNL TOTAL CA: CPT

## 2019-07-08 PROCEDURE — 85025 COMPLETE CBC W/AUTO DIFF WBC: CPT

## 2019-07-08 PROCEDURE — 97535 SELF CARE MNGMENT TRAINING: CPT

## 2019-07-08 PROCEDURE — 99024 POSTOP FOLLOW-UP VISIT: CPT | Performed by: PHYSICIAN ASSISTANT

## 2019-07-08 RX ORDER — FAMOTIDINE 20 MG/1
20 TABLET, FILM COATED ORAL DAILY
Status: CANCELLED | OUTPATIENT
Start: 2019-07-09

## 2019-07-08 RX ORDER — AMLODIPINE BESYLATE 10 MG/1
10 TABLET ORAL DAILY
Status: DISCONTINUED | OUTPATIENT
Start: 2019-07-09 | End: 2019-07-18 | Stop reason: HOSPADM

## 2019-07-08 RX ORDER — NICOTINE POLACRILEX 4 MG
15 LOZENGE BUCCAL PRN
Status: DISCONTINUED | OUTPATIENT
Start: 2019-07-08 | End: 2019-07-18 | Stop reason: HOSPADM

## 2019-07-08 RX ORDER — DEXAMETHASONE 4 MG/1
4 TABLET ORAL EVERY 6 HOURS SCHEDULED
Status: CANCELLED | OUTPATIENT
Start: 2019-07-08

## 2019-07-08 RX ORDER — POLYETHYLENE GLYCOL 3350 17 G/17G
17 POWDER, FOR SOLUTION ORAL DAILY
Status: DISCONTINUED | OUTPATIENT
Start: 2019-07-09 | End: 2019-07-13

## 2019-07-08 RX ORDER — DOCUSATE SODIUM 100 MG/1
100 CAPSULE, LIQUID FILLED ORAL 2 TIMES DAILY
Status: CANCELLED | OUTPATIENT
Start: 2019-07-08

## 2019-07-08 RX ORDER — FAMOTIDINE 20 MG/1
20 TABLET, FILM COATED ORAL DAILY
Status: DISCONTINUED | OUTPATIENT
Start: 2019-07-09 | End: 2019-07-18 | Stop reason: HOSPADM

## 2019-07-08 RX ORDER — LISINOPRIL 20 MG/1
20 TABLET ORAL DAILY
Status: CANCELLED | OUTPATIENT
Start: 2019-07-09

## 2019-07-08 RX ORDER — HYDROCHLOROTHIAZIDE 25 MG/1
25 TABLET ORAL DAILY
Status: DISCONTINUED | OUTPATIENT
Start: 2019-07-09 | End: 2019-07-18 | Stop reason: HOSPADM

## 2019-07-08 RX ORDER — OXYBUTYNIN CHLORIDE 5 MG/1
5 TABLET ORAL 2 TIMES DAILY
Status: CANCELLED | OUTPATIENT
Start: 2019-07-08

## 2019-07-08 RX ORDER — POLYETHYLENE GLYCOL 3350 17 G/17G
17 POWDER, FOR SOLUTION ORAL DAILY
Status: CANCELLED | OUTPATIENT
Start: 2019-07-09

## 2019-07-08 RX ORDER — HYDROCHLOROTHIAZIDE 25 MG/1
25 TABLET ORAL DAILY
Status: CANCELLED | OUTPATIENT
Start: 2019-07-09

## 2019-07-08 RX ORDER — AMLODIPINE BESYLATE 10 MG/1
10 TABLET ORAL DAILY
Status: CANCELLED | OUTPATIENT
Start: 2019-07-09

## 2019-07-08 RX ORDER — PRAVASTATIN SODIUM 40 MG
40 TABLET ORAL EVERY EVENING
Status: CANCELLED | OUTPATIENT
Start: 2019-07-08

## 2019-07-08 RX ORDER — NICOTINE POLACRILEX 4 MG
15 LOZENGE BUCCAL PRN
Status: CANCELLED | OUTPATIENT
Start: 2019-07-08

## 2019-07-08 RX ORDER — DOCUSATE SODIUM 100 MG/1
100 CAPSULE, LIQUID FILLED ORAL 2 TIMES DAILY
Status: DISCONTINUED | OUTPATIENT
Start: 2019-07-08 | End: 2019-07-18 | Stop reason: HOSPADM

## 2019-07-08 RX ORDER — HYDROCODONE BITARTRATE AND ACETAMINOPHEN 5; 325 MG/1; MG/1
1 TABLET ORAL EVERY 6 HOURS PRN
Status: CANCELLED | OUTPATIENT
Start: 2019-07-08

## 2019-07-08 RX ORDER — HYDROCODONE BITARTRATE AND ACETAMINOPHEN 5; 325 MG/1; MG/1
1 TABLET ORAL EVERY 6 HOURS PRN
Status: DISCONTINUED | OUTPATIENT
Start: 2019-07-08 | End: 2019-07-16

## 2019-07-08 RX ORDER — DEXAMETHASONE 4 MG/1
4 TABLET ORAL EVERY 6 HOURS SCHEDULED
Status: DISCONTINUED | OUTPATIENT
Start: 2019-07-08 | End: 2019-07-11

## 2019-07-08 RX ORDER — LISINOPRIL 20 MG/1
20 TABLET ORAL DAILY
Status: DISCONTINUED | OUTPATIENT
Start: 2019-07-09 | End: 2019-07-18 | Stop reason: HOSPADM

## 2019-07-08 RX ORDER — OXYBUTYNIN CHLORIDE 5 MG/1
5 TABLET ORAL 2 TIMES DAILY
Status: DISCONTINUED | OUTPATIENT
Start: 2019-07-08 | End: 2019-07-18 | Stop reason: HOSPADM

## 2019-07-08 RX ORDER — PRAVASTATIN SODIUM 40 MG
40 TABLET ORAL EVERY EVENING
Status: DISCONTINUED | OUTPATIENT
Start: 2019-07-08 | End: 2019-07-18 | Stop reason: HOSPADM

## 2019-07-08 RX ADMIN — FAMOTIDINE 20 MG: 20 TABLET ORAL at 08:30

## 2019-07-08 RX ADMIN — INSULIN LISPRO 2 UNITS: 100 INJECTION, SOLUTION INTRAVENOUS; SUBCUTANEOUS at 16:57

## 2019-07-08 RX ADMIN — AMLODIPINE BESYLATE 10 MG: 10 TABLET ORAL at 09:04

## 2019-07-08 RX ADMIN — OXYBUTYNIN CHLORIDE 5 MG: 5 TABLET ORAL at 08:30

## 2019-07-08 RX ADMIN — LISINOPRIL 20 MG: 20 TABLET ORAL at 09:04

## 2019-07-08 RX ADMIN — DEXAMETHASONE 4 MG: 4 TABLET ORAL at 08:30

## 2019-07-08 RX ADMIN — DOCUSATE SODIUM 100 MG: 100 CAPSULE, LIQUID FILLED ORAL at 08:30

## 2019-07-08 RX ADMIN — INSULIN LISPRO 1 UNITS: 100 INJECTION, SOLUTION INTRAVENOUS; SUBCUTANEOUS at 13:09

## 2019-07-08 RX ADMIN — OXYBUTYNIN CHLORIDE 5 MG: 5 TABLET ORAL at 23:56

## 2019-07-08 RX ADMIN — POLYETHYLENE GLYCOL 3350 17 G: 17 POWDER, FOR SOLUTION ORAL at 08:30

## 2019-07-08 RX ADMIN — DEXAMETHASONE 4 MG: 4 TABLET ORAL at 03:43

## 2019-07-08 RX ADMIN — PRAVASTATIN SODIUM 40 MG: 40 TABLET ORAL at 16:57

## 2019-07-08 RX ADMIN — DOCUSATE SODIUM 100 MG: 100 CAPSULE, LIQUID FILLED ORAL at 23:56

## 2019-07-08 RX ADMIN — DEXAMETHASONE 4 MG: 4 TABLET ORAL at 23:51

## 2019-07-08 RX ADMIN — SODIUM CHLORIDE, PRESERVATIVE FREE 10 ML: 5 INJECTION INTRAVENOUS at 08:30

## 2019-07-08 RX ADMIN — DEXAMETHASONE 4 MG: 4 TABLET ORAL at 13:08

## 2019-07-08 ASSESSMENT — PAIN SCALES - GENERAL: PAINLEVEL_OUTOF10: 1

## 2019-07-08 NOTE — PROGRESS NOTES
created. FINDINGS: CHEST: There is a 1.4 x 0.7 cm hypodense nodule in the right lobe of the thyroid gland which has decreased in size in the interval since prior CT. No axillary, mediastinal or hilar lymphadenopathy is identified. There is small calcified nodule in the right upper lobe and right hilar calcification as evidence for old granulomatous disease. There is minimal nodularity in the superior segment of the right lower lobe posteriorly. These were present on prior exam in 2015 and mildly less conspicuous on the current exam as evidence for benignity. There is a 3 mm nodule in the left lower lobe seen on image 40. The lungs otherwise appear clear. No aggressive osseous lesions are identified in the chest. Abdomen/pelvis: The liver and gallbladder are unremarkable. There is a small hiatal hernia, similar to prior exam. Adrenal glands are unremarkable. There is again noted to be a irregular fat containing lesion at the inferior pole of the right kidney measuring 2.8 x 2.1 cm, not significant changed in size and appearance compared to prior exam. A smaller fat-containing focus at the superior pole the right kidney is also stable. There is a 4.6 x 3.9 cm partially exophytic cysts at the superior pole of the left kidney which is slightly increased in size in the interval. Kidneys are otherwise unremarkable. There are calcified granulomas within the spleen similar to prior exam. Pancreas is unremarkable. No retroperitoneal or mesenteric lymphadenopathy is identified. There is mild circumferential rectal wall thickening. The bowel otherwise appears within normal limits. The bladder is unremarkable. The uterus is surgically absent. No free fluid is identified. No aggressive osseous lesions are identified within the visualized osseous structures. 1. No evidence of metastatic disease in the chest, abdomen or pelvis.  2. 1.4 cm hypodense nodule in the right lobe of the thyroid gland is decreased in size compared to shift of the midline structures towards the left. There is also suggestion of herniation of the cerebellar tonsils through the foramen magnum. The ventricles are enlarged and specifically the third ventricle and temporal horns are dilated. No acute intracranial hemorrhage is identified. No extra-axial fluid collection is present. The visualized orbits, temporal bone structures and paranasal sinuses are unremarkable. The calvarium is intact without acute fracture or aggressive, bony destructive process. There is a rounded area of hypoattenuation with surrounding hypoattenuation within the right cerebellar hemisphere which results in mass effect and shift of the midline structures towards the left. There is also suggestion of herniation of the cerebellar  tonsils through the foramen magnum and this also causes mass effect and effacement of the fourth ventricle. The findings are suspicious for an underlying mass. The mass effect also results in hydrocephalus with the third ventricle and temporal horns of the lateral ventricles being dilated. Results were conveyed to Elayne Pina CNP at 1045 hours on 6/28/2019. **This report has been created using voice recognition software. It may contain minor errors which are inherent in voice recognition technology. ** Final report electronically signed by Dr. Davon Wong on 6/28/2019 10:46 AM    Ct Chest W Wo Contrast    Result Date: 6/28/2019  PROCEDURE: CT CHEST W WO CONTRAST, CT ABDOMEN PELVIS W WO CONTRAST CLINICAL INFORMATION: Rule out additional lesions/metastases . Newly found brain mass. Evaluate for additional metastases. COMPARISON: CT urogram dated 3/2/2018 and CTA chest dated 6/19/2015. TECHNIQUE: Helical CT of the chest, abdomen and pelvis following oral contrast administration and before and after intravenous administration of 80 mL Isovue-370 injected in left arm. Sagittal and coronal reformatted images were also created.  FINDINGS: CHEST: There is a 1.4 x 0.7 cm hypodense nodule in the right lobe of the thyroid gland which has decreased in size in the interval since prior CT. No axillary, mediastinal or hilar lymphadenopathy is identified. There is small calcified nodule in the right upper lobe and right hilar calcification as evidence for old granulomatous disease. There is minimal nodularity in the superior segment of the right lower lobe posteriorly. These were present on prior exam in 2015 and mildly less conspicuous on the current exam as evidence for benignity. There is a 3 mm nodule in the left lower lobe seen on image 40. The lungs otherwise appear clear. No aggressive osseous lesions are identified in the chest. Abdomen/pelvis: The liver and gallbladder are unremarkable. There is a small hiatal hernia, similar to prior exam. Adrenal glands are unremarkable. There is again noted to be a irregular fat containing lesion at the inferior pole of the right kidney measuring 2.8 x 2.1 cm, not significant changed in size and appearance compared to prior exam. A smaller fat-containing focus at the superior pole the right kidney is also stable. There is a 4.6 x 3.9 cm partially exophytic cysts at the superior pole of the left kidney which is slightly increased in size in the interval. Kidneys are otherwise unremarkable. There are calcified granulomas within the spleen similar to prior exam. Pancreas is unremarkable. No retroperitoneal or mesenteric lymphadenopathy is identified. There is mild circumferential rectal wall thickening. The bowel otherwise appears within normal limits. The bladder is unremarkable. The uterus is surgically absent. No free fluid is identified. No aggressive osseous lesions are identified within the visualized osseous structures. 1. No evidence of metastatic disease in the chest, abdomen or pelvis. 2. 1.4 cm hypodense nodule in the right lobe of the thyroid gland is decreased in size compared to prior chest CT.  3. Stable appearing

## 2019-07-08 NOTE — CARE COORDINATION
7/5/19, 1:46 PM    DISCHARGE BARRIERS    SW informed by RN that patient and family would now like TCU as physician can follow over there. SW called and left message with Juanita to update on preference. MAGALI updated Annemarie at the [facility].
7/8/19, 7:37 AM    DISCHARGE BARRIERS        Patient transferred to 4A 7. Report given to unit SW, Abbie GAINES, regarding discharge plan for this patient.
DISCHARGE BARRIERS  7/5/19, 11:29 AM    Reason for Referral: family requesting [phone] for rehab at discharge  Mental Status: Alert and oriented  Decision Making: Yes  Family/Social/Home Environment: Spoke with patient and daughter Tivis . Patient resides at home alone. Bathroom has a walk-in shower and grab bars. Patient was independent at home, had a walker but did not use. Current Services: None  Current Equipment: None  Payment Source: [facility] Medicare  Concerns or Barriers to Discharge: None noted  Collabrative List of ECF/HH were provided: No, would like [facility]    Teach Back Method used with patient and daughter regarding care plan and discharge plans. Patient and Tivis  verbalize understanding of the plan of care and contribute to goal setting. Anticipated Needs/Discharge Plan: ECF for rehab. MAGALI left message for Toribio Diamond at the [facility]. MAGALI explained pre-cert process with patient and daughter, provided [facility] care in case insurance did not approve.      Electronically signed by HEIDY Baron, FAIZA on 7/5/2019 at 11:29 AM
PCP: Ken Santana MD  Readmission: No  Readmission Risk Score: 14%    Discharge Planning  Current Residence:  Private Residence  Living Arrangements:  Alone   Support Systems:  Children, Family Members  Current Services PTA:     Potential Assistance Needed:  Other (Comment)  Potential Assistance Purchasing Medications:  No  Does patient want to participate in local refill/ meds to beds program?  Yes  Type of Home Care Services:  None  Patient expects to be discharged to:  home alone  Expected Discharge date:  06/30/19  Follow Up Appointment: Best Day/ Time: Tuesday AM    Discharge Plan: Spoke with pt. She lives at home alone. She drives herself to all her appointments. She states she has a 2 wheeled walker at home, from previous knee surgeries, but she doesn't currently use. Denies current needs for [place]. Spoke with pt regarding possible needs post surgery, she would like to wait to see how she is doing post op. Will monitor for needs.  Evaluation: not at this time.

## 2019-07-08 NOTE — PROGRESS NOTES
[facility]  INPATIENT PHYSICAL THERAPY  DAILY NOTE  [facility] 4A - 4A-07/007-A    Time In: 0730  Time Out: 0810  Timed Code Treatment Minutes: 40 Minutes  Minutes: 40          Date: 2019  Patient Name: Moo Nguyễn,  Gender:  female        MRN: [id number]  : 1945  (76 y.o.)     Referring Practitioner: Dr Pete Singletary  Diagnosis: Trey Lin  Additional Pertinent Hx: Pt admitted  due to unsteadiness with gait and work up due to brain mass found on CT. MRI done here showed right cerebellar mass. Pt is s/p craniotomy on 7/3/2019 and is s/p resection of glioma with gleolan. Prior Level of Function:  Lives With: Alone  Type of Home: House  Home Layout: One level  Home Access: Stairs to enter with rails  Entrance Stairs - Number of Steps: 2ste  Home Equipment: Rolling walker    Restrictions/Precautions:  Restrictions/Precautions: Fall Risk  Position Activity Restriction  Other position/activity restrictions: s/p Right sub occipital occipital craniotomy on 7/3/19    SUBJECTIVE: Pt is extremely pleasant and cooperative. Motivated. Easily distracted, tangential at times, but easily brought back to task and is aware that she is distractable. PAIN: denies    OBJECTIVE:  Bed Mobility:  Supine to Sit: Contact Guard Assistance    Transfers:  Sit to Stand: Contact Guard Assistance  Stand to Sit:Contact Guard Assistance    Ambulation:  Contact Guard Assistance to near [address] , lateral instability   Distance: ~ 40 feet x 2 with seated RB between and 10 feet x 1   Surface: Level Tile  Device:No Device  Gait Deviations:   Forward Flexed Posture, Decreased Step Length Bilaterally, Decreased Arm Swing, Decreased Trunk Rotation, Decreased Heel Strike Bilaterally, Ataxia, Moderate Path Deviations and Unsteady Gait  Balance:  Dynamic Standing Balance: Contact Guard Assistance- toileting tasks and washing up at sink       Exercise:  Patient was guided in 10 reps of exercise to both lower

## 2019-07-08 NOTE — PROGRESS NOTES
Patient notified of discharge to TCU to room 58. Patient in agreement and states that she would like to wait until after lunch to be transferred.

## 2019-07-08 NOTE — PLAN OF CARE
Problem: Falls - Risk of:  Goal: Will remain free from falls  Description  Will remain free from falls  Outcome: Met This Shift  Note:   Patient currently in bed, approved ambulation to and from restroom. Patient remains a standby me, has appropriate safety awareness. Patient bed remains in lowest position with wheels locked, and appropriate safety rails in place. Patient free from falls and free from intentional/unintentional harm. Patient neuro assessment negative, alert and oriented. Patient actively participates in care plan, not appropriate for discharge this shift. Bed position controlled by patient, turns and repositions self. Patient states chronic head and neck pain persisting since surgery. Patient denies need for medication for pain control, satisfied with current interventions. Problem: Discharge Planning:  Goal: Participates in care planning  Description  Participates in care planning  Outcome: Met This Shift  Note:   Patient currently in bed, approved ambulation to and from restroom. Patient remains a standby me, has appropriate safety awareness. Patient bed remains in lowest position with wheels locked, and appropriate safety rails in place. Patient free from falls and free from intentional/unintentional harm. Patient neuro assessment negative, alert and oriented. Patient actively participates in care plan, not appropriate for discharge this shift. Bed position controlled by patient, turns and repositions self. Patient states chronic head and neck pain persisting since surgery. Patient denies need for medication for pain control, satisfied with current interventions. Goal: Discharged to appropriate level of care  Description  Discharged to appropriate level of care  Outcome: Met This Shift  Note:   Patient currently in bed, approved ambulation to and from restroom. Patient remains a standby me, has appropriate safety awareness.  Patient bed remains in lowest position with wheels locked, and chronic head and neck pain persisting since surgery. Patient denies need for medication for pain control, satisfied with current interventions. Care plan reviewed with patient. Patient verbalizes understanding of the plan of care and contributes to goal setting.

## 2019-07-08 NOTE — PROGRESS NOTES
Patient admitted to 8E 58 from 4A, no family at bedside. Oriented to call light, room, and staff. Bed functions explained and demonstrated for patient. Patient requesting to have 2 siderails at the head of bed in the upright position to facilitate use of bed functions. Admission packet and patient rights provided, questions answered. No needs expressed at this time. 2 person skin check completed by Uday Sanchez LPN, and this nurse. Admitting medication orders compared with acute stay medications; home medication list reviewed with patient/family.   Medication issues identified No  Medication issue: None

## 2019-07-08 NOTE — DISCHARGE SUMMARY
Hospital Medicine Discharge Summary      Patient Identification:   Ashly Rich   :   MRN: [id number]   Account: [de-identified]      Patient's PCP: Jemma Butcher MD    Admit Date: 2019     Discharge Date:   2019    Admitting Physician: No admitting provider for patient encounter. Discharge Physician: Farida Genao MD     Discharge Diagnoses: Active Hospital Problems    Diagnosis Date Noted    Brain mass [G93.9] 2019       The patient was seen and examined on day of discharge and this discharge summary is in conjunction with any daily progress note from day of discharge. Hospital Course:   Ashly Rich is a 76 y.o. female admitted to [facility] on 2019 for  W/U of imbalance and found to have R cerebellar mass w/ significant edema in the posterior fossa. She underwent craniotomy and tumor resection on 7/3 electively and transferred to ICU post-op w/o immediate complications. She was transferred to  yesterday. Pt remained clinically stable. She was followed by PT/OT while in hospital and was discharged to [facility] when a bed became available in stable medical condition. Assessment/Plan:    1. Right Cerebellar Mass: pathology results pending, noted on Dexamethasone 6 mg q6h, switched to PO (no concerns for malabsorption) and decreased dose to 4 mg QID with plan to taper dose by 50% every 4 days if tolerated. Pt to be followed by NSx as OP to follow through w/ tapering unless otherwise indicated. Med/Onc and Rad/Onc referral if indicated based on pathology results to be arranged by NSx. PT/OT to follow with ultimate plan for TCU/Rehab transfer. On SCDs, chemical prophylaxis if Ok by NSx.    : Biopsy confirmed hemangioblastoma grade I. plan for tapering CS as discussed, Nsx to follow as OP and arranged for adjuvant med/Onc +/- Rad/onc, if warranted.  Also, any decisions re: rate of tapering would be at discretion of Nsx based on clinical K 4.0 07/08/2019    K 4.7 07/04/2019     07/08/2019    CO2 31 07/08/2019    BUN 25 07/08/2019    CREATININE 0.5 07/08/2019    CALCIUM 9.0 07/08/2019         Significant Diagnostic Studies    Radiology:   US RENAL COMPLETE   Final Result      1. Multifocal echogenic lesions of the right kidney. Correlate with angiomyolipoma. 2. Upper pole left renal cyst               **This report has been created using voice recognition software. It may contain minor errors which are inherent in voice recognition technology. **      Final report electronically signed by Dr. Rafita Eid on 7/6/2019 2:31 AM      MRI BRAIN W WO CONTRAST   Final Result    Redemonstration of right suboccipital craniotomy with underlying resection cavity in the right cerebral hemisphere without evidence of residual enhancing tumor. There is persistent Pericavitary edema, slightly decreased compared to presurgical exam,    with slightly decreased effacement of the fourth ventricle and mild decreased supratentorial ventriculomegaly. **This report has been created using voice recognition software. It may contain minor errors which are inherent in voice recognition technology. **         Final report electronically signed by Dr. Matty Castro MD on 7/5/2019 2:14 PM      CT HEAD WO CONTRAST   Final Result    IMPRESSION:      Postsurgical changes posterior right cerebellum with edema and pneumocephalus. Mass effect on the fourth ventricle. Findings consistent with recent craniotomy. No acute hemorrhage. **This report has been created using voice recognition software. It may contain minor errors which are inherent in voice recognition technology. **      Final report electronically signed by Dr. Hannah Cameron on 7/3/2019 5:13 PM      CT HEAD WO CONTRAST   Final Result      1. Intra-axial right cerebellar mass with significant edema in the posterior fossa. 2. Stable mild hydrocephalus.                **This report has

## 2019-07-09 LAB
GLUCOSE BLD-MCNC: 121 MG/DL (ref 70–108)
GLUCOSE BLD-MCNC: 147 MG/DL (ref 70–108)
GLUCOSE BLD-MCNC: 240 MG/DL (ref 70–108)

## 2019-07-09 PROCEDURE — 82948 REAGENT STRIP/BLOOD GLUCOSE: CPT

## 2019-07-09 PROCEDURE — 6370000000 HC RX 637 (ALT 250 FOR IP): Performed by: HOSPITALIST

## 2019-07-09 PROCEDURE — 1290000000 HC SEMI PRIVATE OTHER R&B

## 2019-07-09 PROCEDURE — 97535 SELF CARE MNGMENT TRAINING: CPT

## 2019-07-09 PROCEDURE — 97162 PT EVAL MOD COMPLEX 30 MIN: CPT

## 2019-07-09 PROCEDURE — 6370000000 HC RX 637 (ALT 250 FOR IP): Performed by: FAMILY MEDICINE

## 2019-07-09 PROCEDURE — 97166 OT EVAL MOD COMPLEX 45 MIN: CPT

## 2019-07-09 PROCEDURE — 97116 GAIT TRAINING THERAPY: CPT

## 2019-07-09 PROCEDURE — 2500000003 HC RX 250 WO HCPCS: Performed by: HOSPITALIST

## 2019-07-09 PROCEDURE — 92523 SPEECH SOUND LANG COMPREHEN: CPT

## 2019-07-09 PROCEDURE — 6360000002 HC RX W HCPCS: Performed by: HOSPITALIST

## 2019-07-09 PROCEDURE — 97530 THERAPEUTIC ACTIVITIES: CPT

## 2019-07-09 RX ORDER — SENNA PLUS 8.6 MG/1
1 TABLET ORAL NIGHTLY PRN
Status: DISCONTINUED | OUTPATIENT
Start: 2019-07-09 | End: 2019-07-18 | Stop reason: HOSPADM

## 2019-07-09 RX ADMIN — DOCUSATE SODIUM 100 MG: 100 CAPSULE, LIQUID FILLED ORAL at 22:33

## 2019-07-09 RX ADMIN — OXYBUTYNIN CHLORIDE 5 MG: 5 TABLET ORAL at 22:32

## 2019-07-09 RX ADMIN — PRAVASTATIN SODIUM 40 MG: 40 TABLET ORAL at 17:37

## 2019-07-09 RX ADMIN — DOCUSATE SODIUM 100 MG: 100 CAPSULE, LIQUID FILLED ORAL at 10:03

## 2019-07-09 RX ADMIN — HYDROCHLOROTHIAZIDE 25 MG: 25 TABLET ORAL at 10:03

## 2019-07-09 RX ADMIN — DEXAMETHASONE 4 MG: 4 TABLET ORAL at 06:15

## 2019-07-09 RX ADMIN — FAMOTIDINE 20 MG: 20 TABLET ORAL at 10:03

## 2019-07-09 RX ADMIN — DEXAMETHASONE 4 MG: 4 TABLET ORAL at 17:37

## 2019-07-09 RX ADMIN — INSULIN LISPRO 1 UNITS: 100 INJECTION, SOLUTION INTRAVENOUS; SUBCUTANEOUS at 17:37

## 2019-07-09 RX ADMIN — Medication 5 UNITS: at 17:42

## 2019-07-09 RX ADMIN — LISINOPRIL 20 MG: 20 TABLET ORAL at 10:03

## 2019-07-09 RX ADMIN — SENNOSIDES 8.6 MG: 8.6 TABLET, FILM COATED ORAL at 22:34

## 2019-07-09 RX ADMIN — DEXAMETHASONE 4 MG: 4 TABLET ORAL at 13:13

## 2019-07-09 RX ADMIN — OXYBUTYNIN CHLORIDE 5 MG: 5 TABLET ORAL at 10:03

## 2019-07-09 RX ADMIN — POLYETHYLENE GLYCOL 3350 17 G: 17 POWDER, FOR SOLUTION ORAL at 10:03

## 2019-07-09 RX ADMIN — AMLODIPINE BESYLATE 10 MG: 10 TABLET ORAL at 10:03

## 2019-07-09 RX ADMIN — INSULIN LISPRO 2 UNITS: 100 INJECTION, SOLUTION INTRAVENOUS; SUBCUTANEOUS at 13:13

## 2019-07-09 ASSESSMENT — PAIN SCALES - GENERAL
PAINLEVEL_OUTOF10: 0
PAINLEVEL_OUTOF10: 0

## 2019-07-09 ASSESSMENT — PAIN DESCRIPTION - PAIN TYPE: TYPE: ACUTE PAIN

## 2019-07-09 NOTE — PROGRESS NOTES
speech therapist, I was expecting you to come\". Positive engagement throughout assessment. No family at bedside. SOCIAL HISTORY: Pt resides in [place] in [place]; x1 daughter in the immediate area to provide additional support as needed. Prior independence for completion of ADLs and IADLs, including cooking, cleaning, management of medications (pillbox), and finances (online bill paying, occasional check-book usage); active . Wears reading glasses, no hearing aids. Enjoys reading novels, attending [employer] ([address]), and [employer] studies. ORAL MOTOR:  Labial / Facial:  WFL  Lingual: WFL  Soft Palate: DNT  Sensation: DNT  Dentition: WFL    SPEECH / VOICE:  Speech: adequate articulatory precision with speech intelligibility approximating 100% in connected speech and conversation; no dysarthric tendencies  Voice: adequate functional intensity, no aphonia or dysphonia     LANGUAGE:  Receptive:  1 step commands: 3/3  2 step commands: 3/3  Simple yes/no: 3/3  Complex yes/no: 3/3    Expressive:  Automatic speech: [facility] numerical counting 1-10  Sentence Completion (automatic): 3/3  Confrontational naming: 3/3  Responsive namin/2  Sentence Formulation: Intact for basic wants/needs  Conversational speech: [facility] thought organization for conveying desired message  Paraphasias: None evidenced  Repetition: 2/2 sentence level    COGNITION: [place] Cognitive Assessment (MOCA) version 7.3 completed. Pt scored 26/30. Normal is greater than or equal to 26/30.   Orientation: 6/6  Immediate recall: 3/5  Short term recall: 2/5  Divergent namin items named within 28 second time frame (target=11 in 1 minute)  Problem solving: 3/3  Reasonin/2 verbal, 5/5 visuospatial  Sequencin/1  Thought organization: [facility]  Insight: Adequate  Attention: Adequate sustained and selective attention   Numerical relations: 4/5 serial subtraction     SWALLOWING:  Pt denies issues related to swallow

## 2019-07-09 NOTE — PROGRESS NOTES
routine. Short term goal 2: Pt to tolerate dynamic standing task x 5 min with no UE support, no LOB and S in prep for simple homemaking tasks   Short term goal 3: Pt to complete mobility to/from bathroom & in hallway with S & min vcs for obstacles to increase indep with accessing environemnt during ADLs. Short term goal 4: Pt will complete simulated IADL task of walking dog with S to increase indep with caring for dog at [facility]. Long term goals  Time Frame for Long term goals : 2 weeks  Long term goal 1: Pt will complete ADL routine including shower t/f (once staples are removed), with Valentine to increase indep with self care. Long term goal 2: Pt will complete light homemaking task with Valentine to increase indep with warming up light meals. Following session, patient left in safe position with all fall risk precautions in place.

## 2019-07-09 NOTE — PROGRESS NOTES
ankle - skin graft, Dr. Katelin Chase      right leg melanoma    SKIN CANCER EXCISION  6/27/13    r arm    SKIN CANCER EXCISION      melanoma of chest - in situ    SKIN CANCER EXCISION Left 03/24/2017    Upper Back--Melanoma    TONSILLECTOMY  1966    TOTAL KNEE ARTHROPLASTY Right 09/2017    Dr. Reva Riggs - [facility] Left 06/17/2015    total - Dr. Amanda Mcgregor       Restrictions/Precautions:  Fall Risk, General Precautions                    Other position/activity restrictions: s/p Right sub occipital occipital craniotomy on 7/3/19       Subjective:  Chart Reviewed: Yes  Patient assessed for rehabilitation services?: Yes  Other (Comment): s/p craniotomy 7/3/2010 due to hemangioblastoma  Subjective: Pt seated in recliner. Pleasant and cooperative.      General:  Overall Orientation Status: Within Functional Limits  Follows Commands: Within Functional Limits    Vision: Impaired  Vision Exceptions: Wears glasses for reading    Hearing: Within functional limits         Pain:   .  Pain Assessment  Pain Level: 0       Social/Functional History:    Lives With: Alone  Type of Home: House(condo)  Home Layout: One level  Home Access: Stairs to enter with rails  Entrance Stairs - Number of Steps: 2  Entrance Stairs - Rails: Right(grab bar)  Home Equipment: Rolling walker(None used PTA)     Bathroom Shower/Tub: Walk-in shower, Tub/Shower unit(uses walk-in shower, has seat)  Bathroom Toilet: Standard  Bathroom Equipment: Shower chair, Built-in shower seat, 3-in-1 commode(has a BSC from prior knee sx)  Bathroom Accessibility: Accessible  IADL Comments: Pt completing her own homemaking tasks indep, eats out [employer] Help From: Family  ADL Assistance: Independent  Homemaking Assistance: Independent  Ambulation Assistance: Independent  Transfer Assistance: Independent    Active : Yes  Mode of Transportation: Car  Occupation: Retired  Type of occupation: worked for the [employer]: walking dog, reading, bible study, involved in Muslim  Additional Comments: Was not using AD prior , however pt has developed unsteadiness since a fall in May at her grandsons graduation. Pt reporting she was very indep at home with all ADL tasks. Pt has family and friends who would be able to assist her. Daughter lives in [facility] and able to assist as well. Objective:     ROM RLE: WFL  ROM LLE: WFL    Strength RLE: WFL    Strength LLE: WFL    RLE Tone: Normotonic  LLE Tone: Normotonic       Balance  Comments: Standing without devices, reaching to floor to  item, CGA with no loss of balance. Pt reported feeling shakey. Rolling to Left: Independent  Rolling to Right: Independent  Supine to Sit: Independent  Sit to Supine: Independent    Transfers  Sit to Stand: Stand by assistance(to CGA due to minimal unsteadiness at times upon standing)  Stand to sit: Stand by assistance  Bed to Chair: Stand by assistance  Car Transfer: Contact guard assistance(2nd trial after demo for technique, SBA)       Ambulation 1  Surface: level tile, ramp(over brick and threshold)  Device: No Device  Assistance: Contact guard assistance(to min assist with occasional trunk sway and loss of balance, jayna over threshold and with scanning while walking)  Quality of Gait: slow pace, small step length. Occasional variation in step width with min path deviation. LOB noted when stepping over threshold and with turning head while turning.    Distance: 15 ft, 150 ft, 8 ft ramp, 75 ft with increased pace emphasized, 20 ft x2 with testing       Stairs  # Steps : 4  Stairs Height: 6\"  Rails: Right ascending  Curbs: 6\"  Device: No Device  Assistance: Contact guard assistance(on consecutive steps, min assist with curb step. )  Comment: reciprocal pattern on consecutive steps    Wheelchair Activities  Propulsion: No         Tinetti:  Balance Score: 12  Gait Score: 9  Tinetti Total Score:

## 2019-07-09 NOTE — PROGRESS NOTES
[facility] [address]  Recreational Therapy  Daily Note  - [facility]    Time Spent with Patient: 10 minutes    Date:  7/9/2019       Patient Name: Beulah Bearden      MRN: [id number]      YOB: 1945 (71 y.o.)       Gender: female  Diagnosis: s/p craniotomy  Referring Practitioner: Wil Clarke MD; Attending: Dr. Paris Barrios    RESTRICTIONS/PRECAUTIONS:  Restrictions/Precautions: Fall Risk          PAIN: 0-no c/o pain     SUBJECTIVE:  I would like her to trim my hair     OBJECTIVE:  Pt would like our beautician to trim her hair for her tomorrow-very pleasant-social-appreciative          Patient Education  New Education Provided: Importance of Leisure,     Electronically signed by: Irena Jeans, CTRS  Date: 7/9/2019

## 2019-07-10 PROBLEM — B02.9 HERPES ZOSTER WITHOUT COMPLICATION: Status: ACTIVE | Noted: 2019-07-10

## 2019-07-10 LAB
GLUCOSE BLD-MCNC: 147 MG/DL (ref 70–108)
GLUCOSE BLD-MCNC: 149 MG/DL (ref 70–108)
GLUCOSE BLD-MCNC: 196 MG/DL (ref 70–108)
GLUCOSE BLD-MCNC: 211 MG/DL (ref 70–108)
GLUCOSE BLD-MCNC: 230 MG/DL (ref 70–108)

## 2019-07-10 PROCEDURE — 6360000002 HC RX W HCPCS: Performed by: HOSPITALIST

## 2019-07-10 PROCEDURE — 1290000000 HC SEMI PRIVATE OTHER R&B

## 2019-07-10 PROCEDURE — 2709999900 HC NON-CHARGEABLE SUPPLY

## 2019-07-10 PROCEDURE — 82948 REAGENT STRIP/BLOOD GLUCOSE: CPT

## 2019-07-10 PROCEDURE — 97110 THERAPEUTIC EXERCISES: CPT

## 2019-07-10 PROCEDURE — 97530 THERAPEUTIC ACTIVITIES: CPT

## 2019-07-10 PROCEDURE — 97535 SELF CARE MNGMENT TRAINING: CPT

## 2019-07-10 PROCEDURE — 6370000000 HC RX 637 (ALT 250 FOR IP): Performed by: HOSPITALIST

## 2019-07-10 PROCEDURE — 6370000000 HC RX 637 (ALT 250 FOR IP): Performed by: FAMILY MEDICINE

## 2019-07-10 PROCEDURE — 97116 GAIT TRAINING THERAPY: CPT

## 2019-07-10 RX ORDER — ACYCLOVIR 800 MG/1
800 TABLET ORAL
Status: DISPENSED | OUTPATIENT
Start: 2019-07-10 | End: 2019-07-17

## 2019-07-10 RX ADMIN — AMLODIPINE BESYLATE 10 MG: 10 TABLET ORAL at 10:38

## 2019-07-10 RX ADMIN — INSULIN LISPRO 1 UNITS: 100 INJECTION, SOLUTION INTRAVENOUS; SUBCUTANEOUS at 17:10

## 2019-07-10 RX ADMIN — DEXAMETHASONE 4 MG: 4 TABLET ORAL at 17:09

## 2019-07-10 RX ADMIN — DOCUSATE SODIUM 100 MG: 100 CAPSULE, LIQUID FILLED ORAL at 10:39

## 2019-07-10 RX ADMIN — HYDROCHLOROTHIAZIDE 25 MG: 25 TABLET ORAL at 10:38

## 2019-07-10 RX ADMIN — MAGNESIUM HYDROXIDE 30 ML: 400 SUSPENSION ORAL at 10:39

## 2019-07-10 RX ADMIN — INSULIN LISPRO 2 UNITS: 100 INJECTION, SOLUTION INTRAVENOUS; SUBCUTANEOUS at 13:22

## 2019-07-10 RX ADMIN — DEXAMETHASONE 4 MG: 4 TABLET ORAL at 13:22

## 2019-07-10 RX ADMIN — PRAVASTATIN SODIUM 40 MG: 40 TABLET ORAL at 17:10

## 2019-07-10 RX ADMIN — FAMOTIDINE 20 MG: 20 TABLET ORAL at 10:38

## 2019-07-10 RX ADMIN — OXYBUTYNIN CHLORIDE 5 MG: 5 TABLET ORAL at 10:38

## 2019-07-10 RX ADMIN — INSULIN LISPRO 1 UNITS: 100 INJECTION, SOLUTION INTRAVENOUS; SUBCUTANEOUS at 10:42

## 2019-07-10 RX ADMIN — ACYCLOVIR 800 MG: 800 TABLET ORAL at 15:11

## 2019-07-10 RX ADMIN — DEXAMETHASONE 4 MG: 4 TABLET ORAL at 03:19

## 2019-07-10 RX ADMIN — ACYCLOVIR 800 MG: 800 TABLET ORAL at 17:10

## 2019-07-10 RX ADMIN — POLYETHYLENE GLYCOL 3350 17 G: 17 POWDER, FOR SOLUTION ORAL at 10:38

## 2019-07-10 RX ADMIN — DEXAMETHASONE 4 MG: 4 TABLET ORAL at 06:37

## 2019-07-10 RX ADMIN — LISINOPRIL 20 MG: 20 TABLET ORAL at 10:38

## 2019-07-10 ASSESSMENT — PAIN SCALES - GENERAL: PAINLEVEL_OUTOF10: 0

## 2019-07-10 NOTE — H&P
TCU History and Physical      Chief Complaint and Reason for TCU Admission:   Need for the2 continuation of time with therapies following the acute hospital stay. History of Present Illness:  Monisha Ace  is a 76 y.o. female admitted to the transitional care unit on 7/8/2019. She had been weak and falling at home and the evaluation in the ED showed a mass in the posterior fossa. She did well post op but is quite weand and needs admission.     Past Medical History:      Diagnosis Date    Abnormal EKG     normal stress test 3/2009    Cancer (HCC)     basal cell skin    Complication of anesthesia     difficulty breathing after surgery- transferrred from [facility] to [facility]. Angeline's for 3 days, O2 for about 5 days    Constipation     Diabetes mellitus ([address]     History type 2 - lost weight - diet controlled    Hyperlipidemia     Hypertension     Melanoma in situ ([address] 6/2012    of chest - Dr. Haile Square - margins clear - neg sentinal lymph node    Melanoma in situ of lower extremity ([address]     excision- x2    Metabolic syndrome     much improved - normal triglycerides, weight loss of 50#    OA (osteoarthritis)     left knee       Primary care provider: Anabella Espinoza MD     Past Surgical History:      Procedure Laterality Date    BLADDER SURGERY      COLONOSCOPY      CRANIOTOMY Right 7/3/2019    RIGHT OCCIPITAL CRANIOTOMY INTRA-OP POSTERIOR FOSSA performed by Santino Llanes MD at [address] [facility]ilateral 3/2016[facility]    reomoval of left ovary - fibroid tumor    JOINT REPLACEMENT      both knees    KNEE ARTHROSCOPY Left 2007    KNEE ARTHROSCOPY Left 1/2014    Dr. Garfield Andre Left 7/31/2013    Left inner thigh    PRE-MALIGNANT / BENIGN SKIN LESION EXCISION Right 2009    ankle - skin graft, Dr. Marinelli Pap      right leg melanoma    SKIN CANCER EXCISION  6/27/13    r arm    SKIN CANCER EXCISION melanoma of chest - in situ    SKIN CANCER EXCISION Left 03/24/2017    Upper Back--Melanoma    TONSILLECTOMY  1966    TOTAL KNEE ARTHROPLASTY Right 09/2017    Dr. Salvatore Potter - Garcia Valentin Left 06/17/2015    total - Dr. Anam Kebede       Allergies:    Pcn [penicillins] and Sulfa antibiotics    Current Medications:    Current Facility-Administered Medications: senna (SENOKOT) tablet 8.6 mg, 1 tablet, Oral, Nightly PRN  magnesium hydroxide (MILK OF MAGNESIA) 400 MG/5ML suspension 30 mL, 30 mL, Oral, Daily PRN  docusate sodium (COLACE) capsule 100 mg, 100 mg, Oral, BID  glucagon (rDNA) injection 1 mg, 1 mg, Intramuscular, PRN  glucose (GLUTOSE) 40 % oral gel 15 g, 15 g, Oral, PRN  amLODIPine (NORVASC) tablet 10 mg, 10 mg, Oral, Daily  dexamethasone (DECADRON) tablet 4 mg, 4 mg, Oral, 4 times per day  famotidine (PEPCID) tablet 20 mg, 20 mg, Oral, Daily  HYDROcodone-acetaminophen (NORCO) 5-325 MG per tablet 1 tablet, 1 tablet, Oral, Q6H PRN  insulin lispro (HUMALOG) injection vial 0-3 Units, 0-3 Units, Subcutaneous, Nightly  insulin lispro (HUMALOG) injection vial 0-6 Units, 0-6 Units, Subcutaneous, TID WC  lisinopril (PRINIVIL;ZESTRIL) tablet 20 mg, 20 mg, Oral, Daily **AND** hydrochlorothiazide (HYDRODIURIL) tablet 25 mg, 25 mg, Oral, Daily  oxybutynin (DITROPAN) tablet 5 mg, 5 mg, Oral, BID  polyethylene glycol (GLYCOLAX) packet 17 g, 17 g, Oral, Daily  [START ON 7/11/2019] ppd (tuberculin skin test) read, , Does not apply, Weekly  pravastatin (PRAVACHOL) tablet 40 mg, 40 mg, Oral, QPM  tuberculin injection 5 Units, 5 Units, Intradermal, Weekly     Resident is taking an antipsychotic medication? No   If yes, was Gradual Dose Reduction (GDR) attempted?  NA     No- GDR is clinically contraindicated due to the fact that tapering the medication  would not achieve the desired therapeutic effects and the current dose is  necessary to maintain or improve the resident's function, well-being, negative for gait problems  BEHAVIOR/PSYCH:  negative for poor concentration  10 point system review otherwise negative    Physical Exam:  BP (!) 109/55   Pulse 55   Temp 96.7 °F (35.9 °C) (Oral)   Resp 16   Ht 5' 4\" (1.626 m)   Wt 165 lb 6.4 oz (75 kg)   SpO2 97%   BMI 28.39 kg/m²   Well developed well nourished white female and is sitting in her chair  Skin atrophic  Head normocephalic with surgical scar posteriorly  Membranes moist  Neck without mass  Chest symmetrical   Lungs CTA  Heart S1S2 without murmur  Abd soft, non tender, normoactive BS and no mass  Ext no edema  Neuro weak  Psy pleasant    Diagnostics:  Recent Results (from the past 24 hour(s))   POCT glucose    Collection Time: 07/09/19  7:54 AM   Result Value Ref Range    POC Glucose 121 (H) 70 - 108 mg/dl   POCT glucose    Collection Time: 07/09/19 11:55 AM   Result Value Ref Range    POC Glucose 240 (H) 70 - 108 mg/dl   POCT glucose    Collection Time: 07/09/19  4:41 PM   Result Value Ref Range    POC Glucose 147 (H) 70 - 108 mg/dl       Impression:  Active Hospital Problems    Diagnosis Date Noted    Brain tumor ([facility] [D49.6] 07/08/2019    GERD (gastroesophageal reflux disease) [K21.9] 04/12/2015    Essential hypertension [I10] 08/22/2011   ·      Plan:   · The patient demonstrates good potential to participate in a skilled rehabilitation program on the transitional care unit. Rehabilitation services will include PT and OT/RT in order to improve functional status prior to discharge. Family education and training will be completed. Equipment evaluations and recommendations will be completed as appropriate. · Nursing will be involved for bowel, bladder, skin, and pain management. Nursing will also provide education and training to patient and family. · Prophylaxis:  DVT: lovenox. GI: pepcid. · Pain: norco, tylenol  · Nutrition:  Consultation to dietician for nutritional counseling and recommendations.    · Bladder:

## 2019-07-11 LAB
GLUCOSE BLD-MCNC: 135 MG/DL (ref 70–108)
GLUCOSE BLD-MCNC: 148 MG/DL (ref 70–108)
GLUCOSE BLD-MCNC: 159 MG/DL (ref 70–108)
GLUCOSE BLD-MCNC: 188 MG/DL (ref 70–108)

## 2019-07-11 PROCEDURE — 97530 THERAPEUTIC ACTIVITIES: CPT

## 2019-07-11 PROCEDURE — 97116 GAIT TRAINING THERAPY: CPT

## 2019-07-11 PROCEDURE — 6360000002 HC RX W HCPCS: Performed by: FAMILY MEDICINE

## 2019-07-11 PROCEDURE — 97535 SELF CARE MNGMENT TRAINING: CPT

## 2019-07-11 PROCEDURE — 82948 REAGENT STRIP/BLOOD GLUCOSE: CPT

## 2019-07-11 PROCEDURE — 97110 THERAPEUTIC EXERCISES: CPT

## 2019-07-11 PROCEDURE — 6370000000 HC RX 637 (ALT 250 FOR IP): Performed by: FAMILY MEDICINE

## 2019-07-11 PROCEDURE — 6360000002 HC RX W HCPCS: Performed by: HOSPITALIST

## 2019-07-11 PROCEDURE — 1290000000 HC SEMI PRIVATE OTHER R&B

## 2019-07-11 PROCEDURE — 6370000000 HC RX 637 (ALT 250 FOR IP): Performed by: HOSPITALIST

## 2019-07-11 RX ORDER — DEXAMETHASONE 1.5 MG/1
3 TABLET ORAL EVERY 6 HOURS SCHEDULED
Status: DISCONTINUED | OUTPATIENT
Start: 2019-07-11 | End: 2019-07-16

## 2019-07-11 RX ADMIN — FAMOTIDINE 20 MG: 20 TABLET ORAL at 10:31

## 2019-07-11 RX ADMIN — ACYCLOVIR 800 MG: 800 TABLET ORAL at 17:35

## 2019-07-11 RX ADMIN — ACYCLOVIR 800 MG: 800 TABLET ORAL at 13:11

## 2019-07-11 RX ADMIN — INSULIN LISPRO 1 UNITS: 100 INJECTION, SOLUTION INTRAVENOUS; SUBCUTANEOUS at 23:25

## 2019-07-11 RX ADMIN — OXYBUTYNIN CHLORIDE 5 MG: 5 TABLET ORAL at 10:31

## 2019-07-11 RX ADMIN — DEXAMETHASONE 4 MG: 4 TABLET ORAL at 00:35

## 2019-07-11 RX ADMIN — DEXAMETHASONE 3 MG: 1.5 TABLET ORAL at 23:16

## 2019-07-11 RX ADMIN — SENNOSIDES 8.6 MG: 8.6 TABLET, FILM COATED ORAL at 00:35

## 2019-07-11 RX ADMIN — PRAVASTATIN SODIUM 40 MG: 40 TABLET ORAL at 17:35

## 2019-07-11 RX ADMIN — HYDROCHLOROTHIAZIDE 25 MG: 25 TABLET ORAL at 10:31

## 2019-07-11 RX ADMIN — DEXAMETHASONE 4 MG: 4 TABLET ORAL at 06:36

## 2019-07-11 RX ADMIN — ACYCLOVIR 800 MG: 800 TABLET ORAL at 06:36

## 2019-07-11 RX ADMIN — INSULIN LISPRO 1 UNITS: 100 INJECTION, SOLUTION INTRAVENOUS; SUBCUTANEOUS at 10:33

## 2019-07-11 RX ADMIN — OXYBUTYNIN CHLORIDE 5 MG: 5 TABLET ORAL at 23:16

## 2019-07-11 RX ADMIN — INSULIN LISPRO 1 UNITS: 100 INJECTION, SOLUTION INTRAVENOUS; SUBCUTANEOUS at 13:12

## 2019-07-11 RX ADMIN — LISINOPRIL 20 MG: 20 TABLET ORAL at 10:31

## 2019-07-11 RX ADMIN — ENOXAPARIN SODIUM 40 MG: 40 INJECTION SUBCUTANEOUS at 23:23

## 2019-07-11 RX ADMIN — ACYCLOVIR 800 MG: 800 TABLET ORAL at 00:34

## 2019-07-11 RX ADMIN — DOCUSATE SODIUM 100 MG: 100 CAPSULE, LIQUID FILLED ORAL at 10:31

## 2019-07-11 RX ADMIN — DEXAMETHASONE 4 MG: 4 TABLET ORAL at 13:11

## 2019-07-11 RX ADMIN — POLYETHYLENE GLYCOL 3350 17 G: 17 POWDER, FOR SOLUTION ORAL at 10:31

## 2019-07-11 RX ADMIN — OXYBUTYNIN CHLORIDE 5 MG: 5 TABLET ORAL at 00:35

## 2019-07-11 RX ADMIN — AMLODIPINE BESYLATE 10 MG: 10 TABLET ORAL at 10:31

## 2019-07-11 RX ADMIN — DEXAMETHASONE 3 MG: 1.5 TABLET ORAL at 19:01

## 2019-07-11 RX ADMIN — ACYCLOVIR 800 MG: 800 TABLET ORAL at 23:16

## 2019-07-11 RX ADMIN — DOCUSATE SODIUM 100 MG: 100 CAPSULE, LIQUID FILLED ORAL at 00:35

## 2019-07-11 ASSESSMENT — PAIN SCALES - GENERAL: PAINLEVEL_OUTOF10: 0

## 2019-07-11 NOTE — PROGRESS NOTES
[address] [address]  Recreational Therapy  Daily Note  - [facility]    Time Spent with Patient: 75 minutes    Date:  7/10/2019       Patient Name: Vitaly Acosta      MRN: [id number]      YOB: 1945 (71 y.o.)       Gender: female  Diagnosis: s/p craniotomy  Referring Practitioner: Brittany Cochran MD; Attending: Dr. Magaly Humphreys    RESTRICTIONS/PRECAUTIONS:  Restrictions/Precautions: Fall Risk, General Precautions  Vision: Impaired  Hearing: Within functional limits    PAIN: 0    SUBJECTIVE:  I am ready     OBJECTIVE:  [facility] and picked out a saying for her friend to give to her as a gift-pt did well painting sign and peeling off saying-needed assistance to hammer the  on back-so appreciative -affect bright and social-her brother came in to visit on way back to room          Patient Education  New Education Provided: Importance of Leisure,     Electronically signed by: RACHEL Hussein  Date: 7/10/2019

## 2019-07-11 NOTE — PROGRESS NOTES
RECREATIONAL THERAPY  MISSED TREATMENT    [x]Transitional Care Unit  []Inpatient Rehabilitation    Date:  7/11/2019            Patient Name: Maggy Roberts           MRN: [id number]  Acct: [de-identified]          YOB: 1945 (71 y.o.)       Gender: female   Diagnosis: s/p craniotomy  Physician: Referring Practitioner: Janette Palomo MD; Attending: Dr. Mirtha Barrios:    []Hold treatment per nursing request  []Patient refusal  []Family declined treatment  []Patient at testing and/or off the floor  []Patient unavailable, with PT/OT/nursing, etc.  [x]Other too fatigued this afternoon to play [employer] with peers   [place], [place]    7/11/2019

## 2019-07-11 NOTE — PROGRESS NOTES
Clinical Pharmacy Note  Pharmacy Antimicrobial Monitoring    Current antibiotic(s): Acyclovir    Total days of antibiotics: 7 days    Indication/Diagnosis: Acute herpes zoster infection- confirmed with Dr. David Puri    Patient chart reviewed for signs/symptoms of infection: Yes    Signs/symptoms: Unknown    /71   Pulse 85   Temp 98.5 °F (36.9 °C) (Oral)   Resp 16   Ht 5' 4\" (1.626 m)   Wt 165 lb 6.4 oz (75 kg)   SpO2 96%   BMI 28.39 kg/m²   Lab Results   Component Value Date    WBC 12.2 (H) 07/08/2019       Cultures: N/A    Recommended stop date: N/A- already entered for 7/17/19    Prescriber contacted: Yes- clarified indication with Dr. David Puri as acute herpes zoster infection    Recommendations accepted: Hugo Cuellar, PharmD, BCPS  7/11/2019  10:13 AM

## 2019-07-11 NOTE — PLAN OF CARE
Problem: Falls - Risk of:  Goal: Will remain free from falls  Description  Will remain free from falls  Outcome: Ongoing  Pt will remain free of falls. Pt is contact guard assistance     Problem: Skin Integrity:  Goal: Absence of new skin breakdown  Description  Absence of new skin breakdown  Outcome: Ongoing  Skin assessment every shift. Problem: Infection - Surgical Site:  Goal: Will show no infection signs and symptoms  Description  Will show no infection signs and symptoms  Outcome: Ongoing  Surgical site will be assessed every shift for warmth to site, redness, pain or drainage. Dry dressing in place, no drainage noted, changed daily per order.      Problem: Fluid Volume - Imbalance:  Goal: Absence of imbalanced fluid volume signs and symptoms  Description  Absence of imbalanced fluid volume signs and symptoms  Outcome: Ongoing  Monitor vs and report abn to dr. Caroline Guerra: Discharge Planning:  Goal: Patients continuum of care needs are met  Description  Patients continuum of care needs are met  7/11/2019 0909 by FAIZA Gomez  Outcome: Ongoing     Problem: Serum Glucose Level - Abnormal:  Goal: Ability to maintain appropriate glucose levels has stabilized  Description  Ability to maintain appropriate glucose levels has stabilized  Outcome: Ongoing  Use of insulin per order, pt has no hx of diabetes but dt to steroid use
mg Oral Daily Phil Khoury MD   10 mg at 07/10/19 1038    dexamethasone (DECADRON) tablet 4 mg  4 mg Oral 4 times per day Phil Khoury MD   4 mg at 07/10/19 1035    famotidine (PEPCID) tablet 20 mg  20 mg Oral Daily Phil Khoury MD   20 mg at 07/10/19 1038    HYDROcodone-acetaminophen (NORCO) 5-325 MG per tablet 1 tablet  1 tablet Oral Q6H PRN Phil Khoury MD        insulin lispro (HUMALOG) injection vial 0-3 Units  0-3 Units Subcutaneous Nightly Phil Khoury MD        insulin lispro (HUMALOG) injection vial 0-6 Units  0-6 Units Subcutaneous TID WC Phil Khoury MD   1 Units at 07/10/19 1042    lisinopril (PRINIVIL;ZESTRIL) tablet 20 mg  20 mg Oral Daily Phil Khoury MD   20 mg at 07/10/19 1038    And    hydrochlorothiazide (HYDRODIURIL) tablet 25 mg  25 mg Oral Daily Phil Khoury MD   25 mg at 07/10/19 1038    oxybutynin (DITROPAN) tablet 5 mg  5 mg Oral BID Phil Khoury MD   5 mg at 07/10/19 1038    polyethylene glycol (GLYCOLAX) packet 17 g  17 g Oral Daily Phil Khoury MD   17 g at 07/10/19 1038    [START ON 7/11/2019] ppd (tuberculin skin test) read   Does not apply Weekly Phil Khoury MD        pravastatin (PRAVACHOL) tablet 40 mg  40 mg Oral QPM Phil Khoury MD   40 mg at 07/09/19 1737    tuberculin injection 5 Units  5 Units Intradermal Weekly Phil Khoury MD   5 Units at 07/09/19 1742       Plan of Care Problems and Goals      Problem: Pain    Goal: Pain Level will decrease   Problem: Falls- Risk of    Goal: Absence of falls    Goal: Absence of physical injury   Problem: Serum Glucose Level- Abnormal    Goal: Ability to maintain appropriate glucose levels will improve   Problem: Risk of Impaired Skin Integrity    Goal: Absence of new skin breakdown   Problem: Infection- Risk of Surgical Site Infection    Goal: Will show no infection signs and symptoms   Problem: Nutrition    Goal: Optimal nutrition therapy   Problem: Fluid Volume- Risk of,

## 2019-07-12 LAB
GLUCOSE BLD-MCNC: 145 MG/DL (ref 70–108)
GLUCOSE BLD-MCNC: 157 MG/DL (ref 70–108)
GLUCOSE BLD-MCNC: 179 MG/DL (ref 70–108)
GLUCOSE BLD-MCNC: 189 MG/DL (ref 70–108)

## 2019-07-12 PROCEDURE — 6370000000 HC RX 637 (ALT 250 FOR IP): Performed by: HOSPITALIST

## 2019-07-12 PROCEDURE — 6360000002 HC RX W HCPCS: Performed by: FAMILY MEDICINE

## 2019-07-12 PROCEDURE — 97116 GAIT TRAINING THERAPY: CPT

## 2019-07-12 PROCEDURE — 6370000000 HC RX 637 (ALT 250 FOR IP): Performed by: FAMILY MEDICINE

## 2019-07-12 PROCEDURE — 97530 THERAPEUTIC ACTIVITIES: CPT

## 2019-07-12 PROCEDURE — 82948 REAGENT STRIP/BLOOD GLUCOSE: CPT

## 2019-07-12 PROCEDURE — 97110 THERAPEUTIC EXERCISES: CPT

## 2019-07-12 PROCEDURE — 97535 SELF CARE MNGMENT TRAINING: CPT

## 2019-07-12 PROCEDURE — 1290000000 HC SEMI PRIVATE OTHER R&B

## 2019-07-12 PROCEDURE — 99024 POSTOP FOLLOW-UP VISIT: CPT | Performed by: PHYSICIAN ASSISTANT

## 2019-07-12 RX ADMIN — DOCUSATE SODIUM 100 MG: 100 CAPSULE, LIQUID FILLED ORAL at 08:58

## 2019-07-12 RX ADMIN — HYDROCHLOROTHIAZIDE 25 MG: 25 TABLET ORAL at 08:58

## 2019-07-12 RX ADMIN — DOCUSATE SODIUM 100 MG: 100 CAPSULE, LIQUID FILLED ORAL at 20:50

## 2019-07-12 RX ADMIN — ACYCLOVIR 800 MG: 800 TABLET ORAL at 12:17

## 2019-07-12 RX ADMIN — POLYETHYLENE GLYCOL 3350 17 G: 17 POWDER, FOR SOLUTION ORAL at 08:58

## 2019-07-12 RX ADMIN — AMLODIPINE BESYLATE 10 MG: 10 TABLET ORAL at 08:58

## 2019-07-12 RX ADMIN — ENOXAPARIN SODIUM 40 MG: 40 INJECTION SUBCUTANEOUS at 17:51

## 2019-07-12 RX ADMIN — DEXAMETHASONE 3 MG: 1.5 TABLET ORAL at 06:07

## 2019-07-12 RX ADMIN — ACYCLOVIR 800 MG: 800 TABLET ORAL at 17:56

## 2019-07-12 RX ADMIN — PRAVASTATIN SODIUM 40 MG: 40 TABLET ORAL at 17:50

## 2019-07-12 RX ADMIN — LISINOPRIL 20 MG: 20 TABLET ORAL at 08:58

## 2019-07-12 RX ADMIN — FAMOTIDINE 20 MG: 20 TABLET ORAL at 08:59

## 2019-07-12 RX ADMIN — DEXAMETHASONE 3 MG: 1.5 TABLET ORAL at 17:51

## 2019-07-12 RX ADMIN — INSULIN LISPRO 1 UNITS: 100 INJECTION, SOLUTION INTRAVENOUS; SUBCUTANEOUS at 17:51

## 2019-07-12 RX ADMIN — OXYBUTYNIN CHLORIDE 5 MG: 5 TABLET ORAL at 20:50

## 2019-07-12 RX ADMIN — DEXAMETHASONE 3 MG: 1.5 TABLET ORAL at 12:17

## 2019-07-12 RX ADMIN — ACYCLOVIR 800 MG: 800 TABLET ORAL at 22:44

## 2019-07-12 RX ADMIN — OXYBUTYNIN CHLORIDE 5 MG: 5 TABLET ORAL at 08:58

## 2019-07-12 RX ADMIN — ACYCLOVIR 800 MG: 800 TABLET ORAL at 17:51

## 2019-07-12 RX ADMIN — INSULIN LISPRO 1 UNITS: 100 INJECTION, SOLUTION INTRAVENOUS; SUBCUTANEOUS at 20:50

## 2019-07-12 RX ADMIN — INSULIN LISPRO 1 UNITS: 100 INJECTION, SOLUTION INTRAVENOUS; SUBCUTANEOUS at 09:01

## 2019-07-12 RX ADMIN — ACYCLOVIR 800 MG: 800 TABLET ORAL at 06:07

## 2019-07-12 RX ADMIN — INSULIN LISPRO 1 UNITS: 100 INJECTION, SOLUTION INTRAVENOUS; SUBCUTANEOUS at 12:18

## 2019-07-12 ASSESSMENT — PAIN SCALES - GENERAL
PAINLEVEL_OUTOF10: 0

## 2019-07-12 ASSESSMENT — PAIN - FUNCTIONAL ASSESSMENT
PAIN_FUNCTIONAL_ASSESSMENT: ACTIVITIES ARE NOT PREVENTED
PAIN_FUNCTIONAL_ASSESSMENT: ACTIVITIES ARE NOT PREVENTED

## 2019-07-12 NOTE — PROGRESS NOTES
is oriented to person, place, and time. She appears well-developed and well-nourished. HENT:   Head: Normocephalic and atraumatic. Eyes: Pupils are equal, round, and reactive to light. Conjunctivae and EOM are normal.   Neck: Normal range of motion. Cardiovascular: Normal rate, regular rhythm and normal heart sounds. Exam reveals no friction rub.   No murmur heard. Pulmonary/Chest: Effort normal and breath sounds normal. No respiratory distress. She has no wheezes. Abdominal: Soft. Bowel sounds are normal. She exhibits no distension. There is no tenderness. Musculoskeletal: Normal range of motion. Neurological: She is alert and oriented to person, place, and time. She has normal strength. No cranial nerve deficit or sensory deficit.   Stable and neurologically intact on exam.   Skin: Skin is warm and dry. Psychiatric: She has a normal mood and affect. Her behavior is normal. Judgment and thought content normal        ROS:  Review of Systems  Constitutional: Negative for activity change. Respiratory: Negative for apnea, chest tightness and shortness of breath.    Cardiovascular: Negative for chest pain and leg swelling. Gastrointestinal: Negative for abdominal distention and abdominal pain. Musculoskeletal: Positive for neck pain, resolving. Negative for arthralgias and gait problem. Neurological: Negative for weakness, numbness and headaches. Psychiatric/Behavioral: Negative for agitation, behavioral problems and confusion.         24 hour intake/output:    Intake/Output Summary (Last 24 hours) at 7/12/2019 1353  Last data filed at 7/12/2019 0800  Gross per 24 hour   Intake 1310 ml   Output 1250 ml   Net 60 ml     Last 3 weights: Wt Readings from Last 3 Encounters:   07/09/19 165 lb 6.4 oz (75 kg)   07/08/19 162 lb 6 oz (73.7 kg)   06/13/19 166 lb (75.3 kg)         CBC: No results for input(s): WBC, HGB, PLT in the last 72 hours.   BMP:  No results for input(s): NA, K, CL, CO2, BUN, CREATININE, GLUCOSE in the last 72 hours. Calcium:No results for input(s): CALCIUM in the last 72 hours. Magnesium:No results for input(s): MG in the last 72 hours. Glucose:  Recent Labs     07/11/19  2322 07/12/19  0641 07/12/19  1215   POCGLU 159* 179* 189*     HgbA1C: No results for input(s): LABA1C in the last 72 hours. Lipids: No results for input(s): CHOL, TRIG, HDL, LDLCALC in the last 72 hours. Invalid input(s): LDL    Radiology reports as per the Radiologist  Radiology: Ct Abdomen Pelvis W Wo Contrast Additional Contrast? None    Result Date: 6/28/2019  PROCEDURE: CT CHEST W WO CONTRAST, CT ABDOMEN PELVIS W WO CONTRAST CLINICAL INFORMATION: Rule out additional lesions/metastases . Newly found brain mass. Evaluate for additional metastases. COMPARISON: CT urogram dated 3/2/2018 and CTA chest dated 6/19/2015. TECHNIQUE: Helical CT of the chest, abdomen and pelvis following oral contrast administration and before and after intravenous administration of 80 mL Isovue-370 injected in left arm. Sagittal and coronal reformatted images were also created. FINDINGS: CHEST: There is a 1.4 x 0.7 cm hypodense nodule in the right lobe of the thyroid gland which has decreased in size in the interval since prior CT. No axillary, mediastinal or hilar lymphadenopathy is identified. There is small calcified nodule in the right upper lobe and right hilar calcification as evidence for old granulomatous disease. There is minimal nodularity in the superior segment of the right lower lobe posteriorly. These were present on prior exam in 2015 and mildly less conspicuous on the current exam as evidence for benignity. There is a 3 mm nodule in the left lower lobe seen on image 40. The lungs otherwise appear clear. No aggressive osseous lesions are identified in the chest. Abdomen/pelvis: The liver and gallbladder are unremarkable. There is a small hiatal hernia, similar to prior exam. Adrenal glands are unremarkable.  There is again noted to be a irregular fat containing lesion at the inferior pole of the right kidney measuring 2.8 x 2.1 cm, not significant changed in size and appearance compared to prior exam. A smaller fat-containing focus at the superior pole the right kidney is also stable. There is a 4.6 x 3.9 cm partially exophytic cysts at the superior pole of the left kidney which is slightly increased in size in the interval. Kidneys are otherwise unremarkable. There are calcified granulomas within the spleen similar to prior exam. Pancreas is unremarkable. No retroperitoneal or mesenteric lymphadenopathy is identified. There is mild circumferential rectal wall thickening. The bowel otherwise appears within normal limits. The bladder is unremarkable. The uterus is surgically absent. No free fluid is identified. No aggressive osseous lesions are identified within the visualized osseous structures. 1. No evidence of metastatic disease in the chest, abdomen or pelvis. 2. 1.4 cm hypodense nodule in the right lobe of the thyroid gland is decreased in size compared to prior chest CT. 3. 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 4. Mild rectal wall thickening. Correlate with visual inspection. **This report has been created using voice recognition software. It may contain minor errors which are inherent in voice recognition technology. ** Final report electronically signed by Dr. Willis Gann MD on 6/28/2019 5:54 PM    Ct Head Wo Contrast    Result Date: 7/3/2019  PROCEDURE: CT HEAD WO CONTRAST CLINICAL INFORMATION: on way to ICU :  Craniectomy with tumor resection. . COMPARISON: July 2, 2019 TECHNIQUE: Noncontrast 5 mm axial images were obtained through the brain. All CT scans at this facility use dose modulation, iterative reconstruction, and/or weight-based dosing when appropriate to reduce radiation dose to as low as reasonably achievable.  FINDINGS: Postsurgical changes posterior right cerebellum with edema findings are suspicious for an underlying mass. The mass effect also results in hydrocephalus with the third ventricle and temporal horns of the lateral ventricles being dilated. Results were conveyed to Jaky Hadley CNP at 1045 hours on 6/28/2019. **This report has been created using voice recognition software. It may contain minor errors which are inherent in voice recognition technology. ** Final report electronically signed by Dr. Khoi Eisenberg on 6/28/2019 10:46 AM    Ct Chest W Wo Contrast    Result Date: 6/28/2019  PROCEDURE: CT CHEST W WO CONTRAST, CT ABDOMEN PELVIS W WO CONTRAST CLINICAL INFORMATION: Rule out additional lesions/metastases . Newly found brain mass. Evaluate for additional metastases. COMPARISON: CT urogram dated 3/2/2018 and CTA chest dated 6/19/2015. TECHNIQUE: Helical CT of the chest, abdomen and pelvis following oral contrast administration and before and after intravenous administration of 80 mL Isovue-370 injected in left arm. Sagittal and coronal reformatted images were also created. FINDINGS: CHEST: There is a 1.4 x 0.7 cm hypodense nodule in the right lobe of the thyroid gland which has decreased in size in the interval since prior CT. No axillary, mediastinal or hilar lymphadenopathy is identified. There is small calcified nodule in the right upper lobe and right hilar calcification as evidence for old granulomatous disease. There is minimal nodularity in the superior segment of the right lower lobe posteriorly. These were present on prior exam in 2015 and mildly less conspicuous on the current exam as evidence for benignity. There is a 3 mm nodule in the left lower lobe seen on image 40. The lungs otherwise appear clear. No aggressive osseous lesions are identified in the chest. Abdomen/pelvis: The liver and gallbladder are unremarkable. There is a small hiatal hernia, similar to prior exam. Adrenal glands are unremarkable.  There is again noted to be a irregular fat the upper pole lesion measures 6.8 x 7.2 x 6.5 mm. There is no gross obstructive uropathy. Left kidney contains an upper pole cyst measuring 4.2 x 4.1 x 3.8 cm. RIGHT KIDNEY - 11.3 x 5.3 x 5.2 cm Resistive Index - 0.53 Cortical Thickness - 1.3 cm LEFT KIDNEY - 11.4 x 5.4 x 6.6 cm Resistive Index - 0.62 Cortical Thickness - 1.4 cm URINARY BLADDER Pre-Void - 444 mL Bladder remains distended as the patient did not have urged to void. The ureteral jets are visualized and appear normal.     1. Multifocal echogenic lesions of the right kidney. Correlate with angiomyolipoma. 2. Upper pole left renal cyst **This report has been created using voice recognition software. It may contain minor errors which are inherent in voice recognition technology. ** Final report electronically signed by Dr. Jeremy Palomino on 7/6/2019 2:31 AM    Vl Dup Carotid Bilateral    Result Date: 6/28/2019  PROCEDURE: VL DUP CAROTID BILATERAL CLINICAL INFORMATION: Unsteady gait COMPARISON: No prior study. TECHNIQUE: Multiple grayscale and color flow images of both carotid systems and both vertebral arteries were obtained. Spectral Doppler waveforms were generated and velocity measurements were obtained. RIGHT PSV/EDV DIST CCA-------->63/12cm/s PROX ICA-------->70/17cm/s ECA---------------->71/9cm/s VERT-------------->39/10cm/s LEFT PSV/EDV DIST CCA-------->53/13cm/s PROX ICA-------->61/21cm/s ECA---------------->90/8cm/s VERT-------------->45/11cm/s     1. RIGHT ICA . Michaelyn Daily Michaelyn Daily Unremarkable . Michaelyn Daily 2. RIGHT ECA. Michaelyn Daily Unremarkable . .... Michaelyn Daily RIGHT CCA. Michaelyn Daily Unremarkable . Michaelyn Daily 3. RIGHT VERTEBRAL. Michaelyn Daily Antegrade flow . .. ... LEFT VERTEBRAL. .. Antegrade flow. .. 4. LEFT ICA. .... Unremarkable. . 5. LEFT ECA. .. Minimal intimal thickening, no appreciable stenosis. ..... LEFT CCA. .. Unremarkable. **This report has been created using voice recognition software. It may contain minor errors which are inherent in voice recognition technology. ** Final report electronically signed by Dr. Vern Plolock elsewhere in the subcortical deep white matter is evidence for chronic microvascular angiopathy. The major vascular flow voids appear patent. Orbits are unremarkable. There are mucous retention cysts in the right maxillary sinus. Mastoid air cells are clear. 2.5 cm enhancing intra-axial mass in the right cerebellar hemisphere with pronounced perilesional edema causing expansion and mass effect of the right cerebellar hemisphere and vermis with near complete effacement of the fourth ventricle and moderate supratentorial ventriculomegaly, as well as, mass effect on the brainstem at the foramen magnum. A solitary metastatic lesion is favored over a high-grade glioma. **This report has been created using voice recognition software. It may contain minor errors which are inherent in voice recognition technology. ** Final report electronically signed by Dr. Gopi Francis MD on 6/28/2019 6:08 PM     A/P: S/P patient remains postoperative day #9 from suboccipital craniectomy and resection of cerebellar lesion on by Dr. Zulema Scott, without complication. Blessing Molina is dry dressings are intact and she is without complaints.   We reviewed findings from pathology which show a benign process.   We recommend that she continue weaning schedule for the steroids over a 10-day period.   We recommend suture removal at 12 days postoperative and a follow-up with neurosurgery 2 weeks after ultimate discharge from the hospital.          Electronically signed by uMrray Zepeda PA-C on 7/12/2019 at 1:53 PM

## 2019-07-12 NOTE — PROGRESS NOTES
[address] [facility]  Occupational Therapy  Daily Note  Time:   Time In: 0193  Time Out: 1500  Timed Code Treatment Minutes: 45 Minutes  Minutes: 45          Date: 2019  Patient Name: Gilmer Segal,   Gender: female      Room: [address] [place] [address]  MRN: [id number]  : 1945  (76 y.o.)  Referring Practitioner: Anam Pascal MD; Attending: Dr. Agnieszka Cook  Diagnosis: s/p craniotomy  Additional Pertinent Hx: Pt admitted  due to unsteadiness with gait and work up due to brain mass found on CT. MRI done here showed right cerebellar mass. Neuro sx on the case. Steriods started with pt and family reporting improvement in function. Craniotomy 7/3. Admit to TCU     Restrictions/Precautions:  Restrictions/Precautions: Fall Risk, General Precautions  Position Activity Restriction  Other position/activity restrictions: s/p Right sub occipital occipital craniotomy on 7/3/19    SUBJECTIVE: Up in chair upon arrival, agreeable to OT session    PAIN: 0/10:     COGNITION: WFL    ADL:   Grooming: Stand By Assistance. washed hands standing sinkside  Lower Extremity Dressing: with set-up. changed clothing sitting on toilet  Toileting: Stand By Assistance. for clothing management  Toilet Transfer: Stand By Assistance. STS. BALANCE:  Standing Balance: Stand By Assistance approx 2 minutes for ADLs with 0 UE support    BED MOBILITY:  Sit to Supine: Modified Independent HOB flat and used bedrail    TRANSFERS:  Sit to Stand:  Stand By Assistance. bedside chair and arm chair  Stand to Sit: Stand By Assistance. FUNCTIONAL MOBILITY:  Assistive Device: None  Assist Level:  Contact Guard Assistance.    Distance: around unit carrying an 8 lb bag, Completed to simluate bag of dog food at home     ADDITIONAL ACTIVITIES:  Able to gather water from faucet, fill and carry and fill water bowel for dog with CGA due to being unsteady at times    ASSESSMENT:  Activity Tolerance:  Patient tolerance

## 2019-07-12 NOTE — PROGRESS NOTES
appears stated age and cooperative  Head: Normocephalic, without obvious abnormality, atraumatic  Lungs: clear to auscultation bilaterally  Heart: regular rate and rhythm, S1, S2 normal, no murmur, click, rub or gallop  Abdomen: soft, non-tender; bowel sounds normal; no masses,  no organomegaly  Extremities: extremities normal, atraumatic, no cyanosis or edema  Skin: scattered vesicular lesions to the upper left sacral area without secondary infection  Neuro weak    Data Review:     Results for Shelli Godfrey (MRN [id number]) as of 7/11/2019 21:35   Ref.  Range 7/10/2019 16:43 7/10/2019 21:12 7/11/2019 07:57 7/11/2019 11:55 7/11/2019 16:57   POC Glucose Latest Ref Range: 70 - 108 mg/dl 196 (H) 230 (H) 148 (H) 188 (H) 135 (H)     Electronically signed by Paul Jackson MD on 7/11/2019 at 9:32 PM

## 2019-07-12 NOTE — PROGRESS NOTES
Scissoring with right foot crossing in front of her left foot, unsteadiness occurring however pt. Able to correct without assist    Exercise:  Patient was guided in 1 set(s) 15 reps of exercise to both lower extremities. Seated hip flexion, Long arc quads, Seated hamstring curls, Standing heel/toe raises, Standing marches, Standing hip abduction/adduction, Standing hip extension, Standing hamstring curls, Mini squats, Step ups, Seated heel/toe raises, Seated isometric hip adduction and with 2# wt to b le's. Exercises were completed for increased independence with functional mobility. Stairs:  Stand By Assistance, with verbal cues , with increased time for completion  Number of Steps: 8   (4 steps x 2 consecutive trials)  Height: 6\" step with right  Handrail  Ascending and non-reciprocal pattern    Stairs:  Stand By Assistance, [address], with verbal cues , with increased time for completion, lt. cga to ascend and close sba to descend  Number of Steps: 1   Porch step  Height: 6\" step with no ue support    Functional Outcome Measures: Not completed       ASSESSMENT:  Assessment: Patient progressing toward established goals. Activity Tolerance:  Patient tolerance of  treatment: good. Equipment Recommendations:Equipment Needed: No(Anticipate pt not requiring AD with gait.   Pt has RW)  Discharge Recommendations:  Discharge Recommendations: Continue to assess pending progress, Patient would benefit from continued therapy after discharge    Plan: Times per week: 6x/ wk  Current Treatment Recommendations: Strengthening, Transfer Training, Endurance Training, Patient/Caregiver Education & Training, Equipment Evaluation, Education, & procurement, Balance Training, Gait Training, Home Exercise Program, Functional Mobility Training, Stair training, Safety Education & Training    Patient Education  Patient Education: Home Exercise Program, Transfers, Reviewed Prior Education, Gait, Stairs, Car

## 2019-07-12 NOTE — PROGRESS NOTES
[address] [address]  Recreational Therapy  Daily Note  - [place] SKILLED NURSING UNIT    Time Spent with Patient: 38 minutes    Date:  7/12/2019       Patient Name: Blair Prather      MRN: [id number]      YOB: 1945 [de-identified]76 y.o.)       Gender: female  Diagnosis: s/p craniotomy  Referring Practitioner: Jasmin Cabello MD; Attending: Dr. Belia Mackay    RESTRICTIONS/PRECAUTIONS:  Restrictions/Precautions: Fall Risk, General Precautions  Vision: Impaired  Hearing: Within functional limits    PAIN: 0    SUBJECTIVE:  I would like to go down and listen to the keyboard. OBJECTIVE:  Upon arrival, Pt. Was sitting in the recliner chair.- Pt. Ambulated to the lobby with no AD and SBA .- Pt. Initiated conversation by introducing herself.- Pt. Was social with peers.- Pt. Asked volunteer about the music he enjoys playing.- Pt's affect was bright due to her friends coming to visit and listen to the music with her.- Pt. Was sociable and pleasant with peers.           Patient Education  New Education Provided: Importance of Leisure,   Electronically signed by: Caroline Dumont [place]  Date: 7/12/2019

## 2019-07-13 LAB
GLUCOSE BLD-MCNC: 129 MG/DL (ref 70–108)
GLUCOSE BLD-MCNC: 131 MG/DL (ref 70–108)
GLUCOSE BLD-MCNC: 140 MG/DL (ref 70–108)
GLUCOSE BLD-MCNC: 190 MG/DL (ref 70–108)

## 2019-07-13 PROCEDURE — 82948 REAGENT STRIP/BLOOD GLUCOSE: CPT

## 2019-07-13 PROCEDURE — 6370000000 HC RX 637 (ALT 250 FOR IP): Performed by: HOSPITALIST

## 2019-07-13 PROCEDURE — 1290000000 HC SEMI PRIVATE OTHER R&B

## 2019-07-13 PROCEDURE — 6360000002 HC RX W HCPCS: Performed by: FAMILY MEDICINE

## 2019-07-13 PROCEDURE — 6370000000 HC RX 637 (ALT 250 FOR IP): Performed by: FAMILY MEDICINE

## 2019-07-13 RX ORDER — DIPHENHYDRAMINE HCL 25 MG
50 TABLET ORAL NIGHTLY
Status: DISCONTINUED | OUTPATIENT
Start: 2019-07-13 | End: 2019-07-18 | Stop reason: HOSPADM

## 2019-07-13 RX ORDER — POLYETHYLENE GLYCOL 3350 17 G/17G
17 POWDER, FOR SOLUTION ORAL 2 TIMES DAILY
Status: DISCONTINUED | OUTPATIENT
Start: 2019-07-13 | End: 2019-07-18 | Stop reason: HOSPADM

## 2019-07-13 RX ORDER — VITAMIN E 268 MG
400 CAPSULE ORAL DAILY
Status: DISCONTINUED | OUTPATIENT
Start: 2019-07-13 | End: 2019-07-18 | Stop reason: HOSPADM

## 2019-07-13 RX ORDER — OYSTER SHELL CALCIUM WITH VITAMIN D 500; 200 MG/1; [IU]/1
1 TABLET, FILM COATED ORAL 2 TIMES DAILY
Status: DISCONTINUED | OUTPATIENT
Start: 2019-07-13 | End: 2019-07-18 | Stop reason: HOSPADM

## 2019-07-13 RX ORDER — ACETAMINOPHEN 500 MG
1000 TABLET ORAL NIGHTLY
Status: DISCONTINUED | OUTPATIENT
Start: 2019-07-13 | End: 2019-07-18 | Stop reason: HOSPADM

## 2019-07-13 RX ADMIN — DEXAMETHASONE 3 MG: 1.5 TABLET ORAL at 06:30

## 2019-07-13 RX ADMIN — ACYCLOVIR 800 MG: 800 TABLET ORAL at 10:03

## 2019-07-13 RX ADMIN — ACYCLOVIR 800 MG: 800 TABLET ORAL at 21:49

## 2019-07-13 RX ADMIN — OXYBUTYNIN CHLORIDE 5 MG: 5 TABLET ORAL at 21:49

## 2019-07-13 RX ADMIN — PRAVASTATIN SODIUM 40 MG: 40 TABLET ORAL at 17:21

## 2019-07-13 RX ADMIN — DIPHENHYDRAMINE HCL 50 MG: 25 TABLET ORAL at 22:41

## 2019-07-13 RX ADMIN — LISINOPRIL 20 MG: 20 TABLET ORAL at 10:03

## 2019-07-13 RX ADMIN — FAMOTIDINE 20 MG: 20 TABLET ORAL at 10:03

## 2019-07-13 RX ADMIN — ACYCLOVIR 800 MG: 800 TABLET ORAL at 06:29

## 2019-07-13 RX ADMIN — ACETAMINOPHEN 1000 MG: 500 TABLET ORAL at 21:49

## 2019-07-13 RX ADMIN — VITAMIN D, TAB 1000IU (100/BT) 2000 UNITS: 25 TAB at 21:48

## 2019-07-13 RX ADMIN — CALCIUM CARBONATE-VITAMIN D TAB 500 MG-200 UNIT 1 TABLET: 500-200 TAB at 21:49

## 2019-07-13 RX ADMIN — HYDROCHLOROTHIAZIDE 25 MG: 25 TABLET ORAL at 10:03

## 2019-07-13 RX ADMIN — DOCUSATE SODIUM 100 MG: 100 CAPSULE, LIQUID FILLED ORAL at 21:50

## 2019-07-13 RX ADMIN — DOCUSATE SODIUM 100 MG: 100 CAPSULE, LIQUID FILLED ORAL at 10:03

## 2019-07-13 RX ADMIN — DEXAMETHASONE 3 MG: 1.5 TABLET ORAL at 17:22

## 2019-07-13 RX ADMIN — INSULIN LISPRO 1 UNITS: 100 INJECTION, SOLUTION INTRAVENOUS; SUBCUTANEOUS at 21:50

## 2019-07-13 RX ADMIN — OXYBUTYNIN CHLORIDE 5 MG: 5 TABLET ORAL at 10:02

## 2019-07-13 RX ADMIN — Medication 400 UNITS: at 21:48

## 2019-07-13 RX ADMIN — DEXAMETHASONE 3 MG: 1.5 TABLET ORAL at 12:32

## 2019-07-13 RX ADMIN — ENOXAPARIN SODIUM 40 MG: 40 INJECTION SUBCUTANEOUS at 17:22

## 2019-07-13 RX ADMIN — DEXAMETHASONE 3 MG: 1.5 TABLET ORAL at 00:10

## 2019-07-13 RX ADMIN — AMLODIPINE BESYLATE 10 MG: 10 TABLET ORAL at 10:03

## 2019-07-13 RX ADMIN — ACYCLOVIR 800 MG: 800 TABLET ORAL at 17:21

## 2019-07-13 ASSESSMENT — PAIN SCALES - GENERAL
PAINLEVEL_OUTOF10: 0

## 2019-07-13 NOTE — PROGRESS NOTES
Early sense alerting to low heart rate. Patient asymptomatic, rouses easily. Patient states that her heart rate has been low at night. Will continue to monitor.

## 2019-07-14 LAB
ANION GAP SERPL CALCULATED.3IONS-SCNC: 10 MEQ/L (ref 8–16)
BUN BLDV-MCNC: 34 MG/DL (ref 7–22)
CALCIUM SERPL-MCNC: 9.1 MG/DL (ref 8.5–10.5)
CHLORIDE BLD-SCNC: 98 MEQ/L (ref 98–111)
CO2: 29 MEQ/L (ref 23–33)
CREAT SERPL-MCNC: 0.6 MG/DL (ref 0.4–1.2)
GFR SERPL CREATININE-BSD FRML MDRD: > 90 ML/MIN/1.73M2
GLUCOSE BLD-MCNC: 135 MG/DL (ref 70–108)
GLUCOSE BLD-MCNC: 143 MG/DL (ref 70–108)
GLUCOSE BLD-MCNC: 154 MG/DL (ref 70–108)
GLUCOSE BLD-MCNC: 154 MG/DL (ref 70–108)
GLUCOSE BLD-MCNC: 202 MG/DL (ref 70–108)
POTASSIUM SERPL-SCNC: 4.5 MEQ/L (ref 3.5–5.2)
SODIUM BLD-SCNC: 137 MEQ/L (ref 135–145)

## 2019-07-14 PROCEDURE — 6370000000 HC RX 637 (ALT 250 FOR IP): Performed by: HOSPITALIST

## 2019-07-14 PROCEDURE — 82948 REAGENT STRIP/BLOOD GLUCOSE: CPT

## 2019-07-14 PROCEDURE — 97116 GAIT TRAINING THERAPY: CPT

## 2019-07-14 PROCEDURE — 99024 POSTOP FOLLOW-UP VISIT: CPT | Performed by: PHYSICIAN ASSISTANT

## 2019-07-14 PROCEDURE — 6360000002 HC RX W HCPCS: Performed by: FAMILY MEDICINE

## 2019-07-14 PROCEDURE — 80048 BASIC METABOLIC PNL TOTAL CA: CPT

## 2019-07-14 PROCEDURE — 6370000000 HC RX 637 (ALT 250 FOR IP): Performed by: FAMILY MEDICINE

## 2019-07-14 PROCEDURE — 1290000000 HC SEMI PRIVATE OTHER R&B

## 2019-07-14 PROCEDURE — 36415 COLL VENOUS BLD VENIPUNCTURE: CPT

## 2019-07-14 PROCEDURE — 97110 THERAPEUTIC EXERCISES: CPT

## 2019-07-14 PROCEDURE — 97535 SELF CARE MNGMENT TRAINING: CPT

## 2019-07-14 RX ADMIN — FAMOTIDINE 20 MG: 20 TABLET ORAL at 08:14

## 2019-07-14 RX ADMIN — PRAVASTATIN SODIUM 40 MG: 40 TABLET ORAL at 21:52

## 2019-07-14 RX ADMIN — VITAMIN D, TAB 1000IU (100/BT) 2000 UNITS: 25 TAB at 21:52

## 2019-07-14 RX ADMIN — CALCIUM CARBONATE-VITAMIN D TAB 500 MG-200 UNIT 1 TABLET: 500-200 TAB at 21:53

## 2019-07-14 RX ADMIN — POLYETHYLENE GLYCOL 3350 17 G: 17 POWDER, FOR SOLUTION ORAL at 21:53

## 2019-07-14 RX ADMIN — VITAMIN D, TAB 1000IU (100/BT) 2000 UNITS: 25 TAB at 08:13

## 2019-07-14 RX ADMIN — DEXAMETHASONE 3 MG: 1.5 TABLET ORAL at 16:51

## 2019-07-14 RX ADMIN — DEXAMETHASONE 3 MG: 1.5 TABLET ORAL at 00:51

## 2019-07-14 RX ADMIN — OXYBUTYNIN CHLORIDE 5 MG: 5 TABLET ORAL at 21:53

## 2019-07-14 RX ADMIN — CALCIUM CARBONATE-VITAMIN D TAB 500 MG-200 UNIT 1 TABLET: 500-200 TAB at 08:13

## 2019-07-14 RX ADMIN — ENOXAPARIN SODIUM 40 MG: 40 INJECTION SUBCUTANEOUS at 16:55

## 2019-07-14 RX ADMIN — DIPHENHYDRAMINE HCL 50 MG: 25 TABLET ORAL at 21:52

## 2019-07-14 RX ADMIN — LISINOPRIL 20 MG: 20 TABLET ORAL at 08:13

## 2019-07-14 RX ADMIN — POLYETHYLENE GLYCOL 3350 17 G: 17 POWDER, FOR SOLUTION ORAL at 08:14

## 2019-07-14 RX ADMIN — OXYBUTYNIN CHLORIDE 5 MG: 5 TABLET ORAL at 08:13

## 2019-07-14 RX ADMIN — ACYCLOVIR 800 MG: 800 TABLET ORAL at 00:51

## 2019-07-14 RX ADMIN — DEXAMETHASONE 3 MG: 1.5 TABLET ORAL at 11:47

## 2019-07-14 RX ADMIN — ACETAMINOPHEN 1000 MG: 500 TABLET ORAL at 21:52

## 2019-07-14 RX ADMIN — ACYCLOVIR 800 MG: 800 TABLET ORAL at 05:53

## 2019-07-14 RX ADMIN — ACYCLOVIR 800 MG: 800 TABLET ORAL at 21:53

## 2019-07-14 RX ADMIN — INSULIN LISPRO 1 UNITS: 100 INJECTION, SOLUTION INTRAVENOUS; SUBCUTANEOUS at 12:07

## 2019-07-14 RX ADMIN — INSULIN LISPRO 1 UNITS: 100 INJECTION, SOLUTION INTRAVENOUS; SUBCUTANEOUS at 21:56

## 2019-07-14 RX ADMIN — INSULIN LISPRO 1 UNITS: 100 INJECTION, SOLUTION INTRAVENOUS; SUBCUTANEOUS at 08:21

## 2019-07-14 RX ADMIN — ACYCLOVIR 800 MG: 800 TABLET ORAL at 11:48

## 2019-07-14 RX ADMIN — AMLODIPINE BESYLATE 10 MG: 10 TABLET ORAL at 08:13

## 2019-07-14 RX ADMIN — HYDROCHLOROTHIAZIDE 25 MG: 25 TABLET ORAL at 08:14

## 2019-07-14 RX ADMIN — ACYCLOVIR 800 MG: 800 TABLET ORAL at 16:51

## 2019-07-14 RX ADMIN — DOCUSATE SODIUM 100 MG: 100 CAPSULE, LIQUID FILLED ORAL at 21:53

## 2019-07-14 RX ADMIN — DEXAMETHASONE 3 MG: 1.5 TABLET ORAL at 05:53

## 2019-07-14 RX ADMIN — DOCUSATE SODIUM 100 MG: 100 CAPSULE, LIQUID FILLED ORAL at 08:14

## 2019-07-14 ASSESSMENT — ENCOUNTER SYMPTOMS
ABDOMINAL PAIN: 0
STRIDOR: 0
ABDOMINAL DISTENTION: 0
APNEA: 0
CHEST TIGHTNESS: 0

## 2019-07-14 ASSESSMENT — PAIN SCALES - GENERAL: PAINLEVEL_OUTOF10: 0

## 2019-07-14 NOTE — PROGRESS NOTES
surrounding hypoattenuation within the right cerebellar hemisphere which results in mass effect and shift of the midline structures towards the left. There is also suggestion of herniation of the cerebellar  tonsils through the foramen magnum and this also causes mass effect and effacement of the fourth ventricle. The findings are suspicious for an underlying mass. The mass effect also results in hydrocephalus with the third ventricle and temporal horns of the lateral ventricles being dilated. Results were conveyed to Orvil Peabody, CNP at 1045 hours on 6/28/2019. **This report has been created using voice recognition software. It may contain minor errors which are inherent in voice recognition technology. ** Final report electronically signed by Dr. Anthony Ferris on 6/28/2019 10:46 AM    Ct Chest W Wo Contrast    Result Date: 6/28/2019  PROCEDURE: CT CHEST W WO CONTRAST, CT ABDOMEN PELVIS W WO CONTRAST CLINICAL INFORMATION: Rule out additional lesions/metastases . Newly found brain mass. Evaluate for additional metastases. COMPARISON: CT urogram dated 3/2/2018 and CTA chest dated 6/19/2015. TECHNIQUE: Helical CT of the chest, abdomen and pelvis following oral contrast administration and before and after intravenous administration of 80 mL Isovue-370 injected in left arm. Sagittal and coronal reformatted images were also created. FINDINGS: CHEST: There is a 1.4 x 0.7 cm hypodense nodule in the right lobe of the thyroid gland which has decreased in size in the interval since prior CT. No axillary, mediastinal or hilar lymphadenopathy is identified. There is small calcified nodule in the right upper lobe and right hilar calcification as evidence for old granulomatous disease. There is minimal nodularity in the superior segment of the right lower lobe posteriorly. These were present on prior exam in 2015 and mildly less conspicuous on the current exam as evidence for benignity.  There is a 3 mm nodule in the left

## 2019-07-15 LAB
GLUCOSE BLD-MCNC: 121 MG/DL (ref 70–108)
GLUCOSE BLD-MCNC: 149 MG/DL (ref 70–108)
GLUCOSE BLD-MCNC: 156 MG/DL (ref 70–108)
GLUCOSE BLD-MCNC: 226 MG/DL (ref 70–108)

## 2019-07-15 PROCEDURE — 97116 GAIT TRAINING THERAPY: CPT

## 2019-07-15 PROCEDURE — 0220000000 HC SKILLED NURSING FACILITY

## 2019-07-15 PROCEDURE — 6360000002 HC RX W HCPCS: Performed by: FAMILY MEDICINE

## 2019-07-15 PROCEDURE — 1290000000 HC SEMI PRIVATE OTHER R&B

## 2019-07-15 PROCEDURE — 97530 THERAPEUTIC ACTIVITIES: CPT

## 2019-07-15 PROCEDURE — 97535 SELF CARE MNGMENT TRAINING: CPT

## 2019-07-15 PROCEDURE — 97110 THERAPEUTIC EXERCISES: CPT

## 2019-07-15 PROCEDURE — 6370000000 HC RX 637 (ALT 250 FOR IP): Performed by: HOSPITALIST

## 2019-07-15 PROCEDURE — 82948 REAGENT STRIP/BLOOD GLUCOSE: CPT

## 2019-07-15 PROCEDURE — 6370000000 HC RX 637 (ALT 250 FOR IP): Performed by: FAMILY MEDICINE

## 2019-07-15 RX ADMIN — DEXAMETHASONE 3 MG: 1.5 TABLET ORAL at 23:25

## 2019-07-15 RX ADMIN — HYDROCHLOROTHIAZIDE 25 MG: 25 TABLET ORAL at 08:48

## 2019-07-15 RX ADMIN — DOCUSATE SODIUM 100 MG: 100 CAPSULE, LIQUID FILLED ORAL at 21:20

## 2019-07-15 RX ADMIN — OXYBUTYNIN CHLORIDE 5 MG: 5 TABLET ORAL at 08:48

## 2019-07-15 RX ADMIN — Medication 400 UNITS: at 08:48

## 2019-07-15 RX ADMIN — DEXAMETHASONE 3 MG: 1.5 TABLET ORAL at 06:47

## 2019-07-15 RX ADMIN — ACYCLOVIR 800 MG: 800 TABLET ORAL at 12:24

## 2019-07-15 RX ADMIN — ACETAMINOPHEN 1000 MG: 500 TABLET ORAL at 21:20

## 2019-07-15 RX ADMIN — DEXAMETHASONE 3 MG: 1.5 TABLET ORAL at 12:24

## 2019-07-15 RX ADMIN — ENOXAPARIN SODIUM 40 MG: 40 INJECTION SUBCUTANEOUS at 18:27

## 2019-07-15 RX ADMIN — CALCIUM CARBONATE-VITAMIN D TAB 500 MG-200 UNIT 1 TABLET: 500-200 TAB at 08:48

## 2019-07-15 RX ADMIN — ACYCLOVIR 800 MG: 800 TABLET ORAL at 23:25

## 2019-07-15 RX ADMIN — DEXAMETHASONE 3 MG: 1.5 TABLET ORAL at 00:38

## 2019-07-15 RX ADMIN — FAMOTIDINE 20 MG: 20 TABLET ORAL at 08:48

## 2019-07-15 RX ADMIN — INSULIN LISPRO 1 UNITS: 100 INJECTION, SOLUTION INTRAVENOUS; SUBCUTANEOUS at 08:49

## 2019-07-15 RX ADMIN — OXYBUTYNIN CHLORIDE 5 MG: 5 TABLET ORAL at 21:20

## 2019-07-15 RX ADMIN — ACYCLOVIR 800 MG: 800 TABLET ORAL at 18:22

## 2019-07-15 RX ADMIN — CALCIUM CARBONATE-VITAMIN D TAB 500 MG-200 UNIT 1 TABLET: 500-200 TAB at 21:20

## 2019-07-15 RX ADMIN — LISINOPRIL 20 MG: 20 TABLET ORAL at 08:48

## 2019-07-15 RX ADMIN — POLYETHYLENE GLYCOL 3350 17 G: 17 POWDER, FOR SOLUTION ORAL at 08:48

## 2019-07-15 RX ADMIN — VITAMIN D, TAB 1000IU (100/BT) 2000 UNITS: 25 TAB at 18:22

## 2019-07-15 RX ADMIN — INSULIN LISPRO 1 UNITS: 100 INJECTION, SOLUTION INTRAVENOUS; SUBCUTANEOUS at 18:23

## 2019-07-15 RX ADMIN — AMLODIPINE BESYLATE 10 MG: 10 TABLET ORAL at 08:48

## 2019-07-15 RX ADMIN — ACYCLOVIR 800 MG: 800 TABLET ORAL at 16:36

## 2019-07-15 RX ADMIN — ACYCLOVIR 800 MG: 800 TABLET ORAL at 00:38

## 2019-07-15 RX ADMIN — DIPHENHYDRAMINE HCL 50 MG: 25 TABLET ORAL at 21:20

## 2019-07-15 RX ADMIN — DEXAMETHASONE 3 MG: 1.5 TABLET ORAL at 18:22

## 2019-07-15 RX ADMIN — DOCUSATE SODIUM 100 MG: 100 CAPSULE, LIQUID FILLED ORAL at 08:48

## 2019-07-15 RX ADMIN — PRAVASTATIN SODIUM 40 MG: 40 TABLET ORAL at 18:22

## 2019-07-15 RX ADMIN — ACYCLOVIR 800 MG: 800 TABLET ORAL at 06:47

## 2019-07-15 RX ADMIN — INSULIN LISPRO 1 UNITS: 100 INJECTION, SOLUTION INTRAVENOUS; SUBCUTANEOUS at 21:49

## 2019-07-15 RX ADMIN — VITAMIN D, TAB 1000IU (100/BT) 2000 UNITS: 25 TAB at 08:48

## 2019-07-15 ASSESSMENT — PAIN SCALES - GENERAL
PAINLEVEL_OUTOF10: 0

## 2019-07-15 NOTE — PROGRESS NOTES
[facility]  [address] [place] SKILLED NURSING UNIT  Occupational Therapy  Daily Note  Time:   Time In: 0900  Time Out: 5994  Timed Code Treatment Minutes: 52 Minutes  Minutes: 47          Date: 7/15/2019  Patient Name: Vitaly Acosta,   Gender: female      Room: [facility]-  MRN: [id number]  : 1945  (76 y.o.)  Referring Practitioner: Brittany Cochran MD; Attending: Dr. Magaly Humphreys  Diagnosis: s/p craniotomy  Additional Pertinent Hx: Pt admitted  due to unsteadiness with gait and work up due to brain mass found on CT. MRI done here showed right cerebellar mass. Neuro sx on the case. Steriods started with pt and family reporting improvement in function. Craniotomy 7/3. Admit to TCU     Restrictions/Precautions:  Restrictions/Precautions: Fall Risk, General Precautions  Position Activity Restriction  Other position/activity restrictions: s/p Right sub occipital occipital craniotomy on 7/3/19    SUBJECTIVE: Up in chair upon arrival, agreeable to OT session, cooperative    PAIN: 0/10:     COGNITION: WFL and Decreased Attention Span    ADL:   Grooming: Supervision. Washed hands standing sinkside  Toileting: Stand By Assistance. for clothing management  Toilet Transfer: Stand By Assistance. STS, used grab bar. BALANCE:  Standing Balance: Stand By Assistance  greater than 20 minutes while cooking with0-1 UE support      TRANSFERS:  Sit to Stand:  Stand By Assistance. bedside chair and arm chair  Stand to Sit: Stand By Assistance. FUNCTIONAL MOBILITY:  Assistive Device: None  Assist Level:  Stand By Assistance. Distance: To and from therapy gym  Unsteady at times     ADDITIONAL ACTIVITIES:  Patient completed IADL homemaking task this date of preparing ramen noodles - task was graded to challenge balance  Patient was able to retrieve all items from various cupboard with SBA and 0 VCs for safety during the retrieval and transportation of items.  Patient required SBA throughout task with a

## 2019-07-15 NOTE — PATIENT CARE CONFERENCE
[address] [address]  Transitional Care Unit  Team Conference Note    Patient Name: Moris Perez   MRN: [id number]    : 1945  (76 y.o.)  Gender: female   Referring Practitioner: Dr. Margo Wu (ordering),  Dr. Marquis Vega (attending)  Diagnosis: Brain Tumor      Team Conference Date: 2019   Admission Date:   6176  4:86 PM  Re-Certification Date: 303    :  Tentative Discharge Plan and current psychosocial issues: Patient progressing well with therapy. Patient plans to return home alone once discharged from [facility]. Patient may benefit from [address] [place] [address] Services/ or Out Patient Servcies at time of discharge. SW will continue to monitor progress and maintain contact with patient and patient's family regarding length of stay and recommendations. SW will continue to assist with ongoing discharge planning. Nursing:     Weight:  Weight: has been stable     Wounds/Incisions/Ulcers:  Incision healing well, zoster  Bowel: continent  Bladder:continent     Pain: Patient's pain is currently controlled with norco    Education Provided:  Diabetic:  No, sliding scale while on decadron  Peg Tube:No    Watts Care:  Education:NA  Removal Necessary:  NA  Supplies Needed: NA     Anticoagulant Use:  Patient on lovenox for anticoagulation, which is being managed by Primary Care Physician    Antibiotic Use:  Patient not on antibiotics but on acyclovir    Psychotropic Medication Use:  Patient not on psychotropic medications.     Oxygen Use: No    Home Medications Reconciled: N/A    Physical Therapy:   Bed Mobility  Scooting: Stand by assistance  Transfers  Sit to Stand: Stand by assistance(to CGA due to minimal unsteadiness at times upon standing)  Stand to sit: Stand by assistance  Bed to Chair: Stand by assistance  Car Transfer: Contact guard assistance(2nd trial after demo for technique, SBA)  Ambulation 1  Surface: level tile, ramp(over brick and threshold)  Device: No Device  Assistance: Contact

## 2019-07-15 NOTE — PROGRESS NOTES
Important, but cant do or no choice  6 - No response or non-responsive Enter Codes in Boxes    1 A. How important is it to you to have books, newspapers, and magazines to read?    2 B. How important is it to you to listen to music you like?    1 C. How important is it to you to be around animals such as pets?     1 D. How important is it to you to keep up with the news?    1 E. How important is it to you to do things with groups of people?     1 F. How important is it to you to do your favorite activities?     1 G. How important is it to you to go outside to get fresh air when the weather is good?     1 H. How important is it to you to participate in Alevism services or practices? .  Daily and Activity Preferences Primary Respondent   Enter Code    1 Indicate Primary respondent for Daily and Activity Preferences ( and )  1 - Resident  2 - Family or Significant other (close friend or other representative)  5 - Interview could not be completed by resident or family/significant other (no response to 3 or more items)

## 2019-07-16 LAB
EKG ATRIAL RATE: 47 BPM
EKG P AXIS: 69 DEGREES
EKG P-R INTERVAL: 160 MS
EKG Q-T INTERVAL: 456 MS
EKG QRS DURATION: 88 MS
EKG QTC CALCULATION (BAZETT): 403 MS
EKG R AXIS: -34 DEGREES
EKG T AXIS: 50 DEGREES
EKG VENTRICULAR RATE: 47 BPM
GLUCOSE BLD-MCNC: 135 MG/DL (ref 70–108)
GLUCOSE BLD-MCNC: 137 MG/DL (ref 70–108)
GLUCOSE BLD-MCNC: 143 MG/DL (ref 70–108)
GLUCOSE BLD-MCNC: 194 MG/DL (ref 70–108)

## 2019-07-16 PROCEDURE — 97116 GAIT TRAINING THERAPY: CPT

## 2019-07-16 PROCEDURE — 82948 REAGENT STRIP/BLOOD GLUCOSE: CPT

## 2019-07-16 PROCEDURE — 1290000000 HC SEMI PRIVATE OTHER R&B

## 2019-07-16 PROCEDURE — 97110 THERAPEUTIC EXERCISES: CPT

## 2019-07-16 PROCEDURE — 6370000000 HC RX 637 (ALT 250 FOR IP): Performed by: FAMILY MEDICINE

## 2019-07-16 PROCEDURE — 97535 SELF CARE MNGMENT TRAINING: CPT

## 2019-07-16 PROCEDURE — 97530 THERAPEUTIC ACTIVITIES: CPT

## 2019-07-16 PROCEDURE — 6360000002 HC RX W HCPCS: Performed by: FAMILY MEDICINE

## 2019-07-16 PROCEDURE — 93010 ELECTROCARDIOGRAM REPORT: CPT | Performed by: NUCLEAR MEDICINE

## 2019-07-16 PROCEDURE — 93005 ELECTROCARDIOGRAM TRACING: CPT | Performed by: FAMILY MEDICINE

## 2019-07-16 PROCEDURE — 2500000003 HC RX 250 WO HCPCS: Performed by: HOSPITALIST

## 2019-07-16 PROCEDURE — 6370000000 HC RX 637 (ALT 250 FOR IP): Performed by: HOSPITALIST

## 2019-07-16 RX ORDER — DEXAMETHASONE 4 MG/1
2 TABLET ORAL EVERY 6 HOURS SCHEDULED
Status: DISCONTINUED | OUTPATIENT
Start: 2019-07-16 | End: 2019-07-18 | Stop reason: HOSPADM

## 2019-07-16 RX ADMIN — ACYCLOVIR 800 MG: 800 TABLET ORAL at 17:50

## 2019-07-16 RX ADMIN — VITAMIN D, TAB 1000IU (100/BT) 2000 UNITS: 25 TAB at 08:55

## 2019-07-16 RX ADMIN — CALCIUM CARBONATE-VITAMIN D TAB 500 MG-200 UNIT 1 TABLET: 500-200 TAB at 08:56

## 2019-07-16 RX ADMIN — OXYBUTYNIN CHLORIDE 5 MG: 5 TABLET ORAL at 08:55

## 2019-07-16 RX ADMIN — DEXAMETHASONE 2 MG: 1.5 TABLET ORAL at 17:50

## 2019-07-16 RX ADMIN — POLYETHYLENE GLYCOL 3350 17 G: 17 POWDER, FOR SOLUTION ORAL at 08:55

## 2019-07-16 RX ADMIN — ACYCLOVIR 800 MG: 800 TABLET ORAL at 20:10

## 2019-07-16 RX ADMIN — DOCUSATE SODIUM 100 MG: 100 CAPSULE, LIQUID FILLED ORAL at 08:55

## 2019-07-16 RX ADMIN — ACYCLOVIR 800 MG: 800 TABLET ORAL at 06:29

## 2019-07-16 RX ADMIN — FAMOTIDINE 20 MG: 20 TABLET ORAL at 08:55

## 2019-07-16 RX ADMIN — AMLODIPINE BESYLATE 10 MG: 10 TABLET ORAL at 08:56

## 2019-07-16 RX ADMIN — DIPHENHYDRAMINE HCL 50 MG: 25 TABLET ORAL at 20:10

## 2019-07-16 RX ADMIN — ENOXAPARIN SODIUM 40 MG: 40 INJECTION SUBCUTANEOUS at 17:50

## 2019-07-16 RX ADMIN — VITAMIN D, TAB 1000IU (100/BT) 2000 UNITS: 25 TAB at 17:50

## 2019-07-16 RX ADMIN — HYDROCHLOROTHIAZIDE 25 MG: 25 TABLET ORAL at 08:55

## 2019-07-16 RX ADMIN — DEXAMETHASONE 3 MG: 1.5 TABLET ORAL at 13:18

## 2019-07-16 RX ADMIN — ACYCLOVIR 800 MG: 800 TABLET ORAL at 13:17

## 2019-07-16 RX ADMIN — CALCIUM CARBONATE-VITAMIN D TAB 500 MG-200 UNIT 1 TABLET: 500-200 TAB at 20:10

## 2019-07-16 RX ADMIN — DOCUSATE SODIUM 100 MG: 100 CAPSULE, LIQUID FILLED ORAL at 20:10

## 2019-07-16 RX ADMIN — PRAVASTATIN SODIUM 40 MG: 40 TABLET ORAL at 17:50

## 2019-07-16 RX ADMIN — DEXAMETHASONE 3 MG: 1.5 TABLET ORAL at 06:29

## 2019-07-16 RX ADMIN — INSULIN LISPRO 1 UNITS: 100 INJECTION, SOLUTION INTRAVENOUS; SUBCUTANEOUS at 17:46

## 2019-07-16 RX ADMIN — Medication 400 UNITS: at 08:55

## 2019-07-16 RX ADMIN — LISINOPRIL 20 MG: 20 TABLET ORAL at 08:55

## 2019-07-16 RX ADMIN — OXYBUTYNIN CHLORIDE 5 MG: 5 TABLET ORAL at 20:10

## 2019-07-16 RX ADMIN — Medication 5 UNITS: at 08:56

## 2019-07-16 RX ADMIN — INSULIN LISPRO 1 UNITS: 100 INJECTION, SOLUTION INTRAVENOUS; SUBCUTANEOUS at 20:19

## 2019-07-16 RX ADMIN — ACETAMINOPHEN 1000 MG: 500 TABLET ORAL at 20:10

## 2019-07-16 ASSESSMENT — PAIN SCALES - GENERAL
PAINLEVEL_OUTOF10: 0
PAINLEVEL_OUTOF10: 0
PAINLEVEL_OUTOF10: 1

## 2019-07-16 NOTE — PROGRESS NOTES
demonstrated slower than typical gait, stated she wants to be stronger and faster. Pt demoed good and safe balance. Pt had 1 slight LOB at end of session, able to safely self correct. ADDITIONAL ACTIVITIES:  IADL task - meal prep/clean up. Pt retrieved/transpoted/set up all items required to make oatmeal. Pt made oatmeal in microwave, stood to take a bite after completion. Pt cleaned up counter top, washed/dried dishes, put away dishes, put dirty towels in appropriate location. Required no rest breaks or VC throughout activity. Stood for approx 10 min during activy. Used counter for support for approx 6/10 min. Pt completed 7 UE theraband exercises to increase UB strength for ease with ADL and IADL tasks. First 4 exercises x 10 reps each arm, last  X 5 reps each arm. ASSESSMENT:  Activity Tolerance:  Patient tolerance of  treatment: good. Pt very motivated throughout session to keep working to make increased progress. Assessment:  Pt demoed improved indep in ADL and IADL tasks. Pt's motivation impacts her performance. Pt demos a decreased level of Indep with ADL/IADL task d/t decreased strength, endurance, balance. Pt would benefit from continued OT services to maximize her indep and return to [facility] for return home. Discharge Recommendations: Home with Home health OT  Equipment Recommendations:  Other: Patient has all equipment needed for discharge  Plan: Times per week: 6x  Current Treatment Recommendations: Balance Training, Endurance Training, Patient/Caregiver Education & Training, Self-Care / ADL, Safety Education & Training, Functional Mobility Training    Patient Education  Patient Education: Plan of Care, Energy Conservation, Home Exercise Program and pt verbalized apprehension regarding falling at home, so emphasis on Importance of Increasing Activity and possibility of keeping her personal RW out to use prn    Goals  Short term goals  Time Frame for Short term goals: 1 week  Short term goal 1: Pt will complete various t/fs with S to increase indep with toileting routine. MET. REVISED. Short term goal 2: Pt to tolerate dynamic standing task x 5 min with no UE support, no LOB and S in prep for simple homemaking tasks NOT MET. CONTINUE. Short term goal 3: Pt to complete mobility to/from bathroom & in hallway with S & min vcs for obstacles to increase indep with accessing environemnt during ADLs. MET. REVISED  Short term goal 4: Pt will complete simulated IADL task of walking dog with S to increase indep with caring for dog at PLOF. NOT MET. CONTINUE  Long term goals  Time Frame for Long term goals : 2 weeks  Long term goal 1: Pt will complete ADL routine including shower t/f (once staples are removed), with Valentine to increase indep with self care NOT MET. CONTINUE. Long term goal 2: Pt will complete light homemaking task with Valentine to increase indep with warming up light meals. NOT MET. CONTINUE. Revised Short-Term Goals  Short term goals  Time Frame for Short term goals: 1 week  Short term goal 1: Pt will complete functional t/f Mod I to increase indep with toileting routine. Short term goal 2: Pt to tolerate dynamic standing task x 5 min with no UE support, no LOB and S in prep for simple homemaking tasks   Short term goal 3: Pt will complete functional mobility to/from bathroom and in hallway Mod I to increase indep with ADL and IADL tasks  Short term goal 4: Pt will complete simulated IADL task of walking dog with S to increase indep with caring for dog at PLOF. Long term goals  Time Frame for Long term goals : 2 weeks  Long term goal 1: Pt will complete ADL routine including shower t/f (once staples are removed), with Valentine to increase indep with self care. Long term goal 2: Pt will complete light homemaking task with Valentine to increase indep with warming up light meals. Following session, patient left in safe position with all fall risk precautions in place.

## 2019-07-17 LAB
GLUCOSE BLD-MCNC: 106 MG/DL (ref 70–108)
GLUCOSE BLD-MCNC: 111 MG/DL (ref 70–108)
GLUCOSE BLD-MCNC: 143 MG/DL (ref 70–108)
GLUCOSE BLD-MCNC: 147 MG/DL (ref 70–108)

## 2019-07-17 PROCEDURE — 97530 THERAPEUTIC ACTIVITIES: CPT

## 2019-07-17 PROCEDURE — 1290000000 HC SEMI PRIVATE OTHER R&B

## 2019-07-17 PROCEDURE — 6360000002 HC RX W HCPCS: Performed by: FAMILY MEDICINE

## 2019-07-17 PROCEDURE — 97116 GAIT TRAINING THERAPY: CPT

## 2019-07-17 PROCEDURE — 6370000000 HC RX 637 (ALT 250 FOR IP): Performed by: HOSPITALIST

## 2019-07-17 PROCEDURE — 6370000000 HC RX 637 (ALT 250 FOR IP): Performed by: FAMILY MEDICINE

## 2019-07-17 PROCEDURE — 82948 REAGENT STRIP/BLOOD GLUCOSE: CPT

## 2019-07-17 PROCEDURE — 97535 SELF CARE MNGMENT TRAINING: CPT

## 2019-07-17 RX ADMIN — DEXAMETHASONE 2 MG: 1.5 TABLET ORAL at 00:47

## 2019-07-17 RX ADMIN — DEXAMETHASONE 2 MG: 1.5 TABLET ORAL at 22:22

## 2019-07-17 RX ADMIN — Medication 400 UNITS: at 09:06

## 2019-07-17 RX ADMIN — DOCUSATE SODIUM 100 MG: 100 CAPSULE, LIQUID FILLED ORAL at 22:22

## 2019-07-17 RX ADMIN — ACYCLOVIR 800 MG: 800 TABLET ORAL at 11:44

## 2019-07-17 RX ADMIN — PRAVASTATIN SODIUM 40 MG: 40 TABLET ORAL at 17:44

## 2019-07-17 RX ADMIN — INSULIN LISPRO 1 UNITS: 100 INJECTION, SOLUTION INTRAVENOUS; SUBCUTANEOUS at 17:42

## 2019-07-17 RX ADMIN — FAMOTIDINE 20 MG: 20 TABLET ORAL at 09:06

## 2019-07-17 RX ADMIN — OXYBUTYNIN CHLORIDE 5 MG: 5 TABLET ORAL at 09:06

## 2019-07-17 RX ADMIN — ENOXAPARIN SODIUM 40 MG: 40 INJECTION SUBCUTANEOUS at 17:44

## 2019-07-17 RX ADMIN — DOCUSATE SODIUM 100 MG: 100 CAPSULE, LIQUID FILLED ORAL at 09:06

## 2019-07-17 RX ADMIN — ACYCLOVIR 800 MG: 800 TABLET ORAL at 05:57

## 2019-07-17 RX ADMIN — AMLODIPINE BESYLATE 10 MG: 10 TABLET ORAL at 09:06

## 2019-07-17 RX ADMIN — POLYETHYLENE GLYCOL 3350 17 G: 17 POWDER, FOR SOLUTION ORAL at 09:06

## 2019-07-17 RX ADMIN — VITAMIN D, TAB 1000IU (100/BT) 2000 UNITS: 25 TAB at 09:05

## 2019-07-17 RX ADMIN — HYDROCHLOROTHIAZIDE 25 MG: 25 TABLET ORAL at 09:06

## 2019-07-17 RX ADMIN — CALCIUM CARBONATE-VITAMIN D TAB 500 MG-200 UNIT 1 TABLET: 500-200 TAB at 22:22

## 2019-07-17 RX ADMIN — VITAMIN D, TAB 1000IU (100/BT) 2000 UNITS: 25 TAB at 17:44

## 2019-07-17 RX ADMIN — INSULIN LISPRO 1 UNITS: 100 INJECTION, SOLUTION INTRAVENOUS; SUBCUTANEOUS at 22:24

## 2019-07-17 RX ADMIN — DEXAMETHASONE 2 MG: 1.5 TABLET ORAL at 11:43

## 2019-07-17 RX ADMIN — ACYCLOVIR 800 MG: 800 TABLET ORAL at 00:48

## 2019-07-17 RX ADMIN — DIPHENHYDRAMINE HCL 50 MG: 25 TABLET ORAL at 22:22

## 2019-07-17 RX ADMIN — DEXAMETHASONE 2 MG: 1.5 TABLET ORAL at 17:45

## 2019-07-17 RX ADMIN — DEXAMETHASONE 2 MG: 1.5 TABLET ORAL at 05:59

## 2019-07-17 RX ADMIN — CALCIUM CARBONATE-VITAMIN D TAB 500 MG-200 UNIT 1 TABLET: 500-200 TAB at 09:06

## 2019-07-17 RX ADMIN — OXYBUTYNIN CHLORIDE 5 MG: 5 TABLET ORAL at 22:22

## 2019-07-17 RX ADMIN — LISINOPRIL 20 MG: 20 TABLET ORAL at 11:43

## 2019-07-17 RX ADMIN — ACETAMINOPHEN 1000 MG: 500 TABLET ORAL at 22:23

## 2019-07-17 ASSESSMENT — PAIN SCALES - GENERAL
PAINLEVEL_OUTOF10: 0

## 2019-07-17 NOTE — PROGRESS NOTES
Tile and Ramp  Device:RW and cane trialed  Gait Deviations:  Slow Veda and Pt hesitant and smaller steps with use of cane. Occasional minimal path deviation noted with use of cane, jayna with turning head. Improved balance and no path deviation noted with use of RW and showing consistent step through pattern. Trial done for balance challenge with use of cane on ramp, sidestepping ea direction, CGA  Functional Outcome Measures:  Tinetti:     Balance Score: 14  Gait Score: 9  Tinetti Total Score: 23    Timed up and Go Test (TUG)  18 seconds       Normative Reference Values  60-69 years:  8.1 seconds  70-79 years: 9.2 seconds  80-99 years: 11.3 seconds    < 10 seconds is normal, a score of > 14 seconds indicates high fall risk  Stairs:  Stand By Assistance  Number of Steps: 4  Height: 6\" step with cane and Rt rail   Stairs:  Modified Independent  Number of Steps: 12  Height: 6\" step with Bilateral Handrails   Stairs:  Modified Independent  Number of Steps: 1  Height: 6\" step with Rolling Walker        ASSESSMENT:  Assessment:   . Pt has shown gains in her overall balance as noted by the gain of 2 points with tinetti and drop in TUG time. Pt will benefit from continued use of the RW when alone and cane with assist for gait with skilled PT intervention for continued balance gains. Activity Tolerance:  Patient tolerance of  treatment: good. Equipment Recommendations:Equipment Needed: Yes( Pt has RW. Cane ordered from [address] [place] [address])  Other: 7/17 Did trial st cane will cont to assess need for cane vs walker for home   Plan: Discharge to home with family to assist PRN.  P.T. To f/u    Patient Education  Patient Education: Gait, Stairs. Pt voiced understanding. Goals:  Patient goals : strengthen my legs and walk better  Short term goals  Time Frame for Short term goals: 10 days  Short term goal 1: Pt to get up/down from various seated surfaces, Mod I, to get up/down to walk.  MET  Short term goal 2: Pt to walk

## 2019-07-17 NOTE — CARE COORDINATION
Uvaldo Wallace requires a cane due to impaired ambulation and for increased stability in order to participate in or complete daily living tasks of: ambulating. The patient is able to safely use the cane and the functional mobility deficit can be sufficiently resolved.

## 2019-07-17 NOTE — PROGRESS NOTES
[facility]  Recreational Therapy  Daily Note  - [place] SKILLED NURSING UNIT    Time Spent with Patient: 15 minutes    Date:  7/17/2019       Patient Name: Sonja Essex      MRN: [id number]      YOB: 1945 (71 y.o.)       Gender: female  Diagnosis: s/p craniotomy  Referring Practitioner: Cecil Posadas MD; Attending: Dr. Chaparro Mcfadden    RESTRICTIONS/PRECAUTIONS:  Restrictions/Precautions: Contact Precautions(shingles)  Vision: Impaired  Hearing: Within functional limits    PAIN: 0    SUBJECTIVE:  I feel better walking with walker     OBJECTIVE:  Pt ambulated to dining room with RW and SBA to eat her lunch and to listen to our 5 yr old violin player entertain all of us-she stayed to eat her lunch in dining room with peer also -bright and social          Patient Education  New Education Provided: Importance of Leisure,     Electronically signed by: RACHEL Adrian  Date: 7/17/2019

## 2019-07-17 NOTE — PROGRESS NOTES
safety    TRANSFERS:  Sit to Stand:  Modified Independent. used grab bars  Stand to Sit: Independent. FUNCTIONAL MOBILITY:  Assistive Device: None  Assist Level:  Modified Independent. Distance: To and from shower room  Pt demoed good balance, no LOB noted. Demoed good safety awareness by slowing down if necessary     ADDITIONAL ACTIVITIES:  N/A    ASSESSMENT:  Activity Tolerance:  Patient tolerance of  treatment: good. Assessment:  Pt has met 2/4 STG and 2/2 LTG. Pt continues to make progress towards goals. Pt demonstrated independence and modified independence with ADL tasks. Pt stated she will utilize HEP at home to continue gaining strength. Pt would benefit from [facility] OT to further increase level of indep through increased strength and endurance. Discharge Recommendations: Home with Home health OT  Equipment Recommendations: Other: Patient has all equipment needed for discharge  Plan: Times per week: 6x  Current Treatment Recommendations: Balance Training, Endurance Training, Patient/Caregiver Education & Training, Self-Care / ADL, Safety Education & Training, Functional Mobility Training    Patient Education  Patient Education: Plan of Care, Home Exercise Program, Home Safety and Importance of Increasing Activity    Goals  Short term goals  Time Frame for Short term goals: 1 week  Short term goal 1: Pt will complete functional t/f Mod I to increase indep with toileting routine. MET  Short term goal 2: Pt to tolerate dynamic standing task x 5 min with no UE support, no LOB and S in prep for simple homemaking tasks . NOT MET  Short term goal 3: Pt will complete functional mobility to/from bathroom and in hallway Mod I to increase indep with ADL and IADL tasks. MET  Short term goal 4: Pt will complete simulated IADL task of walking dog with S to increase indep with caring for dog at [facility].  NOT MET  Long term goals  Time Frame for Long term goals : 2 weeks  Long term goal 1: Pt will complete ADL routine

## 2019-07-17 NOTE — PROGRESS NOTES
[address] [address]  Recreational Therapy  Daily Note  [address]    Time Spent with Patient: 10 minutes    Date:  7/17/2019       Patient Name: Summer Roman      MRN: [id number]      YOB: 1945 (71 y.o.)       Gender: female  Diagnosis: s/p craniotomy  Referring Practitioner: Shayy Velez MD; Attending: Dr. Tenzin Carpio    Patient enjoyed spending time with our pet therapy dogs.  Pt enjoyed petting our pet therapy dogs Mra and Radha this am while resting in bed-affect bright and appreciative     Electronically signed by: Juan Pablo Brooks, CTRS  Date: 7/17/2019

## 2019-07-18 VITALS
BODY MASS INDEX: 27.21 KG/M2 | SYSTOLIC BLOOD PRESSURE: 117 MMHG | OXYGEN SATURATION: 96 % | RESPIRATION RATE: 18 BRPM | HEIGHT: 64 IN | DIASTOLIC BLOOD PRESSURE: 63 MMHG | WEIGHT: 159.4 LBS | HEART RATE: 77 BPM | TEMPERATURE: 98 F

## 2019-07-18 LAB — GLUCOSE BLD-MCNC: 100 MG/DL (ref 70–108)

## 2019-07-18 PROCEDURE — 6360000002 HC RX W HCPCS: Performed by: FAMILY MEDICINE

## 2019-07-18 PROCEDURE — 6370000000 HC RX 637 (ALT 250 FOR IP): Performed by: FAMILY MEDICINE

## 2019-07-18 PROCEDURE — 6370000000 HC RX 637 (ALT 250 FOR IP): Performed by: HOSPITALIST

## 2019-07-18 PROCEDURE — 82948 REAGENT STRIP/BLOOD GLUCOSE: CPT

## 2019-07-18 RX ORDER — DEXAMETHASONE 2 MG/1
2 TABLET ORAL 2 TIMES DAILY WITH MEALS
COMMUNITY
End: 2019-08-12 | Stop reason: ALTCHOICE

## 2019-07-18 RX ORDER — DEXAMETHASONE 2 MG/1
TABLET ORAL
Qty: 24 TABLET | Refills: 0 | Status: SHIPPED | OUTPATIENT
Start: 2019-07-18 | End: 2019-07-30

## 2019-07-18 RX ADMIN — DOCUSATE SODIUM 100 MG: 100 CAPSULE, LIQUID FILLED ORAL at 09:42

## 2019-07-18 RX ADMIN — DEXAMETHASONE 2 MG: 1.5 TABLET ORAL at 07:18

## 2019-07-18 RX ADMIN — VITAMIN D, TAB 1000IU (100/BT) 2000 UNITS: 25 TAB at 09:45

## 2019-07-18 RX ADMIN — OXYBUTYNIN CHLORIDE 5 MG: 5 TABLET ORAL at 09:45

## 2019-07-18 RX ADMIN — LISINOPRIL 20 MG: 20 TABLET ORAL at 09:44

## 2019-07-18 RX ADMIN — Medication 400 UNITS: at 09:45

## 2019-07-18 RX ADMIN — CALCIUM CARBONATE-VITAMIN D TAB 500 MG-200 UNIT 1 TABLET: 500-200 TAB at 09:44

## 2019-07-18 RX ADMIN — FAMOTIDINE 20 MG: 20 TABLET ORAL at 09:42

## 2019-07-18 RX ADMIN — HYDROCHLOROTHIAZIDE 25 MG: 25 TABLET ORAL at 09:45

## 2019-07-18 RX ADMIN — AMLODIPINE BESYLATE 10 MG: 10 TABLET ORAL at 09:42

## 2019-07-18 ASSESSMENT — PAIN SCALES - GENERAL: PAINLEVEL_OUTOF10: 0

## 2019-07-18 NOTE — DISCHARGE SUMMARY
TCU  Discharge Summary     Patient Identification:  Moo Nguyễn  :   Admit date: 2019  Discharge date: 19   Attending provider: Jayda Chapman MD        Primary care provider: Lasha Hull MD     Discharge Diagnoses: Active Hospital Problems    Diagnosis Date Noted    Herpes zoster without complication [J89.2]     Brain tumor ([address] [D49.6] 2019    GERD (gastroesophageal reflux disease) [K21.9] 2015    Essential hypertension [I10] 2011       TCU Course:   Moo Nguyễn is a 76 y.o. female admitted to the transitional care unit on 2019 for the continuation of time with therapies following her brain surgery for mass. She progressed will with therapies but will require home health services still following discharge. She developed a vesicular dermatomal rash and was treated for zoster without signs of secondary infection. Her glucose was followed with the tapering doses of decadron and while the taper will continue following discharge she will not require any glucose checks or insulin coverage. She will be discharged in stable condition. Consults:   none    Significant Diagnostics:   Results for Nadine Farley (MRN [id number]) as of 2019 09:57   Ref. Range 2019 05:25   Sodium Latest Ref Range: 135 - 145 meq/L 137   Potassium Latest Ref Range: 3.5 - 5.2 meq/L 4.5   Chloride Latest Ref Range: 98 - 111 meq/L 98   CO2 Latest Ref Range: 23 - 33 meq/L 29   BUN Latest Ref Range: 7 - 22 mg/dL 34 (H)   Creatinine Latest Ref Range: 0.4 - 1.2 mg/dL 0.6   Anion Gap Latest Ref Range: 8.0 - 16.0 meq/L 10.0   Est, Glom Filt Rate Latest Units: ml/min/1.73m2 >90   Glucose Latest Ref Range: 70 - 108 mg/dL 154 (H)   Calcium Latest Ref Range: 8.5 - 10.5 mg/dL 9.1     Results for Nadine Farley (MRN [id number]) as of 2019 09:57   Ref.  Range 2019 03:20 2019 03:39   WBC Latest Ref Range: 4.8 - 10.8 thou/mm3 9.6 12.2 (H)   RBC Latest Cholecalciferol (VITAMIN D3) 2000 units CAPS  Take 1 capsule by mouth 2 times daily              dexamethasone (DECADRON) 2 MG tablet  Take 1 tablet by mouth 3 times daily (before meals) for 4 days, THEN 1 tablet 2 times daily (before meals) for 4 days, THEN 1 tablet daily (with breakfast) for 4 days.              famotidine (PEPCID) 20 MG tablet  take 1 tablet by mouth daily             lisinopril-hydrochlorothiazide (PRINZIDE;ZESTORETIC) 20-25 MG per tablet  take 1 tablet by mouth once daily             oxybutynin (DITROPAN) 5 MG tablet  Take 1 tablet by mouth 2 times daily             polyethylene glycol (GLYCOLAX) powder  take 17GM (DISSOLVED IN WATER) by mouth once daily             potassium chloride (KLOR-CON M) 20 MEQ extended release tablet  Take 1 tablet by mouth daily             pravastatin (PRAVACHOL) 40 MG tablet  Take 1 tablet by mouth every evening             vitamin E 400 UNIT capsule  Take 400 Units by mouth daily                 35 minutes spent preparing the patient for discharge    Kye Do MD

## 2019-07-19 ENCOUNTER — CARE COORDINATION (OUTPATIENT)
Dept: CASE MANAGEMENT | Age: 74
End: 2019-07-19

## 2019-07-19 DIAGNOSIS — D49.6 BRAIN TUMOR (HCC): Primary | ICD-10-CM

## 2019-07-19 PROCEDURE — 1111F DSCHRG MED/CURRENT MED MERGE: CPT | Performed by: INTERNAL MEDICINE

## 2019-07-22 ENCOUNTER — HOSPITAL ENCOUNTER (OUTPATIENT)
Dept: RADIATION ONCOLOGY | Age: 74
Discharge: HOME | End: 2019-07-22
Payer: MEDICARE

## 2019-07-22 VITALS
DIASTOLIC BLOOD PRESSURE: 55 MMHG | BODY MASS INDEX: 27.17 KG/M2 | HEART RATE: 65 BPM | OXYGEN SATURATION: 96 % | WEIGHT: 158.29 LBS | SYSTOLIC BLOOD PRESSURE: 116 MMHG | RESPIRATION RATE: 16 BRPM | TEMPERATURE: 95.4 F

## 2019-07-22 DIAGNOSIS — C71.6 MALIGNANT NEOPLASM OF CEREBELLUM (HCC): ICD-10-CM

## 2019-07-22 PROBLEM — C71.9 BRAIN NEOPLASM MALIGNANT (HCC): Status: ACTIVE | Noted: 2019-07-22

## 2019-07-22 PROCEDURE — 99212 OFFICE O/P EST SF 10 MIN: CPT | Performed by: RADIOLOGY

## 2019-07-22 NOTE — CONSULTS
Contrast    Result Date: 6/28/2019  PROCEDURE: MRI BRAIN W WO CONTRAST INDICATION:Abnormal ct findings with, patient with balance problem. Please do the brain MRI according to [place] protocol. Disequilibrium. History of melanoma. COMPARISON: CT head from the same date. TECHNIQUE: Multiplanar and multiple spin echo T1 and T2-weighted images were obtained through the brain before and after the administration of 15 mL ProHance injected in the left AC. FINDINGS: There is a 2.5 x 2.2 cm intra-axial low T1 hyperintense T2 signal mass in the right cerebellar hemisphere with prominent peripheral and irregular central enhancement following contrast administration. There is a hemosiderin rim at the margins of the lesion. There is prominent perilesional edema involving the right cerebellar hemisphere and vermis causing mass effect and near complete effacement of the fourth ventricle. There is expansion of the cerebellar vermis from edema causing mass effect on the  brain stem at the foramen magnum with the tip of the foramen magnum extending approximately 1.4 cm below the level of the foramen magnum. No other intra or extra-axial enhancing lesion is identified. There is moderate supratentorial ventriculomegaly. No  focal areas of restricted diffusion are present to include the lesion. There are mild scattered focal areas of T2/FLAIR prolongation elsewhere in the subcortical deep white matter is evidence for chronic microvascular angiopathy. The major vascular flow voids appear patent. Orbits are unremarkable. There are mucous retention cysts in the right maxillary sinus. Mastoid air cells are clear.       2.5 cm enhancing intra-axial mass in the right cerebellar hemisphere with pronounced perilesional edema causing expansion and mass effect of the right cerebellar hemisphere and vermis with near complete effacement of the fourth ventricle and moderate supratentorial ventriculomegaly, as well as, mass effect on the brainstem at

## 2019-07-23 ENCOUNTER — HOSPITAL ENCOUNTER (OUTPATIENT)
Dept: RADIATION ONCOLOGY | Age: 74
Discharge: HOME | End: 2019-07-23
Payer: MEDICARE

## 2019-07-23 ENCOUNTER — CARE COORDINATION (OUTPATIENT)
Dept: CASE MANAGEMENT | Age: 74
End: 2019-07-23

## 2019-07-23 PROCEDURE — 99212 OFFICE O/P EST SF 10 MIN: CPT | Performed by: NURSE PRACTITIONER

## 2019-07-24 ENCOUNTER — OFFICE VISIT (OUTPATIENT)
Dept: NEUROSURGERY | Age: 74
End: 2019-07-24

## 2019-07-24 ENCOUNTER — TELEPHONE (OUTPATIENT)
Dept: NEUROSURGERY | Age: 74
End: 2019-07-24

## 2019-07-24 VITALS
DIASTOLIC BLOOD PRESSURE: 51 MMHG | WEIGHT: 159.2 LBS | HEART RATE: 67 BPM | HEIGHT: 64 IN | BODY MASS INDEX: 27.18 KG/M2 | SYSTOLIC BLOOD PRESSURE: 99 MMHG

## 2019-07-24 DIAGNOSIS — D49.6 CEREBELLAR TUMOR (HCC): ICD-10-CM

## 2019-07-24 DIAGNOSIS — D48.1 HEMANGIOBLASTOMA: Primary | ICD-10-CM

## 2019-07-24 DIAGNOSIS — Z98.890 STATUS POST CRANIOTOMY: ICD-10-CM

## 2019-07-24 PROCEDURE — 99024 POSTOP FOLLOW-UP VISIT: CPT | Performed by: PHYSICIAN ASSISTANT

## 2019-07-24 NOTE — PROGRESS NOTES
cervical thoracic and lumbar spine to be imaged with and without contrast along with a brain MRI to evaluate and rule out a possible connection to Hippel-Lindau disease. A follow-up appointment will be made with our office to review that imaging in 3 months.       Electronically signed by Royal Bernstein PA-C on 7/24/19 at 3:12 PM

## 2019-07-25 ENCOUNTER — APPOINTMENT (OUTPATIENT)
Dept: RADIATION ONCOLOGY | Age: 74
End: 2019-07-25
Attending: RADIOLOGY
Payer: MEDICARE

## 2019-07-26 ENCOUNTER — CARE COORDINATION (OUTPATIENT)
Dept: CASE MANAGEMENT | Age: 74
End: 2019-07-26

## 2019-07-29 NOTE — TELEPHONE ENCOUNTER
Home Health called wanting you to know that taking Ditropan with Potassium, is a level 2 contraindication

## 2019-08-01 ENCOUNTER — CARE COORDINATION (OUTPATIENT)
Dept: CASE MANAGEMENT | Age: 74
End: 2019-08-01

## 2019-08-02 ENCOUNTER — CARE COORDINATION (OUTPATIENT)
Dept: CASE MANAGEMENT | Age: 74
End: 2019-08-02

## 2019-08-02 NOTE — CARE COORDINATION
[facility] Transitions Follow Up Call    2019    Patient: Judith Mina  Patient : 1945   MRN: [id number]  Reason for Admission: Brain tumor [facility]) [D49.6]  Discharge Date: 19 RARS: Readmission Risk Score: 18    Spoke with: Nicole Cowart for sub Care Transition follow up. Identified self/role. Care Transitions Subsequent and Final Call    Subsequent and Final Calls  Do you have any ongoing symptoms?:  Yes  Patient-reported symptoms:  Other  Have your medications changed?:  No  Do you have any questions related to your medications?:  No  Do you currently have any active services?:  Yes  Are you currently active with any services?:  Home Health  Do you have any needs or concerns that I can assist you with?:  No  Identified Barriers:  None  Care Transitions Interventions  No Identified Needs  Other Interventions: Follow up call from CTN call 2019. Patient reports fingers are slowly improving. Reports 5 fingers remain red and swollen around the cuticles but states it is improving. Patient states [facility] was out today, was not concerned. Advised patient to monitor over the weekend and contact CTN with any questions or concerns, verbalized understanding. Patient denies additional needs or concerns at this time. Patient instructed to notify Pre-Service or CTN for any new or worsening symptoms, contact information provided. Patient verbalized understanding. CTN will continue to follow.       Follow Up  Future Appointments   Date Time Provider Anu Sumner   2019  9:30 AM Valeria Watkins MD [facility] [address]   10/24/2019  1:30 PM STR MRI RM1 STRZ MRI STR Radiolog   10/24/2019  2:00 PM STR MRI RM1 STRZ MRI STR Radiolog   10/24/2019  2:30 PM STR MRI RM1 STRZ MRI STR Radiolog   10/24/2019  3:00 PM STR MRI RM1 STRZ MRI STR Radiolog   2019 11:30 AM Justyn Peters PA-C [facility] - Tavon Locke RN  Care Transition Nurse  (O) [phone]  (C) [phone]

## 2019-08-03 DIAGNOSIS — E78.2 MIXED HYPERLIPIDEMIA: ICD-10-CM

## 2019-08-03 DIAGNOSIS — I10 ESSENTIAL HYPERTENSION: ICD-10-CM

## 2019-08-06 ENCOUNTER — CARE COORDINATION (OUTPATIENT)
Dept: CASE MANAGEMENT | Age: 74
End: 2019-08-06

## 2019-08-06 RX ORDER — LISINOPRIL AND HYDROCHLOROTHIAZIDE 25; 20 MG/1; MG/1
TABLET ORAL
Qty: 90 TABLET | Refills: 1 | Status: SHIPPED | OUTPATIENT
Start: 2019-08-06 | End: 2019-11-18 | Stop reason: SDUPTHER

## 2019-08-06 RX ORDER — PRAVASTATIN SODIUM 40 MG
40 TABLET ORAL EVERY EVENING
Qty: 90 TABLET | Refills: 1 | Status: SHIPPED | OUTPATIENT
Start: 2019-08-06 | End: 2019-11-18 | Stop reason: SDUPTHER

## 2019-08-06 NOTE — CARE COORDINATION
[address] Transitions Interview     2019    Patient: Lor Jay Patient : 1945   MRN: [id number]  Reason for Admission: brain tumor  RARS: Readmission Risk Score: 18         Spoke with: Lor Jay  Patient states she is doing well. She feels her strength is getting stronger. Her leg muscles get sore but she does exercises daily. Denies dizziness, lightheaded, fall, and headaches. Denies concerns or issues. Call was cut short by the door bell ring.        Readmission Risk  Patient Active Problem List   Diagnosis    Metabolic syndrome    Essential hypertension    Overactive bladder    Bradycardia    Insomnia    GERD (gastroesophageal reflux disease)    DJD (degenerative joint disease) of knee    Abnormal EKG    Hypoxemia    Pneumonia    HTN (hypertension)    Mal[facility]    S/P knee replacement    Atelectasis of right lung    Dyspepsia    Mixed hyperlipidemia    Hyperglycemia    Allergic rhinitis due to allergen    Deviated nasal septum    Brain mass    Brain tumor ([facility]    Herpes zoster without complication    Brain neoplasm malignant [facility])       Inpatient Assessment  Care Transitions Summary    Care Transitions Inpatient Review  Medication Review  Do you have all of your prescriptions and are they filled?:  Yes   Housing Review  Social Support  Durable Medical Equipment  Functional Review  Hearing and Vision  Care Transitions Interventions  No Identified Needs         Follow Up  Future Appointments   Date Time Provider [facility]   2019  9:30 AM Missy Trevizo MD [facility]   2019  1:30 PM MD ERIC Gallardo SALMA None   10/24/2019  1:30 PM STR MRI RM1 STRZ MRI STR Radiolog   10/24/2019  2:00 PM STR MRI RM1 STRZ MRI STR Radiolog   10/24/2019  2:30 PM STR MRI RM1 STRZ MRI STR Radiolog   10/24/2019  3:00 PM STR MRI RM1 STRZ MRI STR Radiolog   2019 11:30 AM BONNIE TerrazasX [place] [address] Maintenance  There are no preventive care reminders to display for this patient.     Shasta Geiger RN

## 2019-08-12 ENCOUNTER — OFFICE VISIT (OUTPATIENT)
Dept: INTERNAL MEDICINE CLINIC | Age: 74
End: 2019-08-12
Payer: MEDICARE

## 2019-08-12 VITALS
BODY MASS INDEX: 27.39 KG/M2 | SYSTOLIC BLOOD PRESSURE: 136 MMHG | HEART RATE: 96 BPM | HEIGHT: 64 IN | WEIGHT: 160.4 LBS | DIASTOLIC BLOOD PRESSURE: 78 MMHG

## 2019-08-12 DIAGNOSIS — R82.90 FOUL SMELLING URINE: ICD-10-CM

## 2019-08-12 DIAGNOSIS — N32.81 OVERACTIVE BLADDER: ICD-10-CM

## 2019-08-12 DIAGNOSIS — I10 ESSENTIAL HYPERTENSION: Primary | ICD-10-CM

## 2019-08-12 DIAGNOSIS — D17.71 ANGIOMYOLIPOMA OF KIDNEY: ICD-10-CM

## 2019-08-12 DIAGNOSIS — R73.9 HYPERGLYCEMIA: ICD-10-CM

## 2019-08-12 DIAGNOSIS — E78.00 PURE HYPERCHOLESTEROLEMIA: ICD-10-CM

## 2019-08-12 DIAGNOSIS — E87.6 HYPOKALEMIA: ICD-10-CM

## 2019-08-12 DIAGNOSIS — Z85.820 HISTORY OF MELANOMA: ICD-10-CM

## 2019-08-12 DIAGNOSIS — K59.01 SLOW TRANSIT CONSTIPATION: ICD-10-CM

## 2019-08-12 DIAGNOSIS — R10.13 DYSPEPSIA: ICD-10-CM

## 2019-08-12 DIAGNOSIS — R93.5 ABNORMAL CT OF THE ABDOMEN: ICD-10-CM

## 2019-08-12 DIAGNOSIS — N39.41 URGE INCONTINENCE OF URINE: ICD-10-CM

## 2019-08-12 LAB — HBA1C MFR BLD: 6.3 % (ref 4.3–5.7)

## 2019-08-12 PROCEDURE — 99214 OFFICE O/P EST MOD 30 MIN: CPT | Performed by: INTERNAL MEDICINE

## 2019-08-12 PROCEDURE — 83036 HEMOGLOBIN GLYCOSYLATED A1C: CPT | Performed by: INTERNAL MEDICINE

## 2019-08-12 RX ORDER — POTASSIUM CHLORIDE 20 MEQ/1
20 TABLET, EXTENDED RELEASE ORAL DAILY
Qty: 90 TABLET | Refills: 1 | Status: SHIPPED | OUTPATIENT
Start: 2019-08-12 | End: 2019-11-18 | Stop reason: SDUPTHER

## 2019-08-12 RX ORDER — POLYETHYLENE GLYCOL 3350 17 G/17G
POWDER, FOR SOLUTION ORAL
Qty: 527 G | Refills: 3 | Status: SHIPPED | OUTPATIENT
Start: 2019-08-12 | End: 2020-05-04 | Stop reason: SDUPTHER

## 2019-08-12 RX ORDER — FAMOTIDINE 20 MG/1
TABLET, FILM COATED ORAL
Qty: 90 TABLET | Refills: 1 | Status: SHIPPED | OUTPATIENT
Start: 2019-08-12 | End: 2019-11-18 | Stop reason: SDUPTHER

## 2019-08-12 RX ORDER — AMLODIPINE BESYLATE 10 MG/1
TABLET ORAL
Qty: 90 TABLET | Refills: 1 | Status: SHIPPED | OUTPATIENT
Start: 2019-08-12 | End: 2019-11-18 | Stop reason: SDUPTHER

## 2019-08-12 RX ORDER — OXYBUTYNIN CHLORIDE 5 MG/1
5 TABLET ORAL 2 TIMES DAILY
Qty: 180 TABLET | Refills: 1 | Status: SHIPPED | OUTPATIENT
Start: 2019-08-12 | End: 2019-11-18 | Stop reason: SDUPTHER

## 2019-08-12 NOTE — PROGRESS NOTES
nasal septum    Brain mass    [facility]    Herpes zoster without complication    Brain neoplasm malignant (HCC)       Current Outpatient Medications   Medication Sig Dispense Refill    famotidine (PEPCID) 20 MG tablet take 1 tablet by mouth daily 90 tablet 1    amLODIPine (NORVASC) 10 MG tablet take 1 tablet by mouth once daily 90 tablet 1    oxybutynin (DITROPAN) 5 MG tablet Take 1 tablet by mouth 2 times daily 180 tablet 1    potassium chloride (KLOR-CON M) 20 MEQ extended release tablet Take 1 tablet by mouth daily 90 tablet 1    polyethylene glycol (GLYCOLAX) powder take 17GM (DISSOLVED IN WATER) by mouth once daily 527 g 3    pravastatin (PRAVACHOL) 40 MG tablet take 1 tablet by mouth every evening 90 tablet 1    lisinopril-hydrochlorothiazide (PRINZIDE;ZESTORETIC) 20-25 MG per tablet take 1 tablet by mouth once daily 90 tablet 1    Calcium Carb-Cholecalciferol (CALCIUM 500+D3) 500-400 MG-UNIT TABS Take 1 tablet by mouth 2 times daily       Cholecalciferol (VITAMIN D3) 2000 units CAPS Take 1 capsule by mouth 2 times daily       acetaminophen (TYLENOL) 325 MG tablet Take 650 mg by mouth every 6 hours as needed for Pain      vitamin E 400 UNIT capsule Take 400 Units by mouth daily      aspirin 81 MG tablet Take 81 mg by mouth daily       fluticasone (FLONASE) 50 MCG/ACT nasal spray instill 2 sprays into each nostril once daily 16 g 5     No current facility-administered medications for this visit. Allergies   Allergen Reactions    Pcn [Penicillins] Hives and Rash    Sulfa Antibiotics Rash       Review of Systems - General ROS: negative for - fever or chills   Psychological ROS: negative for - anxiety, depression  Hematological and Lymphatic ROS: No history of blood clots or bleeding disorder.    Respiratory ROS: no shortness of breath, wheezing, positive cough from PND  Cardiovascular ROS: no chest pain or dyspnea on exertion  Gastrointestinal ROS: no abdominal pain, change in Flonase and Astelin NS, ween antihistamine as allergy shots take effect. She is allergic to dog and cat - she has a dog. Suggested Xyzal 1/2 tab daily if needed.  - She will make appointment for mammogram on her own. Will schedule follow up appointment in 3 months to review chronic issues. Continue medications at current doses.  - Pneumovax 5/2011 per her report. She is not interested in tetanus today. She thought she may have had a tetanus vaccine at urgent care - will call to see if they have any record. She had mammogram 6/2017 and pap 2/24/14. She did not get Zoster vaccine. Colonoscopy was done 12/1/2008 with Dr. Blaine Wilkinson - due in 7 years - 12/1/15 (but had normal colonoscopy except tortuous colon and hemorrhoids - patient wants to wait till 2018). Reportedly per Frank Curry there is no vaccine record in paper chart. Will need patient to check at home and if no documentation - will start administering. Did flu and Pneumonia vaccine - of note she called later and said right arm was red and sore, had one day of nausea, diarrhea but resolved quickly - unsure if related to vaccine. Kamron Tovar MD    [facility]  [facility]YSIC[facility]. [place] [address]  [address]  [address]  Dept: [phone]  Dept Fax: [phone] : [phone]

## 2019-08-13 ENCOUNTER — NURSE ONLY (OUTPATIENT)
Dept: LAB | Age: 74
End: 2019-08-13

## 2019-08-13 DIAGNOSIS — N39.41 URGE INCONTINENCE OF URINE: ICD-10-CM

## 2019-08-13 DIAGNOSIS — R82.90 FOUL SMELLING URINE: ICD-10-CM

## 2019-08-13 LAB
AMORPHOUS: ABNORMAL
BACTERIA: ABNORMAL /HPF
BILIRUBIN URINE: NEGATIVE
BLOOD, URINE: ABNORMAL
CASTS 2: ABNORMAL /LPF
CASTS UA: ABNORMAL /LPF
CHARACTER, URINE: ABNORMAL
COLOR: YELLOW
CRYSTALS, UA: ABNORMAL
EPITHELIAL CELLS, UA: ABNORMAL /HPF
GLUCOSE URINE: NEGATIVE MG/DL
KETONES, URINE: NEGATIVE
LEUKOCYTE ESTERASE, URINE: ABNORMAL
MISCELLANEOUS 2: ABNORMAL
NITRITE, URINE: POSITIVE
PH UA: 7.5 (ref 5–9)
PROTEIN UA: NEGATIVE
RBC URINE: ABNORMAL /HPF
RENAL EPITHELIAL, UA: ABNORMAL
SPECIFIC GRAVITY, URINE: 1.02 (ref 1–1.03)
UROBILINOGEN, URINE: 1 EU/DL (ref 0–1)
WBC UA: ABNORMAL /HPF
YEAST: ABNORMAL

## 2019-08-15 ENCOUNTER — TELEPHONE (OUTPATIENT)
Dept: INTERNAL MEDICINE CLINIC | Age: 74
End: 2019-08-15

## 2019-08-15 DIAGNOSIS — N30.00 ACUTE CYSTITIS WITHOUT HEMATURIA: Primary | ICD-10-CM

## 2019-08-15 LAB
ORGANISM: ABNORMAL
URINE CULTURE REFLEX: ABNORMAL

## 2019-08-15 RX ORDER — CIPROFLOXACIN 500 MG/1
500 TABLET, FILM COATED ORAL 2 TIMES DAILY
Qty: 14 TABLET | Refills: 0 | Status: SHIPPED | OUTPATIENT
Start: 2019-08-15 | End: 2019-08-16 | Stop reason: ALTCHOICE

## 2019-08-15 RX ORDER — FLUTICASONE PROPIONATE 50 MCG
SPRAY, SUSPENSION (ML) NASAL
Qty: 16 G | Refills: 5 | Status: SHIPPED | OUTPATIENT
Start: 2019-08-15 | End: 2019-11-18 | Stop reason: SDUPTHER

## 2019-08-16 ENCOUNTER — HOSPITAL ENCOUNTER (OUTPATIENT)
Dept: RADIATION ONCOLOGY | Age: 74
Discharge: HOME | End: 2019-08-16
Payer: MEDICARE

## 2019-08-16 VITALS
TEMPERATURE: 99 F | SYSTOLIC BLOOD PRESSURE: 140 MMHG | OXYGEN SATURATION: 94 % | RESPIRATION RATE: 16 BRPM | HEART RATE: 99 BPM | WEIGHT: 160.27 LBS | DIASTOLIC BLOOD PRESSURE: 63 MMHG | BODY MASS INDEX: 27.3 KG/M2

## 2019-08-19 ENCOUNTER — TELEPHONE (OUTPATIENT)
Dept: INTERNAL MEDICINE CLINIC | Age: 74
End: 2019-08-19

## 2019-08-27 ENCOUNTER — TELEPHONE (OUTPATIENT)
Dept: NEUROSURGERY | Age: 74
End: 2019-08-27

## 2019-08-28 ENCOUNTER — TELEPHONE (OUTPATIENT)
Dept: UROLOGY | Age: 74
End: 2019-08-28

## 2019-08-28 ENCOUNTER — NURSE ONLY (OUTPATIENT)
Dept: LAB | Age: 74
End: 2019-08-28

## 2019-08-28 ENCOUNTER — OFFICE VISIT (OUTPATIENT)
Dept: UROLOGY | Age: 74
End: 2019-08-28
Payer: MEDICARE

## 2019-08-28 VITALS
BODY MASS INDEX: 26.49 KG/M2 | WEIGHT: 159 LBS | SYSTOLIC BLOOD PRESSURE: 124 MMHG | DIASTOLIC BLOOD PRESSURE: 78 MMHG | HEIGHT: 65 IN

## 2019-08-28 DIAGNOSIS — R31.29 MICROSCOPIC HEMATURIA: ICD-10-CM

## 2019-08-28 DIAGNOSIS — N28.1 RENAL CYST, ACQUIRED, LEFT: ICD-10-CM

## 2019-08-28 DIAGNOSIS — D21.9 ANGIOMYOMA: Primary | ICD-10-CM

## 2019-08-28 PROCEDURE — 99214 OFFICE O/P EST MOD 30 MIN: CPT | Performed by: NURSE PRACTITIONER

## 2019-08-28 NOTE — PROGRESS NOTES
Hives and Rash    Sulfa Antibiotics Rash       ROS:  Constitutional: Negative for chills, fatigue, fever, or weight loss. Eyes: Denies reported visual changes. ENT: Denies headache, difficulty swallowing, nose bleeds, ringing in ears, or earaches. Cardiovascular: Negative for chest pain, palpitations, tachycardia or edema. Respiratory: Denies cough or SOB. GI:The patient denies abdominal or flank pain, anorexia, nausea or vomiting. : See HPI  Musculoskeletal: Patient denies low back pain or painful or reduced ROM of the back or extremities. Neurological: The patient denies any symptoms of neurological impairment or TIA's; no history of stroke. Lymphatic: Denies swollen glands in neck, axillary or inguinal areas. Psychiatric: Denies anxiety or depression. Skin: Denies rash or lesions. The remainder of the complete ROS is negative    PHYSICAL EXAM:  VITALS:  There were no vitals taken for this visit. .    Constitutional:    Alert and oriented times 3, no acute distress and cooperative to examination with appropriate mood and affect. HEENT:   Head:         Normocephalic and atraumatic. Mouth/Throat:          Mucous membranes are normal.   Eyes:         EOM are normal. No scleral icterus. Nose:    The external appearance of the nose is normal  Ears: The ears appear normal to external inspection   Neck:         Supple, symmetrical, trachea midline, no adenopathy, thyroid symmetric, not enlarged and no tenderness. Cardiovascular:        Normal rate, regular rhythm, S1 S2 heart sounds. Pulmonary/Chest:       Chest symmetric with normal A/P diameter, no wheezes, rales, or rhonchi noted. Normal respiratory rate and rhthym. No use of accessory muscles. Abdominal:          Soft. No tenderness. Active bowel sounds. Musculoskeletal:    Normal range of motion. She exhibits no edema or tenderness of lower extremities. Extremities:    No cyanosis, clubbing, or edema present.   Neurological:

## 2019-09-05 ENCOUNTER — TELEPHONE (OUTPATIENT)
Dept: UROLOGY | Age: 74
End: 2019-09-05

## 2019-09-05 NOTE — TELEPHONE ENCOUNTER
Pt called again stating her surgery was on 7-3-19 and is wanting to know when she is allowed to start driving again. She is not taking any pain medication.

## 2019-09-05 NOTE — TELEPHONE ENCOUNTER
The patient called for her cytology results. I advised her they were negative for high-grade urothelial carcinoma. She voiced understanding. Please advise.  Thank you

## 2019-09-23 ENCOUNTER — OFFICE VISIT (OUTPATIENT)
Dept: NEUROSURGERY | Age: 74
End: 2019-09-23

## 2019-09-23 VITALS
SYSTOLIC BLOOD PRESSURE: 138 MMHG | WEIGHT: 161 LBS | HEIGHT: 64 IN | BODY MASS INDEX: 27.49 KG/M2 | HEART RATE: 84 BPM | DIASTOLIC BLOOD PRESSURE: 79 MMHG

## 2019-09-23 DIAGNOSIS — D48.1 HEMANGIOBLASTOMA: Primary | ICD-10-CM

## 2019-09-23 DIAGNOSIS — D49.6 CEREBELLAR TUMOR (HCC): ICD-10-CM

## 2019-09-23 DIAGNOSIS — Z98.890 STATUS POST CRANIOTOMY: ICD-10-CM

## 2019-09-23 PROCEDURE — 99024 POSTOP FOLLOW-UP VISIT: CPT | Performed by: NEUROLOGICAL SURGERY

## 2019-09-26 ENCOUNTER — TELEPHONE (OUTPATIENT)
Dept: INTERNAL MEDICINE CLINIC | Age: 74
End: 2019-09-26

## 2019-10-16 ENCOUNTER — TELEPHONE (OUTPATIENT)
Dept: NEUROSURGERY | Age: 74
End: 2019-10-16

## 2019-10-24 ENCOUNTER — HOSPITAL ENCOUNTER (OUTPATIENT)
Dept: MRI IMAGING | Age: 74
Discharge: HOME | End: 2019-10-24
Payer: MEDICARE

## 2019-10-24 DIAGNOSIS — D48.1 HEMANGIOBLASTOMA: ICD-10-CM

## 2019-10-24 LAB — POC CREATININE WHOLE BLOOD: 0.8 MG/DL (ref 0.5–1.2)

## 2019-10-24 PROCEDURE — 72156 MRI NECK SPINE W/O & W/DYE: CPT

## 2019-10-24 PROCEDURE — 82565 ASSAY OF CREATININE: CPT

## 2019-10-24 PROCEDURE — 70553 MRI BRAIN STEM W/O & W/DYE: CPT

## 2019-10-24 PROCEDURE — 6360000004 HC RX CONTRAST MEDICATION: Performed by: PHYSICIAN ASSISTANT

## 2019-10-24 PROCEDURE — 72157 MRI CHEST SPINE W/O & W/DYE: CPT

## 2019-10-24 PROCEDURE — 72158 MRI LUMBAR SPINE W/O & W/DYE: CPT

## 2019-10-24 PROCEDURE — A9579 GAD-BASE MR CONTRAST NOS,1ML: HCPCS | Performed by: PHYSICIAN ASSISTANT

## 2019-10-24 RX ADMIN — GADOTERIDOL 15 ML: 279.3 INJECTION, SOLUTION INTRAVENOUS at 16:06

## 2019-11-06 ENCOUNTER — OFFICE VISIT (OUTPATIENT)
Dept: NEUROSURGERY | Age: 74
End: 2019-11-06
Payer: MEDICARE

## 2019-11-06 VITALS
SYSTOLIC BLOOD PRESSURE: 120 MMHG | BODY MASS INDEX: 27.79 KG/M2 | WEIGHT: 162.8 LBS | HEART RATE: 90 BPM | DIASTOLIC BLOOD PRESSURE: 67 MMHG | HEIGHT: 64 IN

## 2019-11-06 DIAGNOSIS — D48.1 HEMANGIOBLASTOMA: Primary | ICD-10-CM

## 2019-11-06 DIAGNOSIS — Z98.890 STATUS POST CRANIOTOMY: ICD-10-CM

## 2019-11-06 DIAGNOSIS — D49.6 CEREBELLAR TUMOR (HCC): ICD-10-CM

## 2019-11-06 DIAGNOSIS — N28.1 RENAL CYST: ICD-10-CM

## 2019-11-06 PROCEDURE — 99213 OFFICE O/P EST LOW 20 MIN: CPT | Performed by: PHYSICIAN ASSISTANT

## 2019-11-08 ENCOUNTER — HOSPITAL ENCOUNTER (OUTPATIENT)
Dept: RADIATION ONCOLOGY | Age: 74
Discharge: HOME | End: 2019-11-08
Payer: MEDICARE

## 2019-11-08 VITALS
DIASTOLIC BLOOD PRESSURE: 86 MMHG | SYSTOLIC BLOOD PRESSURE: 137 MMHG | RESPIRATION RATE: 16 BRPM | OXYGEN SATURATION: 96 % | WEIGHT: 163.58 LBS | TEMPERATURE: 97 F | HEART RATE: 74 BPM | BODY MASS INDEX: 27.66 KG/M2

## 2019-11-08 PROCEDURE — 99212 OFFICE O/P EST SF 10 MIN: CPT | Performed by: RADIOLOGY

## 2019-11-08 PROCEDURE — 99999 PR OFFICE/OUTPT VISIT,PROCEDURE ONLY: CPT | Performed by: RADIOLOGY

## 2019-11-18 ENCOUNTER — OFFICE VISIT (OUTPATIENT)
Dept: INTERNAL MEDICINE CLINIC | Age: 74
End: 2019-11-18
Payer: MEDICARE

## 2019-11-18 VITALS
WEIGHT: 163.8 LBS | HEIGHT: 64 IN | HEART RATE: 68 BPM | SYSTOLIC BLOOD PRESSURE: 136 MMHG | BODY MASS INDEX: 27.96 KG/M2 | DIASTOLIC BLOOD PRESSURE: 80 MMHG

## 2019-11-18 DIAGNOSIS — R10.13 DYSPEPSIA: ICD-10-CM

## 2019-11-18 DIAGNOSIS — E78.2 MIXED HYPERLIPIDEMIA: ICD-10-CM

## 2019-11-18 DIAGNOSIS — N32.81 OVERACTIVE BLADDER: ICD-10-CM

## 2019-11-18 DIAGNOSIS — Z85.820 HISTORY OF MELANOMA: ICD-10-CM

## 2019-11-18 DIAGNOSIS — I10 ESSENTIAL HYPERTENSION: Primary | ICD-10-CM

## 2019-11-18 DIAGNOSIS — L65.9 HAIR LOSS: ICD-10-CM

## 2019-11-18 DIAGNOSIS — E87.6 HYPOKALEMIA: ICD-10-CM

## 2019-11-18 DIAGNOSIS — J30.89 NON-SEASONAL ALLERGIC RHINITIS DUE TO OTHER ALLERGIC TRIGGER: ICD-10-CM

## 2019-11-18 PROCEDURE — 99214 OFFICE O/P EST MOD 30 MIN: CPT | Performed by: INTERNAL MEDICINE

## 2019-11-18 RX ORDER — POTASSIUM CHLORIDE 20 MEQ/1
20 TABLET, EXTENDED RELEASE ORAL DAILY
Qty: 90 TABLET | Refills: 1 | Status: SHIPPED | OUTPATIENT
Start: 2019-11-18 | End: 2020-05-04 | Stop reason: SDUPTHER

## 2019-11-18 RX ORDER — PRAVASTATIN SODIUM 40 MG
40 TABLET ORAL EVERY EVENING
Qty: 90 TABLET | Refills: 1 | Status: SHIPPED | OUTPATIENT
Start: 2019-11-18 | End: 2020-05-04 | Stop reason: SDUPTHER

## 2019-11-18 RX ORDER — NYSTATIN 100000 U/G
CREAM TOPICAL
Refills: 0 | COMMUNITY
Start: 2019-11-07 | End: 2020-06-11 | Stop reason: ALTCHOICE

## 2019-11-18 RX ORDER — BIOTIN 1 MG
TABLET ORAL DAILY
COMMUNITY

## 2019-11-18 RX ORDER — OXYBUTYNIN CHLORIDE 5 MG/1
5 TABLET ORAL 2 TIMES DAILY
Qty: 180 TABLET | Refills: 1 | Status: SHIPPED | OUTPATIENT
Start: 2019-11-18 | End: 2020-05-04 | Stop reason: SDUPTHER

## 2019-11-18 RX ORDER — LISINOPRIL AND HYDROCHLOROTHIAZIDE 25; 20 MG/1; MG/1
TABLET ORAL
Qty: 90 TABLET | Refills: 1 | Status: SHIPPED | OUTPATIENT
Start: 2019-11-18 | End: 2020-05-04 | Stop reason: SDUPTHER

## 2019-11-18 RX ORDER — FLUTICASONE PROPIONATE 50 MCG
SPRAY, SUSPENSION (ML) NASAL
Qty: 16 G | Refills: 5 | Status: SHIPPED | OUTPATIENT
Start: 2019-11-18 | End: 2019-12-18 | Stop reason: SDUPTHER

## 2019-11-18 RX ORDER — FAMOTIDINE 20 MG/1
TABLET, FILM COATED ORAL
Qty: 90 TABLET | Refills: 1 | Status: SHIPPED | OUTPATIENT
Start: 2019-11-18 | End: 2020-08-28

## 2019-11-18 RX ORDER — AMLODIPINE BESYLATE 10 MG/1
TABLET ORAL
Qty: 90 TABLET | Refills: 1 | Status: SHIPPED | OUTPATIENT
Start: 2019-11-18 | End: 2020-05-04 | Stop reason: SDUPTHER

## 2019-12-01 PROBLEM — Z85.820 HISTORY OF MELANOMA: Status: ACTIVE | Noted: 2019-12-01

## 2019-12-01 PROBLEM — L65.9 HAIR LOSS: Status: ACTIVE | Noted: 2019-12-01

## 2019-12-01 PROBLEM — E87.6 HYPOKALEMIA: Status: ACTIVE | Noted: 2019-12-01

## 2019-12-03 ENCOUNTER — HOSPITAL ENCOUNTER (OUTPATIENT)
Dept: RADIATION ONCOLOGY | Age: 74
Discharge: HOME | End: 2019-12-03
Payer: MEDICARE

## 2019-12-03 PROCEDURE — 99212 OFFICE O/P EST SF 10 MIN: CPT | Performed by: RADIOLOGY

## 2019-12-07 ENCOUNTER — HOSPITAL ENCOUNTER (EMERGENCY)
Age: 74
Discharge: HOME | End: 2019-12-07
Payer: MEDICARE

## 2019-12-07 VITALS
HEIGHT: 64 IN | OXYGEN SATURATION: 96 % | DIASTOLIC BLOOD PRESSURE: 65 MMHG | TEMPERATURE: 98 F | WEIGHT: 160 LBS | RESPIRATION RATE: 14 BRPM | BODY MASS INDEX: 27.31 KG/M2 | HEART RATE: 67 BPM | SYSTOLIC BLOOD PRESSURE: 134 MMHG

## 2019-12-07 DIAGNOSIS — J40 BRONCHITIS: Primary | ICD-10-CM

## 2019-12-07 PROCEDURE — 99212 OFFICE O/P EST SF 10 MIN: CPT

## 2019-12-07 PROCEDURE — 99213 OFFICE O/P EST LOW 20 MIN: CPT | Performed by: NURSE PRACTITIONER

## 2019-12-07 RX ORDER — METHYLPREDNISOLONE 4 MG/1
TABLET ORAL
Qty: 1 KIT | Refills: 0 | Status: SHIPPED | OUTPATIENT
Start: 2019-12-07 | End: 2019-12-13

## 2019-12-07 RX ORDER — DOXYCYCLINE HYCLATE 100 MG
100 TABLET ORAL 2 TIMES DAILY
Qty: 14 TABLET | Refills: 0 | Status: SHIPPED | OUTPATIENT
Start: 2019-12-07 | End: 2019-12-14

## 2019-12-07 RX ORDER — ACETAMINOPHEN,DIPHENHYDRAMINE HCL 500; 25 MG/1; MG/1
1 TABLET, FILM COATED ORAL NIGHTLY PRN
COMMUNITY

## 2019-12-07 RX ORDER — BENZONATATE 100 MG/1
200 CAPSULE ORAL 3 TIMES DAILY PRN
Qty: 20 CAPSULE | Refills: 0 | Status: SHIPPED | OUTPATIENT
Start: 2019-12-07 | End: 2020-06-11 | Stop reason: ALTCHOICE

## 2019-12-07 ASSESSMENT — ENCOUNTER SYMPTOMS
SWOLLEN GLANDS: 1
SORE THROAT: 1
SINUS PRESSURE: 1
WHEEZING: 0
NAUSEA: 1
COUGH: 1
EYES NEGATIVE: 1
SHORTNESS OF BREATH: 0
SINUS PAIN: 1
CHEST TIGHTNESS: 0

## 2019-12-10 ENCOUNTER — TELEPHONE (OUTPATIENT)
Dept: INTERNAL MEDICINE CLINIC | Age: 74
End: 2019-12-10

## 2019-12-18 ENCOUNTER — HOSPITAL ENCOUNTER (EMERGENCY)
Age: 74
Discharge: HOME | End: 2019-12-18
Payer: MEDICARE

## 2019-12-18 VITALS
WEIGHT: 162.4 LBS | HEIGHT: 64 IN | BODY MASS INDEX: 27.72 KG/M2 | RESPIRATION RATE: 16 BRPM | OXYGEN SATURATION: 98 % | TEMPERATURE: 98 F | HEART RATE: 73 BPM | SYSTOLIC BLOOD PRESSURE: 138 MMHG | DIASTOLIC BLOOD PRESSURE: 63 MMHG

## 2019-12-18 DIAGNOSIS — J30.89 NON-SEASONAL ALLERGIC RHINITIS DUE TO OTHER ALLERGIC TRIGGER: ICD-10-CM

## 2019-12-18 DIAGNOSIS — J30.81 ALLERGIC RHINITIS DUE TO ANIMAL HAIR AND DANDER: Primary | ICD-10-CM

## 2019-12-18 DIAGNOSIS — J00 ACUTE NASOPHARYNGITIS: ICD-10-CM

## 2019-12-18 DIAGNOSIS — H60.391 INFECTIVE OTITIS EXTERNA OF RIGHT EAR: ICD-10-CM

## 2019-12-18 PROCEDURE — 99212 OFFICE O/P EST SF 10 MIN: CPT

## 2019-12-18 PROCEDURE — 99213 OFFICE O/P EST LOW 20 MIN: CPT | Performed by: NURSE PRACTITIONER

## 2019-12-18 RX ORDER — OFLOXACIN 3 MG/ML
5 SOLUTION AURICULAR (OTIC) 2 TIMES DAILY
Qty: 3.5 ML | Refills: 0 | Status: SHIPPED | OUTPATIENT
Start: 2019-12-18 | End: 2019-12-25

## 2019-12-18 RX ORDER — FLUTICASONE PROPIONATE 50 MCG
2 SPRAY, SUSPENSION (ML) NASAL DAILY
Qty: 16 G | Refills: 0 | Status: SHIPPED | OUTPATIENT
Start: 2019-12-18 | End: 2020-03-24 | Stop reason: SDUPTHER

## 2019-12-18 RX ORDER — CALCIUM CARBONATE 260MG(650)
1 TABLET,CHEWABLE ORAL
Qty: 40 LOZENGE | Refills: 0 | COMMUNITY
Start: 2019-12-18 | End: 2020-04-23

## 2019-12-18 RX ORDER — ASCORBIC ACID/MULTIVIT-MIN 1000 MG
1 EFFERVESCENT POWDER IN PACKET ORAL DAILY
Refills: 0 | COMMUNITY
Start: 2019-12-18 | End: 2020-04-23

## 2019-12-18 RX ORDER — LORATADINE 10 MG/1
10 TABLET ORAL DAILY
Qty: 30 TABLET | Refills: 0 | Status: SHIPPED | OUTPATIENT
Start: 2019-12-18 | End: 2021-06-05

## 2019-12-18 ASSESSMENT — ENCOUNTER SYMPTOMS
COUGH: 1
VOICE CHANGE: 0
SORE THROAT: 0
VOMITING: 0
SINUS PRESSURE: 1
STRIDOR: 0
WHEEZING: 0
NAUSEA: 0
SINUS PAIN: 0
SWOLLEN GLANDS: 0
TROUBLE SWALLOWING: 0
APNEA: 0
CHOKING: 0
CONSTIPATION: 0
DIARRHEA: 0
ABDOMINAL PAIN: 0
RHINORRHEA: 1
SHORTNESS OF BREATH: 0
CHEST TIGHTNESS: 0

## 2019-12-19 ENCOUNTER — TELEPHONE (OUTPATIENT)
Dept: INTERNAL MEDICINE CLINIC | Age: 74
End: 2019-12-19

## 2020-02-06 ENCOUNTER — TELEPHONE (OUTPATIENT)
Dept: INTERNAL MEDICINE CLINIC | Age: 75
End: 2020-02-06

## 2020-02-06 NOTE — TELEPHONE ENCOUNTER
Patient called stating that she was told by her pharmacy that the Famotidine is currently unavailable . Instructed patient that she could try the over the counter Famotidine as it is available and is low cost . Patient verbalized understanding . instructed patient to call if any problem with the OTC.

## 2020-03-24 RX ORDER — FLUTICASONE PROPIONATE 50 MCG
2 SPRAY, SUSPENSION (ML) NASAL DAILY
Qty: 16 G | Refills: 3 | Status: SHIPPED | OUTPATIENT
Start: 2020-03-24 | End: 2020-10-19 | Stop reason: SDUPTHER

## 2020-04-14 ENCOUNTER — TELEPHONE (OUTPATIENT)
Dept: FAMILY MEDICINE CLINIC | Age: 75
End: 2020-04-14

## 2020-04-23 ENCOUNTER — TELEMEDICINE (OUTPATIENT)
Dept: INTERNAL MEDICINE CLINIC | Age: 75
End: 2020-04-23
Payer: MEDICARE

## 2020-04-23 VITALS
WEIGHT: 171 LBS | BODY MASS INDEX: 29.35 KG/M2 | SYSTOLIC BLOOD PRESSURE: 119 MMHG | HEART RATE: 67 BPM | DIASTOLIC BLOOD PRESSURE: 64 MMHG

## 2020-04-23 PROCEDURE — G0439 PPPS, SUBSEQ VISIT: HCPCS | Performed by: INTERNAL MEDICINE

## 2020-04-23 RX ORDER — ANASTROZOLE 1 MG/1
1 TABLET ORAL DAILY
COMMUNITY
End: 2021-06-05

## 2020-04-23 ASSESSMENT — PATIENT HEALTH QUESTIONNAIRE - PHQ9
SUM OF ALL RESPONSES TO PHQ QUESTIONS 1-9: 0
SUM OF ALL RESPONSES TO PHQ QUESTIONS 1-9: 0

## 2020-04-23 ASSESSMENT — LIFESTYLE VARIABLES: HOW OFTEN DO YOU HAVE A DRINK CONTAINING ALCOHOL: 0

## 2020-04-23 NOTE — PROGRESS NOTES
Medicare Annual Wellness Visit  Name: Donald Amaral Date: 2020   MRN: [id number] Sex: Female   Age: 76 y.o. Ethnicity: Non-/Non    : 1945 Race: Prasanna Rivas is here for Medicare AWV    Screenings for behavioral, psychosocial and functional/safety risks, and cognitive dysfunction are all negative except as indicated below. These results, as well as other patient data from the [address] form, are documented in Flowsheets linked to this Encounter. Allergies   Allergen Reactions    Betadine [Povidone Iodine] Rash    Chloraprep One Step [Chlorhexidine Gluconate] Rash    Pcn [Penicillins] Hives and Rash    Sulfa Antibiotics Rash       Prior to Visit Medications    Medication Sig Taking? Authorizing Provider   anastrozole (ARIMIDEX) 1 MG tablet Take 1 mg by mouth daily Yes Historical Provider, MD   fluticasone (FLONASE) 50 MCG/ACT nasal spray 2 sprays by Nasal route daily instill 2 sprays into each nostril once daily. Refill when due. Yes Danie Noel MD   diphenhydrAMINE-APAP, sleep, (TYLENOL PM EXTRA STRENGTH)  MG tablet Take 1 tablet by mouth nightly as needed for Sleep Yes Historical Provider, MD   Biotin 1000 MCG TABS Take by mouth daily Yes Historical Provider, MD   Artificial Tear Ointment (DRY EYES OP) Apply to eye as needed Yes Historical Provider, MD   amLODIPine (NORVASC) 10 MG tablet take 1 tablet by mouth once daily Yes Danie Noel MD   oxybutynin (DITROPAN) 5 MG tablet Take 1 tablet by mouth 2 times daily Yes Danie Noel MD   potassium chloride (KLOR-CON M) 20 MEQ extended release tablet Take 1 tablet by mouth daily Yes Danie Noel MD   pravastatin (PRAVACHOL) 40 MG tablet Take 1 tablet by mouth every evening Yes Danie Noel MD   lisinopril-hydrochlorothiazide (PRINZIDE;ZESTORETIC) 20-25 MG per tablet Take 1 tab by mouth daily .  Yes Danie Noel MD   polyethylene glycol [facility]) powder take 17GM (DISSOLVED IN WATER) by mouth once daily Yes Octavia Lilly MD   Calcium Carb-Cholecalciferol (CALCIUM 500+D3) 500-400 MG-UNIT TABS Take 1 tablet by mouth 2 times daily  Yes Historical Provider, MD   Cholecalciferol (VITAMIN D3) 2000 units CAPS Take 1 capsule by mouth 2 times daily  Yes Historical Provider, MD   acetaminophen (TYLENOL) 325 MG tablet Take 650 mg by mouth every 6 hours as needed for Pain Yes Historical Provider, MD   vitamin E 400 UNIT capsule Take 400 Units by mouth daily Yes Historical Provider, MD   aspirin 81 MG tablet Take 81 mg by mouth daily  Yes Historical Provider, MD   loratadine (CLARITIN) 10 MG tablet Take 1 tablet by mouth daily  Patient not taking: Reported on 4/23/2020  Victoria Malloy APRN - CNP   benzonatate (TESSALON PERLES) 100 MG capsule Take 2 capsules by mouth 3 times daily as needed for Cough  Patient not taking: Reported on 4/23/2020  Desiree Garcia APRN - CNP   nystatin (MYCOSTATIN) 901217 UNIT/GM cream apply to affected area twice a day for 10 days  Historical Provider, MD   famotidine (PEPCID) 20 MG tablet take 1 tablet by mouth daily. Refill when due .   Patient not taking: Reported on 4/23/2020  Octavia Lilly MD       Past Medical History:   Diagnosis Date    Abnormal EKG     normal stress test 3/2009    Breast cancer ([address]     Cancer ([address]     basal cell skin    Complication of anesthesia     difficulty breathing after surgery- [facility]. Angeline's for 3 days, O2 for about 5 days    Constipation     Diabetes mellitus ([address]     History type 2 - lost weight - diet controlled    Hyperlipidemia     Hypertension     Melanoma in situ ([address] 6/2012    of chest - Dr. Enma Hernandez - margins clear - neg sentinal lymph node    Melanoma in situ of lower extremity ([address]     excision- x2    Metabolic syndrome     much improved - normal triglycerides, weight loss of 50#    OA (osteoarthritis)     left knee       Past Surgical History:

## 2020-04-23 NOTE — PATIENT INSTRUCTIONS
Personalized Preventive Plan for Lolly Carter - 4/23/2020  Medicare offers a range of preventive health benefits. Some of the tests and screenings are paid in full while other may be subject to a deductible, co-insurance, and/or copay. Some of these benefits include a comprehensive review of your medical history including lifestyle, illnesses that may run in your family, and various assessments and screenings as appropriate. After reviewing your medical record and screening and assessments performed today your provider may have ordered immunizations, labs, imaging, and/or referrals for you. A list of these orders (if applicable) as well as your Preventive Care list are included within your After Visit Summary for your review. Other Preventive Recommendations:    · A preventive eye exam performed by an eye specialist is recommended every 1-2 years to screen for glaucoma; cataracts, macular degeneration, and other eye disorders. · A preventive dental visit is recommended every 6 months. · Try to get at least 150 minutes of exercise per week or 10,000 steps per day on a pedometer . · Order or download the FREE \"Exercise & Physical Activity: Your Everyday Guide\" from [employer]. Call 3-783.628.5760 or search [employer] online. · You need 9897-5989 mg of calcium and 0032-5843 IU of vitamin D per day. It is possible to meet your calcium requirement with diet alone, but a vitamin D supplement is usually necessary to meet this goal.  · When exposed to the sun, use a sunscreen that protects against both UVA and UVB radiation with an SPF of 30 or greater. Reapply every 2 to 3 hours or after sweating, drying off with a towel, or swimming. · Always wear a seat belt when traveling in a car. Always wear a helmet when riding a bicycle or motorcycle.

## 2020-05-04 RX ORDER — LISINOPRIL AND HYDROCHLOROTHIAZIDE 25; 20 MG/1; MG/1
TABLET ORAL
Qty: 90 TABLET | Refills: 1 | Status: SHIPPED | OUTPATIENT
Start: 2020-05-04 | End: 2020-12-10 | Stop reason: SDUPTHER

## 2020-05-04 RX ORDER — PRAVASTATIN SODIUM 40 MG
40 TABLET ORAL EVERY EVENING
Qty: 90 TABLET | Refills: 1 | Status: SHIPPED | OUTPATIENT
Start: 2020-05-04 | End: 2020-12-10 | Stop reason: SDUPTHER

## 2020-05-04 RX ORDER — POTASSIUM CHLORIDE 20 MEQ/1
20 TABLET, EXTENDED RELEASE ORAL DAILY
Qty: 90 TABLET | Refills: 1 | Status: SHIPPED | OUTPATIENT
Start: 2020-05-04 | End: 2020-12-10 | Stop reason: SDUPTHER

## 2020-05-04 RX ORDER — OXYBUTYNIN CHLORIDE 5 MG/1
5 TABLET ORAL 2 TIMES DAILY
Qty: 180 TABLET | Refills: 1 | Status: SHIPPED | OUTPATIENT
Start: 2020-05-04 | End: 2020-12-05 | Stop reason: SDUPTHER

## 2020-05-04 RX ORDER — POLYETHYLENE GLYCOL 3350 17 G/17G
POWDER, FOR SOLUTION ORAL
Qty: 527 G | Refills: 3 | Status: SHIPPED | OUTPATIENT
Start: 2020-05-04 | End: 2020-12-10 | Stop reason: SDUPTHER

## 2020-05-04 RX ORDER — AMLODIPINE BESYLATE 10 MG/1
TABLET ORAL
Qty: 90 TABLET | Refills: 1 | Status: SHIPPED | OUTPATIENT
Start: 2020-05-04 | End: 2020-11-20 | Stop reason: SDUPTHER

## 2020-05-29 ENCOUNTER — HOSPITAL ENCOUNTER (OUTPATIENT)
Dept: MRI IMAGING | Age: 75
Discharge: HOME | End: 2020-05-29
Payer: MEDICARE

## 2020-05-29 LAB — POC CREATININE WHOLE BLOOD: 0.7 MG/DL (ref 0.5–1.2)

## 2020-05-29 PROCEDURE — 6360000004 HC RX CONTRAST MEDICATION: Performed by: PHYSICIAN ASSISTANT

## 2020-05-29 PROCEDURE — A9579 GAD-BASE MR CONTRAST NOS,1ML: HCPCS | Performed by: PHYSICIAN ASSISTANT

## 2020-05-29 PROCEDURE — 70553 MRI BRAIN STEM W/O & W/DYE: CPT

## 2020-05-29 PROCEDURE — 82565 ASSAY OF CREATININE: CPT

## 2020-05-29 RX ADMIN — GADOTERIDOL 15 ML: 279.3 INJECTION, SOLUTION INTRAVENOUS at 10:32

## 2020-06-05 ENCOUNTER — OFFICE VISIT (OUTPATIENT)
Dept: NEUROSURGERY | Age: 75
End: 2020-06-05
Payer: MEDICARE

## 2020-06-05 VITALS
HEIGHT: 64 IN | BODY MASS INDEX: 29.12 KG/M2 | TEMPERATURE: 97.7 F | SYSTOLIC BLOOD PRESSURE: 126 MMHG | DIASTOLIC BLOOD PRESSURE: 74 MMHG | HEART RATE: 70 BPM | WEIGHT: 170.6 LBS

## 2020-06-05 PROCEDURE — 99214 OFFICE O/P EST MOD 30 MIN: CPT | Performed by: PHYSICIAN ASSISTANT

## 2020-06-05 NOTE — PROGRESS NOTES
[address] [place] [address]  Dept: [phone]  Dept Fax: [phone]  Loc: [facility] Follow Visit  Visit Date: 6/5/2020      Celso Sweeney  is a 76 y.o. female who is returning to the office today for a post-op follow-up visit. She had surgery on 07/03/2019 to undergo suboccipital craniectomy for resection of cerebellar lesion performed by Dr. Willie Rider without complication. He was last seen and evaluated in our office on 11/6/2019 he was maintaining appointments with radiation oncology for treatment. Incision was well-healed at that visit. MRI of the brain imaged on that time was stable and improving. She presents today with a new MRI of the brain imaged with and without contrast on 5/29/2020 which revealed a redemonstration of the right suboccipital Craney anatomy with underlying resection cavity. Tangents on prior imaging are markedly decreased in size and conspicuity. Markedly decreased thickness is compared to prior exam with no nodular enhancement identified. This is a stable image compared to prior exam.  He arrives today accompanied by her daughter and remains very happy with the outcome of her procedure. He relates complete resolution of her prior symptoms and arrives today without any complaints. Her incision is completely healed. On physical exam she is stable and grossly intact for strength and sensation for upper and lower extremities bilaterally and symmetrically. Completed radiation treatments and is no longer following up with them after release. We have agreed to follow-up with her with a new image in 18 months for comparison and review along with a follow-up at that time.   They are encouraged to contact her office in the interim with any questions or concerns or should she experience any significant changes in her general health.     · Patient was evaluated today and is doing very well overall. · No new complaints were voiced. · Patient  lives with their family  · Wound: wound healing as expected  · Follow-up Studies: No orders of the defined types were placed in this encounter. ·      Assessment/Plan:  · Status Post craniotomy for resection of cerebellar lesion performed by Dr. Vikram Iraheta without complication. · Doing very well overall  · Encouraged gradual increase in physical and mental activity. · Fall precaution and home safety education provided to patient.   · Follow-up: 18 months      Electronically signed by Melba Alonso PA-C on 6/5/20 at 10:04 AM EDT

## 2020-06-11 ENCOUNTER — NURSE ONLY (OUTPATIENT)
Dept: LAB | Age: 75
End: 2020-06-11

## 2020-06-11 ENCOUNTER — OFFICE VISIT (OUTPATIENT)
Dept: INTERNAL MEDICINE CLINIC | Age: 75
End: 2020-06-11
Payer: MEDICARE

## 2020-06-11 VITALS
DIASTOLIC BLOOD PRESSURE: 80 MMHG | WEIGHT: 169.2 LBS | SYSTOLIC BLOOD PRESSURE: 138 MMHG | TEMPERATURE: 96.8 F | BODY MASS INDEX: 28.89 KG/M2 | HEART RATE: 68 BPM | HEIGHT: 64 IN

## 2020-06-11 LAB
ALBUMIN SERPL-MCNC: 4.7 G/DL (ref 3.5–5.1)
ALP BLD-CCNC: 68 U/L (ref 38–126)
ALT SERPL-CCNC: 13 U/L (ref 11–66)
ANION GAP SERPL CALCULATED.3IONS-SCNC: 11 MEQ/L (ref 8–16)
AST SERPL-CCNC: 18 U/L (ref 5–40)
BASOPHILS # BLD: 1 %
BASOPHILS ABSOLUTE: 0.1 THOU/MM3 (ref 0–0.1)
BILIRUB SERPL-MCNC: 0.6 MG/DL (ref 0.3–1.2)
BUN BLDV-MCNC: 14 MG/DL (ref 7–22)
CALCIUM SERPL-MCNC: 10.2 MG/DL (ref 8.5–10.5)
CHLORIDE BLD-SCNC: 103 MEQ/L (ref 98–111)
CHOLESTEROL, TOTAL: 186 MG/DL (ref 100–199)
CO2: 30 MEQ/L (ref 23–33)
CREAT SERPL-MCNC: 0.6 MG/DL (ref 0.4–1.2)
EOSINOPHIL # BLD: 3.3 %
EOSINOPHILS ABSOLUTE: 0.2 THOU/MM3 (ref 0–0.4)
ERYTHROCYTE [DISTWIDTH] IN BLOOD BY AUTOMATED COUNT: 12.3 % (ref 11.5–14.5)
ERYTHROCYTE [DISTWIDTH] IN BLOOD BY AUTOMATED COUNT: 39.5 FL (ref 35–45)
GFR SERPL CREATININE-BSD FRML MDRD: > 90 ML/MIN/1.73M2
GLUCOSE BLD-MCNC: 109 MG/DL (ref 70–108)
HBA1C MFR BLD: 5.9 % (ref 4.3–5.7)
HCT VFR BLD CALC: 44.5 % (ref 37–47)
HDLC SERPL-MCNC: 59 MG/DL
HEMOGLOBIN: 14.1 GM/DL (ref 12–16)
IMMATURE GRANS (ABS): 0.02 THOU/MM3 (ref 0–0.07)
IMMATURE GRANULOCYTES: 0.3 %
LDL CHOLESTEROL CALCULATED: 95 MG/DL
LYMPHOCYTES # BLD: 19.2 %
LYMPHOCYTES ABSOLUTE: 1.2 THOU/MM3 (ref 1–4.8)
MCH RBC QN AUTO: 28 PG (ref 26–33)
MCHC RBC AUTO-ENTMCNC: 31.7 GM/DL (ref 32.2–35.5)
MCV RBC AUTO: 88.5 FL (ref 81–99)
MONOCYTES # BLD: 7 %
MONOCYTES ABSOLUTE: 0.4 THOU/MM3 (ref 0.4–1.3)
NUCLEATED RED BLOOD CELLS: 0 /100 WBC
PLATELET # BLD: 206 THOU/MM3 (ref 130–400)
PMV BLD AUTO: 11.2 FL (ref 9.4–12.4)
POTASSIUM SERPL-SCNC: 3.6 MEQ/L (ref 3.5–5.2)
RBC # BLD: 5.03 MILL/MM3 (ref 4.2–5.4)
SEG NEUTROPHILS: 69.2 %
SEGMENTED NEUTROPHILS ABSOLUTE COUNT: 4.2 THOU/MM3 (ref 1.8–7.7)
SODIUM BLD-SCNC: 144 MEQ/L (ref 135–145)
TOTAL PROTEIN: 6.8 G/DL (ref 6.1–8)
TRIGL SERPL-MCNC: 159 MG/DL (ref 0–199)
TSH SERPL DL<=0.05 MIU/L-ACNC: 2.73 UIU/ML (ref 0.4–4.2)
WBC # BLD: 6 THOU/MM3 (ref 4.8–10.8)

## 2020-06-11 PROCEDURE — 83036 HEMOGLOBIN GLYCOSYLATED A1C: CPT | Performed by: INTERNAL MEDICINE

## 2020-06-11 PROCEDURE — 90471 IMMUNIZATION ADMIN: CPT | Performed by: INTERNAL MEDICINE

## 2020-06-11 PROCEDURE — 99214 OFFICE O/P EST MOD 30 MIN: CPT | Performed by: INTERNAL MEDICINE

## 2020-06-11 PROCEDURE — 90715 TDAP VACCINE 7 YRS/> IM: CPT | Performed by: INTERNAL MEDICINE

## 2020-06-11 RX ORDER — FAMOTIDINE 10 MG
10 TABLET ORAL PRN
COMMUNITY

## 2020-06-11 RX ORDER — ASCORBIC ACID 500 MG
500 TABLET ORAL DAILY
COMMUNITY
End: 2021-06-05

## 2020-06-11 NOTE — PROGRESS NOTES
Chief Complaint   Patient presents with    Hypertension     3 month follow up    Hyperlipidemia    Other     would like a referral to derm in [facility]-does not want to go back to dr Joseline Perales     This patient presents for medical evaluation. This is a 6 month follow up visit. Allergic rhinitis - stopped injections in 7/2019 with surgery and never went back. She is using Flonase regularly. She only uses Claritin prn. Melanoma - She wants a new Dermatologist to follow her skin evaluations - h/o multiple melanomas - will refer to Dr. Lazaro Wolff. Give Tdap. 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 Pathology - fibrocystic changes including cyst formation, apocrine metaplasia, fibrosis and microcalcification. She is going to follow up with Dr. Raudel Pierre to discuss 11/25/19 - DCIS on bx but not in surgical path - so will need to question if need to do radiation (for brain). Lateral incision left breast - well approximated and no redness or discharge. She got a rash from Betadine - none now. She recently did bx and was benign. On anti-estrogen - Dr. Salvatore Ledezma. No radiation. Mammogram due in 11/2020. In pre-op work up of this - went to cardiology at [facility] due to abnormal EKG (septal infarct) - did ECHO 11/1/19 - normal.      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 Merna Randolph. Now seeing Dr. Tyrell Mitchell - considering radiation but putting off for now. Now looking into [facility] disease - MRI spine and ophthamology evaluation. She has visit with Dr. Lindsay Burroughs 8/26/19. She is also complaining of dry eye. Post-op complicated with shingles (healed currently), and redness around cuticles. Upon review of scans 6/2019- thyroid nodule seen 1.4 cm but smaller than what was seen on CT 2015.   Mild rectal wall thickening - just had colonoscopy 2/2019 - to have nasal spray with allergist and combination has improved symptoms. Reviewed CT and explained results - anatomy may be contributing to symptoms. But she is feeling better with treatment as above, so not wanting to see ENT. CT results:  1. No evidence to suggest acute sinusitis.       2. Mild mucosal thickening is present within the bilateral maxillary sinuses. Mucosal coaptation also causes mild narrowing of the left ostiomeatal unit.       3. 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 She is now taking allergy shots, as she was having sedation with anti-histamine. She is continuing anti-histamine and nasal spray till shots are in her system. Allergist is managing. Told her to decrease Xyzal to 1/2 tab at night. Today - she is no longer using medication since surgery and symptoms resolved in hospital.  But now back home with dog she is congested. She started back on the Flonase. HM - DEXA reviewed from 2/8/19 - normal, and colonoscopy done 2/2019 - due in 10 years - but will be 81 yo and high risk. Microscopic hematuria - probable due to left renal cyst on u/s, persistent hematuria - added Cipro x 3 days and repeated UA. If still has microscopic hematuria after Cipro - send to Urology. UA still with RBC - send to Urology for further work-up. She saw them and had normal cystoscopy, CT urogram and cytology. Need to do yearly cytology- - due in 2/2019 - I will order as no follow up with Urology. UA with trace blood 2/2019, cytology was not ordered. Will ask Urology how to order as not coming up in [address] [place] [address] Rd. Negative for malignant cells 2/2019 and 8/2019. Hypokalemia - most likely due to HCTZ. Offered to change HCTZ to something else but she would rather stay on this regimen. Potassium 3.7 1/2018 - repeat potassium 4.3 1/2019 on KCL 20 meq daily. Normal potassium 4.5 7/2019 and normal 10/2019.     OA - No longer taking NSAID or any pain medication. She is just on Tylenol PM at bedtime - now no longer taking it. She is sleeping much better at night. OAB - continue Ditropan and pads - unable to control urine after surgery. All other issues stable. Patient Active Problem List   Diagnosis    Metabolic syndrome    Essential hypertension    Overactive bladder    Bradycardia    Insomnia    GERD (gastroesophageal reflux disease)    DJD (degenerative joint disease) of knee    Abnormal EKG    Hypoxemia    Pneumonia    HTN (hypertension)    Malignant melanoma ([facility] [address]    S/P knee replacement    Atelectasis of right lung    Dyspepsia    Mixed hyperlipidemia    Hyperglycemia    Allergic rhinitis due to allergen    Deviated nasal septum    Brain mass    Brain tumor (HCC)    Herpes zoster without complication    Brain neoplasm malignant (HCC)    Hypokalemia    History of melanoma    Hair loss       Current Outpatient Medications   Medication Sig Dispense Refill    vitamin C (ASCORBIC ACID) 500 MG tablet Take 500 mg by mouth daily      famotidine (PEPCID AC) 10 MG tablet Take 10 mg by mouth as needed      amLODIPine (NORVASC) 10 MG tablet take 1 tablet by mouth once daily 90 tablet 1    oxybutynin (DITROPAN) 5 MG tablet Take 1 tablet by mouth 2 times daily 180 tablet 1    potassium chloride (KLOR-CON M) 20 MEQ extended release tablet Take 1 tablet by mouth daily 90 tablet 1    pravastatin (PRAVACHOL) 40 MG tablet Take 1 tablet by mouth every evening 90 tablet 1    lisinopril-hydroCHLOROthiazide (PRINZIDE;ZESTORETIC) 20-25 MG per tablet Take 1 tab by mouth daily . 90 tablet 1    polyethylene glycol (GLYCOLAX) 17 GM/SCOOP powder take 17GM (DISSOLVED IN WATER) by mouth once daily 527 g 3    anastrozole (ARIMIDEX) 1 MG tablet Take 1 mg by mouth daily      fluticasone (FLONASE) 50 MCG/ACT nasal spray 2 sprays by Nasal route daily instill 2 sprays into each nostril once daily.  Refill when note she called later and said right arm was red and sore, had one day of nausea, diarrhea but resolved quickly - unsure if related to vaccine. Will give Tdap today. Candice Roca MD    [facility]  PHYSICIANS [facility] [address]  [address]  [address]  Dept: [phone]  Dept Fax: [phone] : [phone]

## 2020-06-11 NOTE — PROGRESS NOTES
After obtaining consent, and per orders of Dr. Dr. Val Favre, injection of tdap  given in left deltoid by [facility]. Patient instructed to remain in clinic for 20 minutes afterwards, and to report any adverse reaction to me immediately.

## 2020-06-22 ENCOUNTER — TELEPHONE (OUTPATIENT)
Dept: INTERNAL MEDICINE CLINIC | Age: 75
End: 2020-06-22

## 2020-08-19 ENCOUNTER — HOSPITAL ENCOUNTER (OUTPATIENT)
Dept: CT IMAGING | Age: 75
Discharge: HOME | End: 2020-08-19
Payer: MEDICARE

## 2020-08-19 LAB — POC CREATININE WHOLE BLOOD: 0.8 MG/DL (ref 0.5–1.2)

## 2020-08-19 PROCEDURE — 82565 ASSAY OF CREATININE: CPT

## 2020-08-19 PROCEDURE — 74170 CT ABD WO CNTRST FLWD CNTRST: CPT

## 2020-08-19 PROCEDURE — 6360000004 HC RX CONTRAST MEDICATION: Performed by: NURSE PRACTITIONER

## 2020-08-19 RX ADMIN — IOPAMIDOL 85 ML: 755 INJECTION, SOLUTION INTRAVENOUS at 10:18

## 2020-08-28 ENCOUNTER — TELEPHONE (OUTPATIENT)
Dept: UROLOGY | Age: 75
End: 2020-08-28

## 2020-08-28 ENCOUNTER — OFFICE VISIT (OUTPATIENT)
Dept: UROLOGY | Age: 75
End: 2020-08-28
Payer: MEDICARE

## 2020-08-28 VITALS — TEMPERATURE: 97.4 F | BODY MASS INDEX: 27.66 KG/M2 | WEIGHT: 166 LBS | HEIGHT: 65 IN

## 2020-08-28 LAB
AMORPHOUS: ABNORMAL
BACTERIA: ABNORMAL
BILIRUBIN URINE: NEGATIVE
BILIRUBIN URINE: NEGATIVE
BLOOD URINE, POC: ABNORMAL
BLOOD, URINE: ABNORMAL
CASTS: ABNORMAL /LPF
CHARACTER, URINE: CLEAR
CHARACTER, URINE: CLEAR
COLOR, URINE: YELLOW
COLOR: YELLOW
CRYSTALS: ABNORMAL
EPITHELIAL CELLS, UA: ABNORMAL /HPF
GLUCOSE URINE: NEGATIVE MG/DL
GLUCOSE, URINE: NEGATIVE MG/DL
KETONES, URINE: NEGATIVE
KETONES, URINE: NEGATIVE
LEUKOCYTE CLUMPS, URINE: ABNORMAL
LEUKOCYTE ESTERASE, URINE: ABNORMAL
MISCELLANEOUS LAB TEST RESULT: ABNORMAL
NITRITE, URINE: NEGATIVE
NITRITE, URINE: NEGATIVE
PH UA: 6.5 (ref 5–9)
PH, URINE: 7 (ref 5–9)
PROTEIN UA: NEGATIVE MG/DL
PROTEIN, URINE: NEGATIVE MG/DL
RBC URINE: ABNORMAL /HPF
RENAL EPITHELIAL, UA: ABNORMAL
SPECIFIC GRAVITY UA: 1.01 (ref 1–1.03)
SPECIFIC GRAVITY, URINE: 1.02 (ref 1–1.03)
UROBILINOGEN, URINE: 0.2 EU/DL (ref 0–1)
UROBILINOGEN, URINE: 0.2 EU/DL (ref 0–1)
WBC UA: ABNORMAL /HPF
YEAST: ABNORMAL

## 2020-08-28 PROCEDURE — 81003 URINALYSIS AUTO W/O SCOPE: CPT | Performed by: NURSE PRACTITIONER

## 2020-08-28 PROCEDURE — 99214 OFFICE O/P EST MOD 30 MIN: CPT | Performed by: NURSE PRACTITIONER

## 2020-08-28 ASSESSMENT — ENCOUNTER SYMPTOMS
ABDOMINAL PAIN: 0
BACK PAIN: 0

## 2020-08-28 NOTE — TELEPHONE ENCOUNTER
Patient is scheduled for her renal ultrasound on 08- at 10:30am with an arrival of 10:00am at [address] [place] [address]. Patient advised to have no carbonated beverages the morning and be well hydrated.

## 2020-08-28 NOTE — PROGRESS NOTES
[address] [address]  Dept: [phone]  Loc: [phone]    Visit Date: 8/28/2020        HPI:     Lindsay Tracey is a 76 y.o. 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 Pt has a hx of micro hematuria and underwent office cystoscopy in 2018 by Dr. Suman Iraheta that was normal.  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 Pt has been undergoing imaging yearly to monitor for change in size and is here today in follow up with CT scan. Pt reports she is doing well. No pain, gross hematuria, dysuria, fever, chills. Current Outpatient Medications   Medication Sig Dispense Refill    vitamin C (ASCORBIC ACID) 500 MG tablet Take 500 mg by mouth daily      famotidine (PEPCID AC) 10 MG tablet Take 10 mg by mouth as needed      amLODIPine (NORVASC) 10 MG tablet take 1 tablet by mouth once daily 90 tablet 1    oxybutynin (DITROPAN) 5 MG tablet Take 1 tablet by mouth 2 times daily 180 tablet 1    potassium chloride (KLOR-CON M) 20 MEQ extended release tablet Take 1 tablet by mouth daily 90 tablet 1    pravastatin (PRAVACHOL) 40 MG tablet Take 1 tablet by mouth every evening 90 tablet 1    lisinopril-hydroCHLOROthiazide (PRINZIDE;ZESTORETIC) 20-25 MG per tablet Take 1 tab by mouth daily . 90 tablet 1    polyethylene glycol (GLYCOLAX) 17 GM/SCOOP powder take 17GM (DISSOLVED IN WATER) by mouth once daily 527 g 3    anastrozole (ARIMIDEX) 1 MG tablet Take 1 mg by mouth daily      fluticasone (FLONASE) 50 MCG/ACT nasal spray 2 sprays by Nasal route daily instill 2 sprays into each nostril once daily. Refill when due.  16 g 3    loratadine (CLARITIN) 10 MG tablet Take 1 tablet by mouth daily 30 tablet 0    Biotin 1000 MCG TABS Take by mouth daily      Calcium Carb-Cholecalciferol (CALCIUM 500+D3) 500-400 MG-UNIT TABS Take 1 tablet by mouth 2 times daily       Cholecalciferol (VITAMIN D3) 2000 units CAPS Take 1 capsule by mouth 2 times daily       vitamin E 400 UNIT capsule Take 400 Units by mouth daily      aspirin 81 MG tablet Take 81 mg by mouth daily       diphenhydrAMINE-APAP, sleep, (TYLENOL PM EXTRA STRENGTH)  MG tablet Take 1 tablet by mouth nightly as needed for Sleep      Artificial Tear Ointment (DRY EYES OP) Apply to eye as needed       No current facility-administered medications for this visit. Past Medical History  Mike Vicente  has a past medical history of Abnormal EKG, Breast cancer ([address] Cancer ([address] Complication of anesthesia, Constipation, Hyperglycemia, Hyperlipidemia, Hypertension, Melanoma in situ ([address] Melanoma in situ of lower extremity ([address] Metabolic syndrome, and OA (osteoarthritis). Past Surgical History  The patient  has a past surgical history that includes Bladder surgery; Knee arthroscopy (Left, 2007); Skin cancer excision; pre-malignant / benign skin lesion excision (Right, 2009); skin biopsy; Skin cancer excision (6/27/13); malignant skin lesion excision (Left, 7/31/2013); Knee arthroscopy (Left, 1/2014); Skin cancer excision; Tonsillectomy (1966); Hysterectomy (11/1971); eye surgery (Bilateral, 3/2016, 4/2016); Colonoscopy; Skin cancer excision (Left, 03/24/2017); Total knee arthroplasty (Right, 09/2017); Total knee arthroplasty (Left, 06/17/2015); joint replacement; craniotomy (Right, 7/3/2019); Breast surgery; brain surgery; and skin biopsy (07/2020). Family History  This patient's family history includes Cancer in her father; Heart Failure in her mother. Social History  Mike Vicente  reports that she has never smoked. She has never used smokeless tobacco. She reports current alcohol use.  She reports that she does not use drugs.      Subjective:      Review of Systems   Constitutional: Negative for activity change, appetite change, chills, diaphoresis, fatigue, fever and unexpected weight change. Gastrointestinal: Negative for abdominal pain. Genitourinary: Negative for difficulty urinating, dysuria, frequency, hematuria and urgency. Musculoskeletal: Negative for back pain. Objective:   Temp 97.4 °F (36.3 °C)   Ht 5' 5\" (1.651 m)   Wt 166 lb (75.3 kg)   BMI 27.62 kg/m²     Physical Exam  Constitutional:       General: She is not in acute distress. Appearance: Normal appearance. She is not ill-appearing or diaphoretic. HENT:      Head: Normocephalic and atraumatic. Right Ear: External ear normal.      Left Ear: External ear normal.      Nose: Nose normal.      Mouth/Throat:      Mouth: Mucous membranes are moist.   Eyes:      General: No scleral icterus. Right eye: No discharge. Left eye: No discharge. Pulmonary:      Effort: Pulmonary effort is normal. No respiratory distress. Abdominal:      Tenderness: There is no abdominal tenderness. There is no right CVA tenderness or left CVA tenderness. Skin:     General: Skin is warm. Neurological:      Mental Status: She is alert and oriented to person, place, and time. Mental status is at baseline. Psychiatric:         Mood and Affect: Mood normal.         Behavior: Behavior normal.         Thought Content:  Thought content normal.         POC  Results for POC orders placed in visit on 08/28/20   POCT Urinalysis No Micro (Auto)   Result Value Ref Range    Glucose, Ur Negative NEGATIVE mg/dl    Bilirubin Urine Negative     Ketones, Urine Negative NEGATIVE    Specific Gravity, Urine 1.020 1.002 - 1.030    Blood, UA POC Trace-intact NEGATIVE    pH, Urine 7.00 5.0 - 9.0    Protein, Urine Negative NEGATIVE mg/dl    Urobilinogen, Urine 0.20 0.0 - 1.0 eu/dl    Nitrite, Urine Negative NEGATIVE    Leukocyte Clumps, Urine Small (A) NEGATIVE    Color, Urine Yellow [id number]    Character, Urine Clear [id number]         Patients recent PSA values are as follows  No results found for: PSA, PSADIA     Recent BUN/Creatinine:  Lab Results   Component Value Date    BUN 14 06/11/2020    CREATININE 0.6 06/11/2020       Radiology  The patient has had a CT abd with and without contrast which I have reviewed along with its accompanying report. The study demonstrates bilateral renal cysts and a R AML. Angiomyolipoma stable in size at 2.8 x 2.1 cms. Assessment:   R AML  Bilateral renal cysts  Micro hematuria    Plan:     Pt continues with micro hematuria on urine dip. No gross hematuria. No concerning findings on CT. Cysto 2018 normal.  Cytologies thus far negative. Send urine for cytology today. AML stable. Also has simple appearing bilateral renal cysts. Repeat renal US in one year. F/u in 1 year with Renal US few days prior. Send urine for cytology--call results.

## 2020-09-01 ENCOUNTER — TELEPHONE (OUTPATIENT)
Dept: UROLOGY | Age: 75
End: 2020-09-01

## 2020-10-19 RX ORDER — FLUTICASONE PROPIONATE 50 MCG
2 SPRAY, SUSPENSION (ML) NASAL DAILY
Qty: 16 G | Refills: 3 | Status: SHIPPED | OUTPATIENT
Start: 2020-10-19 | End: 2021-04-14

## 2020-11-04 ENCOUNTER — NURSE ONLY (OUTPATIENT)
Dept: INTERNAL MEDICINE CLINIC | Age: 75
End: 2020-11-04
Payer: MEDICARE

## 2020-11-04 PROCEDURE — G0008 ADMIN INFLUENZA VIRUS VAC: HCPCS | Performed by: NURSE PRACTITIONER

## 2020-11-04 PROCEDURE — 90694 VACC AIIV4 NO PRSRV 0.5ML IM: CPT | Performed by: NURSE PRACTITIONER

## 2020-11-04 NOTE — PROGRESS NOTES
After obtaining consent, and per orders of Dr. Rg Schwarz, injection of high dose flu vaccine  given in left deltoid by [facility]. Patient instructed to remain in clinic for 20 minutes afterwards, and to report any adverse reaction to me immediately.

## 2020-11-20 NOTE — TELEPHONE ENCOUNTER
Pt out of medication. Has  1 pill left. I called in to [facility] on [address] and spoke to Carmelita Larsen.

## 2020-11-22 RX ORDER — AMLODIPINE BESYLATE 10 MG/1
TABLET ORAL
Qty: 90 TABLET | Refills: 1 | OUTPATIENT
Start: 2020-11-22 | End: 2021-05-23

## 2020-12-05 RX ORDER — OXYBUTYNIN CHLORIDE 5 MG/1
5 TABLET ORAL 2 TIMES DAILY
Qty: 180 TABLET | Refills: 1 | Status: SHIPPED | OUTPATIENT
Start: 2020-12-05 | End: 2021-06-04

## 2020-12-08 ENCOUNTER — NURSE ONLY (OUTPATIENT)
Dept: LAB | Age: 75
End: 2020-12-08

## 2020-12-08 LAB
BASOPHILS # BLD: 1.3 %
BASOPHILS ABSOLUTE: 0.1 THOU/MM3 (ref 0–0.1)
EOSINOPHIL # BLD: 2.3 %
EOSINOPHILS ABSOLUTE: 0.2 THOU/MM3 (ref 0–0.4)
ERYTHROCYTE [DISTWIDTH] IN BLOOD BY AUTOMATED COUNT: 12.3 % (ref 11.5–14.5)
ERYTHROCYTE [DISTWIDTH] IN BLOOD BY AUTOMATED COUNT: 39.3 FL (ref 35–45)
HCT VFR BLD CALC: 44.2 % (ref 37–47)
HEMOGLOBIN: 14.1 GM/DL (ref 12–16)
IMMATURE GRANS (ABS): 0.02 THOU/MM3 (ref 0–0.07)
IMMATURE GRANULOCYTES: 0.3 %
LYMPHOCYTES # BLD: 17.9 %
LYMPHOCYTES ABSOLUTE: 1.3 THOU/MM3 (ref 1–4.8)
MCH RBC QN AUTO: 28.1 PG (ref 26–33)
MCHC RBC AUTO-ENTMCNC: 31.9 GM/DL (ref 32.2–35.5)
MCV RBC AUTO: 88.2 FL (ref 81–99)
MONOCYTES # BLD: 7.4 %
MONOCYTES ABSOLUTE: 0.5 THOU/MM3 (ref 0.4–1.3)
NUCLEATED RED BLOOD CELLS: 0 /100 WBC
PLATELET # BLD: 217 THOU/MM3 (ref 130–400)
PMV BLD AUTO: 11.2 FL (ref 9.4–12.4)
RBC # BLD: 5.01 MILL/MM3 (ref 4.2–5.4)
SEG NEUTROPHILS: 70.8 %
SEGMENTED NEUTROPHILS ABSOLUTE COUNT: 5 THOU/MM3 (ref 1.8–7.7)
WBC # BLD: 7 THOU/MM3 (ref 4.8–10.8)

## 2020-12-09 LAB
ALBUMIN SERPL-MCNC: 4.6 G/DL (ref 3.5–5.1)
ALP BLD-CCNC: 69 U/L (ref 38–126)
ALT SERPL-CCNC: 12 U/L (ref 11–66)
ANION GAP SERPL CALCULATED.3IONS-SCNC: 17 MEQ/L (ref 8–16)
AST SERPL-CCNC: 16 U/L (ref 5–40)
BILIRUB SERPL-MCNC: 0.7 MG/DL (ref 0.3–1.2)
BUN BLDV-MCNC: 13 MG/DL (ref 7–22)
CALCIUM SERPL-MCNC: 10.4 MG/DL (ref 8.5–10.5)
CHLORIDE BLD-SCNC: 100 MEQ/L (ref 98–111)
CO2: 26 MEQ/L (ref 23–33)
CREAT SERPL-MCNC: 0.6 MG/DL (ref 0.4–1.2)
GFR SERPL CREATININE-BSD FRML MDRD: > 90 ML/MIN/1.73M2
GLUCOSE BLD-MCNC: 107 MG/DL (ref 70–108)
POTASSIUM SERPL-SCNC: 3.8 MEQ/L (ref 3.5–5.2)
SODIUM BLD-SCNC: 143 MEQ/L (ref 135–145)
TOTAL PROTEIN: 6.8 G/DL (ref 6.1–8)

## 2020-12-10 ENCOUNTER — OFFICE VISIT (OUTPATIENT)
Dept: INTERNAL MEDICINE CLINIC | Age: 75
End: 2020-12-10
Payer: MEDICARE

## 2020-12-10 VITALS
DIASTOLIC BLOOD PRESSURE: 80 MMHG | WEIGHT: 166.8 LBS | TEMPERATURE: 97.5 F | BODY MASS INDEX: 27.79 KG/M2 | HEIGHT: 65 IN | HEART RATE: 58 BPM | SYSTOLIC BLOOD PRESSURE: 132 MMHG

## 2020-12-10 PROCEDURE — 99214 OFFICE O/P EST MOD 30 MIN: CPT | Performed by: INTERNAL MEDICINE

## 2020-12-10 PROCEDURE — 93000 ELECTROCARDIOGRAM COMPLETE: CPT | Performed by: INTERNAL MEDICINE

## 2020-12-10 RX ORDER — POLYETHYLENE GLYCOL 3350 17 G/17G
POWDER, FOR SOLUTION ORAL
Qty: 527 G | Refills: 3 | Status: SHIPPED | OUTPATIENT
Start: 2020-12-10 | End: 2021-08-16

## 2020-12-10 RX ORDER — PRAVASTATIN SODIUM 40 MG
40 TABLET ORAL EVERY EVENING
Qty: 90 TABLET | Refills: 1 | Status: SHIPPED | OUTPATIENT
Start: 2020-12-10 | End: 2021-08-12

## 2020-12-10 RX ORDER — LISINOPRIL AND HYDROCHLOROTHIAZIDE 25; 20 MG/1; MG/1
TABLET ORAL
Qty: 90 TABLET | Refills: 1 | Status: SHIPPED | OUTPATIENT
Start: 2020-12-10 | End: 2021-07-29 | Stop reason: SDUPTHER

## 2020-12-10 RX ORDER — POTASSIUM CHLORIDE 20 MEQ/1
20 TABLET, EXTENDED RELEASE ORAL DAILY
Qty: 90 TABLET | Refills: 1 | Status: SHIPPED | OUTPATIENT
Start: 2020-12-10 | End: 2021-05-08

## 2020-12-10 NOTE — PROGRESS NOTES
Chief Complaint   Patient presents with    Hypertension     6 month follow up    Hyperlipidemia    Other     allergic rhinitis     This patient presents for medical evaluation. This is a 6 month follow up visit. Shingles - she is asking today if she should she get shingles vaccine. She had mild case of shingles on right lower back after brain surgery. Suggested she get vaccine. Advised it requires 2 doses - suggested pre-medicating with Tylenol. Cataracts - set up for surgery 2021 with Dr. Adriana Hui. EKG with chronic anteroseptal infarct due to poor R wave progression. Gave list of allergies to give to Dr. Adriana Hui - as multiple allergies to antibacterial skin prep. Hypertension - BP controlled, asymptomatic. BMP 2020. Continue lisinopril/HCTZ, Norvasc. Neighbor  at home last week - lots of excitement around her house. May be why BP is elevated initially today, normal on repeat. Hyperglycemia - 118, 10/2019, normal at 107 2020. Continue diet control. HgA1c 5.6 2019, 5.9 2020. Hyperlipidemia - LDL 77 2019, 95 2020, normal liver function 2020. Continue pravastatin. Allergic rhinitis -stable - stopped injections in 2019 with surgery and never went back. She is using Flonase regularly. She only uses Claritin prn. History of multiple Melanomas - She saw Dr. Vishnu Martinez. Due to see her again in 2021. Following CBC, CMP periodically. 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 Pathology - fibrocystic changes including cyst formation, apocrine metaplasia, fibrosis and microcalcification. She is going to follow up with Dr. Cosme Beckham to discuss 11/25/19 - DCIS on bx but not in surgical path - so will need to question if need to do radiation (for brain). She recently did bx and was benign. On anti-estrogen - Dr. Elvira Harman. No radiation. Mammogram done in 11/2020 - due 5/2021. DEXA normal 11/2020. Hand arthritis - She is concerned that her anti-estrogen could be causing new hand arthritis - especially in left 4th finger. Dr. Elvira Harman offered to stop med and see if improves. But patient refused. Offered to do blood work to rule out RA - mainly in [facility] and PIP joint. She doesn't want anything right now. In pre-op work up of this - went to cardiology at [facility] due to abnormal EKG (septal infarct) - did ECHO 11/1/19 - normal.      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 Jennie Evans. She saw radiation oncology - considering radiation but putting off for now. Work up for [employer] [place] disease - MRI of entire spine and ophthamology evaluation done and negative. She has visit saw Dr. Lexi Mckeon 8/26/19. She is also complaining of dry eye. Post-op complicated with shingles (healed currently), and redness around cuticles. MRI 5/2020 brain stable. Upon review of scans 6/2019- thyroid nodule seen 1.4 cm but smaller than what was seen on CT 2015 - improved over 4 years - no follow up needed. Mild rectal wall thickening - just had colonoscopy 2/2019 - to have sigmoidoscopy with Dr. Bartolo Farias - will ask GI if was done. 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 Need to follow with yearly renal ultrasound - Urology doing this. GERD - stable, on Pepcid 10 mg daily, prn. May need to increase if goes back on NSAIDs. She is on a dissovable OTC prn - pepcid ac and doing ok. OAB - stable, continue Ditropan. Constipation - very happy with Miralax and Activa. She had her colonoscopy done - 2/18/19, impressively long and looping colon, hypertrophied anal papilla, grade 2 internal hemorrhoids without hemorrhage, repeat in 10 year but will be 83 and would not do unless very good health as difficult with looping colon. 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 She saw them and had normal cystoscopy, CT urogram and cytology. Urology - [facility] - following imaging yearly with cytology and stable at last visit 8/2020. Hypokalemia - most likely due to HCTZ. Offered to change HCTZ to something else but she would rather stay on this regimen. Potassium 3.7 1/2018 - repeat potassium 4.3 1/2019 on KCL 20 meq daily. Normal potassium 4.5 7/2019 and normal 10/2019, 12/2020. HM - DEXA reviewed from 2/8/19 - normal, and colonoscopy done 2/2019 - due in 10 years - but will be 81 yo and high risk.      Discussed at previous visit: Allergic rhinitis - with PND, cough, clear sputum, sinus pressure. Increased Flonase to 2 sprays each nostril daily, stopped Claritin D and recommended Xyzal .  Suggested backing off Benadryl at night. She did those changes - and added alec pot. She saw ENT - Dr. Jacob Elias - he again gave same suggestions. If not improving - consider CT sinuses - Ordered at last visit as sinus pressure, cough, PND ongoing for the previous 6 months (seen by me, urgent care and ENT for same issues). Ordered allergist referral.   She did see allergist - she is allergic to dog and cat - she has a dog. She states she is sleeping with dog and will not give up dog. Suggested not sleeping with dog at least.  She states she started another nasal spray with allergist and combination has improved symptoms. Reviewed CT and explained results - anatomy may be contributing to symptoms. But she is feeling better with treatment as above, so not wanting to see ENT. CT results:  1. No evidence to suggest acute sinusitis.       2. Mild mucosal thickening is present within the bilateral maxillary sinuses. Mucosal coaptation also causes mild narrowing of the left ostiomeatal unit.       3. 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 She is now taking allergy shots, as she was having sedation with anti-histamine. She is continuing anti-histamine and nasal spray till shots are in her system. Allergist is managing. Told her to decrease Xyzal to 1/2 tab at night. Today - she is no longer using medication since surgery and symptoms resolved in hospital.  But now back home with dog she is congested. She started back on the Flonase.       Past Medical History:   Diagnosis Date    Abnormal EKG     normal stress test 3/2009    [facility] [address]     Cancer (HCC)     basal cell skin    Complication of anesthesia difficulty breathing after surgery- transferrred from [facility] to [address] [place] [address] for 3 days, O2 for about 5 days    Constipation     Hyperglycemia     History type 2 DM - lost weight - diet controlled    Hyperlipidemia     Hypertension     Melanoma in situ ([facility] [address] 6/2012    of chest - Dr. Esau Pacheco - margins clear - neg sentinal lymph node    Melanoma in situ of lower extremity ([facility] [address]     excision- x2    Metabolic syndrome     much improved - normal triglycerides, weight loss of 50#    OA (osteoarthritis)     left knee       Current Outpatient Medications   Medication Sig Dispense Refill    potassium chloride (KLOR-CON M) 20 MEQ extended release tablet Take 1 tablet by mouth daily 90 tablet 1    pravastatin (PRAVACHOL) 40 MG tablet Take 1 tablet by mouth every evening 90 tablet 1    lisinopril-hydroCHLOROthiazide (PRINZIDE;ZESTORETIC) 20-25 MG per tablet Take 1 tab by mouth daily . 90 tablet 1    polyethylene glycol (GLYCOLAX) 17 GM/SCOOP powder take 17GM (DISSOLVED IN WATER) by mouth once daily 527 g 3    oxybutynin (DITROPAN) 5 MG tablet Take 1 tablet by mouth 2 times daily 180 tablet 1    amLODIPine (NORVASC) 10 MG tablet take 1 tablet by mouth once daily 90 tablet 1    fluticasone (FLONASE) 50 MCG/ACT nasal spray 2 sprays by Nasal route daily instill 2 sprays into each nostril once daily. Refill when due.  16 g 3    vitamin C (ASCORBIC ACID) 500 MG tablet Take 500 mg by mouth daily      famotidine (PEPCID AC) 10 MG tablet Take 10 mg by mouth as needed      anastrozole (ARIMIDEX) 1 MG tablet Take 1 mg by mouth daily      loratadine (CLARITIN) 10 MG tablet Take 1 tablet by mouth daily 30 tablet 0    diphenhydrAMINE-APAP, sleep, (TYLENOL PM EXTRA STRENGTH)  MG tablet Take 1 tablet by mouth nightly as needed for Sleep      Biotin 1000 MCG TABS Take by mouth daily      Artificial Tear Ointment (DRY EYES OP) Apply to eye as needed  Calcium Carb-Cholecalciferol (CALCIUM 500+D3) 500-400 MG-UNIT TABS Take 1 tablet by mouth 2 times daily       Cholecalciferol (VITAMIN D3) 2000 units CAPS Take 1 capsule by mouth 2 times daily       aspirin 81 MG tablet Take 81 mg by mouth daily        No current facility-administered medications for this visit. Allergies   Allergen Reactions    Iodine     Betadine [Povidone Iodine] Rash    Chloraprep One Step [Chlorhexidine Gluconate] Rash    Pcn [Penicillins] Hives and Rash    Sulfa Antibiotics Rash       Review of Systems - General ROS: negative for - fever or chills   Psychological ROS: negative for - anxiety, depression  Hematological and Lymphatic ROS: No history of blood clots or bleeding disorder. Respiratory ROS: no shortness of breath, wheezing, positive cough from PND  Cardiovascular ROS: no chest pain or dyspnea on exertion  Gastrointestinal ROS: no abdominal pain, change in bowel habits, or black or bloody stools - positive constipation - better with Miralax daily prn  Genito-Urinary ROS: no dysuria, trouble voiding, or hematuria - positive symptoms as above  Musculoskeletal ROS: negative for - muscle pain or muscular weakness  Neurological ROS: negative for - dizziness, memory loss, seizures, tremors, visual changes or weakness  Dermatological ROS: negative for - rash or skin lesion changes    Blood pressure 132/80, pulse 58, temperature 97.5 °F (36.4 °C), height 5' 5\" (1.651 m), weight 166 lb 12.8 oz (75.7 kg), not currently breastfeeding. Physical Examination: General appearance - alert, well appearing, and in no distress  Mental status - alert, oriented to person, place, and time  Head - hair growing back posterior head - incision well healed.   Neck - supple, no significant adenopathy, no JVD, or carotid bruits  Chest - clear to auscultation, no wheezes, rales or rhonchi, symmetric air entry  Heart - normal rate, regular rhythm, no murmurs, rubs, clicks or gallops Abdomen - soft, nontender, nondistended  Neurological - alert, oriented, normal speech, no focal findings or movement disorder noted  Extremities - peripheral pulses normal, no edema, no clubbing or cyanosis  Skin - normal coloration and turgor, warm, dry    Diagnostic Data:  Labs reviewed from 12/2020 and EKG from today with patient.     Results for orders placed or performed in visit on 12/08/20   CBC Auto Differential   Result Value Ref Range    WBC 7.0 4.8 - 10.8 thou/mm3    RBC 5.01 4.20 - 5.40 mill/mm3    Hemoglobin 14.1 12.0 - 16.0 gm/dl    Hematocrit 44.2 37.0 - 47.0 %    MCV 88.2 81.0 - 99.0 fL    MCH 28.1 26.0 - 33.0 pg    MCHC 31.9 (L) 32.2 - 35.5 gm/dl    RDW-CV 12.3 11.5 - 14.5 %    RDW-SD 39.3 35.0 - 45.0 fL    Platelets 770 438 - 652 thou/mm3    MPV 11.2 9.4 - 12.4 fL    Seg Neutrophils 70.8 %    Lymphocytes 17.9 %    Monocytes 7.4 %    Eosinophils 2.3 %    Basophils 1.3 %    Immature Granulocytes 0.3 %    Segs Absolute 5.0 1.8 - 7.7 thou/mm3    Lymphocytes Absolute 1.3 1.0 - 4.8 thou/mm3    Monocytes Absolute 0.5 0.4 - 1.3 thou/mm3    Eosinophils Absolute 0.2 0.0 - 0.4 thou/mm3    Basophils Absolute 0.1 0.0 - 0.1 thou/mm3    Immature Grans (Abs) 0.02 0.00 - 0.07 thou/mm3    nRBC 0 /100 wbc   Comprehensive Metabolic Panel   Result Value Ref Range    Glucose 107 70 - 108 mg/dL    CREATININE 0.6 0.4 - 1.2 mg/dL    BUN 13 7 - 22 mg/dL    Sodium 143 135 - 145 meq/L    Potassium 3.8 3.5 - 5.2 meq/L    Chloride 100 98 - 111 meq/L    CO2 26 23 - 33 meq/L    Calcium 10.4 8.5 - 10.5 mg/dL    AST 16 5 - 40 U/L    Alkaline Phosphatase 69 38 - 126 U/L    Total Protein 6.8 6.1 - 8.0 g/dL    Alb 4.6 3.5 - 5.1 g/dL    Total Bilirubin 0.7 0.3 - 1.2 mg/dL    ALT 12 11 - 66 U/L   Anion Gap   Result Value Ref Range    Anion Gap 17.0 (H) 8.0 - 16.0 meq/L   Glomerular Filtration Rate, Estimated   Result Value Ref Range    Est, Glom Filt Rate >90 ml/min/1.73m2       Assessment/Plan:   Diagnosis Orders 1. Hypokalemia  potassium chloride (KLOR-CON M) 20 MEQ extended release tablet   2. Mixed hyperlipidemia  pravastatin (PRAVACHOL) 40 MG tablet    Comprehensive Metabolic Panel    Lipid Panel   3. Essential hypertension  EKG 12 Lead    lisinopril-hydroCHLOROthiazide (PRINZIDE;ZESTORETIC) 20-25 MG per tablet    CBC Auto Differential    Comprehensive Metabolic Panel   4. Slow transit constipation  polyethylene glycol (GLYCOLAX) 17 GM/SCOOP powder     Orders Placed This Encounter   Procedures    CBC Auto Differential     Standing Status:   Future     Standing Expiration Date:   12/10/2021    Comprehensive Metabolic Panel     Standing Status:   Future     Standing Expiration Date:   12/10/2021    Lipid Panel     Standing Status:   Future     Standing Expiration Date:   12/10/2021     Order Specific Question:   Is Patient Fasting?/# of Hours     Answer:   12    EKG 12 Lead     Order Specific Question:   Reason for Exam?     Answer: Other     Hypertension - BP controlled, continue lisinopril/HCTZ, and Norvasc. BMP stable 12/2020. Hyperlipidemia - controlled, LDL 95 6/2020, continue pravastatin. CMP normal 12/2020. Hypokalemia - likely due to HCTZ, monitor potassium level on supplement. Potassium 3.8 12/2020. Allergic rhinitis/frequent sinusitis - due to dog/cat allergy (she has a dog) - continue Flonase and Claritin. OAB - stable, continue Ditropan. Constipation - continue Miralax. Rectal thickening - just had colonoscopy 2/2019 - will send back to GI for further evaluation. They recommended flex sig - see Erla Ohm note. Will need to follow up with GI and see if this was done. Renal angiomyolipoma - 3 on the right - stable in size (one is >4 cm), and cyst on left. As one is >4 cm will send back to Urology to follow. They will follow yearly.

## 2020-12-10 NOTE — PATIENT INSTRUCTIONS
Allergies   Allergen Reactions    Iodine     Betadine [Povidone Iodine] Rash    Chloraprep One Step [Chlorhexidine Gluconate] Rash    Pcn [Penicillins] Hives and Rash    Sulfa Antibiotics Rash

## 2020-12-29 ENCOUNTER — TELEPHONE (OUTPATIENT)
Dept: INTERNAL MEDICINE CLINIC | Age: 75
End: 2020-12-29

## 2020-12-29 NOTE — TELEPHONE ENCOUNTER
You had requested that I check if sigmoidoscopy was done on after her visit 10/21/19 with Fide Lepe . None was done , patient canceled and never rescheduled .

## 2021-04-16 RX ORDER — FLUTICASONE PROPIONATE 50 MCG
SPRAY, SUSPENSION (ML) NASAL
Qty: 16 G | Refills: 3 | OUTPATIENT
Start: 2021-04-16 | End: 2021-09-13 | Stop reason: SDUPTHER

## 2021-05-06 DIAGNOSIS — E87.6 HYPOKALEMIA: ICD-10-CM

## 2021-05-08 RX ORDER — POTASSIUM CHLORIDE 20 MEQ/1
TABLET, EXTENDED RELEASE ORAL
Qty: 90 TABLET | Refills: 1 | Status: SHIPPED | OUTPATIENT
Start: 2021-05-08 | End: 2021-09-13 | Stop reason: SDUPTHER

## 2021-05-08 NOTE — TELEPHONE ENCOUNTER
Please try to get her to reschedule her Medicare wellness visit. I know she has a regular visit in June but need the extra one for wellness.

## 2021-05-17 DIAGNOSIS — I10 ESSENTIAL HYPERTENSION: ICD-10-CM

## 2021-05-23 RX ORDER — AMLODIPINE BESYLATE 10 MG/1
TABLET ORAL
Qty: 90 TABLET | Refills: 1 | Status: SHIPPED | OUTPATIENT
Start: 2021-05-23 | End: 2021-09-13 | Stop reason: SDUPTHER

## 2021-06-02 DIAGNOSIS — N32.81 OVERACTIVE BLADDER: ICD-10-CM

## 2021-06-04 RX ORDER — OXYBUTYNIN CHLORIDE 5 MG/1
TABLET ORAL
Qty: 180 TABLET | Refills: 1 | Status: SHIPPED | OUTPATIENT
Start: 2021-06-04 | End: 2021-09-13 | Stop reason: SDUPTHER

## 2021-06-05 ENCOUNTER — HOSPITAL ENCOUNTER (EMERGENCY)
Age: 76
Discharge: HOME | End: 2021-06-05
Payer: MEDICARE

## 2021-06-05 VITALS
OXYGEN SATURATION: 98 % | RESPIRATION RATE: 16 BRPM | BODY MASS INDEX: 26.33 KG/M2 | TEMPERATURE: 98.3 F | HEIGHT: 65 IN | HEART RATE: 68 BPM | WEIGHT: 158 LBS | SYSTOLIC BLOOD PRESSURE: 138 MMHG | DIASTOLIC BLOOD PRESSURE: 66 MMHG

## 2021-06-05 DIAGNOSIS — R31.9 URINARY TRACT INFECTION WITH HEMATURIA, SITE UNSPECIFIED: Primary | ICD-10-CM

## 2021-06-05 DIAGNOSIS — N39.0 URINARY TRACT INFECTION WITH HEMATURIA, SITE UNSPECIFIED: Primary | ICD-10-CM

## 2021-06-05 LAB
BILIRUBIN URINE: NEGATIVE
BLOOD, URINE: ABNORMAL
CHARACTER, URINE: CLEAR
COLOR: YELLOW
GLUCOSE URINE: NEGATIVE MG/DL
KETONES, URINE: NEGATIVE
LEUKOCYTE ESTERASE, URINE: ABNORMAL
NITRITE, URINE: NEGATIVE
PH, URINE: 7 (ref 5–9)
PROTEIN, URINE: NEGATIVE MG/DL
SPECIFIC GRAVITY, URINE: 1.02 (ref 1–1.03)
UROBILINOGEN, URINE: 0.2 EU/DL (ref 0.2–1)

## 2021-06-05 PROCEDURE — 99213 OFFICE O/P EST LOW 20 MIN: CPT | Performed by: NURSE PRACTITIONER

## 2021-06-05 PROCEDURE — 99213 OFFICE O/P EST LOW 20 MIN: CPT

## 2021-06-05 PROCEDURE — 81003 URINALYSIS AUTO W/O SCOPE: CPT

## 2021-06-05 PROCEDURE — 87086 URINE CULTURE/COLONY COUNT: CPT

## 2021-06-05 RX ORDER — LETROZOLE 2.5 MG/1
2.5 TABLET, FILM COATED ORAL DAILY
COMMUNITY

## 2021-06-05 RX ORDER — NITROFURANTOIN 25; 75 MG/1; MG/1
100 CAPSULE ORAL 2 TIMES DAILY
Qty: 10 CAPSULE | Refills: 0 | Status: SHIPPED | OUTPATIENT
Start: 2021-06-05 | End: 2021-06-10

## 2021-06-05 RX ORDER — LIDOCAINE HCL 4 %
CREAM (GRAM) TOPICAL
Qty: 30 CAPSULE | Refills: 0 | COMMUNITY
Start: 2021-06-05 | End: 2022-10-17

## 2021-06-05 ASSESSMENT — ENCOUNTER SYMPTOMS
NAUSEA: 0
SHORTNESS OF BREATH: 0
DIARRHEA: 0
COUGH: 0
ABDOMINAL PAIN: 1
VOMITING: 0

## 2021-06-05 ASSESSMENT — PAIN DESCRIPTION - PAIN TYPE: TYPE: ACUTE PAIN

## 2021-06-05 ASSESSMENT — PAIN DESCRIPTION - ORIENTATION: ORIENTATION: ANTERIOR

## 2021-06-05 ASSESSMENT — PAIN DESCRIPTION - ONSET: ONSET: GRADUAL

## 2021-06-05 ASSESSMENT — PAIN DESCRIPTION - LOCATION: LOCATION: GROIN

## 2021-06-05 ASSESSMENT — PAIN DESCRIPTION - PROGRESSION: CLINICAL_PROGRESSION: NOT CHANGED

## 2021-06-05 ASSESSMENT — PAIN SCALES - GENERAL: PAINLEVEL_OUTOF10: 4

## 2021-06-05 ASSESSMENT — PAIN DESCRIPTION - DESCRIPTORS: DESCRIPTORS: ACHING;SORE

## 2021-06-05 ASSESSMENT — PAIN DESCRIPTION - FREQUENCY: FREQUENCY: CONTINUOUS

## 2021-06-05 ASSESSMENT — PAIN - FUNCTIONAL ASSESSMENT: PAIN_FUNCTIONAL_ASSESSMENT: ACTIVITIES ARE NOT PREVENTED

## 2021-06-05 NOTE — ED PROVIDER NOTES
[address] Encounter      CHIEFCOMPLAINT       Chief Complaint   Patient presents with    Urinary Tract Infection       Nurses Notes reviewed and I agree except as noted in the HPI. HISTORY OF PRESENT ILLNESS   Mary Lou Meyer is a 68 y.o. female who presents for evaluation of a possible UTI that started Thursday. Took tylenol and has been trying to increase water. REVIEW OF SYSTEMS     Review of Systems   Constitutional: Negative for chills and fever. Respiratory: Negative for cough and shortness of breath. Cardiovascular: Negative for chest pain. Gastrointestinal: Positive for abdominal pain (lower pressure like). Negative for diarrhea, nausea and vomiting. Genitourinary: Positive for dysuria. Negative for flank pain, frequency, hematuria and urgency. Skin: Negative for rash. Allergic/Immunologic: Negative for environmental allergies and food allergies. Neurological: Negative for headaches. PAST MEDICAL HISTORY         Diagnosis Date    Abnormal EKG     normal stress test 3/2009    Breast cancer ([facility] [address]     Cancer (HCC)     basal cell skin    Complication of anesthesia     difficulty breathing after surgery- transferrred from [facility] to [facility]. Angeline's for 3 days, O2 for about 5 days    Constipation     Hyperglycemia     History type 2 DM - lost weight - diet controlled    Hyperlipidemia     Hypertension     Melanoma in situ ([address] 6/2012    of chest - Dr. Derrick Rios - margins clear - neg sentinal lymph node    Melanoma in situ of lower extremity ([address]     excision- x2    Metabolic syndrome     much improved - normal triglycerides, weight loss of 50#    OA (osteoarthritis)     left knee       SURGICAL HISTORY     Patient  has a past surgical history that includes Bladder surgery; Knee arthroscopy (Left, 2007);  Skin cancer excision; pre-malignant / benign skin lesion excision (Right, 2009); skin biopsy; Skin cancer excision (6/27/13); malignant skin lesion excision (Left, 7/31/2013); Knee arthroscopy (Left, 1/2014); Skin cancer excision; Tonsillectomy (1966); Hysterectomy (11/1971); eye surgery (Bilateral, 3/2016, 4/2016); Colonoscopy; Skin cancer excision (Left, 03/24/2017); Total knee arthroplasty (Right, 09/2017); Total knee arthroplasty (Left, 06/17/2015); joint replacement; craniotomy (Right, 7/3/2019); Breast surgery; brain surgery; and skin biopsy (07/2020). CURRENT MEDICATIONS       Discharge Medication List as of 6/5/2021 11:36 AM      CONTINUE these medications which have NOT CHANGED    Details   letrozole (FEMARA) 2.5 MG tablet Take 2.5 mg by mouth dailyHistorical Med      oxybutynin (DITROPAN) 5 MG tablet take 1 tablet by mouth twice a day, Disp-180 tablet, R-1Refill when dueNormal      amLODIPine (NORVASC) 10 MG tablet take 1 tablet by mouth once daily, Disp-90 tablet, R-1Normal      potassium chloride (KLOR-CON M) 20 MEQ extended release tablet take 1 tablet by mouth once daily, Disp-90 tablet, R-1Refill when dueNormal      fluticasone (FLONASE) 50 MCG/ACT nasal spray instill 2 sprays into each nostril once daily, Disp-16 g, R-3Phone In      pravastatin (PRAVACHOL) 40 MG tablet Take 1 tablet by mouth every evening, Disp-90 tablet, R-1Refill when dueNormal      lisinopril-hydroCHLOROthiazide (PRINZIDE;ZESTORETIC) 20-25 MG per tablet Take 1 tab by mouth daily . , Disp-90 tablet, R-1Refill when dueNormal      polyethylene glycol (GLYCOLAX) 17 GM/SCOOP powder take 17GM (DISSOLVED IN WATER) by mouth once daily, Disp-527 g, R-3Normal      famotidine (PEPCID AC) 10 MG tablet Take 10 mg by mouth as neededHistorical Med      diphenhydrAMINE-APAP, sleep, (TYLENOL PM EXTRA STRENGTH)  MG tablet Take 1 tablet by mouth nightly as needed for SleepHistorical Med      Biotin 1000 MCG TABS Take by mouth dailyHistorical Med      Artificial Tear Ointment (DRY EYES OP) Apply to eye as neededHistorical Med      Calcium Carb-Cholecalciferol (CALCIUM 500+D3) 500-400 MG-UNIT TABS Take 1 tablet by mouth 2 times daily Historical Med      Cholecalciferol (VITAMIN D3) 2000 units CAPS Take 1 capsule by mouth 2 times daily Historical Med      aspirin 81 MG tablet Take 81 mg by mouth daily Historical Med             ALLERGIES     Patient is is allergic to iodine, betadine [povidone iodine], chloraprep one step [chlorhexidine gluconate], pcn [penicillins], and sulfa antibiotics. FAMILY HISTORY     Patient'sfamily history includes Cancer in her father; Heart Failure in her mother. SOCIAL HISTORY     Patient  reports that she has never smoked. She has never used smokeless tobacco. She reports current alcohol use. She reports that she does not use drugs. PHYSICAL EXAM     ED TRIAGE VITALS  BP: 138/66, Temp: 98.3 °F (36.8 °C), Pulse: 68, Resp: 16, SpO2: 98 %  Physical Exam  Vitals and nursing note reviewed. Constitutional:       General: She is not in acute distress. Appearance: Normal appearance. She is well-developed. HENT:      Head: Normocephalic and atraumatic. Right Ear: External ear normal.      Left Ear: External ear normal.      Mouth/Throat:      Lips: Pink. Mouth: Mucous membranes are moist.   Eyes:      Conjunctiva/sclera: Conjunctivae normal.      Right eye: Right conjunctiva is not injected. Left eye: Left conjunctiva is not injected. Pupils: Pupils are equal.   Cardiovascular:      Rate and Rhythm: Normal rate. Heart sounds: Normal heart sounds. Pulmonary:      Effort: Pulmonary effort is normal. No respiratory distress. Breath sounds: Normal breath sounds. Abdominal:      General: Abdomen is flat. Bowel sounds are normal.      Palpations: Abdomen is soft. Tenderness: There is abdominal tenderness in the suprapubic area. There is no right CVA tenderness or left CVA tenderness. Musculoskeletal:      Cervical back: Normal range of motion. Skin:     General: Skin is warm and dry.       Findings: No rash (on exposed surfaces). Neurological:      Mental Status: She is alert and oriented to person, place, and time. Psychiatric:         Mood and Affect: Mood normal.         Speech: Speech normal.         Behavior: Behavior is cooperative. DIAGNOSTIC RESULTS   Labs:  Abnormal Labs Reviewed   URINALYSIS - Abnormal; Notable for the following components:       Result Value    Leukocyte Esterase, Urine Trace (*)     All other components within normal limits        IMAGING:  No orders to display     URGENT CARE COURSE:     Vitals:    06/05/21 1111 06/05/21 1145   BP: (!) 147/67 138/66   Pulse: 66 68   Resp: 16 16   Temp: 98.3 °F (36.8 °C)    TempSrc: Temporal    SpO2: 97% 98%   Weight: 158 lb (71.7 kg)    Height: 5' 5\" (1.651 m)        Medications - No data to display  PROCEDURES:  FINALIMPRESSION      1. Urinary tract infection with hematuria, site unspecified        DISPOSITION/PLAN   DISPOSITION    Discharge     ED Course as of Jun 05 1253   Sat Jun 05, 2021   1133 Leukocyte Esterase, Urine(!): Trace [HA]   1134 Blood, Urine: Trace-lysed [HA]      ED Course User Index  [MATTHEWS] [place], APRN - CNP     Physical assessment findings, diagnostic testing(s) if applicable, and vital signs reviewed with patient/patient representative. Questions answered. If applicable, patient/patient representative will be contacted upon receipt of final culture and sensitivity or other testing results when available. Any additions or changes to medications or changes the plan of care will be made at that time. Medications as directed, including OTC medications for supportive care. Education provided on medications. Differential diagnosis(s) discussed with patient/patient representative. Home care/self care instructions reviewed with patient/patient representative. Patient is to follow-up with family care provider in 2-3 days if no improvement.   Patient is to go to the emergency department if symptoms worsen. Patient/patient representative is aware of care plan, questions answered, verbalizes understanding and is in agreement. Teach back method used for patient/patient representative teaching(s) and printed instructions attached to after visit summary. Problem List Items Addressed This Visit     None      Visit Diagnoses     Urinary tract infection with hematuria, site unspecified    -  Primary    Relevant Medications    Cranberry-Vitamin C (AZO CRANBERRY URINARY TRACT) 250-60 MG CAPS    nitrofurantoin, macrocrystal-monohydrate, (MACROBID) 100 MG capsule          PATIENT REFERRED TO:  Ga Noguera MD  [facility], [address]  [facility] [place] [address]    Schedule an appointment as soon as possible for a visit in 3 days  For further evaluation. , If symptoms change/worsen, go to the [facility]  [address] [facility]  [phone]    as needed, If symptoms change/worsen, go to the [id number] [facility], [address] Donna Huynh B, APRN - CNP  06/05/21 8062

## 2021-06-07 LAB — URINE CULTURE, ROUTINE: NORMAL

## 2021-06-09 ENCOUNTER — TELEPHONE (OUTPATIENT)
Dept: INTERNAL MEDICINE CLINIC | Age: 76
End: 2021-06-09

## 2021-07-29 DIAGNOSIS — I10 ESSENTIAL HYPERTENSION: ICD-10-CM

## 2021-07-29 RX ORDER — LISINOPRIL AND HYDROCHLOROTHIAZIDE 25; 20 MG/1; MG/1
TABLET ORAL
Qty: 90 TABLET | Refills: 1 | Status: SHIPPED | OUTPATIENT
Start: 2021-07-29 | End: 2021-09-13 | Stop reason: SDUPTHER

## 2021-09-12 NOTE — PROGRESS NOTES
something else, but she would rather stay on this regimen. on KCl 20 mEq QD. OAB, stable - On Ditropan. Follows with Urology. She is doing well with this medication. GERD, stable - on Pepcid 10mg QD, prn. She takes it when she eats food that has a higher acid content. Hx Multiple Melanomas - Sees Dr. Lily Graves. Saw her last in August 2021. Seeing them every 6 months. She had 5 spots burned off recent with liquid nitrogen and she states it was non-cancerous. Hx Breast Cancer - DCIS s/p surgical resection. Most recent biopsy on 5/26/20 was benign. She is on anti-estrogen therapy per Dr. Darnell Pagan. No radiation. Latest mammogram on 5/17/21 reports benign unilateral diagnostic mammogram with 6 month follow-up recommended (11/21). She has it scheduled already. DEXA normal on 11/2020. Hx Hemangioblastoma, Right Cerebellum - s/p excision by Dr. Ursula Barthel. She did not have to get any brain radiation. Work up for [employer] [place] disease - MRI of entire spine and ophthalmology eval done and negative. She saw Dr. Kim Johnson 8/26/19. Post-op complicated with shingles (healed currently), and redness around cuticles. MRI brain 5/2020 stable. She has an MRI on November 29th and follow-up with Neurosurgery Dec 3rd. Slow Transit Constipation, controlled: On Miralax and Activia. Having regular bowel movements as long as she does not miss her Miralax. Microscopic Hematuria/Angiomyolipoma Right Kidney x3 - Follows Urology, imaging yearly. She has a kidney ultrasound scheduled. She was supposed to get it done last month, but hasn't because of COVID. Cataracts - Had surgery with Dr. Basilia Martel on 1/2021 and 2/2021. She still wears glasses to read.      Health Maintenance  - Recommended to get flu and shingles vaccine  - Annual Wellness Visit due - appointment scheduled    Past Medical History:   Diagnosis Date    Abnormal EKG     normal stress test 3/2009    Breast cancer ([address]     Cancer ([address]     basal cell skin    Complication of anesthesia     difficulty breathing after surgery- transferrred from [facility] to [address] [place] [address] for 3 days, O2 for about 5 days    Constipation     Hyperglycemia     History type 2 DM - lost weight - diet controlled    Hyperlipidemia     Hypertension     Melanoma in situ ([address] 6/2012    of chest - Dr. Mela Hurtado - margins clear - neg sentinal lymph node    Melanoma in situ of lower extremity ([address]     excision- x2    Metabolic syndrome     much improved - normal triglycerides, weight loss of 50#    OA (osteoarthritis)     left knee     Past Surgical History:   Procedure Laterality Date    BLADDER SURGERY      BRAIN SURGERY      BREAST SURGERY      COLONOSCOPY      CRANIOTOMY Right 7/3/2019    RIGHT OCCIPITAL CRANIOTOMY INTRA-OP POSTERIOR FOSSA performed by Kole Andrea MD at [place] [address] Bilateral 3/2016, 4/2016    Dr. Cristiano Durán  [birth year]1971    reomoval of left ovary - fibroid tumor    JOINT REPLACEMENT      both knees    KNEE ARTHROSCOPY Left 2007    KNEE ARTHROSCOPY Left 1/2014    Dr. Allyssa Srivastava Left 7/31/2013    Left inner thigh    PRE-MALIGNANT / BENIGN SKIN LESION EXCISION Right 2009    ankle - skin graft, Dr. Rae Veras BIOPSY  07/2020    SKIN CANCER EXCISION      right leg melanoma    SKIN CANCER EXCISION  6/27/13    r arm    SKIN CANCER EXCISION      melanoma of chest - in situ    SKIN CANCER EXCISION Left 03/24/2017    Upper Back--Melanoma    TONSILLECTOMY  1966    TOTAL KNEE ARTHROPLASTY Right 09/2017    Dr. Vishnu Lu Left 06/17/2015    total - Dr. Vivian Lala     Current Outpatient Medications   Medication Sig Dispense Refill    lisinopril-hydroCHLOROthiazide (PRINZIDE;ZESTORETIC) 20-25 MG per tablet Take 1 tab by mouth daily .  90 tablet 1    oxybutynin (DITROPAN) 5 MG tablet take 1 tablet by mouth twice a day 180 tablet 1    amLODIPine (NORVASC) 10 MG tablet Take 1 Skin: Negative for pallor, rash and wound. Neurological: Negative for dizziness, weakness, light-headedness, numbness and headaches. Psychiatric/Behavioral: Negative for agitation and suicidal ideas. The patient is not nervous/anxious. Vitals:    09/13/21 1431   BP: 128/72   Site: Left Upper Arm   Position: Sitting   Cuff Size: Medium Adult   Pulse: 64   Temp: 98.3 °F (36.8 °C)   Weight: 163 lb 6.4 oz (74.1 kg)   Height: 5' 5\" (1.651 m)     Wt Readings from Last 3 Encounters:   09/13/21 163 lb 6.4 oz (74.1 kg)   06/05/21 158 lb (71.7 kg)   12/10/20 166 lb 12.8 oz (75.7 kg)     BP Readings from Last 3 Encounters:   09/13/21 128/72   06/05/21 138/66   12/10/20 132/80     Physical Exam  Vitals reviewed. Constitutional:       General: She is not in acute distress. Appearance: Normal appearance. She is not ill-appearing. HENT:      Head: Normocephalic and atraumatic. Nose: Nose normal.      Mouth/Throat:      Mouth: Mucous membranes are moist.      Pharynx: Oropharynx is clear. Eyes:      Extraocular Movements: Extraocular movements intact. Conjunctiva/sclera: Conjunctivae normal.      Pupils: Pupils are equal, round, and reactive to light. Cardiovascular:      Rate and Rhythm: Normal rate and regular rhythm. Pulses: Normal pulses. Heart sounds: No murmur heard. No friction rub. No gallop. Pulmonary:      Effort: Pulmonary effort is normal. No respiratory distress. Breath sounds: No wheezing, rhonchi or rales. Abdominal:      General: Bowel sounds are normal.      Palpations: Abdomen is soft. Tenderness: There is no abdominal tenderness. Musculoskeletal:         General: No swelling or tenderness. Normal range of motion. Cervical back: Normal range of motion and neck supple. Right lower leg: No edema. Left lower leg: No edema. Skin:     General: Skin is warm and dry. Capillary Refill: Capillary refill takes less than 2 seconds. Neurological:      General: No focal deficit present. Mental Status: She is alert and oriented to person, place, and time. Mental status is at baseline. Psychiatric:         Mood and Affect: Mood normal.         Behavior: Behavior normal.         Thought Content: Thought content normal.         Judgment: Judgment normal.       Diagnostic data:  I have reviewed recent diagnostic testing including labs with the patient today. No results found for this visit on 09/13/21. Laboratory/imaging studies ordered this visit: [id number]    The patient is in agreement with and verbalizes understanding of the plan of care today and has no additional questions or complaints. The patient was instructed to follow-up in 6 months or to contact our office sooner if problems should arise. Laboratory studies will be completed prior to next visit. Electronically signed by: Kishore Isidro DO on 9/13/2021 at 4:33 PM  (Please note that portions of this note were completed with a voice recognition program and electronically transcribed. Efforts were made to edit the dictations but occasionally words are mis-transcribed. This transcription may contain errors not detected in proofreading.  This transcription was electronically signed.)

## 2021-09-13 ENCOUNTER — OFFICE VISIT (OUTPATIENT)
Dept: INTERNAL MEDICINE CLINIC | Age: 76
End: 2021-09-13
Payer: MEDICARE

## 2021-09-13 VITALS
HEIGHT: 65 IN | BODY MASS INDEX: 27.22 KG/M2 | HEART RATE: 64 BPM | WEIGHT: 163.4 LBS | SYSTOLIC BLOOD PRESSURE: 128 MMHG | TEMPERATURE: 98.3 F | DIASTOLIC BLOOD PRESSURE: 72 MMHG

## 2021-09-13 DIAGNOSIS — N32.81 OVERACTIVE BLADDER: ICD-10-CM

## 2021-09-13 DIAGNOSIS — I10 ESSENTIAL HYPERTENSION: Primary | ICD-10-CM

## 2021-09-13 DIAGNOSIS — E87.6 HYPOKALEMIA: ICD-10-CM

## 2021-09-13 DIAGNOSIS — R73.03 PRE-DIABETES: ICD-10-CM

## 2021-09-13 DIAGNOSIS — E78.2 MIXED HYPERLIPIDEMIA: ICD-10-CM

## 2021-09-13 DIAGNOSIS — J30.9 ALLERGIC RHINITIS, UNSPECIFIED SEASONALITY, UNSPECIFIED TRIGGER: ICD-10-CM

## 2021-09-13 PROCEDURE — 99214 OFFICE O/P EST MOD 30 MIN: CPT | Performed by: STUDENT IN AN ORGANIZED HEALTH CARE EDUCATION/TRAINING PROGRAM

## 2021-09-13 RX ORDER — LISINOPRIL AND HYDROCHLOROTHIAZIDE 25; 20 MG/1; MG/1
TABLET ORAL
Qty: 90 TABLET | Refills: 1 | Status: SHIPPED | OUTPATIENT
Start: 2021-09-13 | End: 2022-04-07 | Stop reason: SDUPTHER

## 2021-09-13 RX ORDER — PRAVASTATIN SODIUM 40 MG
TABLET ORAL
Qty: 90 TABLET | Refills: 1 | Status: SHIPPED | OUTPATIENT
Start: 2021-09-13 | End: 2022-04-07 | Stop reason: SDUPTHER

## 2021-09-13 RX ORDER — AMLODIPINE BESYLATE 10 MG/1
TABLET ORAL
Qty: 90 TABLET | Refills: 1 | Status: SHIPPED | OUTPATIENT
Start: 2021-09-13 | End: 2022-04-07 | Stop reason: SDUPTHER

## 2021-09-13 RX ORDER — OXYBUTYNIN CHLORIDE 5 MG/1
TABLET ORAL
Qty: 180 TABLET | Refills: 1 | Status: SHIPPED | OUTPATIENT
Start: 2021-09-13 | End: 2022-04-07 | Stop reason: SDUPTHER

## 2021-09-13 RX ORDER — POTASSIUM CHLORIDE 20 MEQ/1
TABLET, EXTENDED RELEASE ORAL
Qty: 90 TABLET | Refills: 1 | Status: SHIPPED | OUTPATIENT
Start: 2021-09-13 | End: 2022-03-24 | Stop reason: SDUPTHER

## 2021-09-13 RX ORDER — FLUTICASONE PROPIONATE 50 MCG
SPRAY, SUSPENSION (ML) NASAL
Qty: 16 G | Refills: 3 | Status: SHIPPED | OUTPATIENT
Start: 2021-09-13 | End: 2022-04-07 | Stop reason: SDUPTHER

## 2021-09-13 SDOH — ECONOMIC STABILITY: FOOD INSECURITY: WITHIN THE PAST 12 MONTHS, YOU WORRIED THAT YOUR FOOD WOULD RUN OUT BEFORE YOU GOT MONEY TO BUY MORE.: NEVER TRUE

## 2021-09-13 SDOH — ECONOMIC STABILITY: FOOD INSECURITY: WITHIN THE PAST 12 MONTHS, THE FOOD YOU BOUGHT JUST DIDN'T LAST AND YOU DIDN'T HAVE MONEY TO GET MORE.: NEVER TRUE

## 2021-09-13 ASSESSMENT — ENCOUNTER SYMPTOMS
RHINORRHEA: 0
ABDOMINAL PAIN: 0
NAUSEA: 0
SHORTNESS OF BREATH: 0
COUGH: 0
CONSTIPATION: 0
CHEST TIGHTNESS: 0
VOMITING: 0
WHEEZING: 0
SORE THROAT: 0

## 2021-09-13 ASSESSMENT — PATIENT HEALTH QUESTIONNAIRE - PHQ9
SUM OF ALL RESPONSES TO PHQ QUESTIONS 1-9: 0
2. FEELING DOWN, DEPRESSED OR HOPELESS: 0
SUM OF ALL RESPONSES TO PHQ QUESTIONS 1-9: 0
SUM OF ALL RESPONSES TO PHQ QUESTIONS 1-9: 0
SUM OF ALL RESPONSES TO PHQ9 QUESTIONS 1 & 2: 0
1. LITTLE INTEREST OR PLEASURE IN DOING THINGS: 0

## 2021-09-13 ASSESSMENT — SOCIAL DETERMINANTS OF HEALTH (SDOH): HOW HARD IS IT FOR YOU TO PAY FOR THE VERY BASICS LIKE FOOD, HOUSING, MEDICAL CARE, AND HEATING?: NOT HARD AT ALL

## 2021-10-05 ENCOUNTER — NURSE ONLY (OUTPATIENT)
Dept: LAB | Age: 76
End: 2021-10-05

## 2021-10-05 DIAGNOSIS — R73.03 PRE-DIABETES: ICD-10-CM

## 2021-10-05 DIAGNOSIS — E78.2 MIXED HYPERLIPIDEMIA: ICD-10-CM

## 2021-10-05 DIAGNOSIS — I10 ESSENTIAL HYPERTENSION: ICD-10-CM

## 2021-10-05 LAB
ALBUMIN SERPL-MCNC: 5 G/DL (ref 3.5–5.1)
ALP BLD-CCNC: 75 U/L (ref 38–126)
ALT SERPL-CCNC: 12 U/L (ref 11–66)
ANION GAP SERPL CALCULATED.3IONS-SCNC: 10 MEQ/L (ref 8–16)
AST SERPL-CCNC: 17 U/L (ref 5–40)
AVERAGE GLUCOSE: 114 MG/DL (ref 70–126)
BASOPHILS # BLD: 0.9 %
BASOPHILS ABSOLUTE: 0.1 THOU/MM3 (ref 0–0.1)
BILIRUB SERPL-MCNC: 0.6 MG/DL (ref 0.3–1.2)
BUN BLDV-MCNC: 13 MG/DL (ref 7–22)
CALCIUM SERPL-MCNC: 10.2 MG/DL (ref 8.5–10.5)
CHLORIDE BLD-SCNC: 102 MEQ/L (ref 98–111)
CHOLESTEROL, TOTAL: 179 MG/DL (ref 100–199)
CO2: 32 MEQ/L (ref 23–33)
CREAT SERPL-MCNC: 0.7 MG/DL (ref 0.4–1.2)
EOSINOPHIL # BLD: 2.5 %
EOSINOPHILS ABSOLUTE: 0.2 THOU/MM3 (ref 0–0.4)
ERYTHROCYTE [DISTWIDTH] IN BLOOD BY AUTOMATED COUNT: 12.1 % (ref 11.5–14.5)
ERYTHROCYTE [DISTWIDTH] IN BLOOD BY AUTOMATED COUNT: 39.9 FL (ref 35–45)
GFR SERPL CREATININE-BSD FRML MDRD: 81 ML/MIN/1.73M2
GLUCOSE BLD-MCNC: 104 MG/DL (ref 70–108)
HBA1C MFR BLD: 5.8 % (ref 4.4–6.4)
HCT VFR BLD CALC: 47 % (ref 37–47)
HDLC SERPL-MCNC: 57 MG/DL
HEMOGLOBIN: 14.2 GM/DL (ref 12–16)
IMMATURE GRANS (ABS): 0.02 THOU/MM3 (ref 0–0.07)
IMMATURE GRANULOCYTES: 0.3 %
LDL CHOLESTEROL CALCULATED: 94 MG/DL
LYMPHOCYTES # BLD: 18.2 %
LYMPHOCYTES ABSOLUTE: 1.2 THOU/MM3 (ref 1–4.8)
MCH RBC QN AUTO: 27.2 PG (ref 26–33)
MCHC RBC AUTO-ENTMCNC: 30.2 GM/DL (ref 32.2–35.5)
MCV RBC AUTO: 90 FL (ref 81–99)
MONOCYTES # BLD: 7 %
MONOCYTES ABSOLUTE: 0.5 THOU/MM3 (ref 0.4–1.3)
NUCLEATED RED BLOOD CELLS: 0 /100 WBC
PLATELET # BLD: 210 THOU/MM3 (ref 130–400)
PMV BLD AUTO: 11.5 FL (ref 9.4–12.4)
POTASSIUM SERPL-SCNC: 4 MEQ/L (ref 3.5–5.2)
RBC # BLD: 5.22 MILL/MM3 (ref 4.2–5.4)
SEG NEUTROPHILS: 71.1 %
SEGMENTED NEUTROPHILS ABSOLUTE COUNT: 4.8 THOU/MM3 (ref 1.8–7.7)
SODIUM BLD-SCNC: 144 MEQ/L (ref 135–145)
TOTAL PROTEIN: 6.9 G/DL (ref 6.1–8)
TRIGL SERPL-MCNC: 141 MG/DL (ref 0–199)
WBC # BLD: 6.7 THOU/MM3 (ref 4.8–10.8)

## 2021-11-27 DIAGNOSIS — K59.01 SLOW TRANSIT CONSTIPATION: ICD-10-CM

## 2021-11-29 ENCOUNTER — HOSPITAL ENCOUNTER (OUTPATIENT)
Dept: MRI IMAGING | Age: 76
Discharge: HOME | End: 2021-11-29
Payer: MEDICARE

## 2021-11-29 DIAGNOSIS — D48.1 HEMANGIOBLASTOMA: ICD-10-CM

## 2021-11-29 DIAGNOSIS — D49.6 CEREBELLAR TUMOR (HCC): ICD-10-CM

## 2021-11-29 PROCEDURE — 6360000004 HC RX CONTRAST MEDICATION: Performed by: PHYSICIAN ASSISTANT

## 2021-11-29 PROCEDURE — 70553 MRI BRAIN STEM W/O & W/DYE: CPT

## 2021-11-29 PROCEDURE — A9579 GAD-BASE MR CONTRAST NOS,1ML: HCPCS | Performed by: PHYSICIAN ASSISTANT

## 2021-11-29 RX ADMIN — GADOTERIDOL 15 ML: 279.3 INJECTION, SOLUTION INTRAVENOUS at 10:04

## 2021-12-01 RX ORDER — POLYETHYLENE GLYCOL 3350 17 G/17G
POWDER, FOR SOLUTION ORAL
Qty: 510 G | Refills: 1 | Status: SHIPPED | OUTPATIENT
Start: 2021-12-01 | End: 2022-04-07 | Stop reason: SDUPTHER

## 2021-12-03 ENCOUNTER — OFFICE VISIT (OUTPATIENT)
Dept: NEUROSURGERY | Age: 76
End: 2021-12-03
Payer: MEDICARE

## 2021-12-03 VITALS
SYSTOLIC BLOOD PRESSURE: 125 MMHG | WEIGHT: 168 LBS | HEIGHT: 65 IN | BODY MASS INDEX: 27.99 KG/M2 | DIASTOLIC BLOOD PRESSURE: 73 MMHG | HEART RATE: 78 BPM

## 2021-12-03 DIAGNOSIS — D48.1 HEMANGIOBLASTOMA: Primary | ICD-10-CM

## 2021-12-03 PROCEDURE — 99213 OFFICE O/P EST LOW 20 MIN: CPT

## 2021-12-03 ASSESSMENT — ENCOUNTER SYMPTOMS
COLOR CHANGE: 0
SHORTNESS OF BREATH: 0
COUGH: 0

## 2021-12-03 NOTE — PATIENT INSTRUCTIONS
Patient Education        Learning About Sleeping Well  What does sleeping well mean? Sleeping well means getting enough sleep. How much sleep is enough varies among people. The number of hours you sleep is not as important as how you feel when you wake up. If you do not feel refreshed, you probably need more sleep. Another sign of not getting enough sleep is feeling tired during the day. The average total nightly sleep time is 7½ to 8 hours. Healthy adults may need a little more or a little less than this. Why is getting enough sleep important? Getting enough quality sleep is a basic part of good health. When your sleep suffers, your mood and your thoughts can suffer too. You may find yourself feeling more grumpy or stressed. Not getting enough sleep also can lead to serious problems, including injury, accidents, anxiety, and depression. What might cause poor sleeping? Many things can cause sleep problems, including:  · Stress. Stress can be caused by fear about a single event, such as giving a speech. Or you may have ongoing stress, such as worry about work or school. · Depression, anxiety, and other mental or emotional conditions. · Changes in your sleep habits or surroundings. This includes changes that happen where you sleep, such as noise, light, or sleeping in a different bed. It also includes changes in your sleep pattern, such as having jet lag or working a late shift. · Health problems, such as pain, breathing problems, and restless legs syndrome. · Lack of regular exercise. How can you help yourself? Here are some tips that may help you sleep more soundly and wake up feeling more refreshed. Your sleeping area   · Use your bedroom only for sleeping and sex. A bit of light reading may help you fall asleep. But if it doesn't, do your reading elsewhere in the house. Don't watch TV in bed.   · Be sure your bed is big enough to stretch out comfortably, especially if you have a sleep partner. · Keep your bedroom quiet, dark, and cool. Use curtains, blinds, or a sleep mask to block out light. To block out noise, use earplugs, soothing music, or a \"white noise\" machine. Your evening and bedtime routine   · Create a relaxing bedtime routine. You might want to take a warm shower or bath, listen to soothing music, or drink a cup of noncaffeinated tea. · Go to bed at the same time every night. And get up at the same time every morning, even if you feel tired. What to avoid   · Limit caffeine (coffee, tea, caffeinated sodas) during the day, and don't have any for at least 4 to 6 hours before bedtime. · Don't drink alcohol before bedtime. Alcohol can cause you to wake up more often during the night. · Don't smoke or use tobacco, especially in the evening. Nicotine can keep you awake. · Don't take naps during the day, especially close to bedtime. · Don't lie in bed awake for too long. If you can't fall asleep, or if you wake up in the middle of the night and can't get back to sleep within 15 minutes or so, get out of bed and go to another room until you feel sleepy. · Don't take medicine right before bed that may keep you awake or make you feel hyper or energized. Your doctor can tell you if your medicine may do this and if you can take it earlier in the day. If you can't sleep   · Imagine yourself in a peaceful, pleasant scene. Focus on the details and feelings of being in a place that is relaxing. · Get up and do a quiet or boring activity until you feel sleepy. · Don't drink any liquids after 6 p.m. if you wake up often because you have to go to the bathroom. Where can you learn more? Go to https://[employer]izabeth.[employer]. org and sign in to your [employer] account. Enter L147 in the [employer] box to learn more about \"Learning About Sleeping Well. \"     If you do not have an account, please click on the \"Sign Up Now\" link.   Current as of: June 16,

## 2022-03-01 ENCOUNTER — TELEPHONE (OUTPATIENT)
Dept: INTERNAL MEDICINE CLINIC | Age: 77
End: 2022-03-01

## 2022-03-01 ENCOUNTER — HOSPITAL ENCOUNTER (EMERGENCY)
Age: 77
Discharge: HOME | End: 2022-03-01
Payer: MEDICARE

## 2022-03-01 VITALS
OXYGEN SATURATION: 95 % | TEMPERATURE: 98.3 F | DIASTOLIC BLOOD PRESSURE: 66 MMHG | HEART RATE: 69 BPM | RESPIRATION RATE: 16 BRPM | SYSTOLIC BLOOD PRESSURE: 141 MMHG

## 2022-03-01 DIAGNOSIS — N30.00 ACUTE CYSTITIS WITHOUT HEMATURIA: Primary | ICD-10-CM

## 2022-03-01 LAB
BILIRUBIN URINE: NEGATIVE
BLOOD, URINE: ABNORMAL
CHARACTER, URINE: CLEAR
COLOR: YELLOW
GLUCOSE URINE: NEGATIVE MG/DL
KETONES, URINE: ABNORMAL
LEUKOCYTE ESTERASE, URINE: ABNORMAL
NITRITE, URINE: NEGATIVE
PH, URINE: 7.5 (ref 5–9)
PROTEIN, URINE: NEGATIVE MG/DL
SPECIFIC GRAVITY, URINE: 1.02 (ref 1–1.03)
UROBILINOGEN, URINE: 1 EU/DL (ref 0.2–1)

## 2022-03-01 PROCEDURE — 99213 OFFICE O/P EST LOW 20 MIN: CPT

## 2022-03-01 PROCEDURE — 99213 OFFICE O/P EST LOW 20 MIN: CPT | Performed by: NURSE PRACTITIONER

## 2022-03-01 PROCEDURE — 81003 URINALYSIS AUTO W/O SCOPE: CPT

## 2022-03-01 PROCEDURE — 87086 URINE CULTURE/COLONY COUNT: CPT

## 2022-03-01 RX ORDER — PHENAZOPYRIDINE HYDROCHLORIDE 100 MG/1
100 TABLET, FILM COATED ORAL 3 TIMES DAILY PRN
Qty: 12 TABLET | Refills: 0 | Status: SHIPPED | OUTPATIENT
Start: 2022-03-01 | End: 2022-03-03

## 2022-03-01 RX ORDER — NITROFURANTOIN 25; 75 MG/1; MG/1
100 CAPSULE ORAL 2 TIMES DAILY
Qty: 14 CAPSULE | Refills: 0 | Status: SHIPPED | OUTPATIENT
Start: 2022-03-01 | End: 2022-03-08

## 2022-03-01 ASSESSMENT — ENCOUNTER SYMPTOMS
DIARRHEA: 0
CHEST TIGHTNESS: 0
COUGH: 0
SHORTNESS OF BREATH: 0
NAUSEA: 0
RHINORRHEA: 0
VOMITING: 0
SORE THROAT: 0

## 2022-03-01 NOTE — ED PROVIDER NOTES
[facility]  Urgent Care Encounter       CHIEF COMPLAINT       Chief Complaint   Patient presents with    Dysuria     onset 4 days       Nurses Notes reviewed and I agree except as noted in the HPI. HISTORY OF PRESENT ILLNESS   Mary Lou Meyer is a 68 y.o. female who presents to the [facility] [address] urgent care for evaluation of dysuria. Patient reports the symptom started roughly 4 days ago. She reports her symptoms as dysuria, urinary urgency, and frequency. She does report lower abdominal pressure. She denies hematuria. She does report a prior UTI several years ago. The history is provided by the patient. No  was used. REVIEW OF SYSTEMS     Review of Systems   Constitutional: Negative for activity change, appetite change, chills, fatigue and fever. HENT: Negative for ear discharge, ear pain, rhinorrhea and sore throat. Respiratory: Negative for cough, chest tightness and shortness of breath. Cardiovascular: Negative for chest pain. Gastrointestinal: Negative for diarrhea, nausea and vomiting. Genitourinary: Positive for dysuria, frequency and urgency. Skin: Negative for rash. Allergic/Immunologic: Negative for environmental allergies and food allergies. Neurological: Negative for dizziness and headaches.        PAST MEDICAL HISTORY         Diagnosis Date    Abnormal EKG     normal stress test 3/2009    Breast cancer ([address]     Cancer (HCC)     basal cell skin    Complication of anesthesia     difficulty breathing after surgery- transferrred from [facility] to [facility]. [facility] for 3 days, O2 for about 5 days    Constipation     Hyperglycemia     History type 2 DM - lost weight - diet controlled    Hyperlipidemia     Hypertension     Melanoma in situ ([address] 6/2012    of chest - Dr. Derrick Rios - margins clear - neg sentinal lymph node    Melanoma in situ of lower extremity ([address]     excision- x2    Metabolic syndrome     much improved - normal 250-60 MG CAPS Take as directed on package. , Disp-30 capsule, R-0OTC      famotidine (PEPCID AC) 10 MG tablet Take 10 mg by mouth as neededHistorical Med      diphenhydrAMINE-APAP, sleep, (TYLENOL PM EXTRA STRENGTH)  MG tablet Take 1 tablet by mouth nightly as needed for SleepHistorical Med      Biotin 1000 MCG TABS Take by mouth dailyHistorical Med      Artificial Tear Ointment (DRY EYES OP) Apply to eye as neededHistorical Med      Calcium Carb-Cholecalciferol (CALCIUM 500+D3) 500-400 MG-UNIT TABS Take 1 tablet by mouth 2 times daily Historical Med      Cholecalciferol (VITAMIN D3) 2000 units CAPS Take 1 capsule by mouth daily Historical Med      aspirin 81 MG tablet Take 81 mg by mouth daily Historical Med             ALLERGIES     Patient is is allergic to iodine, betadine [povidone iodine], chloraprep one step [chlorhexidine gluconate], pcn [penicillins], and sulfa antibiotics. Patients   Immunization History   Administered Date(s) Administered    Influenza 10/31/2013    Influenza Vaccine, unspecified formulation 10/31/2013    Influenza Virus Vaccine 01/29/2015, 01/19/2016    Influenza, High Dose (Fluzone 65 yrs and older) 10/08/2018    Influenza, Quadv, IM, PF (6 mo and older Fluzone, Flulaval, Fluarix, and 3 yrs and older Afluria) 01/17/2017    Influenza, Quadv, adjuvanted, 65 yrs +, IM, PF (Fluad) 11/04/2020    PPD Test 07/09/2019, 07/16/2019    Pneumococcal Conjugate 13-valent ([id number]) 07/18/2017    Pneumococcal Polysaccharide ([id number]) 10/08/2018    Tdap (Boostrix, Adacel) 06/11/2020       FAMILY HISTORY     Patient's family history includes Cancer in her father; Heart Failure in her mother. SOCIAL HISTORY     Patient  reports that she has never smoked. She has never used smokeless tobacco. She reports current alcohol use. She reports that she does not use drugs.     PHYSICAL EXAM     ED TRIAGE VITALS  BP: (!) 141/66, Temp: 98.3 °F (36.8 °C), Pulse: 69, Resp: 16, SpO2: 95 %,Estimated body mass index is 27.96 kg/m² as calculated from the following:    Height as of 12/3/21: 5' 5\" (1.651 m). Weight as of 12/3/21: 168 lb (76.2 kg). ,No LMP recorded. Patient has had a hysterectomy. Physical Exam  Vitals and nursing note reviewed. Constitutional:       General: She is not in acute distress. Appearance: Normal appearance. She is not ill-appearing, toxic-appearing or diaphoretic. HENT:      Head: Normocephalic. Right Ear: Ear canal and external ear normal.      Left Ear: Ear canal and external ear normal.      Nose: Nose normal. No congestion or rhinorrhea. Mouth/Throat:      Mouth: Mucous membranes are moist.      Pharynx: Oropharynx is clear. No oropharyngeal exudate or posterior oropharyngeal erythema. Cardiovascular:      Rate and Rhythm: Normal rate. Pulses: Normal pulses. Pulmonary:      Effort: Pulmonary effort is normal. No respiratory distress. Breath sounds: No stridor. No wheezing or rhonchi. Abdominal:      General: Abdomen is flat. Bowel sounds are normal.      Palpations: Abdomen is soft. Musculoskeletal:         General: No swelling or tenderness. Normal range of motion. Cervical back: Normal range of motion. Neurological:      General: No focal deficit present. Mental Status: She is alert and oriented to person, place, and time.    Psychiatric:         Mood and Affect: Mood normal.         Behavior: Behavior normal.         DIAGNOSTIC RESULTS     Labs:  Results for orders placed or performed during the hospital encounter of 03/01/22   Urinalysis   Result Value Ref Range    Glucose, Ur Negative NEGATIVE mg/dl    Bilirubin Urine Negative NEGATIVE    Ketones, Urine Trace (A) NEGATIVE    Specific Gravity, Urine 1.020 1.002 - 1.030    Blood, Urine Trace-lysed NEGATIVE    pH, Urine 7.50 5.0 - 9.0    Protein, Urine Negative NEGATIVE mg/dl    Urobilinogen, Urine 1.00 0.2 - 1.0 eu/dl    Nitrite, Urine Negative NEGATIVE    Leukocyte Esterase, Urine Trace (A) NEGATIVE    Color, UA Yellow STRAW-YELLOW    Character, Urine Clear CLEAR-SL CLOUD       IMAGING:    No orders to display         EKG: None      URGENT CARE COURSE:     Vitals:    03/01/22 1241   BP: (!) 141/66   Pulse: 69   Resp: 16   Temp: 98.3 °F (36.8 °C)   SpO2: 95%       Medications - No data to display         PROCEDURES:  None    FINAL IMPRESSION      1. Acute cystitis without hematuria          DISPOSITION/ PLAN     Patient seen and evaluated for dysuria. A urinalysis was obtained revealing trace ketones and trace leukocytes. A urine culture has been ordered. She is provided a prescription for Macrobid and Pyridium. Instructed to follow-up with her PCP in 3 to 5 days with worsening symptoms. She is agreeable with the above plan and denies questions or concerns at this time.       PATIENT REFERRED TO:  Isai Elizondo MD  [facility], [address] [place] [id number]      DISCHARGE MEDICATIONS:  Discharge Medication List as of 3/1/2022 12:37 PM      START taking these medications    Details   nitrofurantoin, macrocrystal-monohydrate, (MACROBID) 100 MG capsule Take 1 capsule by mouth 2 times daily for 7 days, Disp-14 capsule, R-0Normal      phenazopyridine (PYRIDIUM) 100 MG tablet Take 1 tablet by mouth 3 times daily as needed for Pain, Disp-12 tablet, R-0Normal             Discharge Medication List as of 3/1/2022 12:37 PM          Discharge Medication List as of 3/1/2022 12:37 PM          NARAYAN Browning CNP    (Please note that portions of this note were completed with a voice recognition program. Efforts were made to edit the dictations but occasionally words are mis-transcribed.)           NARAYAN Browning CNP  03/01/22 7396

## 2022-03-02 LAB
ORGANISM: ABNORMAL
URINE CULTURE, ROUTINE: ABNORMAL

## 2022-03-03 ENCOUNTER — HOSPITAL ENCOUNTER (EMERGENCY)
Age: 77
Discharge: HOME | End: 2022-03-03
Payer: MEDICARE

## 2022-03-03 VITALS
HEART RATE: 78 BPM | RESPIRATION RATE: 16 BRPM | BODY MASS INDEX: 26.66 KG/M2 | SYSTOLIC BLOOD PRESSURE: 146 MMHG | OXYGEN SATURATION: 95 % | HEIGHT: 65 IN | WEIGHT: 160 LBS | TEMPERATURE: 97.3 F | DIASTOLIC BLOOD PRESSURE: 80 MMHG

## 2022-03-03 DIAGNOSIS — N30.00 ACUTE CYSTITIS WITHOUT HEMATURIA: Primary | ICD-10-CM

## 2022-03-03 LAB
BILIRUBIN URINE: NEGATIVE
BLOOD, URINE: ABNORMAL
CHARACTER, URINE: CLEAR
COLOR: ABNORMAL
GLUCOSE URINE: 100 MG/DL
KETONES, URINE: NEGATIVE
LEUKOCYTE ESTERASE, URINE: NEGATIVE
NITRITE, URINE: POSITIVE
PH, URINE: 7 (ref 5–9)
PROTEIN, URINE: NEGATIVE MG/DL
SPECIFIC GRAVITY, URINE: 1.01 (ref 1–1.03)
UROBILINOGEN, URINE: 0.2 EU/DL (ref 0.2–1)

## 2022-03-03 PROCEDURE — 99213 OFFICE O/P EST LOW 20 MIN: CPT

## 2022-03-03 PROCEDURE — 87086 URINE CULTURE/COLONY COUNT: CPT

## 2022-03-03 PROCEDURE — 99213 OFFICE O/P EST LOW 20 MIN: CPT | Performed by: NURSE PRACTITIONER

## 2022-03-03 PROCEDURE — 81003 URINALYSIS AUTO W/O SCOPE: CPT

## 2022-03-03 RX ORDER — CIPROFLOXACIN 500 MG/1
500 TABLET, FILM COATED ORAL 2 TIMES DAILY
Qty: 6 TABLET | Refills: 0 | Status: SHIPPED | OUTPATIENT
Start: 2022-03-03 | End: 2022-03-06

## 2022-03-03 RX ORDER — CIPROFLOXACIN 500 MG/1
500 TABLET, FILM COATED ORAL 2 TIMES DAILY
Qty: 6 TABLET | Refills: 0 | Status: SHIPPED | OUTPATIENT
Start: 2022-03-03 | End: 2022-03-03 | Stop reason: SDUPTHER

## 2022-03-03 ASSESSMENT — PAIN - FUNCTIONAL ASSESSMENT: PAIN_FUNCTIONAL_ASSESSMENT: 0-10

## 2022-03-03 ASSESSMENT — ENCOUNTER SYMPTOMS
CHEST TIGHTNESS: 0
CONSTIPATION: 0
COUGH: 0
DIARRHEA: 0
CHOKING: 0
WHEEZING: 0
ABDOMINAL PAIN: 1
APNEA: 0
NAUSEA: 1
VOMITING: 0
STRIDOR: 0
SHORTNESS OF BREATH: 0

## 2022-03-03 ASSESSMENT — PAIN SCALES - GENERAL: PAINLEVEL_OUTOF10: 6

## 2022-03-03 ASSESSMENT — PAIN DESCRIPTION - LOCATION: LOCATION: ABDOMEN

## 2022-03-03 NOTE — ED PROVIDER NOTES
[place]  Urgent Care Encounter      CHIEF COMPLAINT       Chief Complaint   Patient presents with    Other     \" I had contaminants on my urine culture here\"    Nausea     \" after taking 7 pills\"    Headache    Abdominal Pain    Fatigue       Nurses Notes reviewed and I agree except as noted in the HPI. HISTORY OFPRESENT ILLNESS   Valere Kitchen is a 68 y.o. The history is provided by the patient. No  was used. Dysuria   This is a recurrent (started a week ago, have been taking medication for 2 days symptoms are worsening) problem. The current episode started more than 1 week ago. The problem occurs every urination. The problem has been gradually worsening. The quality of the pain is described as burning and aching. The pain is at a severity of 3/10. The pain is moderate. There has been no fever. She is not sexually active. There is no history of pyelonephritis. Associated symptoms include nausea, frequency and urgency. Pertinent negatives include no chills, no sweats, no vomiting, no discharge, no hematuria, no hesitancy, no possible pregnancy and no flank pain. She has tried antibiotics and increased fluids for the symptoms. Her past medical history does not include kidney stones, single kidney, urological procedure, recurrent UTIs, urinary stasis or catheterization. REVIEW OF SYSTEMS     Review of Systems   Constitutional: Negative for activity change, appetite change, chills, diaphoresis, fatigue, fever and unexpected weight change. Respiratory: Negative for apnea, cough, choking, chest tightness, shortness of breath, wheezing and stridor. Cardiovascular: Negative for chest pain, palpitations and leg swelling. Gastrointestinal: Positive for abdominal pain and nausea. Negative for constipation, diarrhea and vomiting. Genitourinary: Positive for dysuria, frequency and urgency.  Negative for decreased urine volume, difficulty urinating, dyspareunia, enuresis, flank pain, genital sores, hematuria, hesitancy, menstrual problem, pelvic pain, vaginal bleeding, vaginal discharge and vaginal pain. PAST MEDICAL HISTORY         Diagnosis Date    Abnormal EKG     normal stress test 3/2009    Breast [facility] ([facility]     Cancer (HCC)     basal cell skin    Complication of anesthesia     difficulty breathing after surgery- [facility] to [facility]. Angeline's for 3 days, O2 for about 5 days    Constipation     Hyperglycemia     History type 2 DM - lost weight - diet controlled    Hyperlipidemia     Hypertension     Melanoma in situ ([facility] 6/2012    of chest - Dr. Harper Childers - margins clear - neg sentinal lymph node    Melanoma in situ of lower extremity ([facility]     excision- x2    Metabolic syndrome     much improved - normal triglycerides, weight loss of 50#    OA (osteoarthritis)     left knee       SURGICAL HISTORY     Patient  has a past surgical history that includes Bladder surgery; Knee arthroscopy (Left, 2007); Skin cancer excision; pre-malignant / benign skin lesion excision (Right, 2009); skin biopsy; Skin cancer excision (6/27/13); malignant skin lesion excision (Left, 7/31/2013); Knee arthroscopy (Left, 1/2014); Skin cancer excision; Tonsillectomy (1966); Hysterectomy (11/1971); eye surgery (Bilateral, 3/2016, 4/2016); Colonoscopy; Skin cancer excision (Left, 03/24/2017); Total knee arthroplasty (Right, 09/2017); Total knee arthroplasty (Left, 06/17/2015); joint replacement; craniotomy (Right, 7/3/2019); Breast surgery; brain surgery; and skin biopsy (07/2020).     CURRENT MEDICATIONS       Discharge Medication List as of 3/3/2022 11:58 AM      CONTINUE these medications which have NOT CHANGED    Details   nitrofurantoin, macrocrystal-monohydrate, (MACROBID) 100 MG capsule Take 1 capsule by mouth 2 times daily for 7 days, Disp-14 capsule, R-0Normal      polyethylene glycol (GLYCOLAX) 17 GM/SCOOP powder take 17GM (DISSOLVED IN WATER) by mouth once daily, Disp-510 g, R-1Normal      lisinopril-hydroCHLOROthiazide (PRINZIDE;ZESTORETIC) 20-25 MG per tablet Take 1 tab by mouth daily . , Disp-90 tablet, R-1Refill when dueNormal      oxybutynin (DITROPAN) 5 MG tablet take 1 tablet by mouth twice a day, Disp-180 tablet, R-1Refill when dueNormal      amLODIPine (NORVASC) 10 MG tablet Take 1 tablet by mouth daily, Disp-90 tablet, R-1Normal      potassium chloride (KLOR-CON M) 20 MEQ extended release tablet take 1 tablet by mouth once daily, Disp-90 tablet, R-1Refill when dueNormal      fluticasone (FLONASE) 50 MCG/ACT nasal spray instill 2 sprays into each nostril once daily, Disp-16 g, R-3Normal      pravastatin (PRAVACHOL) 40 MG tablet take 1 tablet by mouth every evening take 1 tablet by mouth at bedtime, Disp-90 tablet, R-1Refill when dueNormal      letrozole (FEMARA) 2.5 MG tablet Take 2.5 mg by mouth dailyHistorical Med      Cranberry-Vitamin C (AZO CRANBERRY URINARY TRACT) 250-60 MG CAPS Take as directed on package. , Disp-30 capsule, R-0OTC      famotidine (PEPCID AC) 10 MG tablet Take 10 mg by mouth as neededHistorical Med      diphenhydrAMINE-APAP, sleep, (TYLENOL PM EXTRA STRENGTH)  MG tablet Take 1 tablet by mouth nightly as needed for SleepHistorical Med      Artificial Tear Ointment (DRY EYES OP) Apply to eye as neededHistorical Med      Calcium Carb-Cholecalciferol (CALCIUM 500+D3) 500-400 MG-UNIT TABS Take 1 tablet by mouth 2 times daily Historical Med      Cholecalciferol (VITAMIN D3) 2000 units CAPS Take 1 capsule by mouth daily Historical Med      aspirin 81 MG tablet Take 81 mg by mouth daily Historical Med      Biotin 1000 MCG TABS Take by mouth dailyHistorical Med             ALLERGIES     Patient is is allergic to iodine, betadine [povidone iodine], chloraprep one step [chlorhexidine gluconate], pcn [penicillins], and sulfa antibiotics.     FAMILY HISTORY     Patient's family history includes Cancer in her father; Heart Failure in her mother. SOCIAL HISTORY     Patient  reports that she has never smoked. She has never used smokeless tobacco. She reports previous alcohol use. She reports that she does not use drugs. PHYSICAL EXAM     ED TRIAGE VITALS  BP: (!) 146/80, Temp: 97.3 °F (36.3 °C), Pulse: 78, Resp: 16, SpO2: 95 %  Physical Exam  Vitals and nursing note reviewed. Constitutional:       General: She is not in acute distress. Appearance: She is well-developed and normal weight. She is not ill-appearing, toxic-appearing or diaphoretic. HENT:      Head: Normocephalic and atraumatic. Pulmonary:      Effort: Pulmonary effort is normal.      Breath sounds: Normal breath sounds. Abdominal:      General: Abdomen is protuberant. Palpations: Abdomen is soft. Tenderness: There is abdominal tenderness in the suprapubic area. There is no right CVA tenderness or left CVA tenderness. Hernia: No hernia is present. Skin:     General: Skin is warm. Neurological:      General: No focal deficit present. Mental Status: She is alert and oriented to person, place, and time.    Psychiatric:         Mood and Affect: Mood normal.         Behavior: Behavior normal.         DIAGNOSTIC RESULTS   Labs:  Results for orders placed or performed during the hospital encounter of 03/03/22   Urinalysis   Result Value Ref Range    Glucose, Ur 100 (A) NEGATIVE mg/dl    Bilirubin Urine Negative NEGATIVE    Ketones, Urine Negative NEGATIVE    Specific Gravity, Urine 1.010 1.002 - 1.030    Blood, Urine Trace-lysed NEGATIVE    pH, Urine 7.00 5.0 - 9.0    Protein, Urine Negative NEGATIVE mg/dl    Urobilinogen, Urine 0.20 0.2 - 1.0 eu/dl    Nitrite, Urine Positive (A) NEGATIVE    Leukocyte Esterase, Urine Negative NEGATIVE    Color, UA Juanita (A) STRAW-YELLOW    Character, Urine Clear CLEAR-SL CLOUD       IMAGING:  No orders to display     URGENT CARE COURSE:     Vitals:    03/03/22 1123   BP: (!) 146/80   Pulse: 78   Resp: 16   Temp: 97.3 °F (36.3 °C)   SpO2: 95%   Weight: 160 lb (72.6 kg)   Height: 5' 5\" (1.651 m)       Medications - No data to display  PROCEDURES:  None  FINAL IMPRESSION      1. Acute cystitis without hematuria        DISPOSITION/PLAN   Decision To Discharge       Patient or Patient designated representative was advised to drink plenty of water or fluids and take medication as prescribed. The patient or Patient designated representative is advised to monitor for any changes in pain, development of high fever, chills, persistent vomiting, development of increasing back or flank pain or increase in hematuria the patient is advised to go to the emergency department for reevaluation and further follow-up if they wouldn't notice any of the above symptoms. The patient or Patient designated representative was also advised to follow up with family doctor or primary care provider after the antibiotic is completed for repeat urinalysis. The patient did verbalize understanding of discharge instructions and is agreeable to the treatment plan. The patient left ambulatory without any changes or concerns in stable condition.     PATIENT REFERRED TO:  Jorge Huerta MD  [address] [place] [address]    Schedule an appointment as soon as possible for a visit       Bruce Garcia MD  [facility], [address]  [address] [place] [address]  [phone]    Call   As needed    DISCHARGE MEDICATIONS:  Discharge Medication List as of 3/3/2022 11:58 AM      START taking these medications    Details   ciprofloxacin (CIPRO) 500 MG tablet Take 1 tablet by mouth 2 times daily for 3 days, Disp-6 tablet, R-0Normal           Discharge Medication List as of 3/3/2022 11:58 AM          NARAYAN Stern CNP, APRN - CNP  03/03/22 3365

## 2022-03-04 LAB
ORGANISM: ABNORMAL
URINE CULTURE, ROUTINE: ABNORMAL

## 2022-03-07 ENCOUNTER — TELEPHONE (OUTPATIENT)
Dept: INTERNAL MEDICINE CLINIC | Age: 77
End: 2022-03-07

## 2022-03-16 ENCOUNTER — CARE COORDINATION (OUTPATIENT)
Dept: CARE COORDINATION | Age: 77
End: 2022-03-16

## 2022-03-16 RX ORDER — LANOLIN ALCOHOL/MO/W.PET/CERES
5 CREAM (GRAM) TOPICAL NIGHTLY PRN
COMMUNITY

## 2022-03-16 SDOH — ECONOMIC STABILITY: HOUSING INSECURITY
IN THE LAST 12 MONTHS, WAS THERE A TIME WHEN YOU DID NOT HAVE A STEADY PLACE TO SLEEP OR SLEPT IN A SHELTER (INCLUDING NOW)?: NO

## 2022-03-16 SDOH — HEALTH STABILITY: PHYSICAL HEALTH: ON AVERAGE, HOW MANY DAYS PER WEEK DO YOU ENGAGE IN MODERATE TO STRENUOUS EXERCISE (LIKE A BRISK WALK)?: 0 DAYS

## 2022-03-16 SDOH — ECONOMIC STABILITY: TRANSPORTATION INSECURITY
IN THE PAST 12 MONTHS, HAS THE LACK OF TRANSPORTATION KEPT YOU FROM MEDICAL APPOINTMENTS OR FROM GETTING MEDICATIONS?: NO

## 2022-03-16 SDOH — ECONOMIC STABILITY: FOOD INSECURITY: WITHIN THE PAST 12 MONTHS, YOU WORRIED THAT YOUR FOOD WOULD RUN OUT BEFORE YOU GOT MONEY TO BUY MORE.: NEVER TRUE

## 2022-03-16 SDOH — ECONOMIC STABILITY: INCOME INSECURITY: IN THE LAST 12 MONTHS, WAS THERE A TIME WHEN YOU WERE NOT ABLE TO PAY THE MORTGAGE OR RENT ON TIME?: NO

## 2022-03-16 SDOH — ECONOMIC STABILITY: INCOME INSECURITY: HOW HARD IS IT FOR YOU TO PAY FOR THE VERY BASICS LIKE FOOD, HOUSING, MEDICAL CARE, AND HEATING?: NOT HARD AT ALL

## 2022-03-16 SDOH — SOCIAL STABILITY: SOCIAL NETWORK: ARE YOU MARRIED, WIDOWED, DIVORCED, SEPARATED, NEVER MARRIED, OR LIVING WITH A PARTNER?: WIDOWED

## 2022-03-16 SDOH — HEALTH STABILITY: MENTAL HEALTH
STRESS IS WHEN SOMEONE FEELS TENSE, NERVOUS, ANXIOUS, OR CAN'T SLEEP AT NIGHT BECAUSE THEIR MIND IS TROUBLED. HOW STRESSED ARE YOU?: ONLY A LITTLE

## 2022-03-16 SDOH — SOCIAL STABILITY: SOCIAL NETWORK: HOW OFTEN DO YOU ATTENT MEETINGS OF THE CLUB OR ORGANIZATION YOU BELONG TO?: MORE THAN 4 TIMES PER YEAR

## 2022-03-16 SDOH — SOCIAL STABILITY: SOCIAL NETWORK
IN A TYPICAL WEEK, HOW MANY TIMES DO YOU TALK ON THE PHONE WITH FAMILY, FRIENDS, OR NEIGHBORS?: MORE THAN THREE TIMES A WEEK

## 2022-03-16 SDOH — ECONOMIC STABILITY: TRANSPORTATION INSECURITY
IN THE PAST 12 MONTHS, HAS LACK OF TRANSPORTATION KEPT YOU FROM MEETINGS, WORK, OR FROM GETTING THINGS NEEDED FOR DAILY LIVING?: NO

## 2022-03-16 SDOH — HEALTH STABILITY: PHYSICAL HEALTH: ON AVERAGE, HOW MANY MINUTES DO YOU ENGAGE IN EXERCISE AT THIS LEVEL?: 0 MIN

## 2022-03-16 SDOH — SOCIAL STABILITY: SOCIAL NETWORK: HOW OFTEN DO YOU GET TOGETHER WITH FRIENDS OR RELATIVES?: MORE THAN THREE TIMES A WEEK

## 2022-03-16 SDOH — ECONOMIC STABILITY: FOOD INSECURITY: WITHIN THE PAST 12 MONTHS, THE FOOD YOU BOUGHT JUST DIDN'T LAST AND YOU DIDN'T HAVE MONEY TO GET MORE.: NEVER TRUE

## 2022-03-16 SDOH — HEALTH STABILITY: MENTAL HEALTH: HOW OFTEN DO YOU HAVE A DRINK CONTAINING ALCOHOL?: NEVER

## 2022-03-16 ASSESSMENT — SOCIAL DETERMINANTS OF HEALTH (SDOH)
DO YOU BELONG TO ANY CLUBS OR ORGANIZATIONS SUCH AS CHURCH GROUPS UNIONS, FRATERNAL OR ATHLETIC GROUPS, OR SCHOOL GROUPS?: YES
HOW OFTEN DO YOU GET TOGETHER WITH FRIENDS OR RELATIVES?: NEVER
HOW OFTEN DO YOU ATTENT MEETINGS OF THE CLUB OR ORGANIZATION YOU BELONG TO?: MORE THAN 4 TIMES PER YEAR
HOW OFTEN DO YOU GET TOGETHER WITH FRIENDS OR RELATIVES?: MORE THAN THREE TIMES A WEEK
HOW OFTEN DO YOU ATTEND CHURCH OR RELIGIOUS SERVICES?: MORE THAN 4 TIMES PER YEAR
HOW OFTEN DO YOU ATTENT MEETINGS OF THE CLUB OR ORGANIZATION YOU BELONG TO?: MORE THAN 4 TIMES PER YEAR
IN A TYPICAL WEEK, HOW MANY TIMES DO YOU TALK ON THE PHONE WITH FAMILY, FRIENDS, OR NEIGHBORS?: NEVER
IN A TYPICAL WEEK, HOW MANY TIMES DO YOU TALK ON THE PHONE WITH FAMILY, FRIENDS, OR NEIGHBORS?: MORE THAN THREE TIMES A WEEK
DO YOU BELONG TO ANY CLUBS OR ORGANIZATIONS SUCH AS CHURCH GROUPS UNIONS, FRATERNAL OR ATHLETIC GROUPS, OR SCHOOL GROUPS?: YES
HOW OFTEN DO YOU ATTEND CHURCH OR RELIGIOUS SERVICES?: MORE THAN 4 TIMES PER YEAR

## 2022-03-16 NOTE — CARE COORDINATION
Ambulatory Care Coordination Note  3/16/2022  CM Risk Score: 0  Charlson 10 Year Mortality Risk Score: 79%     ACC: Pita Hernandez, RN    Summary Note: Joey Barragan was referred to Care Coordination by [facility] report following 2 UC visits for cystitis. Spoke with Joey Barragan today. Introduced self and role. Agreeable to f/u calls. Pt has h/o: HTN, OAB, breast Ca, hemangioblastoma. Pt very independent in her care. Was seen on 3/1 and 3/3 for cystitis. Was prescribed atb. Reports s/s have resolved. For appx last 3 wks has noticed congested cough producing clear phlegm. Pt does have some seasonal allergies. Denies concern at this time. Will continue to monitor and will call if s/s persist.  Offered to arrange earlier appt. Pt declines at this time. Has f/u 4/7. Educated on walk in clinic as well  Pt follows up routinely with neurosurgery   Manages own medications  Does not use any DME at this time. Very socially active. Has good support from family and friends. Plan of care:  Weekly ACM calls  F/u with PCP on 4/7. Reinforce availability of walk in clinic, VV's  Work on increasing activity as weather improves. Pt has Silver Sneakers but not using at this time. General Assessment    Do you have any symptoms that are causing concern?: Yes  Progression since Onset: Unchanged  Reported Symptoms:  (Comment: cough producing clear sputum. has been present for last 3 wks. no worse. not coughing all day. has h/o allergies. )           Ambulatory Care Coordination Assessment    Care Coordination Protocol  Program Enrollment: Complex Care  Referral from Primary Care Provider: No  Week 1 - Initial Assessment     Do you have all of your prescriptions and are they filled?: Yes  Barriers to medication adherence: None  Are you able to afford your medications?: Yes  How often do you have trouble taking your medications the way you have been told to take them?: I always take them as prescribed.      Do you have Home O2 Therapy?: No      Ability to seek help/take action for Emergent Urgent situations i.e. fire, crime, inclement weather or health crisis. : Independent  Ability to ambulate to restroom: Independent  Ability handle personal hygeine needs (bathing/dressing/grooming): Independent  Ability to manage Medications: Independent  Ability to prepare Food Preparation: Independent  Ability to maintain home (clean home, laundry): Independent  Ability to drive and/or has transportation: Independent  Ability to do shopping: Independent  Ability to manage finances: Independent  Is patient able to live independently?: Yes     Current Housing: Other  Other Housing: condominium        Per the Fall Risk Screening, did the patient have 2 or more falls or 1 fall with injury in the past year?: No     Frequent urination at night?: No  Do you use rails/bars?: No  Do you have a non-slip tub mat?: Yes     Are you experiencing loss of meaning?: No  Are you experiencing loss of hope and peace?: No     Thinking about your patient's physical health needs, are there any symptoms or problems (risk indicators) you are unsure about that require further investigation?: No identified areas of uncertainly or problems already being investigated   Are the patients physical health problems impacting on their mental well-being?: No identified areas of concern   Are there any problems with your patients lifestyle behaviors (alcohol, drugs, diet, exercise) that are impacting on physical or mental well-being?: No identified areas of concern   Do you have any other concerns about your patients mental well-being?  How would you rate their severity and impact on the patient?: No identified areas of concern   How would you rate their home environment in terms of safety and stability (including domestic violence, insecure housing, neighbor harassment)?: Consistently safe, supportive, stable, no identified problems   How do daily activities impact on the patient's well-being? (include current or anticipated unemployment, work, caregiving, access to transportation or other): No identified problems or perceived positive benefits   How would you rate their social network (family, work, friends)?: Good participation with social networks   How wells does the patient now understand their health and well-being (symptoms, signs or risk factors) and what they need to do to manage their health?: Reasonable to good understanding and already engages in managing health or is willing to undertake better management   How well do you think your patient can engage in healthcare discussions? (Barriers include language, deafness, aphasia, alcohol or drug problems, learning difficulties, concentration): Clear and open communication, no identified barriers   Do other services need to be involved to help this patient?: Other care/services not required at this time   Are current services involved with this patient well-coordinated? (Include coordination with other services you are now recommendation): All required care/services in place and well-coordinated   Suggested Interventions and Community Resources         Set up/Review an Education Plan, Set up/Review Goals              Prior to Admission medications    Medication Sig Start Date End Date Taking? Authorizing Provider   melatonin 3 MG TABS tablet Take 3 mg by mouth nightly as needed   Yes Historical Provider, MD   polyethylene glycol (GLYCOLAX) 17 GM/SCOOP powder take 17GM (DISSOLVED IN WATER) by mouth once daily 12/1/21  Yes Kun Wilson MD   lisinopril-hydroCHLOROthiazide (PRINZIDE;ZESTORETIC) 20-25 MG per tablet Take 1 tab by mouth daily .  9/13/21  Yes Melony Si H Le, DO   oxybutynin (DITROPAN) 5 MG tablet take 1 tablet by mouth twice a day 9/13/21  Yes Melony Si H Le, DO   amLODIPine (NORVASC) 10 MG tablet Take 1 tablet by mouth daily 9/13/21  Yes Melony Si H Le, DO   potassium chloride (KLOR-CON M) 20 MEQ extended release tablet take 1 tablet by mouth once daily 9/13/21  Yes Melony Si H Le, DO   fluticasone (FLONASE) 50 MCG/ACT nasal spray instill 2 sprays into each nostril once daily 9/13/21  Yes Melony Si H Le, DO   pravastatin (PRAVACHOL) 40 MG tablet take 1 tablet by mouth every evening take 1 tablet by mouth at bedtime 9/13/21  Yes Melony Si H Le, DO   letrozole (FEMARA) 2.5 MG tablet Take 2.5 mg by mouth daily   Yes Historical Provider, MD   Cranberry-Vitamin C (AZO CRANBERRY URINARY TRACT) 250-60 MG CAPS Take as directed on package.  6/5/21  Yes NARAYAN Stafford - CNP   famotidine (PEPCID AC) 10 MG tablet Take 10 mg by mouth as needed   Yes Historical Provider, MD   diphenhydrAMINE-APAP, sleep, (TYLENOL PM EXTRA STRENGTH)  MG tablet Take 1 tablet by mouth nightly as needed for Sleep   Yes Historical Provider, MD   Biotin 1000 MCG TABS Take by mouth daily   Yes Historical Provider, MD   Artificial Tear Ointment (DRY EYES OP) Apply to eye as needed   Yes Historical Provider, MD   Calcium Carb-Cholecalciferol (CALCIUM 500+D3) 500-400 MG-UNIT TABS Take 1 tablet by mouth 2 times daily    Yes Historical Provider, MD   Cholecalciferol (VITAMIN D3) 2000 units CAPS Take 1 capsule by mouth daily    Yes Historical Provider, MD   aspirin 81 MG tablet Take 81 mg by mouth daily    Yes Historical Provider, MD       Future Appointments   Date Time Provider Anu Sumner   4/7/2022  1:20 PM [facility] Jonita Aase, DO [facility] [id number] [facility]   12/4/2023 10:00 AM Magali Corona, [facility]

## 2022-03-23 ENCOUNTER — CARE COORDINATION (OUTPATIENT)
Dept: CARE COORDINATION | Age: 77
End: 2022-03-23

## 2022-03-24 DIAGNOSIS — E87.6 HYPOKALEMIA: ICD-10-CM

## 2022-03-24 NOTE — TELEPHONE ENCOUNTER
Pt has one tab left. Next appt is 4/7/22.    I called in to [facility] on [address] and spoke to Epifanio

## 2022-03-25 RX ORDER — POTASSIUM CHLORIDE 20 MEQ/1
TABLET, EXTENDED RELEASE ORAL
Qty: 90 TABLET | Refills: 0 | OUTPATIENT
Start: 2022-03-25 | End: 2022-04-07 | Stop reason: SDUPTHER

## 2022-03-31 ENCOUNTER — CARE COORDINATION (OUTPATIENT)
Dept: CARE COORDINATION | Age: 77
End: 2022-03-31

## 2022-04-07 ENCOUNTER — TELEPHONE (OUTPATIENT)
Dept: UROLOGY | Age: 77
End: 2022-04-07

## 2022-04-07 ENCOUNTER — OFFICE VISIT (OUTPATIENT)
Dept: INTERNAL MEDICINE CLINIC | Age: 77
End: 2022-04-07
Payer: MEDICARE

## 2022-04-07 VITALS
SYSTOLIC BLOOD PRESSURE: 134 MMHG | BODY MASS INDEX: 28.19 KG/M2 | HEART RATE: 70 BPM | TEMPERATURE: 98.1 F | DIASTOLIC BLOOD PRESSURE: 68 MMHG | HEIGHT: 65 IN | WEIGHT: 169.2 LBS

## 2022-04-07 DIAGNOSIS — E78.2 MIXED HYPERLIPIDEMIA: ICD-10-CM

## 2022-04-07 DIAGNOSIS — I10 ESSENTIAL HYPERTENSION: Primary | ICD-10-CM

## 2022-04-07 DIAGNOSIS — K59.01 SLOW TRANSIT CONSTIPATION: ICD-10-CM

## 2022-04-07 DIAGNOSIS — R93.3 ABNORMAL CT SCAN, GASTROINTESTINAL TRACT: ICD-10-CM

## 2022-04-07 DIAGNOSIS — E87.6 HYPOKALEMIA: ICD-10-CM

## 2022-04-07 DIAGNOSIS — J30.9 ALLERGIC RHINITIS, UNSPECIFIED SEASONALITY, UNSPECIFIED TRIGGER: ICD-10-CM

## 2022-04-07 DIAGNOSIS — D17.71 RENAL ANGIOMYOLIPOMA: ICD-10-CM

## 2022-04-07 DIAGNOSIS — N32.81 OVERACTIVE BLADDER: ICD-10-CM

## 2022-04-07 PROCEDURE — 99214 OFFICE O/P EST MOD 30 MIN: CPT | Performed by: INTERNAL MEDICINE

## 2022-04-07 PROCEDURE — 93000 ELECTROCARDIOGRAM COMPLETE: CPT | Performed by: INTERNAL MEDICINE

## 2022-04-07 RX ORDER — OXYBUTYNIN CHLORIDE 5 MG/1
TABLET ORAL
Qty: 180 TABLET | Refills: 1 | Status: SHIPPED | OUTPATIENT
Start: 2022-04-07 | End: 2022-10-11 | Stop reason: SDUPTHER

## 2022-04-07 RX ORDER — FLUTICASONE PROPIONATE 50 MCG
SPRAY, SUSPENSION (ML) NASAL
Qty: 16 G | Refills: 5 | Status: SHIPPED | OUTPATIENT
Start: 2022-04-07 | End: 2022-10-17

## 2022-04-07 RX ORDER — LISINOPRIL AND HYDROCHLOROTHIAZIDE 25; 20 MG/1; MG/1
TABLET ORAL
Qty: 90 TABLET | Refills: 1 | Status: SHIPPED | OUTPATIENT
Start: 2022-04-07 | End: 2022-08-04 | Stop reason: SDUPTHER

## 2022-04-07 RX ORDER — POTASSIUM CHLORIDE 20 MEQ/1
TABLET, EXTENDED RELEASE ORAL
Qty: 90 TABLET | Refills: 1 | Status: SHIPPED | OUTPATIENT
Start: 2022-04-07 | End: 2022-10-11 | Stop reason: SDUPTHER

## 2022-04-07 RX ORDER — PRAVASTATIN SODIUM 40 MG
TABLET ORAL
Qty: 90 TABLET | Refills: 1 | Status: SHIPPED | OUTPATIENT
Start: 2022-04-07 | End: 2022-10-11 | Stop reason: SDUPTHER

## 2022-04-07 RX ORDER — POLYETHYLENE GLYCOL 3350 17 G/17G
POWDER, FOR SOLUTION ORAL
Qty: 510 G | Refills: 1 | Status: SHIPPED | OUTPATIENT
Start: 2022-04-07

## 2022-04-07 RX ORDER — AMLODIPINE BESYLATE 10 MG/1
TABLET ORAL
Qty: 90 TABLET | Refills: 1 | Status: SHIPPED | OUTPATIENT
Start: 2022-04-07 | End: 2022-10-11 | Stop reason: SDUPTHER

## 2022-04-07 NOTE — PROGRESS NOTES
Chief Complaint   Patient presents with    Hypertension     6 months     Hyperlipidemia    Allergic Rhinitis     Other     OAB     This patient presents for medical evaluation. This is a 6 month follow up visit. 2  visits for cystitis - Macrobid 3/1/ ->Cipro 3/3 - growth of contaminants both set (pos nitrite). Symptoms resolved. Thinks she should do better drinking water. Allergy symptoms - cough with clear sputum. She is taking Flonase daily. Add antihistamine. She tried Claritin and not effective enough. She tried Zyrtec or allegra and didn't like how she felt on it. Will try Xyzal.  She has had allergy injections in past and if can't control with Xyzal will consider this. She knows she is allergic to dogs/cats and she has a dog. 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 Maninder Garcia. She saw radiation oncology - considering radiation but putting off for now. Work up for [employer] [place] disease - MRI of entire spine and ophthamology evaluation done and negative. She has visit saw Dr. Mehnaz Fulton 8/26/19. She is also complaining of dry eye. Post-op complicated with shingles (healed currently), and redness around cuticles. MRI 5/2020 brain stable. NS follow up - MRI brain 11/29/21 - no recurrent mass, mild atrophy and probable ischemic changes of white matter, small retention cyst or polyp in R max sinus. Next MRI due in 2 years 11/29/23. Start Silver sneakers. Left heel pain/plantar fascitis - Powerstep inserts, Voltaren gel - seeing [facility]iatry at [facility] 3/14/22 - plantar fascitis, need to do stretching exercises X-ray 1/31/22 -arthritis and calcaneal spur on left foot    Shingles - she is asking today if she should she get shingles vaccine. She had mild case of shingles on right lower back after brain surgery. Suggested she get vaccine. Advised it requires 2 doses - suggested pre-medicating with Tylenol. Cataracts - surgery 1/2021 and 2/2021 with Dr. Perla Mcmahon. EKG with chronic anteroseptal infarct due to poor R wave progression. Gave list of allergies to give to Dr. Perla Mcmahon - as multiple allergies to antibacterial skin prep. Hypertension - BP controlled, asymptomatic. BMP 12/2021. Continue lisinopril/HCTZ, Norvasc. BMP due now. Denies headache, lightheadedness. Hyperglycemia - 118, 10/2019, normal at 107 12/2020. Continue diet control. HgA1c 5.6 1/2019, 5.9 6/2020, 5.8 10/2021. Hyperlipidemia - LDL 77 1/2019, 95 6/2020, normal liver function 12/2020. LDL 94, normal LFTs - 10/2021. Continue pravastatin. History of multiple Melanomas - She sees Dr. Cisco Yeager. She saw her 3/2022 then every 6 months. Following CBC, CMP periodically. 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 Pathology - fibrocystic changes including cyst formation, apocrine metaplasia, fibrosis and microcalcification. She is going to follow up with Dr. Ramana Hilton to discuss 11/25/19 - DCIS on bx but not in surgical path - so will need to question if need to do radiation (for brain). She recently did bx and was benign. On anti-estrogen - Dr. Kathia Zapata MD at [facility]. No radiation. Mammogram done in 11/2021. DEXA normal 11/2020. Joint pain so change to new med - Femara. Upon review of scans 6/2019- thyroid nodule seen 1.4 cm but smaller than what was seen on CT 2015 - improved over 4 years - no follow up needed. Mild rectal wall thickening on CT 6/2019 - just had colonoscopy 2/2019 - was to have sigmoidoscopy with Dr. Estelita Pedroza. She cancelled this and never went back. Her follow up CT 8/2020 did not show rectal thickening. Will set her up with GI and see if they want to look at this. She is having issues with constipation. 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 Need to follow with yearly renal ultrasound - Urology doing this. GERD - stable, on Pepcid 10 mg daily, prn. May need to increase if goes back on NSAIDs. She is on a dissovable OTC prn - pepcid ac and doing ok. OAB - stable, continue Ditropan. Constipation - very happy with Miralax and Activa. She ran out and needs refill now. She had her colonoscopy done - 2/18/19, impressively long and looping colon, hypertrophied anal papilla, grade 2 internal hemorrhoids without hemorrhage, repeat in 10 year but will be 83 and would not do unless very good health as difficult with looping colon. 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 She saw them and had normal cystoscopy, CT urogram and cytology. Urology - [facility] - following imaging yearly with cytology and stable at last visit 8/2020. Hypokalemia - most likely due to HCTZ. Offered to change HCTZ to something else but she would rather stay on this regimen. Potassium stable on KCL 20 meq daily. HM - DEXA reviewed from 11/20/20 - normal, and colonoscopy done 2/2019 - due in 10 years - but will be 79 yo and high risk. Discussed at previous visit:    Allergic rhinitis -stable - stopped injections in 7/2019 with surgery and never went back. She is using Flonase regularly. She only uses Claritin prn. Allergic rhinitis - with PND, cough, clear sputum, sinus pressure. Increased Flonase to 2 sprays each nostril daily, stopped Claritin D and recommended Xyzal .  Suggested backing off Benadryl at night. She did those changes - and added alec pot. She saw ENT - Dr. Rogelio Jackson - he again gave same suggestions. If not improving - consider CT sinuses - Ordered at last visit as sinus pressure, cough, PND ongoing for the previous 6 months (seen by me, urgent care and ENT for same issues).    Ordered allergist referral.   She did see allergist - she is allergic to dog and cat - she has a dog. She states she is sleeping with dog and will not give up dog. Suggested not sleeping with dog at least.  She states she started another nasal spray with allergist and combination has improved symptoms. Reviewed CT and explained results - anatomy may be contributing to symptoms. But she is feeling better with treatment as above, so not wanting to see ENT. CT results:  1. No evidence to suggest acute sinusitis.       2. Mild mucosal thickening is present within the bilateral maxillary sinuses. Mucosal coaptation also causes mild narrowing of the left ostiomeatal unit.       3. 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 She is now taking allergy shots, as she was having sedation with anti-histamine. She is continuing anti-histamine and nasal spray till shots are in her system. Allergist is managing. Told her to decrease Xyzal to 1/2 tab at night. Today - she is no longer using medication since surgery and symptoms resolved in hospital.  But now back home with dog she is congested. She started back on the Flonase.       Past Medical History:   Diagnosis Date    Abnormal EKG     normal stress test 3/2009    Breast cancer ([address]     Cancer (HCC)     basal cell skin    Complication of anesthesia     difficulty breathing after surgery- transferrred [facility] to [facility] [place]. Angeline's for 3 days, O2 for about 5 days    Constipation     Hyperglycemia     History type 2 DM - lost weight - diet controlled    Hyperlipidemia     Hypertension     Melanoma in situ ([address] 6/2012    of chest - Dr. Angy Wong - margins clear - neg sentinal lymph node    Melanoma in situ of lower extremity ([address]     excision- x2    Metabolic syndrome     much improved - normal triglycerides, weight loss of 50#    OA (osteoarthritis)     left knee       Current Outpatient Medications   Medication Sig Dispense Refill    diclofenac sodium (VOLTAREN) 1 % GEL Apply topically 2 times daily      potassium chloride (KLOR-CON M) 20 MEQ extended release tablet take 1 tablet by mouth once daily 90 tablet 1    polyethylene glycol (GLYCOLAX) 17 GM/SCOOP powder Take 17 gm dissolved in water once daily 510 g 1    lisinopril-hydroCHLOROthiazide (PRINZIDE;ZESTORETIC) 20-25 MG per tablet Take 1 tab by mouth daily . 90 tablet 1    oxybutynin (DITROPAN) 5 MG tablet take 1 tablet by mouth twice a day 180 tablet 1    amLODIPine (NORVASC) 10 MG tablet Take 1 tablet by mouth daily 90 tablet 1    fluticasone (FLONASE) 50 MCG/ACT nasal spray instill 2 sprays into each nostril once daily 16 g 5    pravastatin (PRAVACHOL) 40 MG tablet take 1 tablet by mouth every evening take 1 tablet by mouth at bedtime 90 tablet 1    melatonin 3 MG TABS tablet Take 3 mg by mouth nightly as needed      letrozole (FEMARA) 2.5 MG tablet Take 2.5 mg by mouth daily      Cranberry-Vitamin C (AZO CRANBERRY URINARY TRACT) 250-60 MG CAPS Take as directed on package. 30 capsule 0    famotidine (PEPCID AC) 10 MG tablet Take 10 mg by mouth as needed      diphenhydrAMINE-APAP, sleep, (TYLENOL PM EXTRA STRENGTH)  MG tablet Take 1 tablet by mouth nightly as needed for Sleep      Biotin 1000 MCG TABS Take by mouth daily      Artificial Tear Ointment (DRY EYES OP) Apply to eye as needed      Calcium Carb-Cholecalciferol (CALCIUM 500+D3) 500-400 MG-UNIT TABS Take 1 tablet by mouth 2 times daily       Cholecalciferol (VITAMIN D3) 2000 units CAPS Take 1 capsule by mouth daily       aspirin 81 MG tablet Take 81 mg by mouth daily        No current facility-administered medications for this visit.        Allergies   Allergen Reactions    Iodine     Betadine [Povidone Iodine] Rash    Chloraprep One Step [Chlorhexidine Gluconate] Rash    Pcn [Penicillins] Hives and Rash    Sulfa Antibiotics Rash       Review of Systems - General ROS: negative for - fever or chills   Psychological ROS: negative for - anxiety, depression  Hematological and Lymphatic ROS: No history of blood clots or bleeding disorder. Respiratory ROS: no shortness of breath, wheezing, positive cough from PND  Cardiovascular ROS: no chest pain or dyspnea on exertion  Gastrointestinal ROS: no abdominal pain, change in bowel habits, or black or bloody stools - positive constipation - better with Miralax daily prn  Genito-Urinary ROS: no dysuria, trouble voiding, or hematuria - positive symptoms as above  Musculoskeletal ROS: negative for - muscle pain or muscular weakness  Neurological ROS: negative for - dizziness, memory loss, seizures, tremors, visual changes or weakness  Dermatological ROS: negative for - rash or skin lesion changes    Blood pressure 134/68, pulse 70, temperature 98.1 °F (36.7 °C), height 5' 5\" (1.651 m), weight 169 lb 3.2 oz (76.7 kg), not currently breastfeeding. Physical Examination: General appearance - alert, well appearing, and in no distress  Mental status - alert, oriented to person, place, and time  Neck - supple, no significant adenopathy, no JVD, or carotid bruits  Chest - clear to auscultation, no wheezes, rales or rhonchi, symmetric air entry  Heart - normal rate, regular rhythm, no murmurs, rubs, clicks or gallops  Abdomen - soft, nontender, nondistended  Neurological - alert, oriented, normal speech, no focal findings or movement disorder noted  Extremities - peripheral pulses normal, no edema, no clubbing or cyanosis  Skin - normal coloration and turgor, warm, dry    Diagnostic Data:  Labs reviewed from 10/2021 and 3/2022, mammogram 11/2021, MRI brain 11/2021, foot x-ray 1/31/22, EKG today with patient. Results for orders placed or performed during the hospital encounter of 03/03/22   Culture, Urine    Specimen: Urine   Result Value Ref Range    Urine Culture, Routine (A)      Growth of Contaminants.   The mixture of organisms present are not a common cause of urinary tract infections and probably represent skin saleem or distal urethral saleem. Organism Growth of Contaminants (A)    Urinalysis   Result Value Ref Range    Glucose, Ur 100 (A) NEGATIVE mg/dl    Bilirubin Urine Negative NEGATIVE    Ketones, Urine Negative NEGATIVE    Specific Gravity, Urine 1.010 1.002 - 1.030    Blood, Urine Trace-lysed NEGATIVE    pH, Urine 7.00 5.0 - 9.0    Protein, Urine Negative NEGATIVE mg/dl    Urobilinogen, Urine 0.20 0.2 - 1.0 eu/dl    Nitrite, Urine Positive (A) NEGATIVE    Leukocyte Esterase, Urine Negative NEGATIVE    Color, UA Juanita (A) STRAW-YELLOW    Character, Urine Clear CLEAR-SL CLOUD       Assessment/Plan:   Diagnosis Orders   1. Essential hypertension  EKG 12 Lead    lisinopril-hydroCHLOROthiazide (PRINZIDE;ZESTORETIC) 20-25 MG per tablet    amLODIPine (NORVASC) 10 MG tablet    CBC with Auto Differential    Comprehensive Metabolic Panel   2. Hypokalemia  potassium chloride (KLOR-CON M) 20 MEQ extended release tablet    Comprehensive Metabolic Panel   3. Mixed hyperlipidemia  pravastatin (PRAVACHOL) 40 MG tablet    Comprehensive Metabolic Panel   4. Slow transit constipation  polyethylene glycol (GLYCOLAX) 17 GM/SCOOP powder   5. Allergic rhinitis, unspecified seasonality, unspecified trigger  fluticasone (FLONASE) 50 MCG/ACT nasal spray   6. Renal angiomyolipoma  [facility]   7. Overactive bladder  oxybutynin (DITROPAN) 5 MG tablet     Orders Placed This Encounter   Procedures    CBC with Auto Differential     Standing Status:   Future     Standing Expiration Date:   4/7/2023    Comprehensive Metabolic Panel     Standing Status:   Future     Standing Expiration Date:   4/7/2023   [facility]     Referral Priority:   Routine     Referral Type:   Eval and Treat     Referral Reason:   Specialty Services Required     Requested Specialty:   Urology     Number of Visits Requested:   1    EKG 12 Lead     Order Specific Question:   Reason for Exam?     Answer:    Other Hypertension - BP controlled, continue lisinopril/HCTZ, and Norvasc. BMP stable 10/2021. Hypokalemia - likely due to HCTZ, monitor potassium level on supplement. Potassium 4.0 10/2021. Hyperlipidemia - controlled, LDL 94 10/2021, continue pravastatin. CMP normal 10/2021. Constipation - ran out of Miralax and need to restart. Make visit with GI to discuss with rectal thickening - never followed up with sigmoidoscopy. Rectal thickening - just had colonoscopy 2/2019 - will send back to GI for further evaluation. They recommended flex sig - see Majel Levels note. Will need to follow up with GI and see if this was done. Allergic rhinitis/frequent sinusitis - due to dog/cat allergy (she has a dog) - continue Flonase and Claritin. May switch to Xyzal.      Renal angiomyolipoma - 3 on the right - stable in size (one is >4 cm), and cyst on left. As one is >4 cm will send back to Urology to follow. They will follow yearly. She missed last visit and needs set up with them again. OAB - stable, continue Ditropan. Microscopic hematuria -  May be due to renal cyst seen on renal ultrasound. Sent to Urology for cystoscopy and to complete work up of microscopic hematuria. She saw them and had normal cystoscopy, CT urogram and cytology. Need to do yearly cytology - done 8/2020. To see 8/2021- never did - reschedule. History of multiple melanomas - seeing Dermatology Dr. Isai Valiente. Will schedule follow up appointment in 6 months to review chronic issues. Continue medications at current doses. Lab due now.  - Colonoscopy was done 2/2019 with Dr. Odetta Olszewski. Did flu and Pneumonia vaccine - of note she called later and said right arm was red and sore, had one day of nausea, diarrhea but resolved quickly - unsure if related to vaccine. Tolerated Tdap 6/2020. Recommended shingles vaccine as has had shingles. Kaitlin Medrano MD    [facility] [employer]  [facility] [place]. [facility] [address]  [address]  [address]  Dept: [phone]  Dept Fax: [phone] : [phone]

## 2022-04-07 NOTE — TELEPHONE ENCOUNTER
YOU SAW PATIENT IN 2020 AND ORDERED A RENAL US. SHE IS HAVING ANOTHER RENAL US ON 4/18. WOULD YOU LIKE TO DO A FOLLOW UP TELEPHONE VISIT OR SEE IN PERSON? AND DO YOU WANT ME TO ADD HER IN A SPECIFIC DAY?     PLEASE ADVISE  [facility]

## 2022-04-07 NOTE — PATIENT INSTRUCTIONS
Go to pharmacy and get shingles vaccine if want it. Pre-medicate with Tylenol. Start Xyzal nightly for allergy symptoms. If not better after a month and you want to see allergist for allergy injections call me. Start silver sneakers.

## 2022-04-13 ENCOUNTER — CARE COORDINATION (OUTPATIENT)
Dept: CARE COORDINATION | Age: 77
End: 2022-04-13

## 2022-04-13 NOTE — CARE COORDINATION
Attempted to reach Milagro Rutledge today for care coordination f/u. No answer. Message left to return call.

## 2022-04-18 ENCOUNTER — HOSPITAL ENCOUNTER (OUTPATIENT)
Dept: ULTRASOUND IMAGING | Age: 77
Discharge: HOME | End: 2022-04-18
Payer: MEDICARE

## 2022-04-18 DIAGNOSIS — D17.71 RENAL ANGIOMYOLIPOMA: ICD-10-CM

## 2022-04-18 PROCEDURE — 76770 US EXAM ABDO BACK WALL COMP: CPT

## 2022-04-21 ENCOUNTER — CARE COORDINATION (OUTPATIENT)
Dept: CARE COORDINATION | Age: 77
End: 2022-04-21

## 2022-04-21 NOTE — CARE COORDINATION
Attempted to reach Milagro Rutledge today for care coordination f/u. No answer. Message left to return call. I have not been able to reach pt last 4 attempts. My Chart letter sent. Will d/c from care coordination at this time d/t inability to make contact with pt.

## 2022-04-26 ENCOUNTER — TELEPHONE (OUTPATIENT)
Dept: INTERNAL MEDICINE CLINIC | Age: 77
End: 2022-04-26

## 2022-04-26 NOTE — TELEPHONE ENCOUNTER
----- Message from Allison Mata MD sent at 4/25/2022  6:46 PM EDT -----  Let her know that renal u/s findings basically stable - only mild increase in size.

## 2022-05-11 ENCOUNTER — OFFICE VISIT (OUTPATIENT)
Dept: UROLOGY | Age: 77
End: 2022-05-11
Payer: MEDICARE

## 2022-05-11 ENCOUNTER — TELEPHONE (OUTPATIENT)
Dept: INTERNAL MEDICINE CLINIC | Age: 77
End: 2022-05-11

## 2022-05-11 ENCOUNTER — TELEPHONE (OUTPATIENT)
Dept: UROLOGY | Age: 77
End: 2022-05-11

## 2022-05-11 VITALS
SYSTOLIC BLOOD PRESSURE: 116 MMHG | WEIGHT: 169.6 LBS | DIASTOLIC BLOOD PRESSURE: 64 MMHG | BODY MASS INDEX: 28.26 KG/M2 | HEIGHT: 65 IN

## 2022-05-11 DIAGNOSIS — N28.9 RENAL LESION: ICD-10-CM

## 2022-05-11 DIAGNOSIS — N28.1 RENAL CYST, ACQUIRED, LEFT: Primary | ICD-10-CM

## 2022-05-11 DIAGNOSIS — R31.29 MICROSCOPIC HEMATURIA: ICD-10-CM

## 2022-05-11 LAB
BACTERIA: ABNORMAL
BILIRUBIN URINE: NEGATIVE
BILIRUBIN URINE: NEGATIVE
BLOOD URINE, POC: ABNORMAL
BLOOD, URINE: ABNORMAL
CASTS: ABNORMAL /LPF
CASTS: ABNORMAL /LPF
CHARACTER, URINE: CLEAR
CHARACTER, URINE: CLEAR
COLOR, URINE: YELLOW
COLOR: YELLOW
CRYSTALS: ABNORMAL
EPITHELIAL CELLS, UA: ABNORMAL /HPF
GLUCOSE URINE: NEGATIVE MG/DL
GLUCOSE, URINE: NEGATIVE MG/DL
KETONES, URINE: NEGATIVE
KETONES, URINE: NEGATIVE
LEUKOCYTE CLUMPS, URINE: ABNORMAL
LEUKOCYTE ESTERASE, URINE: ABNORMAL
MISCELLANEOUS LAB TEST RESULT: ABNORMAL
NITRITE, URINE: NEGATIVE
NITRITE, URINE: NEGATIVE
PH UA: 7 (ref 5–9)
PH, URINE: 7 (ref 5–9)
PROTEIN UA: NEGATIVE MG/DL
PROTEIN, URINE: NEGATIVE MG/DL
RBC URINE: ABNORMAL /HPF
RENAL EPITHELIAL, UA: ABNORMAL
SPECIFIC GRAVITY UA: 1.01 (ref 1–1.03)
SPECIFIC GRAVITY, URINE: 1.02 (ref 1–1.03)
UROBILINOGEN, URINE: 0.2 EU/DL (ref 0–1)
UROBILINOGEN, URINE: 0.2 EU/DL (ref 0–1)
WBC UA: ABNORMAL /HPF
YEAST: ABNORMAL

## 2022-05-11 PROCEDURE — 81003 URINALYSIS AUTO W/O SCOPE: CPT | Performed by: NURSE PRACTITIONER

## 2022-05-11 PROCEDURE — 99214 OFFICE O/P EST MOD 30 MIN: CPT | Performed by: NURSE PRACTITIONER

## 2022-05-11 RX ORDER — PERPHENAZINE/AMITRIPTYLINE HCL 2 MG-10 MG
TABLET ORAL
COMMUNITY

## 2022-05-11 ASSESSMENT — ENCOUNTER SYMPTOMS
ABDOMINAL PAIN: 0
NAUSEA: 0
VOMITING: 0
BACK PAIN: 0

## 2022-05-11 NOTE — TELEPHONE ENCOUNTER
Patient scheduled for CT ABDOMEN  W WO   at [address] [place] [address] on 11/4/22 ARRIVAL  AM FOR A 820 AM SCAN TIME WITH NPO 4 HOURS PRIOR.     Order mailed with instructions or given to the patient in the office

## 2022-05-11 NOTE — PROGRESS NOTES
[address]  [address]  [address]  [facility] [address]  Dept: [phone]  Loc: [phone]    Visit Date: 5/11/2022        HPI:     Annabella Martin is a 68 y.o. 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      Pt has a hx of micro hematuria and underwent office cystoscopy in 2018 by Dr. Lanny David that was normal.  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 Pt here today to review renal us results.       Pt reports she is doing well. No abdominal or flank pain, dysuria, fever, chills. Reports an episode of gross hematuria associated with infection in March of this year. None since antibiotic treatment. Current Outpatient Medications   Medication Sig Dispense Refill    Misc Natural Products (CRANBERRY/PROBIOTIC) TABS Take by mouth      potassium chloride (KLOR-CON M) 20 MEQ extended release tablet take 1 tablet by mouth once daily 90 tablet 1    polyethylene glycol (GLYCOLAX) 17 GM/SCOOP powder Take 17 gm dissolved in water once daily 510 g 1    lisinopril-hydroCHLOROthiazide (PRINZIDE;ZESTORETIC) 20-25 MG per tablet Take 1 tab by mouth daily .  90 tablet 1    oxybutynin (DITROPAN) 5 MG tablet take 1 tablet by mouth twice a day 180 tablet 1    amLODIPine (NORVASC) 10 MG tablet Take 1 tablet by mouth daily 90 tablet 1    fluticasone (FLONASE) 50 MCG/ACT nasal spray instill 2 sprays into each nostril once daily 16 g 5    pravastatin (PRAVACHOL) 40 MG tablet take 1 tablet by mouth every evening take 1 tablet by mouth at bedtime 90 tablet 1    melatonin 3 MG TABS tablet Take 5 mg by mouth nightly as needed       letrozole (FEMARA) 2.5 MG tablet Take 2.5 mg by mouth daily      famotidine (PEPCID AC) 10 MG tablet Take 10 mg by mouth as needed      diphenhydrAMINE-APAP, sleep, (TYLENOL PM EXTRA STRENGTH)  MG tablet Take 1 tablet by mouth nightly as needed for Sleep      Biotin 1000 MCG TABS Take by mouth daily      Calcium Carb-Cholecalciferol (CALCIUM 500+D3) 500-400 MG-UNIT TABS Take 1 tablet by mouth 2 times daily       Cholecalciferol (VITAMIN D3) 2000 units CAPS Take 1 capsule by mouth daily       aspirin 81 MG tablet Take 81 mg by mouth daily       diclofenac sodium (VOLTAREN) 1 % GEL Apply topically 2 times daily (Patient not taking: Reported on 5/11/2022)      Cranberry-Vitamin C (AZO CRANBERRY URINARY TRACT) 250-60 MG CAPS Take as directed on package. (Patient not taking: Reported on 5/11/2022) 30 capsule 0    Artificial Tear Ointment (DRY EYES OP) Apply to eye as needed (Patient not taking: Reported on 5/11/2022)       No current facility-administered medications for this visit. Past Medical History  Janet Yarbrough  has a past medical history of Abnormal EKG, Breast cancer ([address] Cancer ([address] Complication of anesthesia, Constipation, Hyperglycemia, Hyperlipidemia, Hypertension, Melanoma in situ ([address] Melanoma in situ of lower extremity ([address] Metabolic syndrome, and OA (osteoarthritis). Past Surgical History  The patient  has a past surgical history that includes Bladder surgery; Knee arthroscopy (Left, 2007); Skin cancer excision; pre-malignant / benign skin lesion excision (Right, 2009); skin biopsy; Skin cancer excision (06/27/2013); malignant skin lesion excision (Left, 07/31/2013); Knee arthroscopy (Left, 01/2014); Skin cancer excision; Tonsillectomy (1966); Hysterectomy (11/1971); eye surgery (Bilateral, 3/2016, 4/2016); Colonoscopy; Skin cancer excision (Left, 03/24/2017); Total knee arthroplasty (Right, 09/2017); Total knee arthroplasty (Left, 06/17/2015); joint replacement; craniotomy (Right, 07/03/2019);  Breast surgery; brain surgery; skin biopsy (07/2020); and Cataract removal with implant (Bilateral, 01/2021). Family History  This patient's family history includes Cancer in her father; Heart Failure in her mother. Social History  Fortino Gamble  reports that she has never smoked. She has never used smokeless tobacco. She reports previous alcohol use. She reports that she does not use drugs. Subjective:      Review of Systems   Constitutional: Negative for activity change, appetite change, chills, diaphoresis, fatigue, fever and unexpected weight change. Gastrointestinal: Negative for abdominal pain, nausea and vomiting. Genitourinary: Negative for decreased urine volume, difficulty urinating, dysuria, flank pain, frequency, hematuria and urgency. Musculoskeletal: Negative for back pain. Objective:   /64   Ht 5' 5\" (1.651 m)   Wt 169 lb 9.6 oz (76.9 kg)   BMI 28.22 kg/m²     Physical Exam  Vitals reviewed. Constitutional:       General: She is not in acute distress. Appearance: Normal appearance. She is well-developed. She is not ill-appearing or diaphoretic. HENT:      Head: Normocephalic and atraumatic. Right Ear: External ear normal.      Left Ear: External ear normal.      Nose: Nose normal.      Mouth/Throat:      Mouth: Mucous membranes are moist.   Eyes:      General: No scleral icterus. Right eye: No discharge. Left eye: No discharge. Neck:      Vascular: No JVD. Trachea: No tracheal deviation. Cardiovascular:      Rate and Rhythm: Normal rate and regular rhythm. Pulmonary:      Effort: Pulmonary effort is normal. No respiratory distress. Abdominal:      General: There is no distension. Tenderness: There is no abdominal tenderness. There is no right CVA tenderness or left CVA tenderness. Musculoskeletal:         General: No tenderness. Normal range of motion. Skin:     General: Skin is warm and dry.    Neurological:      Mental Status: She is alert and oriented to person, place, and time. Mental status is at baseline. Psychiatric:         Mood and Affect: Mood normal.         Behavior: Behavior normal.         Thought Content: Thought content normal.         POC  Results for POC orders placed in visit on 05/11/22   POCT Urinalysis No Micro (Auto)   Result Value Ref Range    Glucose, Ur Negative NEGATIVE mg/dl    Bilirubin Urine Negative     Ketones, Urine Negative NEGATIVE    Specific Gravity, Urine 1.020 1.002 - 1.030    Blood, UA POC Small (A) NEGATIVE    pH, Urine 7.00 5.0 - 9.0    Protein, Urine Negative NEGATIVE mg/dl    Urobilinogen, Urine 0.20 0.0 - 1.0 eu/dl    Nitrite, Urine Negative NEGATIVE    Leukocyte Clumps, Urine Small (A) NEGATIVE    Color, Urine Yellow YELLOW-STRAW    Character, Urine Clear [id number]         Patients recent PSA values are as follows  No results found for: PSA, PSADIA     Recent BUN/Creatinine:  Lab Results   Component Value Date    BUN 13 10/05/2021    CREATININE 0.7 10/05/2021       Radiology  The patient has had a Renal Ultrasound 4/18/22 which I have independently reviewed along with its accompanying report. The study demonstrates stable AML lesions in right kidney overall stable. One approximately 1 cm in size and other 2.8 cms in largest dimension. Left renal cyst slightly larger than prior but simple in appearance. Assessment:   R AMLs  L renal cyst  Micro hematuria with hx of recent gross hematuria    Plan:     Smaller R AML previously consistent with a cyst.  L renal cyst larger in size up to 6 cms--asymptomatic. Will check CT abd in 6 months for progress imaging. Pt denies any hx of allergy to iodinated contrast.  Has betadine intolerance--discussed possible premedication with steroids but she prefers to hold off on steroid. [facility] with proceeding with IV contrast.      Micro hematuria today. Notes episode of gross hematuria. Send urine for micro and culture.   If consistent with micro hematuria without continued infection will repeat in 2 months--if micro still showing hematuria or pt has any further gross hematuria consider office cystoscopy. CT abd in 6 months with appt to review results.

## 2022-05-11 NOTE — TELEPHONE ENCOUNTER
----- Message from Elodia Arriaza MD sent at 4/25/2022 10:46 PM EDT -----  Set up with [facility] - known to them - never followed up with rectal thickening and having constipation. Need visit to discuss.

## 2022-05-12 ENCOUNTER — TELEPHONE (OUTPATIENT)
Dept: UROLOGY | Age: 77
End: 2022-05-12

## 2022-05-12 DIAGNOSIS — R31.29 MICROSCOPIC HEMATURIA: Primary | ICD-10-CM

## 2022-05-12 LAB
ORGANISM: ABNORMAL
URINE CULTURE, ROUTINE: ABNORMAL

## 2022-08-03 DIAGNOSIS — I10 ESSENTIAL HYPERTENSION: ICD-10-CM

## 2022-08-03 NOTE — TELEPHONE ENCOUNTER
Called script to [facility]. , left refill information on prescriber voicenail .

## 2022-08-04 RX ORDER — LISINOPRIL AND HYDROCHLOROTHIAZIDE 25; 20 MG/1; MG/1
TABLET ORAL
Qty: 90 TABLET | Refills: 1 | OUTPATIENT
Start: 2022-08-04 | End: 2022-10-11 | Stop reason: SDUPTHER

## 2022-10-08 ENCOUNTER — HOSPITAL ENCOUNTER (EMERGENCY)
Age: 77
Discharge: HOME | End: 2022-10-08
Payer: MEDICARE

## 2022-10-08 VITALS
TEMPERATURE: 99.1 F | WEIGHT: 165 LBS | RESPIRATION RATE: 16 BRPM | DIASTOLIC BLOOD PRESSURE: 65 MMHG | OXYGEN SATURATION: 96 % | HEART RATE: 75 BPM | HEIGHT: 65 IN | SYSTOLIC BLOOD PRESSURE: 128 MMHG | BODY MASS INDEX: 27.49 KG/M2

## 2022-10-08 DIAGNOSIS — J04.0 LARYNGITIS: Primary | ICD-10-CM

## 2022-10-08 PROCEDURE — 99213 OFFICE O/P EST LOW 20 MIN: CPT | Performed by: NURSE PRACTITIONER

## 2022-10-08 PROCEDURE — 99213 OFFICE O/P EST LOW 20 MIN: CPT

## 2022-10-08 RX ORDER — PREDNISONE 20 MG/1
40 TABLET ORAL DAILY
Qty: 14 TABLET | Refills: 0 | Status: SHIPPED | OUTPATIENT
Start: 2022-10-08 | End: 2022-10-15

## 2022-10-08 RX ORDER — ACETAMINOPHEN 325 MG/1
650 TABLET ORAL EVERY 6 HOURS PRN
COMMUNITY

## 2022-10-08 ASSESSMENT — ENCOUNTER SYMPTOMS
VOICE CHANGE: 1
SHORTNESS OF BREATH: 0
SORE THROAT: 0
COUGH: 1
NAUSEA: 0
DIARRHEA: 0
VOMITING: 0

## 2022-10-08 ASSESSMENT — PAIN - FUNCTIONAL ASSESSMENT: PAIN_FUNCTIONAL_ASSESSMENT: NONE - DENIES PAIN

## 2022-10-08 NOTE — ED TRIAGE NOTES
Patient ambulated to room with complaint of cough and loss of voice. States she just had a molar pulled yesterday and did not have an antibiotic first so she is concerned this may be the cause of cough.  Also reports she spent time in a  class but no known exposure to illness

## 2022-10-08 NOTE — ED PROVIDER NOTES
[facility]  Urgent Care Encounter       CHIEF COMPLAINT       Chief Complaint   Patient presents with    Cough     Raspy voice       Nurses Notes reviewed and I agree except as noted in the HPI. HISTORY OF PRESENT ILLNESS   Cindy Kan is a 68 y.o. female who presents for evaluation of cough, loss of voice in postnasal drainage that been ongoing for the past 2 to 3 days. Patient denies any fever or chills or any significant body aches. She states that she has tried Flonase 1 time for the symptoms but denies any medications or interventions. She denies any known sick exposures that she is aware of. The history is provided by the patient. REVIEW OF SYSTEMS     Review of Systems   Constitutional:  Negative for chills and fever. HENT:  Positive for congestion, postnasal drip and voice change. Negative for sore throat. Respiratory:  Positive for cough. Negative for shortness of breath. Cardiovascular:  Negative for chest pain. Gastrointestinal:  Negative for diarrhea, nausea and vomiting. Musculoskeletal:  Negative for arthralgias and myalgias. Skin:  Negative for rash. Allergic/Immunologic: Positive for environmental allergies. Neurological:  Negative for headaches.      PAST MEDICAL HISTORY         Diagnosis Date    Abnormal EKG     normal stress test 3/2009    Breast cancer ([facility]     Cancer (H[facility])     basal cell skin    Complication of anesthesia     difficulty breathing after surgery- transferrred from [facility] to [facility]. Angeline's for 3 days, O2 for about 5 days    Constipation     Hyperglycemia     History type 2 DM - lost weight - diet controlled    Hyperlipidemia     Hypertension     Melanoma in situ ([address] 6/2012    of chest - Dr. Donavon Valle - margins clear - neg sentinal lymph node    Melanoma in situ of lower extremity ([address]     excision- x2    Metabolic syndrome     much improved - normal triglycerides, weight loss of 50#    OA (osteoarthritis)     left knee SURGICALHISTORY     Patient  has a past surgical history that includes Bladder surgery; Knee arthroscopy (Left, 2007); Skin cancer excision; pre-malignant / benign skin lesion excision (Right, 2009); skin biopsy; Skin cancer excision (06/27/2013); malignant skin lesion excision (Left, 07/31/2013); Knee arthroscopy (Left, 01/2014); Skin cancer excision; Tonsillectomy (1966); Hysterectomy (11/1971); eye surgery (Bilateral, 3/2016, 4/2016); Colonoscopy; Skin cancer excision (Left, 03/24/2017); Total knee arthroplasty (Right, 09/2017); Total knee arthroplasty (Left, 06/17/2015); joint replacement; craniotomy (Right, 07/03/2019); Breast surgery; brain surgery; skin biopsy (07/2020); and Cataract removal with implant (Bilateral, 01/2021). CURRENT MEDICATIONS       Previous Medications    ACETAMINOPHEN (TYLENOL) 325 MG TABLET    Take 650 mg by mouth every 6 hours as needed for Pain    AMLODIPINE (NORVASC) 10 MG TABLET    Take 1 tablet by mouth daily    ARTIFICIAL TEAR OINTMENT (DRY EYES OP)    Apply to eye as needed    ASPIRIN 81 MG TABLET    Take 81 mg by mouth daily     BIOTIN 1000 MCG TABS    Take by mouth daily    CALCIUM CARB-CHOLECALCIFEROL 500-400 MG-UNIT TABS    Take 1 tablet by mouth 2 times daily     CHOLECALCIFEROL (VITAMIN D3) 2000 UNITS CAPS    Take 1 capsule by mouth daily     CRANBERRY-VITAMIN C (AZO CRANBERRY URINARY TRACT) 250-60 MG CAPS    Take as directed on package.     DICLOFENAC SODIUM (VOLTAREN) 1 % GEL    Apply topically 2 times daily    DIPHENHYDRAMINE-APAP, SLEEP, (TYLENOL PM EXTRA STRENGTH)  MG TABLET    Take 1 tablet by mouth nightly as needed for Sleep    FAMOTIDINE (PEPCID) 10 MG TABLET    Take 10 mg by mouth as needed    FLUTICASONE (FLONASE) 50 MCG/ACT NASAL SPRAY    instill 2 sprays into each nostril once daily    LETROZOLE (FEMARA) 2.5 MG TABLET    Take 2.5 mg by mouth daily    LISINOPRIL-HYDROCHLOROTHIAZIDE (PRINZIDE;ZESTORETIC) 20-25 MG PER TABLET    Take 1 tab by mouth daily .    MELATONIN 3 MG TABS TABLET    Take 5 mg by mouth nightly as needed     MISC NATURAL PRODUCTS (CRANBERRY/PROBIOTIC) TABS    Take by mouth    OXYBUTYNIN (DITROPAN) 5 MG TABLET    take 1 tablet by mouth twice a day    POLYETHYLENE GLYCOL (GLYCOLAX) 17 GM/SCOOP POWDER    Take 17 gm dissolved in water once daily    POTASSIUM CHLORIDE (KLOR-CON M) 20 MEQ EXTENDED RELEASE TABLET    take 1 tablet by mouth once daily    PRAVASTATIN (PRAVACHOL) 40 MG TABLET    take 1 tablet by mouth every evening take 1 tablet by mouth at bedtime       ALLERGIES     Patient is is allergic to iodine, betadine [povidone iodine], chloraprep one step [chlorhexidine gluconate], pcn [penicillins], and sulfa antibiotics. Patients   Immunization History   Administered Date(s) Administered    COVID-19, MODERNA BLUE border, Primary or Immunocompromised, (age 12y+), IM, 100 mcg/0.5mL 01/28/2021, 02/23/2021, 10/25/2021    Influenza 10/31/2013    Influenza Vaccine, unspecified formulation 10/31/2013    Influenza Virus Vaccine 01/29/2015, 01/19/2016    Influenza, FLUAD, (age 72 y+), Adjuvanted, 0.5mL 11/04/2020    Influenza, FLUARIX, FLULAVAL, FLUZONE (age 10 mo+) AND AFLURIA, (age 1 y+), PF, 0.5mL 01/17/2017    Influenza, High Dose (Fluzone 65 yrs and older) 10/08/2018    PPD Test 07/09/2019, 07/16/2019    Pneumococcal Conjugate 13-valent ([id number]) 07/18/2017    Pneumococcal Polysaccharide ([id number]) 10/08/2018    Tdap (Boostrix, Adacel) 06/11/2020       FAMILY HISTORY     Patient's family history includes Cancer in her father; Heart Failure in her mother. SOCIAL HISTORY     Patient  reports that she has never smoked. She has never used smokeless tobacco. She reports that she does not currently use alcohol. She reports that she does not use drugs.     PHYSICAL EXAM     ED TRIAGE VITALS  BP: 128/65, Temp: 99.1 °F (37.3 °C), Heart Rate: 75, Resp: 16, SpO2: 96 %,Estimated body mass index is 27.46 kg/m² as calculated from the following: Height as of this encounter: 5' 5\" (1.651 m). Weight as of this encounter: 165 lb (74.8 kg). ,No LMP recorded. Patient has had a hysterectomy. Physical Exam  Vitals and nursing note reviewed. Constitutional:       General: She is not in acute distress. Appearance: She is well-developed. She is not diaphoretic. HENT:      Right Ear: Tympanic membrane and ear canal normal.      Left Ear: Tympanic membrane and ear canal normal.      Mouth/Throat:      Mouth: Mucous membranes are moist.      Pharynx: Oropharynx is clear. Eyes:      Conjunctiva/sclera:      Right eye: Right conjunctiva is not injected. Left eye: Left conjunctiva is not injected. Pupils: Pupils are equal.   Cardiovascular:      Rate and Rhythm: Normal rate and regular rhythm. Heart sounds: No murmur heard. Pulmonary:      Effort: Pulmonary effort is normal. No respiratory distress. Breath sounds: Normal breath sounds. Musculoskeletal:      Cervical back: Normal range of motion. Lymphadenopathy:      Head:      Right side of head: No tonsillar adenopathy. Left side of head: No tonsillar adenopathy. Skin:     General: Skin is warm. Findings: No rash. Neurological:      Mental Status: She is alert and oriented to person, place, and time. Psychiatric:         Behavior: Behavior normal.       DIAGNOSTIC RESULTS     Labs:No results found for this visit on 10/08/22. IMAGING:    No orders to display         EKG:      URGENT CARE COURSE:     Vitals:    10/08/22 1026   BP: 128/65   Pulse: 75   Resp: 16   Temp: 99.1 °F (37.3 °C)   TempSrc: Temporal   SpO2: 96%   Weight: 165 lb (74.8 kg)   Height: 5' 5\" (1.651 m)       Medications - No data to display         PROCEDURES:  None    FINAL IMPRESSION      1. Laryngitis          DISPOSITION/ PLAN       Exam is consistent with laryngitis and postnasal drainage. I discussed with the patient that her cough is likely drainage related.   I did offer a COVID test, but patient declines at this time as she states she is fully vaccinated and boosted. I discussed the plan to treat with oral prednisone and to continue allergy medications at home. She is advised to follow-up on an outpatient basis as needed and is agreeable to plan as discussed.     PATIENT REFERRED TO:  Lucas Núñez MD  [facility], [address] [facility] [id number]      DISCHARGE MEDICATIONS:  New Prescriptions    PREDNISONE (DELTASONE) 20 MG TABLET    Take 2 tablets by mouth daily for 7 days       Discontinued Medications    No medications on file       Current Discharge Medication List          NARAYAN Bateman - CNP    (Please note that portions of this note were completed with a voice recognition program. Efforts were made to edit the dictations but occasionally words are mis-transcribed.)          NARAYAN Bateman - CNP  10/08/22 1893

## 2022-10-11 ENCOUNTER — HOSPITAL ENCOUNTER (OUTPATIENT)
Dept: WOMENS IMAGING | Age: 77
Discharge: HOME | End: 2022-10-11

## 2022-10-11 ENCOUNTER — OFFICE VISIT (OUTPATIENT)
Dept: INTERNAL MEDICINE CLINIC | Age: 77
End: 2022-10-11
Payer: MEDICARE

## 2022-10-11 ENCOUNTER — HOSPITAL ENCOUNTER (OUTPATIENT)
Dept: WOMENS IMAGING | Age: 77
Discharge: HOME | End: 2022-10-11
Payer: MEDICARE

## 2022-10-11 VITALS
TEMPERATURE: 97.9 F | SYSTOLIC BLOOD PRESSURE: 112 MMHG | HEART RATE: 76 BPM | WEIGHT: 166.2 LBS | BODY MASS INDEX: 27.66 KG/M2 | DIASTOLIC BLOOD PRESSURE: 68 MMHG

## 2022-10-11 DIAGNOSIS — E78.2 MIXED HYPERLIPIDEMIA: ICD-10-CM

## 2022-10-11 DIAGNOSIS — I10 ESSENTIAL HYPERTENSION: Primary | ICD-10-CM

## 2022-10-11 DIAGNOSIS — K21.9 GASTROESOPHAGEAL REFLUX DISEASE, UNSPECIFIED WHETHER ESOPHAGITIS PRESENT: ICD-10-CM

## 2022-10-11 DIAGNOSIS — Z00.6 ENCOUNTER FOR EXAMINATION FOR NORMAL COMPARISON OR CONTROL IN CLINICAL RESEARCH PROGRAM: ICD-10-CM

## 2022-10-11 DIAGNOSIS — N32.81 OVERACTIVE BLADDER: ICD-10-CM

## 2022-10-11 DIAGNOSIS — E87.6 HYPOKALEMIA: ICD-10-CM

## 2022-10-11 DIAGNOSIS — J30.89 NON-SEASONAL ALLERGIC RHINITIS DUE TO OTHER ALLERGIC TRIGGER: ICD-10-CM

## 2022-10-11 DIAGNOSIS — Z12.31 VISIT FOR SCREENING MAMMOGRAM: ICD-10-CM

## 2022-10-11 PROCEDURE — 1123F ACP DISCUSS/DSCN MKR DOCD: CPT | Performed by: STUDENT IN AN ORGANIZED HEALTH CARE EDUCATION/TRAINING PROGRAM

## 2022-10-11 PROCEDURE — 77063 BREAST TOMOSYNTHESIS BI: CPT

## 2022-10-11 PROCEDURE — 99214 OFFICE O/P EST MOD 30 MIN: CPT | Performed by: STUDENT IN AN ORGANIZED HEALTH CARE EDUCATION/TRAINING PROGRAM

## 2022-10-11 PROCEDURE — 3078F DIAST BP <80 MM HG: CPT | Performed by: STUDENT IN AN ORGANIZED HEALTH CARE EDUCATION/TRAINING PROGRAM

## 2022-10-11 PROCEDURE — 3074F SYST BP LT 130 MM HG: CPT | Performed by: STUDENT IN AN ORGANIZED HEALTH CARE EDUCATION/TRAINING PROGRAM

## 2022-10-11 RX ORDER — POTASSIUM CHLORIDE 20 MEQ/1
TABLET, EXTENDED RELEASE ORAL
Qty: 90 TABLET | Refills: 1 | Status: SHIPPED | OUTPATIENT
Start: 2022-10-11

## 2022-10-11 RX ORDER — PANTOPRAZOLE SODIUM 40 MG/1
40 TABLET, DELAYED RELEASE ORAL
Qty: 42 TABLET | Refills: 0 | Status: SHIPPED | OUTPATIENT
Start: 2022-10-11 | End: 2022-11-09

## 2022-10-11 RX ORDER — LISINOPRIL AND HYDROCHLOROTHIAZIDE 25; 20 MG/1; MG/1
TABLET ORAL
Qty: 90 TABLET | Refills: 1 | Status: SHIPPED | OUTPATIENT
Start: 2022-10-11 | End: 2022-10-11 | Stop reason: SINTOL

## 2022-10-11 RX ORDER — PRAVASTATIN SODIUM 40 MG
TABLET ORAL
Qty: 90 TABLET | Refills: 1 | Status: SHIPPED | OUTPATIENT
Start: 2022-10-11

## 2022-10-11 RX ORDER — OXYBUTYNIN CHLORIDE 5 MG/1
TABLET ORAL
Qty: 180 TABLET | Refills: 1 | Status: SHIPPED | OUTPATIENT
Start: 2022-10-11

## 2022-10-11 RX ORDER — ASPIRIN 81 MG/1
81 TABLET ORAL DAILY
Qty: 90 TABLET | Refills: 1 | Status: SHIPPED | OUTPATIENT
Start: 2022-10-11

## 2022-10-11 RX ORDER — AMLODIPINE BESYLATE 10 MG/1
TABLET ORAL
Qty: 90 TABLET | Refills: 1 | Status: SHIPPED | OUTPATIENT
Start: 2022-10-11

## 2022-10-11 RX ORDER — LOSARTAN POTASSIUM AND HYDROCHLOROTHIAZIDE 12.5; 5 MG/1; MG/1
1 TABLET ORAL DAILY
Qty: 90 TABLET | Refills: 1 | Status: SHIPPED | OUTPATIENT
Start: 2022-10-11 | End: 2022-11-09

## 2022-10-11 ASSESSMENT — PATIENT HEALTH QUESTIONNAIRE - PHQ9
SUM OF ALL RESPONSES TO PHQ9 QUESTIONS 1 & 2: 0
SUM OF ALL RESPONSES TO PHQ QUESTIONS 1-9: 0
SUM OF ALL RESPONSES TO PHQ QUESTIONS 1-9: 0
2. FEELING DOWN, DEPRESSED OR HOPELESS: 0
SUM OF ALL RESPONSES TO PHQ QUESTIONS 1-9: 0
SUM OF ALL RESPONSES TO PHQ QUESTIONS 1-9: 0
1. LITTLE INTEREST OR PLEASURE IN DOING THINGS: 0

## 2022-10-11 NOTE — PROGRESS NOTES
[address] [facility] Internal Medicine  [facility] [address]  [place], [address]  Dept: [phone]  Dept Fax: [phone] [facility] 1945 is a 68 y.o. female, Established patient, here for evaluations of:  Chief Complaint   Patient presents with    Hypertension    Hyperlipidemia          ASSESSMENT/PLAN:    1. Essential hypertension   - Stable  - Switching out Lisinopril-HCTZ for Losartan-HCTZ due to suspicion for ACEi cough  - Prescription for Losartan-HCTZ 50-12.5 mg  - Refilling Amlodipine 10 mg daily  - Refilling Potassium supplement  - Told to check BP at home, and call us with values     2. Gastroesophageal reflux disease, unspecified whether esophagitis present   - Patient reports flares with spicy food  - Takes Pepcid as needed  - Cough may also be caused by GERD symptoms  - Starting 6 week course of Protonix 40 mg daily  - Given prescription     3. Mixed hyperlipidemia   - Stable, refilling Pravastatin 40 mg daily  - Refilling ASA 81 mg daily     4. Hypokalemia   - Medication induced  - Refilling Potassium     5.      6. Overactive bladder   - Stable, refilling Ditropan 5 mg daily    Allergic Rhinitis  - Nasal drainage causing cough in past  - Finishing 7 day course of Prednisone for presumed Laryngitis from Urgent Care  - Increase in allergy symptoms may be causing increased cough  - If BP medication change and Protonix do not help, send back to allergist for shots       Return in about 3 months (around 1/11/2023). Diagnostic data:   I have reviewed recent diagnostic testing including labs with patient today. I have reviewed the notes from the prior visit and GI. I have reviewed the labs for UA and Culture (negative for UTI), CBC (normal), CMP (normal), LDH (normal), CA-125 (normal), CA 15-3 (normal).       SUBJECTIVE/OBJECTIVE:    Pam Zamora is a 68 y.o. female who presents for a 6 month follow-up for HTN, HLD, Hyperglycemia, and history of BCA.    Hypertension:  Controlled on today's visit. Takes Lisinopril/HCTZ 20-25 mg, Norvasc 10 mg daily. Cr 0.65 on 7/2022, stable. Denies syncope, lightheadedness. Hyperglycemia:  Diet controlled, Glucose normal on BMP 7/2022. A1C 5.8% on 10/2021. Hyperlipidemia:  Takes Pravastatin 40 mg daily. LDL 94 on 10/2021, normal LFTs on  7/2022. Denies myalgias. History of multiple Melanomas:  She sees Dr. Sara Suarez. Following CBC, CMP periodically. No new lesions stated. Breast cancer:  Biopsy from abnormal mammogram showed DCIS. Had left partial mastectomy. Pathology showed fibrocystic changes, cyst formation, apocrine metaplasia, fibrosis and microcalcification. Saw Dr. Earnestine Blancas, no radiation given. On anti-estrogen, sees Dr. Bhargav New at [facility]. Mammogram done in 11/2021. DEXA normal 11/2020. Joint pain so she was changed to Femara 2.5 mg daily. Less symptoms with Femara, but gaining weight is occurring (lost 3# since last visit). Mild rectal wall thickening on CT 6/2019:  Colonoscopy 2/2019, scheduled for Sigmoidoscopy with Dr. Kris Riley. She cancelled the appointment. Follow up CT 8/2020 did not show rectal thickening. Seen GI on 10/2019, state they would like a sigmoidoscopy, however do not feel it is cancer related. 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 Normal Cystoscopy, CT urogram, and Cytology. Stable. Frequent Cystitis with last episode in 5/2022 (growth of contaminants, mild nitrates). GERD:  Stable, on Pepcid 10 mg daily prn. Denies any stomach pain, nausea, or GERD symptoms. Symptoms depend on what she eats. OAB:  Stable, on Ditropan 5 mg daily. Chronic Constipation:   Taking Miralax and Activa. GI agrees with plan and states patient can use Metamucil too. More constipation with Tylenol. Hypokalemia:  Likely due to HCTZ, patient prefers this medication.   Stable on 7/2022 with KCL 20 meq daily. Allergy symptoms:   Cough with clear sputum. Taking Flonase (intermittently) and Xyzal.  Has had allergy shots in the past.  Allergy to dog/cat dander, owns a dog. Recent increase in cough and phlegm. Went to urgent care and was prescribed Prednisone 40 mg for 7 days for presumed Laryngitis. Would like to start back with the shots. 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 Deb Holland. No radiation currently, did consider. Work up for [employer] [place] disease, MRI of spine and ophthamology evaluation done, negative. Dry eyes, sees Dr. Joann Herrera. Post-op complicated with shingles. MRI 11/2021 stable with no reoccurrence, small retention cyst in Right maxillary sinus. Next MRI due in 2 years 11/29/23. Left heel pain/plantar fascitis:  Powerstep inserts, Voltaren gel. Sees podiatry at [facility], recommended stretching exercises. X-ray 1/2022 showed arthritis and calcaneal spur on left foot. Started [employer]. Shingles:  Episode on right lower back after brain surgery, mild and healed. Vaccine recommended, requires 2 doses, suggested pre-medicating with Tylenol. Cataracts:  Surgery 1/2021 and 2/2021 with Dr. Reynaldo Calderon. Gave list of allergies to give to Dr. Reynaldo Calderon, multiple allergies to antibacterial skin prep. HM:  DEXA reviewed from 11/20/20, normal, and colonoscopy done 2/2019, due in 10 years however will be high risk at 80years old.  6/2019, 1.4 cm thyroid nodule smaller then previous, no follow-up needed. ---------------------------------------------------------------------------------    Review of Systems:  General ROS: Increased cough with clear phlegm negative for - chills or fever  Psychological ROS: negative for - anxiety and depression  Hematological and Lymphatic ROS: No history of blood clots or bleeding disorder.    Respiratory ROS: Increased cough as above no shortness of breath or wheezing  Cardiovascular ROS: no chest pain or dyspnea on exertion, tolerates a flight of stairs, vacuuming  Gastrointestinal ROS: no abdominal pain, change in bowel habits, or black or bloody stools  Genito-Urinary ROS: no dysuria, trouble voiding, or hematuria  Musculoskeletal ROS: negative for - muscle pain or muscular weakness  Neurological ROS: negative for - headaches, numbness/tingling, seizures or weakness  Dermatological ROS: negative for - rash or skin lesion changes    Blood pressure 112/68, pulse 76, temperature 97.9 °F (36.6 °C), weight 166 lb 3.2 oz (75.4 kg), not currently breastfeeding. Physical Examination:   General appearance - alert, well appearing, and in no distress  Mental status - alert, oriented to person, place, and time  Head - atraumatic, normocephalic  Eyes - pupils equal and reactive to light, extraocular muscles intact  Ears - external ears are normal, hearing is grossly normal  Mouth - oral pharynx clear, mucous membranes moist, Hoarse voice  Neck - supple, no significant adenopathy  Chest - clear to auscultation, no wheezes, rales or rhonchi, symmetric air entry  Heart - normal rate, regular rhythm, normal S1, S2, no murmurs, rubs, clicks or gallops  Abdomen - soft, nontender, nondistended  Neurological - alert, oriented, cranial nerves II-XII intact, motor and sensation are grossly intact bilateral upper and lower extremities. Extremities - peripheral pulses normal, no pedal edema, no clubbing or cyanosis  Skin - warm and dry    An electronic signature was used to authenticate this note. --Sapphire Ken DO  PGY-3 on 10/11/2022 seen with Dr. Gina Arredondo. [facility].  [address]  Dept: [phone]  Dept Fax: [phone] : [phone]

## 2022-10-17 ENCOUNTER — HOSPITAL ENCOUNTER (EMERGENCY)
Age: 77
Discharge: HOME | End: 2022-10-17
Payer: MEDICARE

## 2022-10-17 VITALS
HEART RATE: 69 BPM | TEMPERATURE: 97.9 F | OXYGEN SATURATION: 97 % | SYSTOLIC BLOOD PRESSURE: 154 MMHG | DIASTOLIC BLOOD PRESSURE: 72 MMHG | RESPIRATION RATE: 18 BRPM | BODY MASS INDEX: 27.99 KG/M2 | HEIGHT: 65 IN | WEIGHT: 168 LBS

## 2022-10-17 DIAGNOSIS — J32.0 CHRONIC MAXILLARY SINUSITIS: Primary | ICD-10-CM

## 2022-10-17 PROCEDURE — 99212 OFFICE O/P EST SF 10 MIN: CPT | Performed by: NURSE PRACTITIONER

## 2022-10-17 PROCEDURE — 99213 OFFICE O/P EST LOW 20 MIN: CPT

## 2022-10-17 RX ORDER — CEFDINIR 300 MG/1
300 CAPSULE ORAL 2 TIMES DAILY
Qty: 10 CAPSULE | Refills: 0 | Status: SHIPPED | OUTPATIENT
Start: 2022-10-17 | End: 2022-10-22

## 2022-10-17 RX ORDER — AZELASTINE 1 MG/ML
1 SPRAY, METERED NASAL 2 TIMES DAILY
Qty: 30 ML | Refills: 0 | Status: SHIPPED | OUTPATIENT
Start: 2022-10-17

## 2022-10-17 ASSESSMENT — ENCOUNTER SYMPTOMS
COUGH: 1
APNEA: 0
STRIDOR: 0
SORE THROAT: 1
CHOKING: 0
SINUS PRESSURE: 1
SWOLLEN GLANDS: 0
SHORTNESS OF BREATH: 0
SINUS PAIN: 0
VOMITING: 0
NAUSEA: 0
ABDOMINAL PAIN: 0
DIARRHEA: 0
RHINORRHEA: 1
WHEEZING: 0
CHEST TIGHTNESS: 0

## 2022-10-17 ASSESSMENT — PAIN - FUNCTIONAL ASSESSMENT: PAIN_FUNCTIONAL_ASSESSMENT: NONE - DENIES PAIN

## 2022-10-17 NOTE — ED PROVIDER NOTES
[facility]  Urgent Care Encounter      CHIEF COMPLAINT       Chief Complaint   Patient presents with    Nasal Congestion     Laryngitis  seen 2 weeks ago and still sick. Recetntly changed BP med  ( but pt did not do) ue to the s/s and treated for  GERD thinking that was the cause- not seen by usual doctor. CHanged lisinopril huydrochlorathiazide to  to losartan hydrochlorathiazide pt did not fill rx or the protonix    Cough    Ear Fullness     Bilateral 3 weeks ago had a molar tooth pulled       Nurses Notes reviewed and I agree except as noted in the HPI. HISTORY OFPRESENT ILLNESS   Ami Barry is a 68 y.o. The history is provided by the patient. No  was used. URI  Presenting symptoms: congestion, cough, fatigue, rhinorrhea and sore throat    Presenting symptoms: no ear pain, no facial pain and no fever    Severity:  Moderate  Onset quality:  Gradual  Duration:  3 months  Timing:  Constant  Progression:  Waxing and waning  Chronicity:  Recurrent  Relieved by:  Nothing  Worsened by: Movement, eating, drinking, certain positions, breathing and belching  Ineffective treatments:  OTC medications and prescription medications  Associated symptoms: headaches    Associated symptoms: no arthralgias, no myalgias, no neck pain, no sinus pain, no sneezing, no swollen glands and no wheezing    Risk factors: not elderly, no chronic cardiac disease, no chronic kidney disease, no chronic respiratory disease, no diabetes mellitus, no immunosuppression, no recent illness, no recent travel and no sick contacts      REVIEW OF SYSTEMS     Review of Systems   Constitutional:  Positive for diaphoresis and fatigue. Negative for activity change, appetite change, chills and fever. HENT:  Positive for congestion, postnasal drip, rhinorrhea, sinus pressure and sore throat. Negative for ear pain, sinus pain and sneezing. Respiratory:  Positive for cough.  Negative for apnea, choking, chest tightness, shortness of breath, wheezing and stridor. Cardiovascular:  Negative for chest pain, palpitations and leg swelling. Gastrointestinal:  Negative for abdominal pain, diarrhea, nausea and vomiting. Musculoskeletal:  Negative for arthralgias, myalgias and neck pain. Neurological:  Positive for headaches. PAST MEDICAL HISTORY         Diagnosis Date    Abnormal EKG     normal stress test 3/2009    Breast cancer ([address] 10/11/2019    Left DCIS    Cancer ([address]     basal cell skin    Complication of anesthesia     difficulty breathing after surgery- transferrred from [facility] to [facility]. Angeline's for 3 days, O2 for about 5 days    Constipation     Hyperglycemia     History type 2 DM - lost weight - diet controlled    Hyperlipidemia     Hypertension     Melanoma in situ ([facility] 06/2012    of chest - Dr. Sibyl Opitz - margins clear - neg sentinal lymph node    Melanoma in situ of lower extremity ([facility]     excision- x2    Metabolic syndrome     much improved - normal triglycerides, weight loss of 50#    OA (osteoarthritis)     left knee       SURGICAL HISTORY     Patient  has a past surgical history that includes Bladder surgery; Knee arthroscopy (Left, 2007); Skin cancer excision; pre-malignant / benign skin lesion excision (Right, 2009); skin biopsy; Skin cancer excision (06/27/2013); malignant skin lesion excision (Left, 07/31/2013); Knee arthroscopy (Left, 01/2014); Skin cancer excision; Tonsillectomy (1966); Hysterectomy (11/1971); eye surgery (Bilateral, 3/2016, 4/2016); Colonoscopy; Skin cancer excision (Left, 03/24/2017); Total knee arthroplasty (Right, 09/2017); Total knee arthroplasty (Left, 06/17/2015); joint replacement; craniotomy (Right, 07/03/2019); brain surgery; skin biopsy (07/2020); Cataract removal with implant (Bilateral, 01/2021);  Breast lumpectomy (Left, 11/05/2019); [facility] BREAST BX W LOC DEVICE 1ST LESION LEFT (Left, 05/26/2020);  BREAST BIOPSY W LOC DEVICE 1ST LESION RIGHT (Right, 07/12/2018); and [facility] STEREO BREAST BX W LOC DEVICE 1ST LESION LEFT (Left, 10/11/2019).     CURRENT MEDICATIONS       Discharge Medication List as of 10/17/2022 12:29 PM        CONTINUE these medications which have NOT CHANGED    Details   pravastatin (PRAVACHOL) 40 MG tablet take 1 tablet by mouth every evening take 1 tablet by mouth at bedtime, Disp-90 tablet, R-1Refill when dueNormal      aspirin EC 81 MG EC tablet Take 1 tablet by mouth daily, Disp-90 tablet, R-1Normal      oxybutynin (DITROPAN) 5 MG tablet take 1 tablet by mouth twice a day, Disp-180 tablet, R-1Refill when dueNormal      potassium chloride (KLOR-CON M) 20 MEQ extended release tablet take 1 tablet by mouth once daily, Disp-90 tablet, R-1Refill when dueNormal      amLODIPine (NORVASC) 10 MG tablet Take 1 tablet by mouth daily, Disp-90 tablet, R-1Normal      losartan-hydroCHLOROthiazide (HYZAAR) 50-12.5 MG per tablet Take 1 tablet by mouth daily, Disp-90 tablet, R-1Normal      pantoprazole (PROTONIX) 40 MG tablet Take 1 tablet by mouth every morning (before breakfast), Disp-42 tablet, R-0Normal      acetaminophen (TYLENOL) 325 MG tablet Take 650 mg by mouth every 6 hours as needed for PainHistorical Med      Misc Natural Products (CRANBERRY/PROBIOTIC) TABS Historical Med      polyethylene glycol (GLYCOLAX) 17 GM/SCOOP powder Take 17 gm dissolved in water once daily, Disp-510 g, R-1Normal      melatonin 3 MG TABS tablet Take 5 mg by mouth nightly as needed Historical Med      letrozole (FEMARA) 2.5 MG tablet Take 2.5 mg by mouth dailyHistorical Med      famotidine (PEPCID) 10 MG tablet Take 10 mg by mouth as neededHistorical Med      diphenhydrAMINE-APAP, sleep, (TYLENOL PM EXTRA STRENGTH)  MG tablet Take 1 tablet by mouth nightly as needed for SleepHistorical Med      Biotin 1000 MCG TABS Take by mouth dailyHistorical Med      Calcium Carb-Cholecalciferol 500-400 MG-UNIT TABS Take 1 tablet by mouth 2 times daily Historical Med Cholecalciferol (VITAMIN D3) 2000 units CAPS Take 1 capsule by mouth daily Historical Med             ALLERGIES     Patient is is allergic to iodine, soap, betadine [povidone iodine], chloraprep one step [chlorhexidine gluconate], pcn [penicillins], and sulfa antibiotics. FAMILY HISTORY     Patient's family history includes Cancer in her father; Heart Failure in her mother. SOCIAL HISTORY     Patient  reports that she has never smoked. She has never used smokeless tobacco. She reports that she does not currently use alcohol. She reports that she does not use drugs. PHYSICAL EXAM     ED TRIAGE VITALS  BP: (!) 154/72, Temp: 97.9 °F (36.6 °C), Heart Rate: 69, Resp: 18, SpO2: 97 %  Physical Exam  Vitals and nursing note reviewed. Constitutional:       General: She is not in acute distress. Appearance: Normal appearance. She is not ill-appearing, toxic-appearing or diaphoretic. HENT:      Head: Normocephalic and atraumatic. Right Ear: Tympanic membrane, ear canal and external ear normal.      Left Ear: Tympanic membrane, ear canal and external ear normal.      Nose: Congestion and rhinorrhea present. Eyes:      Extraocular Movements: Extraocular movements intact. Conjunctiva/sclera: Conjunctivae normal.   Pulmonary:      Effort: Pulmonary effort is normal.   Musculoskeletal:         General: Normal range of motion. Cervical back: Normal range of motion. Skin:     General: Skin is warm. Neurological:      General: No focal deficit present. Mental Status: She is alert and oriented to person, place, and time. Psychiatric:         Mood and Affect: Mood normal.         Behavior: Behavior normal.         Thought Content: Thought content normal.         Judgment: Judgment normal.       DIAGNOSTIC RESULTS   Labs:No results found for this visit on 10/17/22.     IMAGING:  No orders to display     URGENT CARE COURSE:     Vitals:    10/17/22 1149   BP: (!) 154/72   Pulse: 69   Resp: 18 Temp: 97.9 °F (36.6 °C)   SpO2: 97%   Weight: 168 lb (76.2 kg)   Height: 5' 5\" (1.651 m)       Medications - No data to display  PROCEDURES:  None  FINAL IMPRESSION      1. Chronic maxillary sinusitis        DISPOSITION/PLAN   Decision To Discharge    Drink lots of fluids  Take Motrin or Tylenol for headache  Humidification of air  Use nasal spray as directed  Take a nasal decongestant as directed  Monitor for any fever increased and sinus pain or pressure  Follow-up see her primary care provider in 48 hours  Or go to the emergency department for any changes or concerns.      PATIENT REFERRED TO:  Franklin Quintero MD  [facility], [address]  [facility]    Schedule an appointment as soon as possible for a visit     DISCHARGE MEDICATIONS:  Discharge Medication List as of 10/17/2022 12:29 PM        START taking these medications    Details   azelastine (ASTELIN) 0.1 % nasal spray 1 spray by Nasal route 2 times daily Use in each nostril as directed, Disp-30 mL, R-0Normal      cefdinir (OMNICEF) 300 MG capsule Take 1 capsule by mouth 2 times daily for 5 days, Disp-10 capsule, R-0Normal           Discharge Medication List as of 10/17/2022 12:29 PM          NARAYAN Escobedo CNP, APRN - CNP  10/17/22 0031

## 2022-10-17 NOTE — ED TRIAGE NOTES
To room 3 c/o continued cough congestion laryngitis  seen urgent care 10/8/22 treated with steriod here.   Seen by a dopctor ( not hers) and changed HTN med and added protonix  pt reports whe didn't fill because \" It wasn't my doctor so I wasn't sure and I dont like arely\"

## 2022-10-18 ENCOUNTER — TELEPHONE (OUTPATIENT)
Dept: INTERNAL MEDICINE CLINIC | Age: 77
End: 2022-10-18

## 2022-11-09 ENCOUNTER — OFFICE VISIT (OUTPATIENT)
Dept: ONCOLOGY | Age: 77
End: 2022-11-09
Payer: MEDICARE

## 2022-11-09 ENCOUNTER — HOSPITAL ENCOUNTER (OUTPATIENT)
Dept: INFUSION THERAPY | Age: 77
Discharge: HOME | End: 2022-11-09
Payer: MEDICARE

## 2022-11-09 VITALS
DIASTOLIC BLOOD PRESSURE: 65 MMHG | WEIGHT: 167.8 LBS | HEART RATE: 72 BPM | BODY MASS INDEX: 27.96 KG/M2 | HEIGHT: 65 IN | OXYGEN SATURATION: 95 % | RESPIRATION RATE: 16 BRPM | SYSTOLIC BLOOD PRESSURE: 142 MMHG | TEMPERATURE: 98.2 F

## 2022-11-09 VITALS
TEMPERATURE: 98.2 F | BODY MASS INDEX: 27.96 KG/M2 | OXYGEN SATURATION: 95 % | HEART RATE: 72 BPM | RESPIRATION RATE: 16 BRPM | SYSTOLIC BLOOD PRESSURE: 142 MMHG | WEIGHT: 167.8 LBS | HEIGHT: 65 IN | DIASTOLIC BLOOD PRESSURE: 65 MMHG

## 2022-11-09 DIAGNOSIS — M85.80 OSTEOPENIA AFTER MENOPAUSE: ICD-10-CM

## 2022-11-09 DIAGNOSIS — D05.12 DUCTAL CARCINOMA IN SITU (DCIS) OF LEFT BREAST: Primary | ICD-10-CM

## 2022-11-09 DIAGNOSIS — Z78.0 OSTEOPENIA AFTER MENOPAUSE: ICD-10-CM

## 2022-11-09 PROCEDURE — 3074F SYST BP LT 130 MM HG: CPT | Performed by: INTERNAL MEDICINE

## 2022-11-09 PROCEDURE — 1123F ACP DISCUSS/DSCN MKR DOCD: CPT | Performed by: INTERNAL MEDICINE

## 2022-11-09 PROCEDURE — 3078F DIAST BP <80 MM HG: CPT | Performed by: INTERNAL MEDICINE

## 2022-11-09 PROCEDURE — 99211 OFF/OP EST MAY X REQ PHY/QHP: CPT

## 2022-11-09 PROCEDURE — 99205 OFFICE O/P NEW HI 60 MIN: CPT | Performed by: INTERNAL MEDICINE

## 2022-11-09 RX ORDER — FLUTICASONE PROPIONATE 50 MCG
2 SPRAY, SUSPENSION (ML) NASAL DAILY
COMMUNITY

## 2022-11-09 RX ORDER — ACETAMINOPHEN/DIPHENHYDRAMINE 500MG-25MG
1 TABLET ORAL NIGHTLY PRN
COMMUNITY

## 2022-11-09 RX ORDER — LISINOPRIL AND HYDROCHLOROTHIAZIDE 25; 20 MG/1; MG/1
1 TABLET ORAL DAILY
COMMUNITY

## 2022-11-09 NOTE — PROGRESS NOTES
[address] CANCER [address] [place] [address]  Dept: [phone]  Loc: [address] aaron Mitchell Artist  1945   Referral, Self   Chris Pack MD       Panfilo Lala is a 68 y.o.  female with ductal carcinoma in situ of the left breast    CHIEF COMPLAINT  Chief Complaint   Patient presents with    New Patient     DUCTAL CARCINOMA IN SITU OF LEFT BREAST          HISTORY OF PRESENT ILLNESS    September 2019. Screening mammogram showed a new cluster of amorphous and round microcalcifications in the upper outer posterior aspect of the left breast.    9/26/2019. This was confirmed by diagnostic mammogram which showed a cluster of amorphous rounded punctate calcifications in the lateral far posterior aspect of the left breast.    10/11/2019. Ultrasound-guided biopsy of the left breast showed ductal carcinoma in situ solid and cribriform types with microcalcifications. Estrogen receptor was +100%, progesterone receptor was +60% Ki-67 was low 5%. 10/24/2019. Seen in the office by Dr. Osborne Round consideration of surgery. He documented that the patient had had her first menstrual period at age 15 and had her first live birth at age 25. She did not breast-feed. The patient became menopausal at age 48 and had a hysterectomy. She had had a previous breast biopsy on the right side which is the side opposite her DCIS. She also did not take oral contraceptive pills but did use estrogen patches for few years as hormone replacement therapy. On physical examination he could feel the palpable hematoma and he noted that there were no axillary lymph nodes. Ecchymoses were present. 11/5/2019. Left lumpectomy with sentinel node biopsy by Dr. Heather Dubon. Pathology report showed fibrocystic change breast including cyst formation, apocrine metaplasia, fibrosis and microcalcifications and the previous biopsy site. Full cross-sections through the tissue revealed the previous biopsy site with a metallic clip measuring 0.8 x 1 x 0.7 cm. The area was located approximately 1 cm from the deep margin of the resection. No remaining cancer was found in the pathology. Specimen radiograph demonstrated a radiopaque wire, a radiopaque clip and a calcification and masslike density present with apparently clear margins. 11/27/2019. Consultation with Dr. Alexia Carpenter at [address] [place] [address]. She noted that the patient had a previous breast biopsy on the opposite side on 7/13/2018 that showed benign findings with dense fibrosis and mild fibrocystic change and was negative for malignancy. She recommended that the patient be treated with 5 years of Arimidex, to be started at the conclusion of radiation therapy if radiation therapy were to be given. She had had a previous DEXA scan on 2/8/2019 at [facility] which showed normal bone mineral density. 1/7/2020. Consultation with Dr. Flaco Singh who recommended against radiation therapy. Patient said that despite the fact that Dr. Flaco Singh did not want to treat her with radiation therapy and that she was supposed to on Arimidex, she had read about side effects and became afraid and did not start her medication. She was taking calcium and vitamin D. She was seen by the PA-C under the supervision of Dr. Reagan Rincon. Surveillance was discussed with her.    2/25/2020. Follow-up in the office with Dr. Reagan Rincon. The patient said that she had 1 or 2 hot flashes a day which were tolerable. She has some stiffness in her neck but she would take Tylenol for that. She is taking her calcium and vitamin D and was compliant with her medication regimen. 5/14/2020. Left diagnostic mammogram showed amorphous and round microcalcifications in the superior outer aspect of the left breast at the postlumpectomy site within the scar tissue.   Biopsy and watchful waiting were discussed. 11/19/2020. Bilateral diagnostic mammography showed no suspicious masses clusters of calcifications or architectural distortion. The postlumpectomy site it was said that the area appeared to be stable. Repeat mammogram in 6 months was recommended. DEXA scan done the same day showed normal bone mineral density. 12/7/2020. Follow-up via telephone visit due to the pandemic showed decreasing hot flashes and occasional sharp pains at the lumpectomy site which were less bothersome. It was discussed that she could change from Arimidex to letrozole because of her arthritic pains but she wanted to \"tough it out\" he did not want to use Tylenol or ibuprofen. 5/19/2021. Unilateral left diagnostic mammogram showed scattered areas of fibroglandular density with no suspicious masses microcalcifications or signs of malignancy. Devious biopsy site was examined. There were stable nodular masslike densities present. Repeat mammogram in 6 months was recommended. 5/24/2021. Follow-up in the office with MIRIAM Richmond showed stable findings with benign left axilla and right breast as well as lumpectomy changes on the left. 11/10/2021. Bilateral diagnostic mammography showed no change. 1 year follow-up was recommended. Postlumpectomy site appear to be stable. 7/6/2022. Follow-up in the office. The patient had not been back for over a year. She had gained weight. She had no other complications or complaints except for some generalized arthritis and some neck stiffness. She had no breast complaints. Comorbidities include patient reported melanomas x6 removed by Dr. Uvaldo Irene. Data from the [facility] department shows:  7/9/2012.  1 mm superficial spreading melanoma excised completely sentinel lymph node biopsy was negative  6/27/2013. 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 Resection of melanoma in situ of the left thigh.  3/24/2017.   Resection of melanoma in situ of the left upper back. 4/9/2019. Pigmented compound nevus removed from right calf. 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 This was a WHO grade one 1.9 x 1.4 x 0.5 cm tumor. MRI done on 7/4/2019 showed the craniotomy defect at the site of the previous enhancing lesion and no residual enhancing lesion was identified. She has had no currents of this problem since that time. Other comorbidities include previous \"uterine tumor\" status post hysterectomy that was benign and did not require chemotherapy or radiation. She also had endometriosis, hypertension, hypercholesterolemia, bronchitis, GERD, frequent urinary tract infections, arthritis. INTERVAL NOTE    Mrs. Lilli Brown says that she wants to change her follow-up to the [facility] cancer clinic. She is doing well. She has no complaints. She is not as physically active if she thinks that she should be. She knows that she should aim for 30 minutes of weightbearing activity daily and take her calcium and vitamin D twice a day. She is due for DEXA scan the order is placed today. She just had her mammogram which was benign.     MONITORING PARAMETERS    Physical examination, mammograms and ultrasounds    PAST MEDICAL HISTORY  Past Medical History:   Diagnosis Date    Abnormal EKG     normal stress test 3/2009    Breast cancer ([address] 10/11/2019    Left DCIS    Cancer ([address]     basal cell skin    Complication of anesthesia     difficulty breathing after surgery- transferrred from [facility] to [place]. [facility] for 3 days, O2 for about 5 days    Constipation     Hyperglycemia     History type 2 DM - lost weight - diet controlled    Hyperlipidemia     Hypertension     Melanoma in situ ([address] 06/2012    of chest - Dr. Donavon Valle - margins clear - neg sentinal lymph node    Melanoma in situ of lower extremity ([address]     excision- x2    Metabolic syndrome     much improved - normal triglycerides, weight loss of 50#    OA (osteoarthritis)     left knee   7/3/2019. Hemangioblastoma treated with suboccipital annulotomy with resection. REVIEW OF SYSTEMS  Review of Systems   Constitutional:  Positive for fatigue. Negative for appetite change, chills, diaphoresis and fever. HENT:  Negative for dental problem, mouth sores, nosebleeds, rhinorrhea, sinus pain and trouble swallowing. Eyes:  Negative for visual disturbance. Respiratory:  Negative for cough and shortness of breath. Cardiovascular:  Negative for chest pain, palpitations and leg swelling. Gastrointestinal:  Negative for abdominal pain, constipation, diarrhea, nausea, rectal pain and vomiting. Genitourinary:  Negative for dysuria, hematuria and urgency. Musculoskeletal:  Positive for arthralgias (generalized) and myalgias. Neurological:  Negative for tremors, seizures, speech difficulty, weakness, light-headedness, numbness and headaches. Psychiatric/Behavioral:  Negative for dysphoric mood, hallucinations, self-injury, sleep disturbance and suicidal ideas. The patient is not nervous/anxious and is not hyperactive. FAMILY HISTORY  Family History   Problem Relation Age of Onset    Hypertension Mother     Diabetes Mother     Heart Failure Mother     Kidney Disease Mother     Cancer Father         lung    Hypertension Sister     Diabetes Sister     Hypertension Brother     Diabetes Brother         SOCIAL HISTORY  Social History     Socioeconomic History    Marital status:       Spouse name: Not on file    Number of children: 1    Years of education: Not on file    Highest education level: Not on file   Occupational History    Not on file   Tobacco Use    Smoking status: Never    Smokeless tobacco: Never   Vaping Use    Vaping Use: Never used   Substance and Sexual Activity    Alcohol use: Not Currently     Comment: not very much    Drug use: No    Sexual activity: Not Currently   Other Topics Concern Not on file   Social History Narrative    Not on file     Social Determinants of Health     Financial Resource Strain: Low Risk     Difficulty of Paying Living Expenses: Not hard at all   Food Insecurity: No Food Insecurity    Worried About [address] in the Last Year: Never true    [facility] in the Last Year: Never true   Transportation Needs: No Transportation Needs    Lack of Transportation (Medical): No    Lack of Transportation (Non-Medical): No   Physical Activity: Inactive    Days of Exercise per Week: 0 days    Minutes of Exercise per Session: 0 min   Stress: No Stress Concern Present    Feeling of Stress : Only a little   Social Connections: Moderately Integrated    Frequency of Communication with Friends and Family: More than three times a week    Frequency of Social Gatherings with Friends and Family: More than three times a week    Attends Je[employer] witness Services: More than 4 times per year    Active Member of [address] [place] [address] or Organizations: Yes    Attends Club or Organization Meetings: More than 4 times per year    Marital Status:     Intimate Partner Violence: Not on file   Housing Stability: Unknown    Unable to Pay for Housing in the Last Year: No    Number of Places Lived in the Last Year: Not on file    Unstable Housing in the Last Year: No        CURRENT MEDICATIONS  Current Outpatient Medications   Medication Sig Dispense Refill    fluticasone (FLONASE) 50 MCG/ACT nasal spray 2 sprays by Each Nostril route daily      lisinopril-hydroCHLOROthiazide (PRINZIDE;ZESTORETIC) 20-25 MG per tablet Take 1 tablet by mouth daily      diphenhydrAMINE-APAP, sleep, (TYLENOL PM EXTRA STRENGTH)  MG tablet Take 1 tablet by mouth nightly as needed for Sleep      pravastatin (PRAVACHOL) 40 MG tablet take 1 tablet by mouth every evening take 1 tablet by mouth at bedtime 90 tablet 1    aspirin EC 81 MG EC tablet Take 1 tablet by mouth daily 90 tablet 1    oxybutynin (DITROPAN) 5 MG tablet take 1 tablet by mouth twice a day 180 tablet 1    potassium chloride (KLOR-CON M) 20 MEQ extended release tablet take 1 tablet by mouth once daily 90 tablet 1    amLODIPine (NORVASC) 10 MG tablet Take 1 tablet by mouth daily 90 tablet 1    Misc Natural Products (CRANBERRY/PROBIOTIC) TABS Take by mouth      polyethylene glycol (GLYCOLAX) 17 GM/SCOOP powder Take 17 gm dissolved in water once daily 510 g 1    melatonin 3 MG TABS tablet Take 5 mg by mouth nightly as needed       letrozole (FEMARA) 2.5 MG tablet Take 2.5 mg by mouth daily      famotidine (PEPCID) 10 MG tablet Take 10 mg by mouth as needed      Biotin 1000 MCG TABS Take 5,000 mg by mouth daily      Calcium Carb-Cholecalciferol 500-400 MG-UNIT TABS Take 1 tablet by mouth 2 times daily       Cholecalciferol (VITAMIN D3) 2000 units CAPS Take 1 capsule by mouth daily       acetaminophen (TYLENOL) 325 MG tablet Take 650 mg by mouth every 6 hours as needed for Pain (Patient not taking: Reported on 11/9/2022)       No current facility-administered medications for this visit. OARRS    PDMP Monitoring: Review of this document shows that she has had no prescriptions for opioids, sedatives or stimulants. PDMP Monitoring:    Last PDMP Enrico Crespo as Reviewed:  Review User Review Instant Review Result   Winsome Ma 11/13/2022 11:28 PM Reviewed PDMP [1]                 PHYSICAL EXAM    Physical Exam  Vitals (Blood pressure is 142/65) and nursing note reviewed. Exam conducted with a chaperone present. Constitutional:       General: She is not in acute distress. Appearance: Normal appearance. She is not ill-appearing, toxic-appearing or diaphoretic. HENT:      Head: Normocephalic and atraumatic. Nose: Nose normal.      Mouth/Throat:      Mouth: Mucous membranes are moist.      Pharynx: Oropharynx is clear. No oropharyngeal exudate or posterior oropharyngeal erythema. Eyes:      General: No scleral icterus.      Extraocular Movements: Extraocular movements intact. Conjunctiva/sclera: Conjunctivae normal.      Pupils: Pupils are equal, round, and reactive to light. Cardiovascular:      Rate and Rhythm: Normal rate and regular rhythm. Pulses: Normal pulses. Heart sounds: Normal heart sounds. No murmur heard. No gallop. Pulmonary:      Effort: Pulmonary effort is normal. No respiratory distress. Breath sounds: Normal breath sounds. No stridor. No wheezing, rhonchi or rales. Chest:      Chest wall: No tenderness. Abdominal:      General: Bowel sounds are normal. There is no distension. Palpations: Abdomen is soft. There is no mass. Tenderness: There is no abdominal tenderness. There is no guarding or rebound. Musculoskeletal:         General: Normal range of motion. Cervical back: Normal range of motion and neck supple. No rigidity or tenderness. Right lower leg: No edema. Left lower leg: No edema. Lymphadenopathy:      Cervical: No cervical adenopathy. Skin:     General: Skin is warm and dry. Coloration: Skin is not jaundiced or pale. Findings: No lesion or rash. Neurological:      General: No focal deficit present. Mental Status: She is alert and oriented to person, place, and time. Mental status is at baseline. Cranial Nerves: No cranial nerve deficit. Gait: Gait normal.   Psychiatric:         Mood and Affect: Mood normal.         Behavior: Behavior normal.         Thought Content: Thought content normal.         Judgment: Judgment normal.   Examination of the breast shows that she is status post bilateral breast biopsies with no masses nipple change or discharge bilaterally. There is no significant lymphadenopathy that I can feel in the head neck, axillary or supraclavicular areas. No gross organomegaly. LABS/IMAGING  KILO JENNY DIGITAL SCREEN SELF REFERRAL W OR WO CAD BILATERAL    Result Date: 10/11/2022  1. No mammographic evidence of malignancy.  A 1 year screening mammogram is recommended. A result letter will be sent to the patient. She will also receive a reminder 1 month prior to her next mammogram. BI-RADS CATEGORY 2: BENIGN FINDINGS. Management Recommendation: Routine annual mammography. **This report has been created using voice recognition software. It may contain minor errors which are inherent in voice recognition technology. ** Final report electronically signed by Dr. Ru Prieto on 10/11/2022 11:40 PM     No new laboratory data was obtained today    PATHOLOGY/GENETICS    DCIS  Skin cancers including multiple melanomas at least 1 squamous cell carcinoma. Hemangioblastoma of the cerebellum status post resection    ASSESSMENT and PLAN    1. DCIS of the left breast status post action. Peers that the entirety of the DCIS was removed at the time of the original biopsy since the lumpectomy specimen had no additional malignancy. She did not have radiation. She continues on Arimidex which she is tolerating well. 2.  Use of Arimidex. She is supplementing her diet with calcium and vitamin D. She is trying to get 30 minutes of weightbearing activity on most days of the week. Her bone mineral density tests have shown normal density. 3.  History of skin cancers. She has had several melanomas and at least 1 squamous cell carcinoma removed by Dr. Simone Alejandro. She knows to stay out of the sun and use liberal amounts of sunscreen. 4.  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 5.  Multiple tumors. I briefly discussed that genetic counseling and possible testing would be appropriate. She will take this under advisement. 6.  Comorbidities. She has type 2 diabetes, hypertension and hyperlipidemia. She will stay on her current regimen and continue follow-up with her usual providers. She knows to call if she has any change in her status, questions or concerns.           Amira Bell MD 11/13/2022 11:28 PM

## 2022-11-09 NOTE — PATIENT INSTRUCTIONS
Discussed pathology results and recent medical history. Discussed potential side effects of her Letrozole (Femara)  Reviewed that she should stay on the Femara for 5 years. Discussed bone mineral density and need for repeat testing. Order placed for scan. Return to see MD in 6 months.

## 2022-11-15 DIAGNOSIS — K59.01 SLOW TRANSIT CONSTIPATION: ICD-10-CM

## 2022-11-15 ASSESSMENT — ENCOUNTER SYMPTOMS
VOMITING: 0
TROUBLE SWALLOWING: 0
RHINORRHEA: 0
NAUSEA: 0
COUGH: 0
SHORTNESS OF BREATH: 0
ABDOMINAL PAIN: 0
SINUS PAIN: 0
CONSTIPATION: 0
DIARRHEA: 0
RECTAL PAIN: 0

## 2022-11-16 RX ORDER — POLYETHYLENE GLYCOL 3350 17 G/17G
POWDER, FOR SOLUTION ORAL
Qty: 510 G | Refills: 1 | Status: SHIPPED | OUTPATIENT
Start: 2022-11-16

## 2022-11-30 NOTE — PROGRESS NOTES
Infusion Nursing Note:  Justus Lozano presents today for evenity.    Patient seen by provider today: No   present during visit today: Not Applicable.    Note: N/A.    Intravenous Access:  No Intravenous access/labs at this visit.    Treatment Conditions:  Lab Results   Component Value Date     02/16/2022    POTASSIUM 4.0 02/16/2022    CR 0.49 (L) 09/28/2022    HANDY 9.3 09/28/2022    BILITOTAL 0.5 09/14/2016    ALBUMIN 3.4 09/14/2016    ALT 18 09/14/2016    AST 13 09/14/2016     Results reviewed, labs MET treatment parameters, ok to proceed with treatment.    Post Infusion Assessment:  Patient tolerated injection without incident.     Discharge Plan:   Patient declined prescription refills.  Discharge instructions reviewed with: Patient.  Patient and/or family verbalized understanding of discharge instructions and all questions answered.  AVS to patient via [employer].  Patient will return 1/4/23 for next appointment.   Patient discharged in stable condition accompanied by: self.  Departure Mode: Ambulatory.      Patricia Lees RN                       [facility] [id number]  Dept: [phone]  Dept Fax: [phone]  Loc: [facility] [address] Follow Visit  Visit Date: 9/23/2019      Johnny Del Rio  is a 76 y.o. female who is returning to the office today for a post-op follow-up visit. She had surgery on 7/3/2019   S/p Right sub occipital craniotomy right cerebellar intraparenchymal tumor. The final pathology result was Hemangioblastoma. · Patient was evaluated today and is doing very well overall. · No new complaints were voiced. · She came in today with several questions regarding her previous surgery. All questions and concerns were addressed and answered. · She stated that she was offered a radiation treatment but she declined. · She was evaluated by ophthalmology without retina or ocular exam findings. · She did not her scheduled MRIs(brain and spine) yet  · Patient  lives with their family  · Wound: wound healing as expected       Assessment/Plan:    · Status Post Right sub occipital craniotomy right cerebellar intraparenchymal tumor. · Doing very well overall  · Encouraged gradual increase in physical and mental activity. · Fall precaution and home safety education provided to patient. · Follow-up: after 3 months with new MRIs. ( brain and spine)      Electronically signed by Malgorzata Palacios MD on 9/23/19 at 12:27 PM     *Time spent with the patient was 25  minutes.  More than 50% was spent counseling/coordinating the patient's care.

## 2023-01-23 RX ORDER — LETROZOLE 2.5 MG/1
2.5 TABLET, FILM COATED ORAL DAILY
Qty: 30 TABLET | Refills: 5 | Status: SHIPPED | OUTPATIENT
Start: 2023-01-23

## 2023-01-23 NOTE — TELEPHONE ENCOUNTER
Patient called requesting a refill. Last seen 11/2022 next apt 5/9/23. Medication pended please advise.

## 2023-01-30 ENCOUNTER — OFFICE VISIT (OUTPATIENT)
Dept: INTERNAL MEDICINE CLINIC | Age: 78
End: 2023-01-30
Payer: MEDICARE

## 2023-01-30 VITALS
SYSTOLIC BLOOD PRESSURE: 126 MMHG | HEIGHT: 65 IN | BODY MASS INDEX: 28.36 KG/M2 | HEART RATE: 68 BPM | DIASTOLIC BLOOD PRESSURE: 76 MMHG | WEIGHT: 170.2 LBS | TEMPERATURE: 97.2 F

## 2023-01-30 DIAGNOSIS — I10 ESSENTIAL HYPERTENSION: ICD-10-CM

## 2023-01-30 DIAGNOSIS — E87.6 HYPOKALEMIA: ICD-10-CM

## 2023-01-30 DIAGNOSIS — N32.81 OVERACTIVE BLADDER: ICD-10-CM

## 2023-01-30 DIAGNOSIS — E78.2 MIXED HYPERLIPIDEMIA: ICD-10-CM

## 2023-01-30 PROCEDURE — 3074F SYST BP LT 130 MM HG: CPT | Performed by: INTERNAL MEDICINE

## 2023-01-30 PROCEDURE — 99214 OFFICE O/P EST MOD 30 MIN: CPT | Performed by: INTERNAL MEDICINE

## 2023-01-30 PROCEDURE — 3078F DIAST BP <80 MM HG: CPT | Performed by: INTERNAL MEDICINE

## 2023-01-30 PROCEDURE — 1123F ACP DISCUSS/DSCN MKR DOCD: CPT | Performed by: INTERNAL MEDICINE

## 2023-01-30 RX ORDER — LOSARTAN POTASSIUM AND HYDROCHLOROTHIAZIDE 12.5; 5 MG/1; MG/1
1 TABLET ORAL DAILY
Qty: 90 TABLET | Refills: 1 | Status: SHIPPED | OUTPATIENT
Start: 2023-01-30

## 2023-01-30 RX ORDER — OXYBUTYNIN CHLORIDE 5 MG/1
TABLET ORAL
Qty: 180 TABLET | Refills: 1 | Status: SHIPPED | OUTPATIENT
Start: 2023-01-30

## 2023-01-30 RX ORDER — POTASSIUM CHLORIDE 20 MEQ/1
TABLET, EXTENDED RELEASE ORAL
Qty: 90 TABLET | Refills: 1 | Status: SHIPPED | OUTPATIENT
Start: 2023-01-30

## 2023-01-30 RX ORDER — AMLODIPINE BESYLATE 10 MG/1
TABLET ORAL
Qty: 90 TABLET | Refills: 1 | Status: SHIPPED | OUTPATIENT
Start: 2023-01-30

## 2023-01-30 RX ORDER — PRAVASTATIN SODIUM 40 MG
TABLET ORAL
Qty: 90 TABLET | Refills: 1 | Status: SHIPPED | OUTPATIENT
Start: 2023-01-30

## 2023-01-30 RX ORDER — LOSARTAN POTASSIUM AND HYDROCHLOROTHIAZIDE 12.5; 5 MG/1; MG/1
1 TABLET ORAL DAILY
COMMUNITY
End: 2023-01-30 | Stop reason: SDUPTHER

## 2023-01-30 RX ORDER — ASPIRIN 81 MG/1
81 TABLET ORAL DAILY
COMMUNITY
End: 2023-01-30 | Stop reason: SDUPTHER

## 2023-01-30 RX ORDER — ASPIRIN 81 MG/1
81 TABLET ORAL DAILY
Qty: 90 TABLET | Refills: 1 | Status: CANCELLED | OUTPATIENT
Start: 2023-01-30

## 2023-01-30 ASSESSMENT — PATIENT HEALTH QUESTIONNAIRE - PHQ9
SUM OF ALL RESPONSES TO PHQ9 QUESTIONS 1 & 2: 0
SUM OF ALL RESPONSES TO PHQ QUESTIONS 1-9: 0
1. LITTLE INTEREST OR PLEASURE IN DOING THINGS: 0
SUM OF ALL RESPONSES TO PHQ QUESTIONS 1-9: 0
SUM OF ALL RESPONSES TO PHQ QUESTIONS 1-9: 0
2. FEELING DOWN, DEPRESSED OR HOPELESS: 0
SUM OF ALL RESPONSES TO PHQ QUESTIONS 1-9: 0

## 2023-01-30 NOTE — PROGRESS NOTES
Chief Complaint   Patient presents with    Hypertension     3 months     Hyperlipidemia    Gastroesophageal Reflux     This patient presents for medical evaluation. This is a 3 month follow up visit.  - recommended COVID bivalent vaccine, she did flu vaccine in the fall 2022 at [facility] on [facility] - get date from [address] to call. They states no record there - patient to look at home for record. At last visit in resident clinic - complained of cough - switched lisinopril/HCTZ to losartan/HCTZ due to suspicion of ACEI induced cough. Also recommended she start PPI to better treat GERD as this could be source of cough. If cough persists after above - consider sending to allergist for allergy shots. Since last visit, she did switch BP medication, but did not start PPI. Cough is better but was also at a time of URI. Breast cancer/melanoma/hemangioblastoma - transferred care to [facility] oncology - labs normal 7/2022. Continuing on Femara (3 yrs on medication- goal 5 years of treatment), regular skin checks with Dermatology in [facility] - Dr. Jett Clay. 2 UC visits for cystitis - Macrobid 3/1/ ->Cipro 3/3 - growth of contaminants both set (pos nitrite). Symptoms resolved. Thinks she should do better drinking water. She is doing better with drinking more water and taking cranberry/probiotic OTC supplement. Allergy symptoms - cough with clear sputum. She is taking Flonase daily. Add antihistamine. She tried Claritin and not effective enough. She tried Zyrtec or allegra and didn't like how she felt on it. Will try Xyzal.  She has had allergy injections in past and if can't control with Xyzal will consider this. She knows she is allergic to dogs/cats and she has a dog. She is having issues and did retest with allergist - she is taking Flonase and if more issues adds Azelastine NS. No antihistamine. Doing ok.       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 Yamilet Menchaca. She saw radiation oncology - considering radiation but putting off for now. Work up for [employer] [place] disease - MRI of entire spine and ophthamology evaluation done and negative. She has visit saw Dr. Summer Desir 8/26/19. She is also complaining of dry eye. Post-op complicated with shingles (healed currently), and redness around cuticles. MRI 5/2020 brain stable. NS follow up - MRI brain 11/29/21 - no recurrent mass, mild atrophy and probable ischemic changes of white matter, small retention cyst or polyp in R max sinus. Next MRI due in 2 years 11/29/23. Start Silver sneakers. Left heel pain/plantar fascitis - Powerstep inserts, Voltaren gel - seeing podiatry at [facility] 3/14/22 - plantar fascitis, need to do stretching exercises X-ray 1/31/22 -arthritis and calcaneal spur on left foot    Shingles - She had mild case of shingles on right lower back after brain surgery. Suggested she get vaccine. Advised it requires 2 doses - suggested pre-medicating with Tylenol. Cataracts - surgery 1/2021 and 2/2021 with Dr. Alejo Carter. EKG with chronic anteroseptal infarct due to poor R wave progression. Gave list of allergies to give to Dr. Alejo Carter - as multiple allergies to antibacterial skin prep. Hypertension - BP controlled, asymptomatic. CMP 7/2022. Continue losartan/HCTZ, Norvasc. BMP due now. Denies headache, lightheadedness. Hyperglycemia - 101 7/2022. Continue diet control. HgA1c 5.6 1/2019, 5.9 6/2020, 5.8 10/2021. Hyperlipidemia - LDL 94 10/2021, normal LFTs - 7/2022. Continue pravastatin. Due for Lipid panel now. No myalgia. History of multiple Melanomas - She sees Dr. Benigno Mae. She saw her 3/2022 then every 6 months. Following CBC, CMP periodically. 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   Pathology - fibrocystic changes including cyst formation, apocrine metaplasia, fibrosis and microcalcification. She is going to follow up with Dr. German Barron to discuss 11/25/19 - DCIS on bx but not in surgical path - so will need to question if need to do radiation (for brain). She recently did bx and was benign. On anti-estrogen - Dr. Sabina Ibarra MD at [facility]. No radiation. Mammogram done in 11/2021. DEXA normal 11/2020. Joint pain so change to new med - Femara. Upon review of scans 6/2019- thyroid nodule seen 1.4 cm but smaller than what was seen on CT 2015 - improved over 4 years - no follow up needed. Mild rectal wall thickening on CT 6/2019 - just had colonoscopy 2/2019 - was to have sigmoidoscopy with Dr. Moses Granda. She cancelled this and never went back. Her follow up CT 8/2020 did not show rectal thickening. Will set her up with GI and see if they want to look at this. She is having issues with constipation. She still hasn't followed up with GI. She does well if takes polyethylene glycol weekly prn. 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 Need to follow with yearly renal ultrasound - Urology was doing this. Last renal u/s done 4/2022 - stable lipomas in right kidney with minimal change in size, simple cyst left kidney slight increase in size. GERD - stable, on Pepcid 10 mg daily, prn. May need to increase if goes back on NSAIDs. She is on a dissovable OTC prn - pepcid ac and doing ok. OAB - stable, continue Ditropan. Constipation - very happy with Miralax. She had her colonoscopy done - 2/18/19, impressively long and looping colon, hypertrophied anal papilla, grade 2 internal hemorrhoids without hemorrhage, repeat in 10 year but will be 83 and would not do unless very good health as difficult with looping colon. 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   She saw them and had normal cystoscopy, CT urogram and cytology. Urology - [facility] - following imaging yearly with cytology and stable at last visit 8/2020. Hypokalemia - most likely due to HCTZ. Offered to change HCTZ to something else but she would rather stay on this regimen. Potassium stable on KCL 20 meq daily. HM - DEXA reviewed from 11/20/20 - normal, and colonoscopy done 2/2019 - due in 10 years - but will be 81 yo and high risk. Oncology rescheduled DEXA. Discussed at previous visit:    Allergic rhinitis -stable - stopped injections in 7/2019 with surgery and never went back. She is using Flonase regularly. She only uses Claritin prn. Allergic rhinitis - with PND, cough, clear sputum, sinus pressure. Increased Flonase to 2 sprays each nostril daily, stopped Claritin D and recommended Xyzal .  Suggested backing off Benadryl at night. She did those changes - and added alec pot. She saw ENT - Dr. Audrey Krause - he again gave same suggestions. If not improving - consider CT sinuses - Ordered at last visit as sinus pressure, cough, PND ongoing for the previous 6 months (seen by me, urgent care and ENT for same issues). Ordered allergist referral.   She did see allergist - she is allergic to dog and cat - she has a dog. She states she is sleeping with dog and will not give up dog. Suggested not sleeping with dog at least.  She states she started another nasal spray with allergist and combination has improved symptoms. Reviewed CT and explained results - anatomy may be contributing to symptoms. But she is feeling better with treatment as above, so not wanting to see ENT. CT results:  1. No evidence to suggest acute sinusitis. 2. Mild mucosal thickening is present within the bilateral maxillary sinuses. Mucosal coaptation also causes mild narrowing of the left ostiomeatal unit.        3. 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 She is now taking allergy shots, as she was having sedation with anti-histamine. She is continuing anti-histamine and nasal spray till shots are in her system. Allergist is managing. Told her to decrease Xyzal to 1/2 tab at night. Today - she is no longer using medication since surgery and symptoms resolved in hospital.  But now back home with dog she is congested. She started back on the Flonase.       Past Medical History:   Diagnosis Date    Abnormal EKG     normal stress test 3/2009    Breast cancer ([facility] 10/11/2019    Left DCIS    Cancer ([facility] [address]     basal cell skin    Complication of anesthesia     difficulty breathing after surgery- [facility]ferr[facility] to [facility]. Angeline's for 3 days, O2 for about 5 days    Constipation     Hyperglycemia     History type 2 DM - lost weight - diet controlled    Hyperlipidemia     Hypertension     Melanoma in situ ([facility] 06/2012    of chest - Dr. Denis Acuna - margins clear - neg sentinal lymph node    Melanoma in situ of lower extremity ([facility]     excision- x2    Metabolic syndrome     much improved - normal triglycerides, weight loss of 50#    OA (osteoarthritis)     left knee       Current Outpatient Medications   Medication Sig Dispense Refill    pravastatin (PRAVACHOL) 40 MG tablet take 1 tablet by mouth every evening take 1 tablet by mouth at bedtime 90 tablet 1    oxybutynin (DITROPAN) 5 MG tablet take 1 tablet by mouth twice a day 180 tablet 1    potassium chloride (KLOR-CON M) 20 MEQ extended release tablet take 1 tablet by mouth once daily 90 tablet 1    amLODIPine (NORVASC) 10 MG tablet Take 1 tablet by mouth daily 90 tablet 1    losartan-hydroCHLOROthiazide (HYZAAR) 50-12.5 MG per tablet Take 1 tablet by mouth daily 90 tablet 1    letrozole (FEMARA) 2.5 MG tablet Take 1 tablet by mouth daily 30 tablet 5    polyethylene glycol (GLYCOLAX) 17 GM/SCOOP powder Take 17 gm dissolved in water once daily 510 g 1    fluticasone (FLONASE) 50 MCG/ACT nasal spray 2 sprays by Each Nostril route daily      diphenhydrAMINE-APAP, sleep, (TYLENOL PM EXTRA STRENGTH)  MG tablet Take 1 tablet by mouth nightly as needed for Sleep      acetaminophen (TYLENOL) 325 MG tablet Take 650 mg by mouth every 6 hours as needed for Pain      Misc Natural Products (CRANBERRY/PROBIOTIC) TABS Take by mouth      melatonin 3 MG TABS tablet Take 5 mg by mouth nightly as needed       famotidine (PEPCID) 10 MG tablet Take 10 mg by mouth as needed      Biotin 1000 MCG TABS Take 5,000 mg by mouth daily      Calcium Carb-Cholecalciferol 500-400 MG-UNIT TABS Take 1 tablet by mouth 2 times daily       Cholecalciferol (VITAMIN D3) 2000 units CAPS Take 1 capsule by mouth daily        No current facility-administered medications for this visit. Allergies   Allergen Reactions    Iodine     Soap     Betadine [Povidone Iodine] Rash    Chloraprep One Step [Chlorhexidine Gluconate] Rash    Pcn [Penicillins] Hives and Rash    Sulfa Antibiotics Rash       Review of Systems - General ROS: negative for - fever or chills   Psychological ROS: negative for - anxiety, depression  Hematological and Lymphatic ROS: No history of blood clots or bleeding disorder. Respiratory ROS: no shortness of breath, wheezing, cough  Cardiovascular ROS: no chest pain or dyspnea on exertion  Gastrointestinal ROS: no abdominal pain, change in bowel habits, or black or bloody stools - positive constipation - better with Miralax daily prn  Genito-Urinary ROS: no dysuria, trouble voiding, or hematuria  Musculoskeletal ROS: negative for - muscle pain or muscular weakness  Neurological ROS: negative for - dizziness, memory loss, seizures, tremors, or weakness, positive floaters - need to make appt with eye provider.   Dermatological ROS: negative for - rash or skin lesion changes - she has seb keratosis    Blood pressure 126/76, pulse 68, temperature 97.2 °F (36.2 °C), height 5' 5\" (1.651 m), weight 170 lb 3.2 oz (77.2 kg), not currently breastfeeding. Physical Examination: General appearance - alert, well appearing, and in no distress  Mental status - alert, oriented to person, place, and time  Neck - supple, no significant adenopathy, no JVD, or carotid bruits  Chest - clear to auscultation, no wheezes, rales or rhonchi, symmetric air entry  Heart - normal rate, regular rhythm, no murmurs, rubs, clicks or gallops  Abdomen - soft, nontender, nondistended  Neurological - alert, oriented, normal speech, no focal findings or movement disorder noted  Extremities - peripheral pulses normal, no edema, no clubbing or cyanosis  Skin - normal coloration and turgor, warm, dry    Diagnostic Data:  Labs reviewed from 7/2022 (oncology labs) - labs due now.      Results for orders placed or performed in visit on 07/06/22   CBC   Result Value Ref Range    WBC 7.2 4.4 - 10.5 th/cmm    RBC 4.76 4.00 - 5.10 mil/cmm    Hemoglobin 13.2 12.0 - 15.0 gm/dL    Hematocrit 39.9 35.0 - 44.0 %    MCV 83.8 80 - 97 CU YULIET    MCH 27.7 27.5 - 33.0 PG    MCHC 33.1 33.0 - 36.0 gm/dL    RDW 12.4 12.0 - 16.0 %    Platelets 075 322 - 345 th/cmm    Neutrophils % 72.1 (H) 40 - 70 %    Lymphocytes % 17.4 15 - 45 %    Monocytes % 7.9 2 - 10 %    Eosinophils % 1.8 0 - 6 %    Basophils % 0.8 0 - 2 %    Nucleated RBCs 0.1 <1 /100 WBC    Absolute Neut # 5200 1800 - 7700 /cmm    Absolute Lymph # 1200 1000 - 4800 /cmm    Absolute Mono # 600 0 - 800 /cmm    Absolute Eos # 100 0 - 500 /cmm    Absolute Baso # 100 0 - 200 /cmm   Comprehensive Metabolic Panel   Result Value Ref Range    Sodium 141 135 - 145 mEq/L    Potassium 3.9 3.6 - 5.0 mEq/L    Chloride 101 101 - 111 mEq/L    CO2 35 (H) 21 - 32 mEq/L    Anion Gap 5 4 - 12    Glucose 101 70 - 110 mg/dL    BUN 17 7 - 20 mg/dL    Creatinine 0.65 0.60 - 1.30 mg/dL    Creatinine Clearance >60     AST 15 15 - 41 IU/L    Alkaline Phosphatase 60 39 - 118 IU/L    Total Bilirubin 0.5 0.2 - 1.0 mg/dL    Calcium 9.70 8.8 - 10.5 mg/dL    Albumin 4.5 3.5 - 5.0 g/dL    Total Protein 6.8 6.2 - 8.0 g/dL    Albumin/Globulin Ratio 2.0 1.5 - 2.5    ALT 11 10 - 40 IU/L   Lactate Dehydrogenase   Result Value Ref Range     98 - 192 IU/L      Result Value Ref Range     9.9 <35 U/mL   Cancer Antigen 15-3   Result Value Ref Range    CA 15-3 27 <30 U/mL       Assessment/Plan:   Diagnosis Orders   1. Essential hypertension  amLODIPine (NORVASC) 10 MG tablet    losartan-hydroCHLOROthiazide (HYZAAR) 50-12.5 MG per tablet      2. Mixed hyperlipidemia  pravastatin (PRAVACHOL) 40 MG tablet      3. Hypokalemia  potassium chloride (KLOR-CON M) 20 MEQ extended release tablet      4. Overactive bladder  oxybutynin (DITROPAN) 5 MG tablet        No orders of the defined types were placed in this encounter. Hypertension - BP controlled, continue lisinopril/HCTZ, and Norvasc. CMP stable 7/2022. BMP due now. Hyperlipidemia - controlled, LDL 94 10/2021, continue pravastatin. CMP normal 7/2022. Lipid panel due now. Hypokalemia - likely due to HCTZ, monitor potassium level on supplement. Potassium 3.9 7/2022. OAB - stable, continue Ditropan. Constipation - ran out of Miralax and need to restart. Make visit with GI to discuss with rectal thickening - never followed up with sigmoidoscopy. Rectal thickening - just had colonoscopy 2/2019 - will send back to GI for further evaluation. They recommended flex sig - see Lori canchola. Will need to follow up with GI and see if this was done. Allergic rhinitis/frequent sinusitis - due to dog/cat allergy (she has a dog) - continue Flonase and Claritin. May switch to Xyzal.      Renal angiomyolipoma - 3 on the right - stable in size (one is >4 cm), and cyst on left. As one is >4 cm will send back to Urology to follow. They will follow yearly. She missed last visit. Renal u/s 4/2022 - stable, mild lipomas in right kidney- minimal change in size, simple cyst left kidney.       Microscopic hematuria -  May be due to renal cyst seen on renal ultrasound. Sent to Urology for cystoscopy and to complete work up of microscopic hematuria. She saw them and had normal cystoscopy, CT urogram and cytology. Need to do yearly cytology - done 8/2020. To see 8/2021- never did - reschedule. History of multiple melanomas - seeing Dermatology Dr. Flaco Malone. Will schedule follow up appointment in 6 months to review chronic issues. Continue medications at current doses. Lab due now.  - Colonoscopy was done 2/2019 with Dr. Hue Correa. Did flu and Pneumonia vaccine - of note she called later and said right arm was red and sore, had one day of nausea, diarrhea but resolved quickly - unsure if related to vaccine. Tolerated Tdap 6/2020. Recommended shingles vaccine as has had shingles. Patient instructions:  Bring in durable power of , living will if you have one. Look for proof of flu vaccine. You can stop ASA at your age. Consider starting Silver Sneakers. Reschedule DEXA scan - [facility]. Make appt with eye provider- floaters    Chiki Pickens MD    [facility] [place] [address]  [address]  [address] [address]  Dept: [phone]  Dept Fax: [phone] : [phone]

## 2023-01-30 NOTE — PATIENT INSTRUCTIONS
Bring in durable power of , living will if you have one. Look for proof of flu vaccine. You can stop ASA at your age. Consider starting Silver Sneakers. Reschedule DEXA scan - [facility].     Make appt with eye provider- floaters

## 2023-02-06 PROBLEM — L57.0 ACTINIC KERATOSIS: Status: ACTIVE | Noted: 2021-01-22

## 2023-02-06 PROBLEM — D05.10 DUCTAL CARCINOMA IN SITU (DCIS) OF BREAST: Status: ACTIVE | Noted: 2023-02-06

## 2023-02-06 PROBLEM — E78.00 PURE HYPERCHOLESTEROLEMIA: Status: ACTIVE | Noted: 2023-02-06

## 2023-02-08 ENCOUNTER — TELEPHONE (OUTPATIENT)
Dept: INTERNAL MEDICINE CLINIC | Age: 78
End: 2023-02-08

## 2023-02-08 NOTE — TELEPHONE ENCOUNTER
Left message per Hipaa asking that she get her fasting labs done for Dr. Gannon Reason . Orders were given at her recent appointment .

## 2023-02-14 ENCOUNTER — NURSE ONLY (OUTPATIENT)
Dept: LAB | Age: 78
End: 2023-02-14

## 2023-02-14 DIAGNOSIS — E78.2 MIXED HYPERLIPIDEMIA: ICD-10-CM

## 2023-02-14 DIAGNOSIS — I10 ESSENTIAL HYPERTENSION: ICD-10-CM

## 2023-02-14 LAB
ANION GAP SERPL CALC-SCNC: 10 MEQ/L (ref 8–16)
BUN SERPL-MCNC: 13 MG/DL (ref 7–22)
CALCIUM SERPL-MCNC: 10 MG/DL (ref 8.5–10.5)
CHLORIDE SERPL-SCNC: 106 MEQ/L (ref 98–111)
CHOLEST SERPL-MCNC: 162 MG/DL (ref 100–199)
CO2 SERPL-SCNC: 30 MEQ/L (ref 23–33)
CREAT SERPL-MCNC: 0.6 MG/DL (ref 0.4–1.2)
GFR SERPL CREATININE-BSD FRML MDRD: > 60 ML/MIN/1.73M2
GLUCOSE SERPL-MCNC: 116 MG/DL (ref 70–108)
HDLC SERPL-MCNC: 51 MG/DL
LDLC SERPL CALC-MCNC: 79 MG/DL
POTASSIUM SERPL-SCNC: 4 MEQ/L (ref 3.5–5.2)
SODIUM SERPL-SCNC: 146 MEQ/L (ref 135–145)
TRIGL SERPL-MCNC: 160 MG/DL (ref 0–199)

## 2023-02-28 ENCOUNTER — HOSPITAL ENCOUNTER (EMERGENCY)
Age: 78
Discharge: HOME | End: 2023-02-28
Payer: MEDICARE

## 2023-02-28 VITALS
HEART RATE: 70 BPM | WEIGHT: 168 LBS | BODY MASS INDEX: 27.96 KG/M2 | OXYGEN SATURATION: 96 % | SYSTOLIC BLOOD PRESSURE: 164 MMHG | RESPIRATION RATE: 20 BRPM | TEMPERATURE: 98.3 F | DIASTOLIC BLOOD PRESSURE: 74 MMHG

## 2023-02-28 DIAGNOSIS — J06.9 VIRAL URI WITH COUGH: Primary | ICD-10-CM

## 2023-02-28 PROCEDURE — 99213 OFFICE O/P EST LOW 20 MIN: CPT

## 2023-02-28 PROCEDURE — 99213 OFFICE O/P EST LOW 20 MIN: CPT | Performed by: NURSE PRACTITIONER

## 2023-02-28 RX ORDER — BENZONATATE 200 MG/1
200 CAPSULE ORAL 3 TIMES DAILY PRN
Qty: 21 CAPSULE | Refills: 0 | Status: SHIPPED | OUTPATIENT
Start: 2023-02-28 | End: 2023-03-07

## 2023-02-28 ASSESSMENT — ENCOUNTER SYMPTOMS
NAUSEA: 0
COUGH: 1
VOMITING: 0
SORE THROAT: 0
DIARRHEA: 0
SHORTNESS OF BREATH: 0

## 2023-02-28 ASSESSMENT — PAIN - FUNCTIONAL ASSESSMENT: PAIN_FUNCTIONAL_ASSESSMENT: NONE - DENIES PAIN

## 2023-02-28 NOTE — ED PROVIDER NOTES
[facility]  Urgent [facility] Encounter       CHIEF COMPLAINT       Chief Complaint   Patient presents with    Cough     Head congestion         Nurses Notes reviewed and I agree except as noted in the HPI. HISTORY OF PRESENT ILLNESS   Sabine Joya is a 68 y.o. female who presents for evaluation of cough and congestion that been ongoing for the past 2 to 3 days. Patient denies any fever, chills, nausea, vomiting, headaches, or body aches. She denies any chest tightness or shortness of breath. She states that she does have a history of allergies and uses a steroid nasal spray at home. She has been told by an allergy specialist to begin taking a pill for her allergies but states that she has not started taking it yet. The history is provided by the patient. REVIEW OF SYSTEMS     Review of Systems   Constitutional:  Negative for chills and fever. HENT:  Positive for congestion. Negative for sore throat. Respiratory:  Positive for cough. Negative for shortness of breath. Cardiovascular:  Negative for chest pain. Gastrointestinal:  Negative for diarrhea, nausea and vomiting. Musculoskeletal:  Negative for arthralgias and myalgias. Skin:  Negative for rash. Allergic/Immunologic: Positive for environmental allergies. Neurological:  Negative for headaches.      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 Angeline's for 3 days, O2 for about 5 days    Constipation     Hyperglycemia     History type 2 DM - lost weight - diet controlled    Hyperlipidemia     Hypertension     Melanoma in situ ([address] 06/2012    of chest - Dr. Chris Roldan - margins clear - neg sentinal lymph node    Melanoma in situ of lower extremity ([address]     excision- x2    Metabolic syndrome much improved - normal triglycerides, weight loss of 50#    OA (osteoarthritis)     left knee       SURGICALHISTORY     Patient  has a past surgical history that includes Bladder surgery; Knee arthroscopy (Left, 2007); Skin cancer excision; pre-malignant / benign skin lesion excision (Right, 2009); skin biopsy; Skin cancer excision (06/27/2013); malignant skin lesion excision (Left, 07/31/2013); Knee arthroscopy (Left, 01/2014); Skin cancer excision; Tonsillectomy (1966); Hysterectomy (11/1971); eye surgery (Bilateral, 3/2016, 4/2016); Colonoscopy; Skin cancer excision (Left, 03/24/2017); Total knee arthroplasty (Right, 09/2017); Total knee arthroplasty (Left, 06/17/2015); joint replacement; craniotomy (Right, 07/03/2019); brain surgery; skin biopsy (07/2020); Cataract removal with implant (Bilateral, 01/2021); Breast lumpectomy (Left, 11/05/2019); [facility] STEREO BREAST BX W LOC DEVICE 1ST LESION LEFT (Left, 05/26/2020);  BREAST BIOPSY W LOC DEVICE 1ST LESION RIGHT (Right, 07/12/2018); and [facility] STEREO BREAST BX W LOC DEVICE 1ST LESION LEFT (Left, 10/11/2019).     CURRENT MEDICATIONS       Previous Medications    ACETAMINOPHEN (TYLENOL) 325 MG TABLET    Take 650 mg by mouth every 6 hours as needed for Pain    AMLODIPINE (NORVASC) 10 MG TABLET    Take 1 tablet by mouth daily    BIOTIN 1000 MCG TABS    Take 5,000 mg by mouth daily    CALCIUM CARB-CHOLECALCIFEROL 500-400 MG-UNIT TABS    Take 1 tablet by mouth 2 times daily     CHOLECALCIFEROL (VITAMIN D3) 2000 UNITS CAPS    Take 1 capsule by mouth daily     DIPHENHYDRAMINE-APAP, SLEEP, (TYLENOL PM EXTRA STRENGTH)  MG TABLET    Take 1 tablet by mouth nightly as needed for Sleep    FAMOTIDINE (PEPCID) 10 MG TABLET    Take 10 mg by mouth as needed    FLUTICASONE (FLONASE) 50 MCG/ACT NASAL SPRAY    2 sprays by Each Nostril route daily    LETROZOLE (FEMARA) 2.5 MG TABLET    Take 1 tablet by mouth daily    LOSARTAN-HYDROCHLOROTHIAZIDE (HYZAAR) 50-12.5 MG PER TABLET Take 1 tablet by mouth daily    MELATONIN 3 MG TABS TABLET    Take 5 mg by mouth nightly as needed     MISC NATURAL PRODUCTS (CRANBERRY/PROBIOTIC) TABS    Take by mouth    OXYBUTYNIN (DITROPAN) 5 MG TABLET    take 1 tablet by mouth twice a day    POLYETHYLENE GLYCOL (GLYCOLAX) 17 GM/SCOOP POWDER    Take 17 gm dissolved in water once daily    POTASSIUM CHLORIDE (KLOR-CON M) 20 MEQ EXTENDED RELEASE TABLET    take 1 tablet by mouth once daily    PRAVASTATIN (PRAVACHOL) 40 MG TABLET    take 1 tablet by mouth every evening take 1 tablet by mouth at bedtime       ALLERGIES     Patient is is allergic to iodine, soap, betadine [povidone iodine], chloraprep one step [chlorhexidine gluconate], pcn [penicillins], and sulfa antibiotics. Patients   Immunization History   Administered Date(s) Administered    COVID-19, MODERNA BLUE border, Primary or Immunocompromised, (age 12y+), IM, 100 mcg/0.5mL 01/28/2021, 02/23/2021, 10/25/2021    Influenza 10/31/2013    Influenza Vaccine, unspecified formulation 10/31/2013    Influenza Virus Vaccine 01/29/2015, 01/19/2016    Influenza, FLUAD, (age 72 y+), Adjuvanted, 0.5mL 11/04/2020, 12/31/2021    Influenza, FLUARIX, FLULAVAL, FLUZONE (age 10 mo+) AND AFLURIA, (age 1 y+), PF, 0.5mL 01/17/2017    Influenza, High Dose (Fluzone 65 yrs and older) 10/08/2018    PPD Test 06/22/2015, 07/09/2019, 07/16/2019    Pneumococcal Conjugate 13-valent ([id number]) 07/18/2017    Pneumococcal Polysaccharide (Btdyybruc29) 10/08/2018    Tdap (Boostrix, Adacel) 06/11/2020       FAMILY HISTORY     Patient's family history includes Cancer in her father; Diabetes in her brother, mother, and sister; Heart Failure in her mother; Hypertension in her brother, mother, and sister; Kidney Disease in her mother. SOCIAL HISTORY     Patient  reports that she has never smoked. She has never used smokeless tobacco. She reports that she does not currently use alcohol. She reports that she does not use drugs.     PHYSICAL EXAM     ED TRIAGE VITALS  BP: (!) 164/74, Temp: 98.3 °F (36.8 °C), Heart Rate: 70, Resp: 20, SpO2: 96 %,Estimated body mass index is 27.96 kg/m² as calculated from the following:    Height as of 1/30/23: 5' 5\" (1.651 m). Weight as of this encounter: 168 lb (76.2 kg). ,No LMP recorded. Patient has had a hysterectomy. Physical Exam  Vitals and nursing note reviewed. Constitutional:       General: She is not in acute distress. Appearance: She is well-developed. She is not diaphoretic. HENT:      Right Ear: Tympanic membrane and ear canal normal.      Left Ear: Tympanic membrane and ear canal normal.      Mouth/Throat:      Mouth: Mucous membranes are moist.      Pharynx: Oropharynx is clear. Eyes:      Conjunctiva/sclera:      Right eye: Right conjunctiva is not injected. Left eye: Left conjunctiva is not injected. Pupils: Pupils are equal.   Cardiovascular:      Rate and Rhythm: Normal rate and regular rhythm. Heart sounds: No murmur heard. Pulmonary:      Effort: Pulmonary effort is normal. No respiratory distress. Breath sounds: Normal breath sounds. Musculoskeletal:      Cervical back: Normal range of motion. Skin:     General: Skin is warm. Findings: No rash. Neurological:      Mental Status: She is alert and oriented to person, place, and time. Psychiatric:         Behavior: Behavior normal.       DIAGNOSTIC RESULTS     Labs:No results found for this visit on 02/28/23. IMAGING:    No orders to display         EKG:      URGENT CARE COURSE:     Vitals:    02/28/23 1329   BP: (!) 164/74   Pulse: 70   Resp: 20   Temp: 98.3 °F (36.8 °C)   TempSrc: Temporal   SpO2: 96%   Weight: 168 lb (76.2 kg)       Medications - No data to display         PROCEDURES:  None    FINAL IMPRESSION      1.  Viral URI with cough          DISPOSITION/ PLAN     Physical exam is benign at this time and I discussed with the patient that symptoms are consistent with a mild upper respiratory infection. I discussed that she should continue her nasal spray at home and begin using her allergy medication that was provided by her allergy specialist.  She is also given a prescription for Tessalon Perles for cough. I discussed that she should follow-up if her symptoms do not improve in the next 5 to 7 days and she is agreeable to plan as discussed.       PATIENT REFERRED TO:  Bossman Cabello MD  [facility] [place] [id number] / [facility] [id number]      DISCHARGE MEDICATIONS:  New Prescriptions    BENZONATATE (TESSALON) 200 MG CAPSULE    Take 1 capsule by mouth 3 times daily as needed for Cough       Discontinued Medications    No medications on file       Current Discharge Medication List          NARAYAN Sanford CNP    (Please note that portions of this note were completed with a voice recognition program. Efforts were made to edit the dictations but occasionally words are mis-transcribed.)          NARAYAN Sanford CNP  02/28/23 [id number]

## 2023-03-03 ENCOUNTER — TELEPHONE (OUTPATIENT)
Dept: INTERNAL MEDICINE CLINIC | Age: 78
End: 2023-03-03

## 2023-04-21 RX ORDER — FLUTICASONE PROPIONATE 50 MCG
2 SPRAY, SUSPENSION (ML) NASAL DAILY
Qty: 16 G | Refills: 5 | Status: SHIPPED | OUTPATIENT
Start: 2023-04-21

## 2023-05-09 ENCOUNTER — TELEPHONE (OUTPATIENT)
Dept: ONCOLOGY | Age: 78
End: 2023-05-09

## 2023-06-29 ENCOUNTER — HOSPITAL ENCOUNTER (OUTPATIENT)
Dept: WOMENS IMAGING | Age: 78
Discharge: HOME | End: 2023-06-29
Attending: INTERNAL MEDICINE
Payer: MEDICARE

## 2023-06-29 DIAGNOSIS — M85.80 OSTEOPENIA AFTER MENOPAUSE: ICD-10-CM

## 2023-06-29 DIAGNOSIS — Z78.0 OSTEOPENIA AFTER MENOPAUSE: ICD-10-CM

## 2023-06-29 PROCEDURE — 77080 DXA BONE DENSITY AXIAL: CPT

## 2023-07-05 ENCOUNTER — HOSPITAL ENCOUNTER (OUTPATIENT)
Dept: INFUSION THERAPY | Age: 78
Discharge: HOME | End: 2023-07-05
Payer: MEDICARE

## 2023-07-05 ENCOUNTER — OFFICE VISIT (OUTPATIENT)
Dept: ONCOLOGY | Age: 78
End: 2023-07-05
Payer: MEDICARE

## 2023-07-05 VITALS
OXYGEN SATURATION: 93 % | SYSTOLIC BLOOD PRESSURE: 122 MMHG | HEART RATE: 67 BPM | TEMPERATURE: 98.1 F | RESPIRATION RATE: 16 BRPM | DIASTOLIC BLOOD PRESSURE: 60 MMHG

## 2023-07-05 VITALS
HEIGHT: 65 IN | SYSTOLIC BLOOD PRESSURE: 122 MMHG | BODY MASS INDEX: 28.32 KG/M2 | RESPIRATION RATE: 16 BRPM | TEMPERATURE: 98.1 F | WEIGHT: 170 LBS | HEART RATE: 67 BPM | OXYGEN SATURATION: 93 % | DIASTOLIC BLOOD PRESSURE: 60 MMHG

## 2023-07-05 DIAGNOSIS — D05.12 DUCTAL CARCINOMA IN SITU (DCIS) OF LEFT BREAST: Primary | ICD-10-CM

## 2023-07-05 DIAGNOSIS — D05.12 DUCTAL CARCINOMA IN SITU (DCIS) OF LEFT BREAST: ICD-10-CM

## 2023-07-05 LAB
ABSOLUTE IMMATURE GRANULOCYTE: 0 THOU/MM3 (ref 0–0.07)
ALBUMIN SERPL BCG-MCNC: 4.5 G/DL (ref 3.5–5.1)
ALP SERPL-CCNC: 74 U/L (ref 38–126)
ALT SERPL W/O P-5'-P-CCNC: 16 U/L (ref 11–66)
AST SERPL-CCNC: 21 U/L (ref 5–40)
BASOPHILS ABSOLUTE: 0.1 THOU/MM3 (ref 0–0.1)
BASOPHILS NFR BLD AUTO: 1 % (ref 0–3)
BILIRUB CONJ SERPL-MCNC: < 0.2 MG/DL (ref 0–0.3)
BILIRUB SERPL-MCNC: 0.3 MG/DL (ref 0.3–1.2)
BUN BLDP-MCNC: 16 MG/DL (ref 8–26)
CHLORIDE BLD-SCNC: 103 MEQ/L (ref 98–109)
CREAT BLD-MCNC: 0.7 MG/DL (ref 0.5–1.2)
EOSINOPHIL NFR BLD AUTO: 2 % (ref 0–4)
EOSINOPHILS ABSOLUTE: 0.2 THOU/MM3 (ref 0–0.4)
ERYTHROCYTE [DISTWIDTH] IN BLOOD BY AUTOMATED COUNT: 12 % (ref 11.5–14.5)
GFR SERPL CREATININE-BSD FRML MDRD: > 60 ML/MIN/1.73M2
GLUCOSE BLD-MCNC: 159 MG/DL (ref 70–108)
HCT VFR BLD AUTO: 39.3 % (ref 37–47)
HGB BLD-MCNC: 12.5 GM/DL (ref 12–16)
IMMATURE GRANULOCYTES: 0 %
IONIZED CALCIUM, WHOLE BLOOD: 1.21 MMOL/L (ref 1.12–1.32)
LYMPHOCYTES ABSOLUTE: 1.4 THOU/MM3 (ref 1–4.8)
LYMPHOCYTES NFR BLD AUTO: 18 % (ref 15–47)
MCH RBC QN AUTO: 27.5 PG (ref 26–33)
MCHC RBC AUTO-ENTMCNC: 31.8 GM/DL (ref 32.2–35.5)
MCV RBC AUTO: 86 FL (ref 81–99)
MONOCYTES ABSOLUTE: 0.5 THOU/MM3 (ref 0.4–1.3)
MONOCYTES NFR BLD AUTO: 7 % (ref 0–12)
NEUTROPHILS NFR BLD AUTO: 73 % (ref 43–75)
PLATELET # BLD AUTO: 219 THOU/MM3 (ref 130–400)
PMV BLD AUTO: 10.4 FL (ref 9.4–12.4)
POTASSIUM BLD-SCNC: 3.1 MEQ/L (ref 3.5–4.9)
PROT SERPL-MCNC: 6.9 G/DL (ref 6.1–8)
RBC # BLD AUTO: 4.55 MILL/MM3 (ref 4.2–5.4)
SEGMENTED NEUTROPHILS ABSOLUTE COUNT: 5.5 THOU/MM3 (ref 1.8–7.7)
SODIUM BLD-SCNC: 142 MEQ/L (ref 138–146)
TOTAL CO2, WHOLE BLOOD: 29 MEQ/L (ref 23–33)
WBC # BLD AUTO: 7.6 THOU/MM3 (ref 4.8–10.8)

## 2023-07-05 PROCEDURE — 1123F ACP DISCUSS/DSCN MKR DOCD: CPT | Performed by: INTERNAL MEDICINE

## 2023-07-05 PROCEDURE — 99211 OFF/OP EST MAY X REQ PHY/QHP: CPT

## 2023-07-05 PROCEDURE — 80076 HEPATIC FUNCTION PANEL: CPT

## 2023-07-05 PROCEDURE — 36415 COLL VENOUS BLD VENIPUNCTURE: CPT

## 2023-07-05 PROCEDURE — 3078F DIAST BP <80 MM HG: CPT | Performed by: INTERNAL MEDICINE

## 2023-07-05 PROCEDURE — 99214 OFFICE O/P EST MOD 30 MIN: CPT | Performed by: INTERNAL MEDICINE

## 2023-07-05 PROCEDURE — 80047 BASIC METABLC PNL IONIZED CA: CPT

## 2023-07-05 PROCEDURE — 85025 COMPLETE CBC W/AUTO DIFF WBC: CPT

## 2023-07-05 PROCEDURE — 3074F SYST BP LT 130 MM HG: CPT | Performed by: INTERNAL MEDICINE

## 2023-07-05 RX ORDER — LEVOCETIRIZINE DIHYDROCHLORIDE 5 MG/1
TABLET, FILM COATED ORAL
COMMUNITY

## 2023-07-05 RX ORDER — LISINOPRIL AND HYDROCHLOROTHIAZIDE 25; 20 MG/1; MG/1
1 TABLET ORAL DAILY
COMMUNITY

## 2023-07-05 NOTE — PROGRESS NOTES
appears that the entirety of the DCIS was removed at the time of the original biopsy since the lumpectomy specimen had no additional malignancy. She did not have radiation. She continues on Arimidex which she is tolerating well. She has had some gaps in her treatment. It appears that she started her treatment around February 2020 and so therefore we will go until February 2025.    2.  Use of Arimidex. She is supplementing her diet with calcium and vitamin D. She is trying to get 30 minutes of weightbearing activity on most days of the week. Her recent bone mineral density tests have shown normal density. 3.  History of skin cancers. She has had several melanomas and at least 1 squamous cell carcinoma removed by Dr. Mela Johnson. She knows to stay out of the sun and use liberal amounts of sunscreen. 4.  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 5.  Multiple tumors. I briefly discussed that genetic counseling and possible testing would be appropriate. She will take this under advisement. 6.  Comorbidities. She has type 2 diabetes, hypertension and hyperlipidemia. She will stay on her current regimen and continue follow-up with her usual providers. She knows to call if she has any change in her status, questions or concerns. She will return to the office in 6 months.   She has seen Dr. Dwaine Bailey in the past.    Linsey Brand MD 7/9/2023 11:54 AM

## 2023-07-05 NOTE — PATIENT INSTRUCTIONS
Reviewed labs and recent medical history. Discussed her last Dexa Scan. And she will continue her calcium and vitamin D  She will continue her Arimidex as directed. Return to see MD in 6 months.  (Saw Dr. Amaya Hinson before at [facility])

## 2023-07-06 ENCOUNTER — TELEPHONE (OUTPATIENT)
Dept: INTERNAL MEDICINE CLINIC | Age: 78
End: 2023-07-06

## 2023-07-06 NOTE — TELEPHONE ENCOUNTER
Spoke with patient and at her Oncology appointment potassium was 3.1 and Dr. Wander Parsons suggested that she take 2 potassium daily . Patient wanted to check with you first .     Per verbal order take Potassium 20 meq 2 tabs daily and will order recheck at appointment 7/13 .

## 2023-07-06 NOTE — TELEPHONE ENCOUNTER
----- Message from Javi Caitlin sent at 7/6/2023  1:30 PM EDT -----  Subject: Message to Provider    QUESTIONS  Information for Provider? Spoke with patient, she has some question   regarding her medication that she currently taking. She is needing to know   if she should decrease the amount of potassium she is taking. Please   return her call to advise, thank you  ---------------------------------------------------------------------------  --------------  Latha BENNETT  [phone]; OK to leave message on voicemail  ---------------------------------------------------------------------------  --------------  SCRIPT ANSWERS  Relationship to Patient?  Self

## 2023-07-09 ASSESSMENT — ENCOUNTER SYMPTOMS
SHORTNESS OF BREATH: 0
COUGH: 0
TROUBLE SWALLOWING: 0
CONSTIPATION: 0
DIARRHEA: 0
VOMITING: 0
ABDOMINAL PAIN: 0
NAUSEA: 0

## 2023-07-13 ENCOUNTER — NURSE ONLY (OUTPATIENT)
Dept: LAB | Age: 78
End: 2023-07-13

## 2023-07-13 ENCOUNTER — OFFICE VISIT (OUTPATIENT)
Dept: INTERNAL MEDICINE CLINIC | Age: 78
End: 2023-07-13
Payer: MEDICARE

## 2023-07-13 VITALS
HEART RATE: 60 BPM | BODY MASS INDEX: 28.36 KG/M2 | DIASTOLIC BLOOD PRESSURE: 60 MMHG | WEIGHT: 170.2 LBS | SYSTOLIC BLOOD PRESSURE: 124 MMHG | HEIGHT: 65 IN

## 2023-07-13 DIAGNOSIS — E87.6 HYPOKALEMIA: ICD-10-CM

## 2023-07-13 DIAGNOSIS — Z00.00 MEDICARE ANNUAL WELLNESS VISIT, SUBSEQUENT: Primary | ICD-10-CM

## 2023-07-13 PROBLEM — L21.9 SEBORRHEIC DERMATITIS: Status: ACTIVE | Noted: 2023-03-29

## 2023-07-13 LAB
ANION GAP SERPL CALC-SCNC: 17 MEQ/L (ref 8–16)
BUN SERPL-MCNC: 15 MG/DL (ref 7–22)
CALCIUM SERPL-MCNC: 10.4 MG/DL (ref 8.5–10.5)
CHLORIDE SERPL-SCNC: 101 MEQ/L (ref 98–111)
CO2 SERPL-SCNC: 26 MEQ/L (ref 23–33)
CREAT SERPL-MCNC: 0.7 MG/DL (ref 0.4–1.2)
GFR SERPL CREATININE-BSD FRML MDRD: > 60 ML/MIN/1.73M2
GLUCOSE SERPL-MCNC: 110 MG/DL (ref 70–108)
MAGNESIUM SERPL-MCNC: 1.9 MG/DL (ref 1.6–2.4)
POTASSIUM SERPL-SCNC: 4.1 MEQ/L (ref 3.5–5.2)
SODIUM SERPL-SCNC: 144 MEQ/L (ref 135–145)

## 2023-07-13 PROCEDURE — 3074F SYST BP LT 130 MM HG: CPT | Performed by: INTERNAL MEDICINE

## 2023-07-13 PROCEDURE — 1123F ACP DISCUSS/DSCN MKR DOCD: CPT | Performed by: INTERNAL MEDICINE

## 2023-07-13 PROCEDURE — G0439 PPPS, SUBSEQ VISIT: HCPCS | Performed by: INTERNAL MEDICINE

## 2023-07-13 PROCEDURE — 3078F DIAST BP <80 MM HG: CPT | Performed by: INTERNAL MEDICINE

## 2023-07-13 RX ORDER — FEXOFENADINE HYDROCHLORIDE 60 MG/1
60 TABLET, FILM COATED ORAL DAILY PRN
COMMUNITY

## 2023-07-13 RX ORDER — AZELASTINE HYDROCHLORIDE 137 UG/1
2 SPRAY, METERED NASAL DAILY
COMMUNITY

## 2023-07-13 RX ORDER — ASPIRIN 81 MG/1
81 TABLET ORAL DAILY
COMMUNITY

## 2023-07-13 SDOH — ECONOMIC STABILITY: FOOD INSECURITY: WITHIN THE PAST 12 MONTHS, THE FOOD YOU BOUGHT JUST DIDN'T LAST AND YOU DIDN'T HAVE MONEY TO GET MORE.: NEVER TRUE

## 2023-07-13 SDOH — ECONOMIC STABILITY: FOOD INSECURITY: WITHIN THE PAST 12 MONTHS, YOU WORRIED THAT YOUR FOOD WOULD RUN OUT BEFORE YOU GOT MONEY TO BUY MORE.: NEVER TRUE

## 2023-07-13 SDOH — ECONOMIC STABILITY: INCOME INSECURITY: HOW HARD IS IT FOR YOU TO PAY FOR THE VERY BASICS LIKE FOOD, HOUSING, MEDICAL CARE, AND HEATING?: NOT HARD AT ALL

## 2023-07-13 ASSESSMENT — PATIENT HEALTH QUESTIONNAIRE - PHQ9
SUM OF ALL RESPONSES TO PHQ QUESTIONS 1-9: 0
SUM OF ALL RESPONSES TO PHQ QUESTIONS 1-9: 0
SUM OF ALL RESPONSES TO PHQ9 QUESTIONS 1 & 2: 0
2. FEELING DOWN, DEPRESSED OR HOPELESS: 0
SUM OF ALL RESPONSES TO PHQ QUESTIONS 1-9: 0
1. LITTLE INTEREST OR PLEASURE IN DOING THINGS: 0
SUM OF ALL RESPONSES TO PHQ QUESTIONS 1-9: 0

## 2023-07-13 ASSESSMENT — LIFESTYLE VARIABLES
HOW MANY STANDARD DRINKS CONTAINING ALCOHOL DO YOU HAVE ON A TYPICAL DAY: 1 OR 2
HOW OFTEN DO YOU HAVE A DRINK CONTAINING ALCOHOL: MONTHLY OR LESS

## 2023-07-18 ENCOUNTER — TELEPHONE (OUTPATIENT)
Dept: INTERNAL MEDICINE CLINIC | Age: 78
End: 2023-07-18

## 2023-07-18 DIAGNOSIS — E87.6 HYPOKALEMIA: Primary | ICD-10-CM

## 2023-07-18 NOTE — TELEPHONE ENCOUNTER
Patient informed of lab results and instructed to go back to Potassium 20 meq daily . Patient will recheck labs in 1 month. Lab ordered and mailed to patient.

## 2023-07-18 NOTE — TELEPHONE ENCOUNTER
----- Message from Tory Parr MD sent at 7/14/2023  1:04 PM EDT -----  Please call patient and let them know results were acceptable. Potassium up to 4.1. Magnesium was normal.  She can go back to 20 meq daily but need to repeat potassium in a month to make sure stays at normal level.

## 2023-07-25 RX ORDER — LETROZOLE 2.5 MG/1
TABLET, FILM COATED ORAL
Qty: 30 TABLET | Refills: 5 | Status: SHIPPED | OUTPATIENT
Start: 2023-07-25

## 2023-08-02 DIAGNOSIS — E78.2 MIXED HYPERLIPIDEMIA: ICD-10-CM

## 2023-08-04 RX ORDER — PRAVASTATIN SODIUM 40 MG
TABLET ORAL
Qty: 90 TABLET | Refills: 1 | Status: SHIPPED | OUTPATIENT
Start: 2023-08-04

## 2023-08-08 DIAGNOSIS — I10 ESSENTIAL HYPERTENSION: ICD-10-CM

## 2023-08-12 RX ORDER — AMLODIPINE BESYLATE 10 MG/1
TABLET ORAL
Qty: 90 TABLET | Refills: 1 | Status: SHIPPED | OUTPATIENT
Start: 2023-08-12

## 2023-08-13 ENCOUNTER — HOSPITAL ENCOUNTER (EMERGENCY)
Age: 78
Discharge: HOME | End: 2023-08-13
Payer: MEDICARE

## 2023-08-13 VITALS
OXYGEN SATURATION: 96 % | WEIGHT: 170 LBS | HEART RATE: 68 BPM | SYSTOLIC BLOOD PRESSURE: 167 MMHG | BODY MASS INDEX: 28.29 KG/M2 | DIASTOLIC BLOOD PRESSURE: 87 MMHG | RESPIRATION RATE: 20 BRPM | TEMPERATURE: 98.3 F

## 2023-08-13 DIAGNOSIS — Z91.038 ALLERGIC TO INSECT BITES AND STINGS: Primary | ICD-10-CM

## 2023-08-13 PROCEDURE — 99213 OFFICE O/P EST LOW 20 MIN: CPT | Performed by: NURSE PRACTITIONER

## 2023-08-13 PROCEDURE — 99213 OFFICE O/P EST LOW 20 MIN: CPT

## 2023-08-13 RX ORDER — TRIAMCINOLONE ACETONIDE 0.25 MG/G
OINTMENT TOPICAL
Qty: 15 G | Refills: 1 | Status: SHIPPED | OUTPATIENT
Start: 2023-08-13 | End: 2023-08-20

## 2023-08-13 ASSESSMENT — ENCOUNTER SYMPTOMS
WHEEZING: 0
NAUSEA: 0
SHORTNESS OF BREATH: 0
CHEST TIGHTNESS: 0
TROUBLE SWALLOWING: 0

## 2023-08-13 ASSESSMENT — PAIN - FUNCTIONAL ASSESSMENT: PAIN_FUNCTIONAL_ASSESSMENT: NONE - DENIES PAIN

## 2023-08-13 NOTE — ED TRIAGE NOTES
Patient to room with c/o circular, red, raised, itchy to right leg. States similar areas to back, abdomen, and groin resolved at this time.

## 2023-08-14 ENCOUNTER — TELEPHONE (OUTPATIENT)
Dept: INTERNAL MEDICINE CLINIC | Age: 78
End: 2023-08-14

## 2023-09-07 DIAGNOSIS — N32.81 OVERACTIVE BLADDER: ICD-10-CM

## 2023-09-08 RX ORDER — OXYBUTYNIN CHLORIDE 5 MG/1
TABLET ORAL
Qty: 180 TABLET | Refills: 1 | Status: SHIPPED | OUTPATIENT
Start: 2023-09-08

## 2023-09-18 ENCOUNTER — TELEPHONE (OUTPATIENT)
Dept: INTERNAL MEDICINE CLINIC | Age: 78
End: 2023-09-18

## 2023-09-18 NOTE — TELEPHONE ENCOUNTER
Patient called stating that she is having ankle swelling if late afternoon . Patient thought that she was to stop the Losartan HCT . Spoke with pharmacy and she filled the Losartan HCT on May so would have ran out in August.    Per verbal order Dr. Lamont Do patient should be taking the Losartan HCT daily . See refill encounter 9/20/23.

## 2023-09-20 RX ORDER — LOSARTAN POTASSIUM AND HYDROCHLOROTHIAZIDE 12.5; 5 MG/1; MG/1
1 TABLET ORAL DAILY
COMMUNITY
End: 2023-09-21 | Stop reason: SDUPTHER

## 2023-09-21 RX ORDER — LOSARTAN POTASSIUM AND HYDROCHLOROTHIAZIDE 12.5; 5 MG/1; MG/1
1 TABLET ORAL DAILY
Qty: 90 TABLET | Refills: 1 | OUTPATIENT
Start: 2023-09-21

## 2023-09-22 ENCOUNTER — NURSE ONLY (OUTPATIENT)
Dept: LAB | Age: 78
End: 2023-09-22

## 2023-09-22 DIAGNOSIS — E87.6 HYPOKALEMIA: ICD-10-CM

## 2023-09-22 LAB — POTASSIUM SERPL-SCNC: 3.8 MEQ/L (ref 3.5–5.2)

## 2023-09-23 ENCOUNTER — HOSPITAL ENCOUNTER (EMERGENCY)
Age: 78
Discharge: HOME | End: 2023-09-23
Payer: MEDICARE

## 2023-09-23 VITALS
WEIGHT: 168 LBS | OXYGEN SATURATION: 96 % | SYSTOLIC BLOOD PRESSURE: 146 MMHG | TEMPERATURE: 98.6 F | HEIGHT: 65 IN | HEART RATE: 68 BPM | DIASTOLIC BLOOD PRESSURE: 68 MMHG | BODY MASS INDEX: 27.99 KG/M2 | RESPIRATION RATE: 16 BRPM

## 2023-09-23 DIAGNOSIS — U07.1 COVID: Primary | ICD-10-CM

## 2023-09-23 LAB
FLUAV AG SPEC QL: NEGATIVE
FLUBV AG SPEC QL: NEGATIVE
SARS-COV-2 RDRP RESP QL NAA+PROBE: DETECTED

## 2023-09-23 PROCEDURE — 99213 OFFICE O/P EST LOW 20 MIN: CPT

## 2023-09-23 PROCEDURE — 87635 SARS-COV-2 COVID-19 AMP PRB: CPT

## 2023-09-23 PROCEDURE — 87804 INFLUENZA ASSAY W/OPTIC: CPT

## 2023-09-23 ASSESSMENT — PAIN DESCRIPTION - LOCATION: LOCATION: HEAD

## 2023-09-23 ASSESSMENT — ENCOUNTER SYMPTOMS
SINUS PAIN: 1
SORE THROAT: 0
COUGH: 1
SHORTNESS OF BREATH: 0
ABDOMINAL PAIN: 0
NAUSEA: 0
DIARRHEA: 0
SINUS PRESSURE: 1
VOMITING: 0

## 2023-09-23 ASSESSMENT — PAIN DESCRIPTION - PAIN TYPE: TYPE: ACUTE PAIN

## 2023-09-23 ASSESSMENT — PAIN SCALES - GENERAL: PAINLEVEL_OUTOF10: 2

## 2023-09-23 ASSESSMENT — PAIN - FUNCTIONAL ASSESSMENT: PAIN_FUNCTIONAL_ASSESSMENT: 0-10

## 2023-09-23 ASSESSMENT — PAIN DESCRIPTION - DESCRIPTORS: DESCRIPTORS: ACHING;DULL

## 2023-09-23 ASSESSMENT — PAIN DESCRIPTION - FREQUENCY: FREQUENCY: INTERMITTENT

## 2023-09-23 ASSESSMENT — PAIN DESCRIPTION - ONSET: ONSET: GRADUAL

## 2023-09-23 NOTE — ED TRIAGE NOTES
Arrives to [facility] for the evaluation of nasal drainage, productive cough and sneezing. Symptoms started 2 days ago. Reports having a dull headache rated 2/10 in severity. Respirations unlabored. Coughs up clear sputum. Afebrile. Waiting provider to assess for orders.

## 2023-09-23 NOTE — ED PROVIDER NOTES
[address]       Chief Complaint   Patient presents with    Nasal Congestion     \"Drainage\"     Cough     Clear sputum   Sneezing        Nurses Notes reviewed and I agree except as noted in the HPI. HISTORY OF PRESENT ILLNESS   Aissatou Vázquez is a 66 y.o. female who presents to the urgent care for evaluation. She states that she has not felt good since yesterday. Has sinus congestion drainage and cough. She is concerned that she may be contagious and would like COVID and flu testing. The patient/patient representative has no other acute complaints at this time. REVIEW OF SYSTEMS     Review of Systems   Constitutional:  Negative for chills, fatigue and fever. HENT:  Positive for congestion, postnasal drip, sinus pressure and sinus pain. Negative for ear pain and sore throat. Respiratory:  Positive for cough. Negative for shortness of breath. Cardiovascular:  Negative for chest pain. Gastrointestinal:  Negative for abdominal pain, diarrhea, nausea and vomiting. Skin:  Negative for rash. Allergic/Immunologic: Negative for environmental allergies and food allergies. Neurological:  Negative for headaches. Hematological:  Negative for adenopathy.        PAST MEDICAL HISTORY         Diagnosis Date    Abnormal EKG     normal stress test 3/2009    Breast cancer ([address]) 10/11/2019    Left DCIS    Cancer ([address])     basal cell skin    Complication of anesthesia     difficulty breathing after surgery- transferrred from [facility] to [facility]. Angeline's for 3 days, O2 for about 5 days    Constipation     Hyperglycemia     History type 2 DM - lost weight - diet controlled    Hyperlipidemia     Hypertension     Melanoma in situ ([address]) 06/2012    of chest - Dr. Anai Grady - margins clear - neg sentinal lymph node    Melanoma in situ of lower extremity ([address])     excision- x2    Metabolic syndrome     much improved - normal triglycerides, weight loss of 50# OA (osteoarthritis)     left knee       SURGICAL HISTORY     Patient  has a past surgical history that includes Bladder surgery; Knee arthroscopy (Left, 2007); Skin cancer excision; pre-malignant / benign skin lesion excision (Right, 2009); skin biopsy; Skin cancer excision (06/27/2013); malignant skin lesion excision (Left, 07/31/2013); Knee arthroscopy (Left, 01/2014); Skin cancer excision; Tonsillectomy (1966); Hysterectomy (11/1971); eye surgery (Bilateral, 3/2016, 4/2016); Colonoscopy; Skin cancer excision (Left, 03/24/2017); Total knee arthroplasty (Right, 09/2017); Total knee arthroplasty (Left, 06/17/2015); joint replacement; craniotomy (Right, 07/03/2019); brain surgery (2019); skin biopsy (07/2020); Cataract removal with implant (Bilateral, 01/2021); Breast lumpectomy (Left, 11/05/2019); [facility] STEREO BREAST BX W LOC DEVICE 1ST LESION LEFT (Left, 05/26/2020);  BREAST BIOPSY W LOC DEVICE 1ST LESION RIGHT (Right, 07/12/2018); and [facility] STEREO BREAST BX W LOC DEVICE 1ST LESION LEFT (Left, 10/11/2019).     CURRENT MEDICATIONS       Discharge Medication List as of 9/23/2023  1:21 PM        CONTINUE these medications which have NOT CHANGED    Details   losartan-hydroCHLOROthiazide (HYZAAR) 50-12.5 MG per tablet Take 1 tablet by mouth daily, Disp-90 tablet, R-1Phone In      oxybutynin (DITROPAN) 5 MG tablet take 1 tablet by mouth twice a day, Disp-180 tablet, R-1Refill when dueNormal      amLODIPine (NORVASC) 10 MG tablet Take 1 tablet by mouth daily, Disp-90 tablet, R-1Normal      pravastatin (PRAVACHOL) 40 MG tablet take 1 tablet by mouth every evening take 1 tablet by mouth at bedtime, Disp-90 tablet, R-1Refill when dueNormal      letrozole (FEMARA) 2.5 MG tablet take 1 tablet by mouth once daily, Disp-30 tablet, R-5Normal      Azelastine HCl 137 MCG/SPRAY SOLN 2 puffs by Nasal route dailyHistorical Med      aspirin 81 MG EC tablet Take 1 tablet by mouth dailyHistorical Med      Cranberry 250 MG TABS Take 250 mg

## 2023-09-23 NOTE — DISCHARGE INSTRUCTIONS
Suggestions for symptom management that are available over the counter. If you have questions regarding allergies or contraindications to use, please speak to the pharmacy staff or your family provider. For fevers or pain: acetaminophen (Tylenol), ibuprofen (Motrin)  For dry cough: medications containing dextromethorphan, such as Delsym, Robitussin DM or Mucinex DM and medicated throat lozenges  For congestion or sinus pressure: decongestant nasal sprays, such as Afrin (for up to 3 days), medications containing guaifenesin to help break up mucus, such as Mucinex or Robitussin, nasal steroid sprays, such as Flonase, Sensimist, Rhinocort or Nasonex and saline nasal sprays, neti pot or sinus rinse bottle  For runny nose, sneezing or watery/itchy eyes: less sedating antihistamines, such as loratidine (Claritin), fexofenadine (Allegra) or Cetirizine (Zyrtec) and antihistamine eye drops, such as ketotifen (Zaditor, Alaway) or olopatadine (Pataday)  For sore throat:  Chloraseptic throat spray or sore throat lozenges or  Mucinex Instasoothe Sore Throat & Pain Cherry Spray  If you have high blood pressure, a brand like Coricidin HBP may be an option. you should avoid medications containing pseudoephedrine or phenylephrine, such as Sudafed,  running a humidifier in your bedroom may be helpful for many of your symptoms. If your cough is keeping you awake at night, you can try raising your head with an extra pillow. If the skin around your nose and lips becomes sore, you can put some petroleum jelly on the area. Suggestions for over-the-counter supplements to help your immune system, if you have questions regarding allergies or contraindications to use, please speak to the pharmacy staff or your family provider.     Multi-vitamin/multi-mineral daily   Vitamin D3 3000 IU daily  Zinc 30 mg daily  Vitamin C 1000 mg twice daily  B-100 complex as daily as directed on bottle  Probiotic/Prebiotic betancourt  Gargle with

## 2023-09-25 ENCOUNTER — TELEPHONE (OUTPATIENT)
Dept: INTERNAL MEDICINE CLINIC | Age: 78
End: 2023-09-25

## 2023-09-25 NOTE — TELEPHONE ENCOUNTER
Patient tested positive for Covid on 9/23 . Patient having cough , congestion , fatigue and loss of appetite . GFR >60 .  Okay per verbal order Ruben Dominguez for [facility]       Call script to [facility] , spoke to [employer] Dimple Vera

## 2023-10-07 RX ORDER — FLUTICASONE PROPIONATE 50 MCG
2 SPRAY, SUSPENSION (ML) NASAL DAILY
Qty: 16 G | Refills: 5 | Status: SHIPPED | OUTPATIENT
Start: 2023-10-07

## 2023-10-09 ENCOUNTER — TELEPHONE (OUTPATIENT)
Dept: INTERNAL MEDICINE CLINIC | Age: 78
End: 2023-10-09

## 2023-10-09 DIAGNOSIS — K59.01 SLOW TRANSIT CONSTIPATION: ICD-10-CM

## 2023-10-09 NOTE — TELEPHONE ENCOUNTER
Patient called stating that she is feeling better from Covid but continues to have nasal drainage and is asking what OTC medication would be best to clear this up . Ask if she has been using her allergy medicine and she has not . Recommended that she try that as it will help dry up her nasal drainage . Any other suggestions ?

## 2023-10-11 RX ORDER — POLYETHYLENE GLYCOL 3350 17 G/17G
POWDER, FOR SOLUTION ORAL
Qty: 510 G | Refills: 1 | Status: SHIPPED | OUTPATIENT
Start: 2023-10-11

## 2023-10-18 DIAGNOSIS — E87.6 HYPOKALEMIA: ICD-10-CM

## 2023-10-18 RX ORDER — POTASSIUM CHLORIDE 20 MEQ/1
TABLET, EXTENDED RELEASE ORAL
Qty: 90 TABLET | Refills: 1 | Status: SHIPPED | OUTPATIENT
Start: 2023-10-18

## 2023-12-08 ENCOUNTER — HOSPITAL ENCOUNTER (OUTPATIENT)
Dept: WOMENS IMAGING | Age: 78
Discharge: HOME | End: 2023-12-08
Payer: MEDICARE

## 2023-12-08 VITALS — HEIGHT: 65 IN | WEIGHT: 168 LBS | BODY MASS INDEX: 27.99 KG/M2

## 2023-12-08 DIAGNOSIS — Z12.31 VISIT FOR SCREENING MAMMOGRAM: ICD-10-CM

## 2023-12-08 PROCEDURE — 77063 BREAST TOMOSYNTHESIS BI: CPT

## 2023-12-22 ENCOUNTER — HOSPITAL ENCOUNTER (INPATIENT)
Age: 78
LOS: 2 days | Discharge: HOME | DRG: 244 | End: 2023-12-24
Attending: EMERGENCY MEDICINE | Admitting: INTERNAL MEDICINE
Payer: MEDICARE

## 2023-12-22 ENCOUNTER — APPOINTMENT (OUTPATIENT)
Dept: GENERAL RADIOLOGY | Age: 78
DRG: 244 | End: 2023-12-22
Payer: MEDICARE

## 2023-12-22 DIAGNOSIS — I44.2 AV BLOCK, 3RD DEGREE (HCC): ICD-10-CM

## 2023-12-22 DIAGNOSIS — I44.2 COMPLETE HEART BLOCK (HCC): ICD-10-CM

## 2023-12-22 DIAGNOSIS — R42 LIGHTHEADEDNESS: ICD-10-CM

## 2023-12-22 DIAGNOSIS — R00.1 BRADYCARDIA: ICD-10-CM

## 2023-12-22 DIAGNOSIS — R55 NEAR SYNCOPE: ICD-10-CM

## 2023-12-22 DIAGNOSIS — R79.89 ABNORMAL THYROID BLOOD TEST: ICD-10-CM

## 2023-12-22 DIAGNOSIS — I44.1 ATRIOVENTRICULAR BLOCK, MOBITZ TYPE 1, WENCKEBACH: ICD-10-CM

## 2023-12-22 DIAGNOSIS — R00.1 SYMPTOMATIC BRADYCARDIA: Primary | ICD-10-CM

## 2023-12-22 LAB
ANION GAP SERPL CALC-SCNC: 11 MEQ/L (ref 8–16)
ANION GAP SERPL CALC-SCNC: 11 MEQ/L (ref 8–16)
BASOPHILS ABSOLUTE: 0.1 THOU/MM3 (ref 0–0.1)
BASOPHILS NFR BLD AUTO: 0.9 %
BUN SERPL-MCNC: 18 MG/DL (ref 7–22)
BUN SERPL-MCNC: 20 MG/DL (ref 7–22)
CA-I BLD ISE-SCNC: 1.19 MMOL/L (ref 1.12–1.32)
CALCIUM SERPL-MCNC: 9.3 MG/DL (ref 8.5–10.5)
CALCIUM SERPL-MCNC: 9.9 MG/DL (ref 8.5–10.5)
CHLORIDE SERPL-SCNC: 106 MEQ/L (ref 98–111)
CHLORIDE SERPL-SCNC: 107 MEQ/L (ref 98–111)
CO2 SERPL-SCNC: 27 MEQ/L (ref 23–33)
CO2 SERPL-SCNC: 28 MEQ/L (ref 23–33)
CREAT SERPL-MCNC: 0.8 MG/DL (ref 0.4–1.2)
CREAT SERPL-MCNC: 0.9 MG/DL (ref 0.4–1.2)
DEPRECATED RDW RBC AUTO: 40.8 FL (ref 35–45)
EOSINOPHIL NFR BLD AUTO: 1.7 %
EOSINOPHILS ABSOLUTE: 0.1 THOU/MM3 (ref 0–0.4)
ERYTHROCYTE [DISTWIDTH] IN BLOOD BY AUTOMATED COUNT: 12.9 % (ref 11.5–14.5)
GFR SERPL CREATININE-BSD FRML MDRD: > 60 ML/MIN/1.73M2
GFR SERPL CREATININE-BSD FRML MDRD: > 60 ML/MIN/1.73M2
GLUCOSE SERPL-MCNC: 118 MG/DL (ref 70–108)
GLUCOSE SERPL-MCNC: 140 MG/DL (ref 70–108)
HCT VFR BLD AUTO: 40.5 % (ref 37–47)
HGB BLD-MCNC: 12.9 GM/DL (ref 12–16)
IMM GRANULOCYTES # BLD AUTO: 0.02 THOU/MM3 (ref 0–0.07)
IMM GRANULOCYTES NFR BLD AUTO: 0.2 %
LYMPHOCYTES ABSOLUTE: 1.2 THOU/MM3 (ref 1–4.8)
LYMPHOCYTES NFR BLD AUTO: 14.1 %
MAGNESIUM SERPL-MCNC: 1.8 MG/DL (ref 1.6–2.4)
MAGNESIUM SERPL-MCNC: 1.9 MG/DL (ref 1.6–2.4)
MCH RBC QN AUTO: 28 PG (ref 26–33)
MCHC RBC AUTO-ENTMCNC: 31.9 GM/DL (ref 32.2–35.5)
MCV RBC AUTO: 87.9 FL (ref 81–99)
MONOCYTES ABSOLUTE: 0.6 THOU/MM3 (ref 0.4–1.3)
MONOCYTES NFR BLD AUTO: 7.7 %
NEUTROPHILS NFR BLD AUTO: 75.4 %
NRBC BLD AUTO-RTO: 0 /100 WBC
NT-PROBNP SERPL IA-MCNC: 874.2 PG/ML (ref 0–449)
OSMOLALITY SERPL CALC.SUM OF ELEC: 294.3 MOSMOL/KG (ref 275–300)
PLATELET # BLD AUTO: 198 THOU/MM3 (ref 130–400)
PMV BLD AUTO: 10.7 FL (ref 9.4–12.4)
POTASSIUM SERPL-SCNC: 3.4 MEQ/L (ref 3.5–5.2)
POTASSIUM SERPL-SCNC: 4.1 MEQ/L (ref 3.5–5.2)
RBC # BLD AUTO: 4.61 MILL/MM3 (ref 4.2–5.4)
SARS-COV-2 RDRP RESP QL NAA+PROBE: NOT  DETECTED
SEGMENTED NEUTROPHILS ABSOLUTE COUNT: 6.2 THOU/MM3 (ref 1.8–7.7)
SODIUM SERPL-SCNC: 144 MEQ/L (ref 135–145)
SODIUM SERPL-SCNC: 146 MEQ/L (ref 135–145)
T4 FREE SERPL-MCNC: 1.87 NG/DL (ref 0.93–1.76)
TROPONIN, HIGH SENSITIVITY: 19 NG/L (ref 0–12)
TROPONIN, HIGH SENSITIVITY: 23 NG/L (ref 0–12)
TROPONIN, HIGH SENSITIVITY: 23 NG/L (ref 0–12)
TSH SERPL DL<=0.005 MIU/L-ACNC: 0.12 UIU/ML (ref 0.4–4.2)
WBC # BLD AUTO: 8.2 THOU/MM3 (ref 4.8–10.8)

## 2023-12-22 PROCEDURE — 93005 ELECTROCARDIOGRAM TRACING: CPT | Performed by: EMERGENCY MEDICINE

## 2023-12-22 PROCEDURE — 6360000002 HC RX W HCPCS

## 2023-12-22 PROCEDURE — 99223 1ST HOSP IP/OBS HIGH 75: CPT | Performed by: INTERNAL MEDICINE

## 2023-12-22 PROCEDURE — 99285 EMERGENCY DEPT VISIT HI MDM: CPT

## 2023-12-22 PROCEDURE — 80048 BASIC METABOLIC PNL TOTAL CA: CPT

## 2023-12-22 PROCEDURE — 93010 ELECTROCARDIOGRAM REPORT: CPT | Performed by: NUCLEAR MEDICINE

## 2023-12-22 PROCEDURE — 93005 ELECTROCARDIOGRAM TRACING: CPT

## 2023-12-22 PROCEDURE — 36415 COLL VENOUS BLD VENIPUNCTURE: CPT

## 2023-12-22 PROCEDURE — 83735 ASSAY OF MAGNESIUM: CPT

## 2023-12-22 PROCEDURE — 2580000003 HC RX 258

## 2023-12-22 PROCEDURE — 82330 ASSAY OF CALCIUM: CPT

## 2023-12-22 PROCEDURE — 87635 SARS-COV-2 COVID-19 AMP PRB: CPT

## 2023-12-22 PROCEDURE — 93005 ELECTROCARDIOGRAM TRACING: CPT | Performed by: INTERNAL MEDICINE

## 2023-12-22 PROCEDURE — 71045 X-RAY EXAM CHEST 1 VIEW: CPT

## 2023-12-22 PROCEDURE — 84439 ASSAY OF FREE THYROXINE: CPT

## 2023-12-22 PROCEDURE — 6370000000 HC RX 637 (ALT 250 FOR IP)

## 2023-12-22 PROCEDURE — 99215 OFFICE O/P EST HI 40 MIN: CPT

## 2023-12-22 PROCEDURE — 84443 ASSAY THYROID STIM HORMONE: CPT

## 2023-12-22 PROCEDURE — 83880 ASSAY OF NATRIURETIC PEPTIDE: CPT

## 2023-12-22 PROCEDURE — 2140000000 HC CCU INTERMEDIATE R&B

## 2023-12-22 PROCEDURE — 84484 ASSAY OF TROPONIN QUANT: CPT

## 2023-12-22 PROCEDURE — 85025 COMPLETE CBC W/AUTO DIFF WBC: CPT

## 2023-12-22 PROCEDURE — 99214 OFFICE O/P EST MOD 30 MIN: CPT

## 2023-12-22 PROCEDURE — 6370000000 HC RX 637 (ALT 250 FOR IP): Performed by: INTERNAL MEDICINE

## 2023-12-22 RX ORDER — SODIUM CHLORIDE 0.9 % (FLUSH) 0.9 %
5-40 SYRINGE (ML) INJECTION EVERY 12 HOURS SCHEDULED
Status: DISCONTINUED | OUTPATIENT
Start: 2023-12-22 | End: 2023-12-24 | Stop reason: HOSPADM

## 2023-12-22 RX ORDER — ACETAMINOPHEN 650 MG/1
650 SUPPOSITORY RECTAL EVERY 6 HOURS PRN
Status: DISCONTINUED | OUTPATIENT
Start: 2023-12-22 | End: 2023-12-24 | Stop reason: HOSPADM

## 2023-12-22 RX ORDER — CETIRIZINE HYDROCHLORIDE 5 MG/1
5 TABLET ORAL DAILY
Status: DISCONTINUED | OUTPATIENT
Start: 2023-12-23 | End: 2023-12-24 | Stop reason: HOSPADM

## 2023-12-22 RX ORDER — FLUTICASONE PROPIONATE 50 MCG
2 SPRAY, SUSPENSION (ML) NASAL DAILY
Status: DISCONTINUED | OUTPATIENT
Start: 2023-12-23 | End: 2023-12-24 | Stop reason: HOSPADM

## 2023-12-22 RX ORDER — LANOLIN ALCOHOL/MO/W.PET/CERES
5 CREAM (GRAM) TOPICAL NIGHTLY PRN
Status: DISCONTINUED | OUTPATIENT
Start: 2023-12-22 | End: 2023-12-24 | Stop reason: HOSPADM

## 2023-12-22 RX ORDER — ACETAMINOPHEN 325 MG/1
650 TABLET ORAL EVERY 6 HOURS PRN
Status: DISCONTINUED | OUTPATIENT
Start: 2023-12-22 | End: 2023-12-24 | Stop reason: HOSPADM

## 2023-12-22 RX ORDER — FAMOTIDINE 20 MG/1
10 TABLET, FILM COATED ORAL DAILY PRN
Status: DISCONTINUED | OUTPATIENT
Start: 2023-12-22 | End: 2023-12-24 | Stop reason: HOSPADM

## 2023-12-22 RX ORDER — POLYETHYLENE GLYCOL 3350 17 G/17G
17 POWDER, FOR SOLUTION ORAL DAILY PRN
Status: DISCONTINUED | OUTPATIENT
Start: 2023-12-22 | End: 2023-12-24 | Stop reason: HOSPADM

## 2023-12-22 RX ORDER — ONDANSETRON 2 MG/ML
4 INJECTION INTRAMUSCULAR; INTRAVENOUS EVERY 6 HOURS PRN
Status: DISCONTINUED | OUTPATIENT
Start: 2023-12-22 | End: 2023-12-24 | Stop reason: HOSPADM

## 2023-12-22 RX ORDER — SODIUM CHLORIDE 0.9 % (FLUSH) 0.9 %
5-40 SYRINGE (ML) INJECTION PRN
Status: DISCONTINUED | OUTPATIENT
Start: 2023-12-22 | End: 2023-12-24 | Stop reason: HOSPADM

## 2023-12-22 RX ORDER — LOSARTAN POTASSIUM AND HYDROCHLOROTHIAZIDE 12.5; 5 MG/1; MG/1
1 TABLET ORAL DAILY
Status: DISCONTINUED | OUTPATIENT
Start: 2023-12-22 | End: 2023-12-22

## 2023-12-22 RX ORDER — MAGNESIUM SULFATE IN WATER 40 MG/ML
2000 INJECTION, SOLUTION INTRAVENOUS ONCE
Status: COMPLETED | OUTPATIENT
Start: 2023-12-22 | End: 2023-12-23

## 2023-12-22 RX ORDER — SODIUM CHLORIDE 9 MG/ML
INJECTION, SOLUTION INTRAVENOUS PRN
Status: DISCONTINUED | OUTPATIENT
Start: 2023-12-22 | End: 2023-12-24 | Stop reason: HOSPADM

## 2023-12-22 RX ORDER — VITAMIN B COMPLEX
2000 TABLET ORAL DAILY
Status: DISCONTINUED | OUTPATIENT
Start: 2023-12-23 | End: 2023-12-24 | Stop reason: HOSPADM

## 2023-12-22 RX ORDER — POTASSIUM CHLORIDE 7.45 MG/ML
10 INJECTION INTRAVENOUS PRN
Status: DISCONTINUED | OUTPATIENT
Start: 2023-12-22 | End: 2023-12-23

## 2023-12-22 RX ORDER — POTASSIUM CHLORIDE 20 MEQ/1
40 TABLET, EXTENDED RELEASE ORAL PRN
Status: DISCONTINUED | OUTPATIENT
Start: 2023-12-22 | End: 2023-12-23

## 2023-12-22 RX ORDER — MAGNESIUM SULFATE IN WATER 40 MG/ML
2000 INJECTION, SOLUTION INTRAVENOUS PRN
Status: DISCONTINUED | OUTPATIENT
Start: 2023-12-22 | End: 2023-12-24 | Stop reason: HOSPADM

## 2023-12-22 RX ORDER — HEPARIN SODIUM 5000 [USP'U]/ML
5000 INJECTION, SOLUTION INTRAVENOUS; SUBCUTANEOUS EVERY 8 HOURS SCHEDULED
Status: DISCONTINUED | OUTPATIENT
Start: 2023-12-22 | End: 2023-12-24 | Stop reason: HOSPADM

## 2023-12-22 RX ORDER — AMLODIPINE BESYLATE 10 MG/1
10 TABLET ORAL DAILY
Status: DISCONTINUED | OUTPATIENT
Start: 2023-12-23 | End: 2023-12-24 | Stop reason: HOSPADM

## 2023-12-22 RX ORDER — HYDROCHLOROTHIAZIDE 25 MG/1
12.5 TABLET ORAL DAILY
Status: DISCONTINUED | OUTPATIENT
Start: 2023-12-23 | End: 2023-12-24 | Stop reason: HOSPADM

## 2023-12-22 RX ORDER — ACETAMINOPHEN 500 MG
500 TABLET ORAL NIGHTLY PRN
Status: DISCONTINUED | OUTPATIENT
Start: 2023-12-23 | End: 2023-12-24 | Stop reason: HOSPADM

## 2023-12-22 RX ORDER — OXYBUTYNIN CHLORIDE 5 MG/1
5 TABLET ORAL 2 TIMES DAILY
Status: DISCONTINUED | OUTPATIENT
Start: 2023-12-22 | End: 2023-12-24 | Stop reason: HOSPADM

## 2023-12-22 RX ORDER — LOSARTAN POTASSIUM 50 MG/1
50 TABLET ORAL DAILY
Status: DISCONTINUED | OUTPATIENT
Start: 2023-12-23 | End: 2023-12-24 | Stop reason: HOSPADM

## 2023-12-22 RX ORDER — PRAVASTATIN SODIUM 40 MG
40 TABLET ORAL NIGHTLY
Status: DISCONTINUED | OUTPATIENT
Start: 2023-12-22 | End: 2023-12-24 | Stop reason: HOSPADM

## 2023-12-22 RX ORDER — LETROZOLE 2.5 MG/1
2.5 TABLET, FILM COATED ORAL NIGHTLY
Status: DISCONTINUED | OUTPATIENT
Start: 2023-12-22 | End: 2023-12-24 | Stop reason: HOSPADM

## 2023-12-22 RX ORDER — ASPIRIN 81 MG/1
81 TABLET ORAL DAILY
Status: DISCONTINUED | OUTPATIENT
Start: 2023-12-23 | End: 2023-12-24 | Stop reason: HOSPADM

## 2023-12-22 RX ORDER — ONDANSETRON 4 MG/1
4 TABLET, ORALLY DISINTEGRATING ORAL EVERY 8 HOURS PRN
Status: DISCONTINUED | OUTPATIENT
Start: 2023-12-22 | End: 2023-12-24 | Stop reason: HOSPADM

## 2023-12-22 RX ORDER — LETROZOLE 2.5 MG/1
2.5 TABLET, FILM COATED ORAL DAILY
Status: DISCONTINUED | OUTPATIENT
Start: 2023-12-23 | End: 2023-12-22

## 2023-12-22 RX ORDER — DIPHENHYDRAMINE HCL 25 MG
25 TABLET ORAL NIGHTLY PRN
Status: DISCONTINUED | OUTPATIENT
Start: 2023-12-23 | End: 2023-12-24 | Stop reason: HOSPADM

## 2023-12-22 RX ORDER — POTASSIUM CHLORIDE 20 MEQ/1
20 TABLET, EXTENDED RELEASE ORAL
Status: DISCONTINUED | OUTPATIENT
Start: 2023-12-23 | End: 2023-12-24 | Stop reason: HOSPADM

## 2023-12-22 RX ADMIN — SODIUM CHLORIDE, PRESERVATIVE FREE 10 ML: 5 INJECTION INTRAVENOUS at 22:24

## 2023-12-22 RX ADMIN — HEPARIN SODIUM 5000 UNITS: 5000 INJECTION INTRAVENOUS; SUBCUTANEOUS at 23:05

## 2023-12-22 RX ADMIN — POTASSIUM BICARBONATE 40 MEQ: 782 TABLET, EFFERVESCENT ORAL at 23:03

## 2023-12-22 RX ADMIN — LETROZOLE 2.5 MG: 2.5 TABLET ORAL at 23:05

## 2023-12-22 RX ADMIN — MAGNESIUM SULFATE HEPTAHYDRATE 2000 MG: 40 INJECTION, SOLUTION INTRAVENOUS at 23:10

## 2023-12-22 RX ADMIN — PRAVASTATIN SODIUM 40 MG: 40 TABLET ORAL at 22:24

## 2023-12-22 RX ADMIN — Medication 1 TABLET: at 22:24

## 2023-12-22 NOTE — ED PROVIDER NOTES
[address] [facility] EMERGENCY DEPT      EMERGENCY MEDICINE     Pt Name: Salomón Valiente  MRN: [id number]  [address] [facility] 1945  Date of evaluation: 12/22/2023  Provider: Ranjit Guaman DO  Supervising Physician: Luiza Guillen       Chief Complaint   Patient presents with    Dizziness     HISTORY OF PRESENT ILLNESS   Salomón Valiente is a 66 y.o. female with a h/o DM, HTN, HLD who presents to the emergency department from home for evaluation of lightheadedness that started on December 8 and has been increasing in frequency since then. Patient went to urgent care and they sent her here for concerns of possible third degree heart block. Pt has been having episodes where she feels like she is going to pass out. She denies chest pain, dyspnea, vertigo, N/V. She denies any beta blocker use.  She has never seen a cardiologist.     PASTMEDICAL HISTORY     Past Medical History:   Diagnosis Date    Abnormal EKG     normal stress test 3/2009    Breast cancer ([address]) 10/11/2019    Left DCIS    Cancer ([address])     basal cell skin    Complication of anesthesia     difficulty breathing after surgery- transferrred from [facility] to [facility]. Angeline's for 3 days, O2 for about 5 days    Constipation     Hyperglycemia     History type 2 DM - lost weight - diet controlled    Hyperlipidemia     Hypertension     Melanoma in situ ([address]) 06/2012    of chest - Dr. Peyton Rios - margins clear - neg sentinal lymph node    Melanoma in situ of lower extremity ([address])     excision- x2    Metabolic syndrome     much improved - normal triglycerides, weight loss of 50#    OA (osteoarthritis)     left knee       Patient Active Problem List   Diagnosis Code    Metabolic syndrome U35.945    Overactive bladder N32.81    Bradycardia R00.1    Insomnia G47.00    GERD (gastroesophageal reflux disease) K21.9    DJD (degenerative joint disease) of knee M17.9    Abnormal EKG R94.31    HTN (hypertension) I10    Malignant melanoma ([address]) C43.9    S/P knee replacement Z96.659

## 2023-12-22 NOTE — ED NOTES
EMS activated, pt remain unsymptomatic with HR 44-39 bpm. Alert, awake and talking to staff.       Divine Tyler RN  12/22/23 6517

## 2023-12-22 NOTE — ED PROVIDER NOTES
[facility]  Urgent Care Encounter      CHIEF COMPLAINT       Chief Complaint   Patient presents with    Dizziness       Nurses Notes reviewed and I agree except as noted in the HPI. HISTORY OF PRESENT ILLNESS   Yu Franco is a 66 y.o. female who presents to urgent care with plaints of lightheadedness and dizziness. Patient reports for the last 2 weeks she has been feeling lightheaded and having symptoms of near syncope. Patient reports today while she was driving she became very lightheaded and felt as if she was going to pass out. Patient reports she then decided to stop at the urgent care for assessment. Patient reports she has never had this happen in the past.    REVIEW OF SYSTEMS     Review of Systems   Constitutional:  Positive for fatigue. HENT:  Negative for congestion and dental problem. Respiratory:  Negative for cough, chest tightness, shortness of breath and wheezing. Cardiovascular:  Negative for chest pain. Gastrointestinal:  Negative for abdominal pain, constipation, diarrhea and vomiting. Genitourinary:  Negative for difficulty urinating, dyspareunia and dysuria. Musculoskeletal:  Negative for arthralgias and back pain. Neurological:  Positive for dizziness and light-headedness. Negative for seizures and syncope.        PAST MEDICAL HISTORY         Diagnosis Date    Abnormal EKG     normal stress test 3/2009    Breast cancer ([address]) 10/11/2019    Left DCIS    Cancer ([address])     basal cell skin    Complication of anesthesia     difficulty breathing after surgery- transferrred from [facility] to [facility] [place]. Angeline's for 3 days, O2 for about 5 days    Constipation     Hyperglycemia     History type 2 DM - lost weight - diet controlled    Hyperlipidemia     Hypertension     Melanoma in situ ([address]) 06/2012    of chest - Dr. Montanez Ards - margins clear - neg sentinal lymph node    Melanoma in situ of lower extremity ([address])     excision- x2    Metabolic syndrome     much improved -

## 2023-12-22 NOTE — ED NOTES
[facility] arrived, report given per Paula Tamez RN. Pt loaded onto gurney, belongs bagged and placed on bottom of gurney.       Edith Alaniz RN  12/22/23 [id number]

## 2023-12-22 NOTE — ED NOTES
Pt transported to [facility] on cart in stable condition. Floor contacted, spoke with Gayla De Paz prior to transport.

## 2023-12-22 NOTE — ED TRIAGE NOTES
Pt here in the room, lying on cart and talking and denies any lightheadedness. Pt reports that the lightheadedness is mostly at night. Pt states that this lightheadedness started appoximately 2-3 weeks ago while at a [employer]. Pt states that this happened again on Wednesday while driving but went away. Pt was driving this morning when she became lightheaded and decided to be seen so came to [facility].

## 2023-12-22 NOTE — ED NOTES
Daughter, Grisel, called and informed of pt condition and need for transport to [facility] per EMS. CNP spoke with daughter and relayed message back to pt that Grisel would be leaving work and heading to [facility] to see mother.       Cathy Blackburn RN  12/22/23 9611

## 2023-12-22 NOTE — H&P
CALCIUM 9.9     No results for input(s): \"AST\", \"ALT\", \"BILIDIR\", \"BILITOT\", \"ALKPHOS\" in the last 72 hours. No results for input(s): \"INR\" in the last 72 hours. No results for input(s): \"TROPONINT\" in the last 72 hours. No results for input(s): \"PROCAL\" in the last 72 hours. Lab Results   Component Value Date/Time    NITRU NEGATIVE 05/11/2022 10:31 AM    WBCUA 0-2 05/11/2022 10:31 AM    BACTERIA NONE SEEN 05/11/2022 10:31 AM    RBCUA 3-5 05/11/2022 10:31 AM    BLOODU TRACE 05/11/2022 10:31 AM    BLOODU Small 05/11/2022 10:09 AM    SPECGRAV 1.014 05/11/2022 10:31 AM    GLUCOSEU Negative 05/11/2022 10:09 AM       Radiology: XR CHEST PORTABLE    Result Date: 12/22/2023  PROCEDURE: XR CHEST PORTABLE CLINICAL INFORMATION: Lightheadedness TECHNIQUE: Mobile AP chest radiograph. COMPARISON: PA and lateral chest radiographs 8/23/2018 FINDINGS: Cardiac silhouette is within normal limits. Atherosclerotic calcifications are present in the thoracic aorta. Lung volumes are low. There are minimal reticular opacities in the left lower lung. Degenerative changes in the thoracic spine are poorly visualized. Minimal left lower lung atelectasis/infiltrate. **This report has been created using voice recognition software. It may contain minor errors which are inherent in voice recognition technology. ** Final report electronically signed by Dr. Phylicia White on 12/22/2023 1:26 PM        DVT prophylaxis:    [] Lovenox  [] SCDs  [x] SQ Heparin  [] Encourage ambulation   [] Already on Anticoagulation  Diet: ADULT DIET;  Regular  Code Status: Full Code  PT/OT: no  Tele: yes  IVF: no    Electronically signed by Benita Gilbert MD on 12/22/2023 at 4:12 PM    Case was discussed with Attending, Dr. Soto Ac

## 2023-12-23 ENCOUNTER — APPOINTMENT (OUTPATIENT)
Dept: GENERAL RADIOLOGY | Age: 78
DRG: 244 | End: 2023-12-23
Payer: MEDICARE

## 2023-12-23 ENCOUNTER — APPOINTMENT (OUTPATIENT)
Age: 78
DRG: 244 | End: 2023-12-23
Payer: MEDICARE

## 2023-12-23 PROBLEM — I44.2 AV BLOCK, 3RD DEGREE (HCC): Status: ACTIVE | Noted: 2023-12-23

## 2023-12-23 PROBLEM — R55 PRE-SYNCOPE: Status: ACTIVE | Noted: 2023-12-23

## 2023-12-23 LAB
ALBUMIN SERPL BCG-MCNC: 3.9 G/DL (ref 3.5–5.1)
ALP SERPL-CCNC: 74 U/L (ref 38–126)
ALT SERPL W/O P-5'-P-CCNC: 85 U/L (ref 11–66)
ANION GAP SERPL CALC-SCNC: 8 MEQ/L (ref 8–16)
AST SERPL-CCNC: 47 U/L (ref 5–40)
BACTERIA: ABNORMAL
BILIRUB CONJ SERPL-MCNC: < 0.2 MG/DL (ref 0–0.3)
BILIRUB SERPL-MCNC: 0.7 MG/DL (ref 0.3–1.2)
BILIRUB UR QL STRIP: NEGATIVE
BUN SERPL-MCNC: 16 MG/DL (ref 7–22)
CALCIUM SERPL-MCNC: 9.7 MG/DL (ref 8.5–10.5)
CASTS #/AREA URNS LPF: ABNORMAL /LPF
CASTS #/AREA URNS LPF: ABNORMAL /LPF
CHARACTER UR: ABNORMAL
CHARCOAL URNS QL MICRO: ABNORMAL
CHLORIDE SERPL-SCNC: 106 MEQ/L (ref 98–111)
CO2 SERPL-SCNC: 31 MEQ/L (ref 23–33)
COLOR UR: YELLOW
CORTIS SERPL-MCNC: 4.94 UG/DL
CORTISOL COLLECTION INFO: NORMAL
CREAT SERPL-MCNC: 0.7 MG/DL (ref 0.4–1.2)
CREAT UR-MCNC: 38.1 MG/DL
CRYSTALS URNS QL MICRO: ABNORMAL
DEPRECATED RDW RBC AUTO: 41.3 FL (ref 35–45)
ECHO AV CUSP MM: 1.9 CM
ECHO AV PEAK GRADIENT: 16 MMHG
ECHO AV PEAK VELOCITY: 2 M/S
ECHO AV VELOCITY RATIO: 0.8
ECHO LA AREA 2C: 21.3 CM2
ECHO LA AREA 4C: 16.7 CM2
ECHO LA DIAMETER: 3.8 CM
ECHO LA MAJOR AXIS: 5.2 CM
ECHO LA MINOR AXIS: 5.6 CM
ECHO LA VOL BP: 54 ML (ref 22–52)
ECHO LA VOL MOD A2C: 65 ML (ref 22–52)
ECHO LA VOL MOD A4C: 43 ML (ref 22–52)
ECHO LV E' LATERAL VELOCITY: 13 CM/S
ECHO LV E' SEPTAL VELOCITY: 10 CM/S
ECHO LV FRACTIONAL SHORTENING: 31 % (ref 28–44)
ECHO LV INTERNAL DIMENSION DIASTOLIC: 4.5 CM (ref 3.9–5.3)
ECHO LV INTERNAL DIMENSION SYSTOLIC: 3.1 CM
ECHO LV IVSD: 0.9 CM (ref 0.6–0.9)
ECHO LV MASS 2D: 186.4 G (ref 67–162)
ECHO LV POSTERIOR WALL DIASTOLIC: 1.4 CM (ref 0.6–0.9)
ECHO LV RELATIVE WALL THICKNESS RATIO: 0.62
ECHO LVOT PEAK GRADIENT: 10 MMHG
ECHO LVOT PEAK VELOCITY: 1.6 M/S
ECHO MV A VELOCITY: 1.15 M/S
ECHO MV E DECELERATION TIME (DT): 123 MS
ECHO MV E VELOCITY: 1.54 M/S
ECHO MV E/A RATIO: 1.34
ECHO MV E/E' LATERAL: 11.85
ECHO MV E/E' RATIO (AVERAGED): 13.62
ECHO PV MAX VELOCITY: 1.1 M/S
ECHO PV PEAK GRADIENT: 5 MMHG
ECHO RV INTERNAL DIMENSION: 2.9 CM
ECHO TV E WAVE: 0.8 M/S
EKG ATRIAL RATE: 101 BPM
EKG ATRIAL RATE: 41 BPM
EKG ATRIAL RATE: 74 BPM
EKG ATRIAL RATE: 79 BPM
EKG ATRIAL RATE: 97 BPM
EKG P AXIS: 58 DEGREES
EKG P AXIS: 65 DEGREES
EKG P AXIS: 71 DEGREES
EKG P AXIS: 71 DEGREES
EKG P AXIS: 75 DEGREES
EKG Q-T INTERVAL: 488 MS
EKG Q-T INTERVAL: 490 MS
EKG Q-T INTERVAL: 504 MS
EKG Q-T INTERVAL: 510 MS
EKG Q-T INTERVAL: 632 MS
EKG QRS DURATION: 118 MS
EKG QRS DURATION: 124 MS
EKG QRS DURATION: 128 MS
EKG QRS DURATION: 130 MS
EKG QRS DURATION: 146 MS
EKG QTC CALCULATION (BAZETT): 404 MS
EKG QTC CALCULATION (BAZETT): 420 MS
EKG QTC CALCULATION (BAZETT): 427 MS
EKG QTC CALCULATION (BAZETT): 436 MS
EKG QTC CALCULATION (BAZETT): 439 MS
EKG R AXIS: -38 DEGREES
EKG R AXIS: -52 DEGREES
EKG R AXIS: -54 DEGREES
EKG R AXIS: -63 DEGREES
EKG R AXIS: 112 DEGREES
EKG T AXIS: -53 DEGREES
EKG T AXIS: 30 DEGREES
EKG T AXIS: 43 DEGREES
EKG T AXIS: 62 DEGREES
EKG T AXIS: 7 DEGREES
EKG VENTRICULAR RATE: 29 BPM
EKG VENTRICULAR RATE: 41 BPM
EKG VENTRICULAR RATE: 42 BPM
EKG VENTRICULAR RATE: 44 BPM
EKG VENTRICULAR RATE: 46 BPM
EPITHELIAL CELLS, UA: ABNORMAL /HPF
ERYTHROCYTE [DISTWIDTH] IN BLOOD BY AUTOMATED COUNT: 12.7 % (ref 11.5–14.5)
GFR SERPL CREATININE-BSD FRML MDRD: > 60 ML/MIN/1.73M2
GLUCOSE SERPL-MCNC: 119 MG/DL (ref 70–108)
GLUCOSE UR QL STRIP.AUTO: NEGATIVE MG/DL
HCT VFR BLD AUTO: 41.3 % (ref 37–47)
HGB BLD-MCNC: 12.8 GM/DL (ref 12–16)
HGB UR QL STRIP.AUTO: ABNORMAL
KETONES UR QL STRIP.AUTO: NEGATIVE
LEUKOCYTE ESTERASE UR QL STRIP.AUTO: NEGATIVE
MAGNESIUM SERPL-MCNC: 2.8 MG/DL (ref 1.6–2.4)
MCH RBC QN AUTO: 27.6 PG (ref 26–33)
MCHC RBC AUTO-ENTMCNC: 31 GM/DL (ref 32.2–35.5)
MCV RBC AUTO: 89.2 FL (ref 81–99)
MRSA DNA SPEC QL NAA+PROBE: NEGATIVE
NITRITE UR QL STRIP.AUTO: NEGATIVE
OSMOLALITY UR: 313 MOSMOL/KG (ref 250–750)
PH UR STRIP.AUTO: 8 [PH] (ref 5–9)
PLATELET # BLD AUTO: 208 THOU/MM3 (ref 130–400)
PMV BLD AUTO: 11.1 FL (ref 9.4–12.4)
POTASSIUM SERPL-SCNC: 4.5 MEQ/L (ref 3.5–5.2)
PROT SERPL-MCNC: 6.2 G/DL (ref 6.1–8)
PROT UR STRIP.AUTO-MCNC: NEGATIVE MG/DL
RBC # BLD AUTO: 4.63 MILL/MM3 (ref 4.2–5.4)
RBC #/AREA URNS HPF: ABNORMAL /HPF
RENAL EPI CELLS #/AREA URNS HPF: ABNORMAL /[HPF]
SODIUM SERPL-SCNC: 145 MEQ/L (ref 135–145)
SODIUM UR-SCNC: 79 MEQ/L
SP GR UR REFRACT.AUTO: 1.01 (ref 1–1.03)
T3FREE SERPL-MCNC: 3.76 PG/ML (ref 2.02–4.43)
UROBILINOGEN UR QL STRIP.AUTO: 0.2 EU/DL (ref 0–1)
UUN 24H UR-MCNC: 285 MG/DL
WBC # BLD AUTO: 7.8 THOU/MM3 (ref 4.8–10.8)
WBC #/AREA URNS HPF: ABNORMAL /HPF
YEAST LIKE FUNGI URNS QL MICRO: ABNORMAL

## 2023-12-23 PROCEDURE — C1892 INTRO/SHEATH,FIXED,PEEL-AWAY: HCPCS | Performed by: THORACIC SURGERY (CARDIOTHORACIC VASCULAR SURGERY)

## 2023-12-23 PROCEDURE — 99223 1ST HOSP IP/OBS HIGH 75: CPT | Performed by: NUCLEAR MEDICINE

## 2023-12-23 PROCEDURE — 02HK3JZ INSERTION OF PACEMAKER LEAD INTO RIGHT VENTRICLE, PERCUTANEOUS APPROACH: ICD-10-PCS | Performed by: THORACIC SURGERY (CARDIOTHORACIC VASCULAR SURGERY)

## 2023-12-23 PROCEDURE — 99223 1ST HOSP IP/OBS HIGH 75: CPT | Performed by: INTERNAL MEDICINE

## 2023-12-23 PROCEDURE — 36415 COLL VENOUS BLD VENIPUNCTURE: CPT

## 2023-12-23 PROCEDURE — 93010 ELECTROCARDIOGRAM REPORT: CPT | Performed by: NUCLEAR MEDICINE

## 2023-12-23 PROCEDURE — 6370000000 HC RX 637 (ALT 250 FOR IP)

## 2023-12-23 PROCEDURE — C1894 INTRO/SHEATH, NON-LASER: HCPCS

## 2023-12-23 PROCEDURE — 6360000002 HC RX W HCPCS

## 2023-12-23 PROCEDURE — 3700000000 HC ANESTHESIA ATTENDED CARE

## 2023-12-23 PROCEDURE — 33208 INSRT HEART PM ATRIAL & VENT: CPT

## 2023-12-23 PROCEDURE — 82248 BILIRUBIN DIRECT: CPT

## 2023-12-23 PROCEDURE — 82533 TOTAL CORTISOL: CPT

## 2023-12-23 PROCEDURE — 3700000001 HC ADD 15 MINUTES (ANESTHESIA)

## 2023-12-23 PROCEDURE — 0JH606Z INSERTION OF PACEMAKER, DUAL CHAMBER INTO CHEST SUBCUTANEOUS TISSUE AND FASCIA, OPEN APPROACH: ICD-10-PCS | Performed by: THORACIC SURGERY (CARDIOTHORACIC VASCULAR SURGERY)

## 2023-12-23 PROCEDURE — 85027 COMPLETE CBC AUTOMATED: CPT

## 2023-12-23 PROCEDURE — C1892 INTRO/SHEATH,FIXED,PEEL-AWAY: HCPCS

## 2023-12-23 PROCEDURE — 2580000003 HC RX 258

## 2023-12-23 PROCEDURE — 33208 INSRT HEART PM ATRIAL & VENT: CPT | Performed by: THORACIC SURGERY (CARDIOTHORACIC VASCULAR SURGERY)

## 2023-12-23 PROCEDURE — 93306 TTE W/DOPPLER COMPLETE: CPT

## 2023-12-23 PROCEDURE — 84540 ASSAY OF URINE/UREA-N: CPT

## 2023-12-23 PROCEDURE — 71045 X-RAY EXAM CHEST 1 VIEW: CPT

## 2023-12-23 PROCEDURE — 2580000003 HC RX 258: Performed by: THORACIC SURGERY (CARDIOTHORACIC VASCULAR SURGERY)

## 2023-12-23 PROCEDURE — C1785 PMKR, DUAL, RATE-RESP: HCPCS | Performed by: THORACIC SURGERY (CARDIOTHORACIC VASCULAR SURGERY)

## 2023-12-23 PROCEDURE — C1898 LEAD, PMKR, OTHER THAN TRANS: HCPCS | Performed by: THORACIC SURGERY (CARDIOTHORACIC VASCULAR SURGERY)

## 2023-12-23 PROCEDURE — 2700000000 HC OXYGEN THERAPY PER DAY

## 2023-12-23 PROCEDURE — 83735 ASSAY OF MAGNESIUM: CPT

## 2023-12-23 PROCEDURE — 84481 FREE ASSAY (FT-3): CPT

## 2023-12-23 PROCEDURE — 6360000002 HC RX W HCPCS: Performed by: THORACIC SURGERY (CARDIOTHORACIC VASCULAR SURGERY)

## 2023-12-23 PROCEDURE — 3700000000 HC ANESTHESIA ATTENDED CARE: Performed by: THORACIC SURGERY (CARDIOTHORACIC VASCULAR SURGERY)

## 2023-12-23 PROCEDURE — 2500000003 HC RX 250 WO HCPCS: Performed by: THORACIC SURGERY (CARDIOTHORACIC VASCULAR SURGERY)

## 2023-12-23 PROCEDURE — 93005 ELECTROCARDIOGRAM TRACING: CPT

## 2023-12-23 PROCEDURE — 81001 URINALYSIS AUTO W/SCOPE: CPT

## 2023-12-23 PROCEDURE — 87641 MR-STAPH DNA AMP PROBE: CPT

## 2023-12-23 PROCEDURE — C1785 PMKR, DUAL, RATE-RESP: HCPCS

## 2023-12-23 PROCEDURE — 83935 ASSAY OF URINE OSMOLALITY: CPT

## 2023-12-23 PROCEDURE — 84300 ASSAY OF URINE SODIUM: CPT

## 2023-12-23 PROCEDURE — 02H63JZ INSERTION OF PACEMAKER LEAD INTO RIGHT ATRIUM, PERCUTANEOUS APPROACH: ICD-10-PCS | Performed by: THORACIC SURGERY (CARDIOTHORACIC VASCULAR SURGERY)

## 2023-12-23 PROCEDURE — 87070 CULTURE OTHR SPECIMN AEROBIC: CPT

## 2023-12-23 PROCEDURE — 2709999900 HC NON-CHARGEABLE SUPPLY

## 2023-12-23 PROCEDURE — 80053 COMPREHEN METABOLIC PANEL: CPT

## 2023-12-23 PROCEDURE — 2580000003 HC RX 258: Performed by: PHYSICIAN ASSISTANT

## 2023-12-23 PROCEDURE — 82570 ASSAY OF URINE CREATININE: CPT

## 2023-12-23 PROCEDURE — 2140000000 HC CCU INTERMEDIATE R&B

## 2023-12-23 PROCEDURE — 3700000001 HC ADD 15 MINUTES (ANESTHESIA): Performed by: THORACIC SURGERY (CARDIOTHORACIC VASCULAR SURGERY)

## 2023-12-23 PROCEDURE — 93306 TTE W/DOPPLER COMPLETE: CPT | Performed by: NUCLEAR MEDICINE

## 2023-12-23 PROCEDURE — C1894 INTRO/SHEATH, NON-LASER: HCPCS | Performed by: THORACIC SURGERY (CARDIOTHORACIC VASCULAR SURGERY)

## 2023-12-23 PROCEDURE — 6370000000 HC RX 637 (ALT 250 FOR IP): Performed by: INTERNAL MEDICINE

## 2023-12-23 PROCEDURE — 2709999900 HC NON-CHARGEABLE SUPPLY: Performed by: THORACIC SURGERY (CARDIOTHORACIC VASCULAR SURGERY)

## 2023-12-23 RX ORDER — SODIUM CHLORIDE 0.9 % (FLUSH) 0.9 %
5-40 SYRINGE (ML) INJECTION EVERY 12 HOURS SCHEDULED
Status: DISCONTINUED | OUTPATIENT
Start: 2023-12-23 | End: 2023-12-24 | Stop reason: HOSPADM

## 2023-12-23 RX ORDER — HYDRALAZINE HYDROCHLORIDE 20 MG/ML
5 INJECTION INTRAMUSCULAR; INTRAVENOUS EVERY 6 HOURS PRN
Status: DISCONTINUED | OUTPATIENT
Start: 2023-12-23 | End: 2023-12-23

## 2023-12-23 RX ORDER — SODIUM CHLORIDE 9 MG/ML
INJECTION, SOLUTION INTRAVENOUS PRN
Status: DISCONTINUED | OUTPATIENT
Start: 2023-12-23 | End: 2023-12-24 | Stop reason: HOSPADM

## 2023-12-23 RX ORDER — SODIUM CHLORIDE 0.9 % (FLUSH) 0.9 %
5-40 SYRINGE (ML) INJECTION PRN
Status: DISCONTINUED | OUTPATIENT
Start: 2023-12-23 | End: 2023-12-24 | Stop reason: HOSPADM

## 2023-12-23 RX ADMIN — PRAVASTATIN SODIUM 40 MG: 40 TABLET ORAL at 23:46

## 2023-12-23 RX ADMIN — Medication 1 TABLET: at 23:46

## 2023-12-23 RX ADMIN — SODIUM CHLORIDE, PRESERVATIVE FREE 10 ML: 5 INJECTION INTRAVENOUS at 23:46

## 2023-12-23 RX ADMIN — ACETAMINOPHEN 650 MG: 325 TABLET ORAL at 19:50

## 2023-12-23 RX ADMIN — CEFAZOLIN 2000 MG: 10 INJECTION, POWDER, FOR SOLUTION INTRAVENOUS at 12:40

## 2023-12-23 RX ADMIN — LETROZOLE 2.5 MG: 2.5 TABLET ORAL at 23:46

## 2023-12-23 RX ADMIN — Medication 4.5 MG: at 23:56

## 2023-12-23 RX ADMIN — SODIUM CHLORIDE, PRESERVATIVE FREE 10 ML: 5 INJECTION INTRAVENOUS at 07:46

## 2023-12-23 RX ADMIN — HEPARIN SODIUM 5000 UNITS: 5000 INJECTION INTRAVENOUS; SUBCUTANEOUS at 23:56

## 2023-12-23 RX ADMIN — HEPARIN SODIUM 5000 UNITS: 5000 INJECTION INTRAVENOUS; SUBCUTANEOUS at 07:32

## 2023-12-23 NOTE — H&P
block.  EKG shows complete heart block. Case and plan developed in coordination with Dr. Rony Butler MD  Electronically signed by Kiera Bright DO on 12/23/2023         Addendum by Dr. Rony Butler MD:  Patient seen by me independently including key components of medical care. Face to face evaluation and examination was performed. Case discussed with Dr. Mariya Mendez DO resident physician. Agree with resident's findings and plan as documented in the resident's note. Italicized font, if present,  represents changes to the note made by me. More than 50% of the encounter time involved with patient care by the Pulmonary & Critical care service team spent by me. Please see my modifications mentioned below:  Patient is in no acute distress. No results found for: \"PH\", \"PCO2\", \"PO2\", \"HCO3\", \"O2SAT\"  No results found for: \"IFIO2\", \"MODE\", \"SETTIDVOL\", \"SETPEEP\"    CBC:   Recent Labs     12/22/23  1307 12/23/23  0427   WBC 8.2 7.8   HGB 12.9 12.8   HCT 40.5 41.3    208     BMP:  Recent Labs     12/22/23  1307 12/22/23  2134 12/23/23  0427   * 144 145   K 4.1 3.4* 4.5    106 106   CO2 28 27 31   BUN 20 18 16   CREATININE 0.9 0.8 0.7   GLUCOSE 118* 140* 119*   MG 1.9 1.8 2.8*   CALCIUM 9.9 9.3 9.7     Hepatic:   Recent Labs     12/23/23  0427   AST 47*   ALT 85*   BILITOT 0.7   ALKPHOS 74     Cardiac Enzymes: No results for input(s): \"CKTOTAL\", \"CKMB\", \"TROPONINI\" in the last 72 hours. BNP: No results for input(s): \"BNP\" in the last 72 hours. INR: No results for input(s): \"INR\", \"PROTIME\" in the last 72 hours. POC No results for input(s): \"POCGLU\" in the last 72 hours. No results for input(s): \"LACTA\" in the last 72 hours.      sodium chloride      sodium chloride          sodium chloride flush  5-40 mL IntraVENous 2 times per day    sodium chloride flush  5-40 mL IntraVENous 2 times per day    heparin (porcine)  5,000 Units SubCUTAneous 3 times per day    [Held by provider] amLODIPine

## 2023-12-23 NOTE — SIGNIFICANT EVENT
Resident physician contacted by primary RN at 2330. Electrolyte supplementation in place. Patient noted to have worsening generalized weakness as well as telemetry changes. Electrolyte supplementation underway. EKG ordered and confirming presence of previously identified block as well as ventricular rate of 29. Qtc 439. Case discussed with ICU Charge Nurse, Resource RN, primary RN, as well as ICU overnight covering resident Dr. Minna Tejada. Dr. Minna Tejada agrees to have patient transferred to ICU for further evaluation and management. Patient seen and evaluated in no acute distress. She endorses a change in her condition (worsening weakness, generalized). She is agreeable to transfer to the ICU for further evaluation and management.     Electronically signed by Janette Meeks MD on 12/22/23 at 11:54 PM EST

## 2023-12-23 NOTE — SIGNIFICANT EVENT
I was informed by the RN taking care of the patient that the patient underwent permanent pacemaker placement. Patient noted to have a good capturing on her telemetry. The site of pacemaker insertion showed no evidence of hematoma. I spoke with Dr. Christiane Singh MD from cardiothoracic surgery service who placed the permanent pacer on patient's left upper part of the chest.  He cleared the patient to be transferred out of ICU to telemetry floor. I spoke with Dr. Yue Babin MD from cardiology service. I informed him regarding the permanent pacemaker placement by Dr. Christiane Singh MD. Dr. Yue Babin MD cleared the patient from cardiology service to be transferred out of ICU. Plan:  Follow-up stat chest x-ray after pacemaker placement. Will consider transferring patient to telemetry floor if the above chest x-rays is stable.

## 2023-12-23 NOTE — CONSULTS
who comes in with a symptomatic  third-degree AV block with near syncope, who does not have any obvious  underlying cardiac disease. PLAN:  At this point, plan is as follows:  1. I am going to obtain a consult from the [facility] team as Dr. Irineo Cleveland is out  of town in order to plan a permanent pacemaker. 2.  We will review the echocardiogram.  3.  The patient will need further cardiac evaluation after pacemaker is  taken care of and further management will follow accordingly. Thank you for allowing me to participate in the care of this patient.         Nickie Choi M.D.    D: 12/23/2023 9:22:52       T: 12/23/2023 9:25:22     [id number]  Job#: [id number]     Doc#: [id number]    CC:

## 2023-12-23 NOTE — SIGNIFICANT EVENT
Resident physician called to bedside for Yen Dyer  1945  at 2110. Patient was noted to have decreased ventricular rate on telemetry (with concerns for decrease down into the 20s). Accompanied to bedside by Resource RN Carole Lopez as well as overnight resident Dr. Leobardo Ledesma. Patient seen and evaluated at the bedside in no acute distress. She states that she has been having ongoing lightheadedness for one month. Patient denies any significant personal cardiovascular history or history of sleep apnea. She denies any recent illness or change in medications. She denies shortness of breath or chest pain/tightness/pressure. She denies any acute deterioration in her condition at this time. Decision made for repeat BMP and magnesium as well as EKG. EKG showing evidence of previously identified bifascicular block with AV dissociation. Case discussed with overnight ICU resident Dr. Sandrita Bazan. Transcutaneous pacer pads in place on the patient. Formal echocardiogram ordered for further evaluation during the day. Point of care ultrasound was performed by Dr. Leobardo Ledesma with chaperone present. Recommendation from ICU to continue monitoring patient on telemetry with consideration for escalation to ICU level of care pending clinical course. The plan of care was discussed with the primary RN, resource RN, patient, patient's brother, and Dr. Leobardo Ledesma. The patient was educated on the nuances of heart block as well as common etiologies. The opportunity to ask additional questions was provided to all parties. None were voiced and they are agreeable to continued monitoring with electrolyte supplementation and escalation of care if necessary. The patient was left in stable condition.      Electronically signed by Naty Morris MD on 23 at 10:43 PM EST

## 2023-12-23 NOTE — PROGRESS NOTES
2125 EKG ordered for irregular heart rate/bradycardia (27bpm) on telly monitor, pt asymptomatic at this time   2129 Dr Jonah Santiago, Dr Jannet Denver, and ICU resource Ismael at the bedside   Dr Jonah Santiago ordered stat BMP, Magnesium, and Ical see chart     2336 Dr Jonah Santiago was notified by this Rn for a irregular heart rate/bradycardia (29bpm sustained) pt was lightheaded, and had numbness in her upper extremities bilaterally. This Rn set pt on the probable defibrillator, pacer pads on. EKG ordered see chart     0000 pt was transferred to ICU room 10.  Handoff given to Eddie FRANCES

## 2023-12-23 NOTE — ACP (ADVANCE CARE PLANNING)
CODE STATUS verified at bedside. Patient acknowledges that she has a DNR on file and that her POA is her daughter German Orta. However, patient displays medical decisionmaking capacity at the bedside and states clearly that she wants all resuscitative measures taken. This conversation was held in the presence of the Resource RN Belle Sanchez and [address]. The patient verbalized understanding that she can change her CODE status at any time. CODE STATUS CONFIRMED FULL.     GOC Time: 5 minutes    Electronically signed by Dave Tan MD on 12/23/23 at 12:48 AM EST

## 2023-12-23 NOTE — PROGRESS NOTES
Patient arrived to unit from  via bed. Patient transferred to ICU bed and placed on continuous ICU bedside monitor. Patient admitted for Lightheadedness [R42]  Atrioventricular block, Mobitz type 1, Wenckebach [I44.1]  Near syncope [R55]  Symptomatic bradycardia [R00.1]. Vitals obtained. See flowsheets. Patient's IV access includes 18G right wrist. Current infusions and rates of infusion include magnesium at 20ml/hr. Assessment completed by Leandra Jones RN. Two nurse skin assessment completed by Leandra Jones RN and Power Beltran RN. See flowsheets for assessment details. Policies and procedures of ICU able to be explained to patient at this time. Family member(s)/representative(s) present at time of admission include none. Patient rights explained to family member(s)/representatives and patient, as able. Patient/patient's family member(s)/representative(s) Declined to have physician notified of their admission. All questions posed by patient's family member(s)/representative(s) and patient answered at this time.

## 2023-12-24 ENCOUNTER — APPOINTMENT (OUTPATIENT)
Dept: GENERAL RADIOLOGY | Age: 78
DRG: 244 | End: 2023-12-24
Payer: MEDICARE

## 2023-12-24 VITALS
HEART RATE: 62 BPM | TEMPERATURE: 98.4 F | DIASTOLIC BLOOD PRESSURE: 66 MMHG | BODY MASS INDEX: 26.93 KG/M2 | SYSTOLIC BLOOD PRESSURE: 138 MMHG | RESPIRATION RATE: 16 BRPM | OXYGEN SATURATION: 96 % | WEIGHT: 161.82 LBS

## 2023-12-24 PROBLEM — R79.89 ABNORMAL THYROID BLOOD TEST: Status: ACTIVE | Noted: 2023-12-24

## 2023-12-24 LAB
ANION GAP SERPL CALC-SCNC: 9 MEQ/L (ref 8–16)
BUN SERPL-MCNC: 16 MG/DL (ref 7–22)
CALCIUM SERPL-MCNC: 9 MG/DL (ref 8.5–10.5)
CHLORIDE SERPL-SCNC: 107 MEQ/L (ref 98–111)
CO2 SERPL-SCNC: 28 MEQ/L (ref 23–33)
CREAT SERPL-MCNC: 0.6 MG/DL (ref 0.4–1.2)
DEPRECATED RDW RBC AUTO: 41.1 FL (ref 35–45)
EKG ATRIAL RATE: 61 BPM
EKG P AXIS: 52 DEGREES
EKG P-R INTERVAL: 204 MS
EKG Q-T INTERVAL: 460 MS
EKG QRS DURATION: 138 MS
EKG QTC CALCULATION (BAZETT): 463 MS
EKG R AXIS: -77 DEGREES
EKG T AXIS: 90 DEGREES
EKG VENTRICULAR RATE: 61 BPM
ERYTHROCYTE [DISTWIDTH] IN BLOOD BY AUTOMATED COUNT: 12.6 % (ref 11.5–14.5)
GFR SERPL CREATININE-BSD FRML MDRD: > 60 ML/MIN/1.73M2
GLUCOSE SERPL-MCNC: 97 MG/DL (ref 70–108)
HCT VFR BLD AUTO: 40.8 % (ref 37–47)
HGB BLD-MCNC: 12.8 GM/DL (ref 12–16)
MCH RBC QN AUTO: 27.9 PG (ref 26–33)
MCHC RBC AUTO-ENTMCNC: 31.4 GM/DL (ref 32.2–35.5)
MCV RBC AUTO: 89.1 FL (ref 81–99)
PLATELET # BLD AUTO: 177 THOU/MM3 (ref 130–400)
PMV BLD AUTO: 10.7 FL (ref 9.4–12.4)
POTASSIUM SERPL-SCNC: 3.6 MEQ/L (ref 3.5–5.2)
RBC # BLD AUTO: 4.58 MILL/MM3 (ref 4.2–5.4)
SODIUM SERPL-SCNC: 144 MEQ/L (ref 135–145)
WBC # BLD AUTO: 7.3 THOU/MM3 (ref 4.8–10.8)

## 2023-12-24 PROCEDURE — 71045 X-RAY EXAM CHEST 1 VIEW: CPT

## 2023-12-24 PROCEDURE — 93005 ELECTROCARDIOGRAM TRACING: CPT | Performed by: INTERNAL MEDICINE

## 2023-12-24 PROCEDURE — 80048 BASIC METABOLIC PNL TOTAL CA: CPT

## 2023-12-24 PROCEDURE — 85027 COMPLETE CBC AUTOMATED: CPT

## 2023-12-24 PROCEDURE — 6370000000 HC RX 637 (ALT 250 FOR IP)

## 2023-12-24 PROCEDURE — 6370000000 HC RX 637 (ALT 250 FOR IP): Performed by: NURSE PRACTITIONER

## 2023-12-24 PROCEDURE — 2580000003 HC RX 258: Performed by: PHYSICIAN ASSISTANT

## 2023-12-24 PROCEDURE — 6360000002 HC RX W HCPCS

## 2023-12-24 PROCEDURE — 36415 COLL VENOUS BLD VENIPUNCTURE: CPT

## 2023-12-24 PROCEDURE — 99239 HOSP IP/OBS DSCHRG MGMT >30: CPT | Performed by: INTERNAL MEDICINE

## 2023-12-24 RX ADMIN — Medication 1 TABLET: at 08:38

## 2023-12-24 RX ADMIN — POTASSIUM CHLORIDE 20 MEQ: 1500 TABLET, EXTENDED RELEASE ORAL at 08:34

## 2023-12-24 RX ADMIN — Medication 2000 UNITS: at 08:38

## 2023-12-24 RX ADMIN — ACETAMINOPHEN 650 MG: 325 TABLET ORAL at 08:41

## 2023-12-24 RX ADMIN — ASPIRIN 81 MG: 81 TABLET, COATED ORAL at 08:34

## 2023-12-24 RX ADMIN — HEPARIN SODIUM 5000 UNITS: 5000 INJECTION INTRAVENOUS; SUBCUTANEOUS at 06:10

## 2023-12-24 RX ADMIN — OXYBUTYNIN CHLORIDE 5 MG: 5 TABLET ORAL at 08:38

## 2023-12-24 RX ADMIN — SODIUM CHLORIDE, PRESERVATIVE FREE 10 ML: 5 INJECTION INTRAVENOUS at 08:35

## 2023-12-24 RX ADMIN — LOSARTAN POTASSIUM 50 MG: 50 TABLET, FILM COATED ORAL at 08:34

## 2023-12-24 NOTE — PROGRESS NOTES
Discussed discharge summary with patient. Instructed patient about medications & follow up appointments and post pacemaker care. Patient denies any additional questions. Patient was discharged with all belongings. No distress noted. Patient discharged to home. Taken down to the vehicle per wheelchair.

## 2023-12-24 NOTE — PROGRESS NOTES
Brief Cardiology note:    Ms. Shemar Saha seen in follow up this morning. Underwent PPM placement yesterday per Dr. Maren Min. Site stable; pressure dressing in place. Good capture noted on telemetry. Post procedure/device management/ instructions per CTS service. ECHO reviewed; EF 55-60% without RWMA. Will plan to have follow up with device clinic in 1 week on discharge. Follow up with cardiology in 1 month; may consider noninvasive ischemic evaluation at that time. Cardiology will follow as needed at this time; please do not hesitate to call with questions or concerns.

## 2023-12-24 NOTE — SIGNIFICANT EVENT
Name: Melissa Gutierrez  YOB: 1945  MRN: [id number]  Date: 12/23/23     Plan of Care      Patient underwent PPM placement today with CV surgery due to complete heart block. Tolerated procedure well. Stable for transfer off of ICU to . Sign out report given to Donal Min CNP.     Electronically signed by Dennis Nieto DO on 12/23/2023 at 8:42 PM

## 2023-12-24 NOTE — DISCHARGE SUMMARY
Resident Discharge Summary (Hospitalist)      Patient Identification:   Morgan Donohue   :   MRN: [id number]   Account: [de-identified]   Patient's PCP: Dallin Low MD    Admit Date: 2023   Discharge Date: 2023      Admitting Physician: Vanessa Brown DO  Discharge Physician: David Eid MD       Discharge Diagnoses:  Symptomatic bradycardia: Patient reports lightheadedness for the past 3 weeks. EKG revealing third-degree heart block status post permanent pacemaker placement  with Dr. Rubens Greco. Follow-up with cardiothoracic surgery in 1 week    Third-degree heart block: EKG shows complete heart block. Heart rate on the floor down into the 20s. Patient has a history of recent COVID infection in September. status post permanent place maker placement  due to complete heart block with Dr. Rubens Greco. At this time cardiology is following and is cleared patient for transfer from ICU to floors. Plan to follow-up with cardiothoracic surgery in 1 week    Hypertension: Resume home antihypertensive medications    Overactive bladder: Continue oxybutynin    Hyperlipidemia: Continue statin    Elevated troponin likely secondary to demand: Flat trend 19, 23, 23 heart score of 3. EKG showing complete heart block. Echo EF of 55 - 60%. Left ventricle size is normal. Normal wall thickness. Normal wall motion. Normal diastolic function. Hypothyroidism:  TSH 0.12 with free T4 of 1.87. Follow-up TSH and free T4 ordered for patient 1 week after discharge  Follow-up with PCP    History of breast cancer in 2019        Hospital Course:   Morgan Donohue is a 70-year-old female PMH hypertension, breast cancer 2019, hyperlipidemia,  Presented to [facility]  with complaints of lightheadedness for the past 3 weeks.   Patient states that on the day of admission she was going to the store and decided to stop in urgent care to get checked out, and urgent care EKG was not able to

## 2023-12-24 NOTE — PLAN OF CARE
Problem: Discharge Planning  Goal: Discharge to home or other facility with appropriate resources  Outcome: Progressing  Flowsheets (Taken 12/24/2023 1142)  Discharge to home or other facility with appropriate resources:   Identify barriers to discharge with patient and caregiver   Arrange for needed discharge resources and transportation as appropriate     Problem: Pain  Goal: Verbalizes/displays adequate comfort level or baseline comfort level  Outcome: Progressing  Flowsheets (Taken 12/24/2023 1142)  Verbalizes/displays adequate comfort level or baseline comfort level:   Encourage patient to monitor pain and request assistance   Assess pain using appropriate pain scale   Administer analgesics based on type and severity of pain and evaluate response     Problem: Cardiovascular - Adult  Goal: Maintains optimal cardiac output and hemodynamic stability  Outcome: Progressing  Flowsheets (Taken 12/24/2023 1142)  Maintains optimal cardiac output and hemodynamic stability:   Monitor blood pressure and heart rate   Assess for signs of decreased cardiac output     Care plan reviewed with patient. Patient verbalizes understanding of the care plan and contributed to goal setting.

## 2023-12-24 NOTE — PROGRESS NOTES
Pacer site looks good. Cxr looks good . Device check is perfect. Instructuins given to pt.  She can be dischrged from my stnadpoint

## 2023-12-24 NOTE — CONSULTS
General appearance: awake and alert moves all ext  Neuro: A/O x 3, moves all Ext, no lat defects  Neck: no bruits, no JVD, No lymph nodes   Lungs: no rhonchi, no wheezes, no rales  Heart: S1 and S2 no murmer, no rubs  Abdomen: positive bowel sounds, no bruits, no masses  Extremities: warm and dry, no cyanosis, no clubbing      Assessment  Intermitent  3th degree heart block and sinus adry    Patient Active Problem List   Diagnosis    Metabolic syndrome    Overactive bladder    Bradycardia    Insomnia    GERD (gastroesophageal reflux disease)    DJD (degenerative joint disease) of knee    Abnormal EKG    Accelerated essential hypertension    Malignant melanoma (HCC)    S/P knee replacement    Atelectasis of right lung    Dyspepsia    Mixed hyperlipidemia    Hyperglycemia    Allergic rhinitis due to allergen    Deviated nasal septum    Brain mass    Brain tumor (HCC)    Herpes zoster without complication    Brain neoplasm malignant (HCC)    Hypokalemia    History of melanoma    Hair loss    Actinic keratosis    Pure hypercholesterolemia    Constipation, unspecified    Ductal carcinoma in situ (DCIS) of breast    Seborrheic dermatitis    Symptomatic bradycardia    AV block, 3rd degree (HCC)    Pre-syncope               Social History     Tobacco Use    Smoking status: Never    Smokeless tobacco: Never   Substance Use Topics    Alcohol use: Not Currently     Comment: not very much       Plan 12/23/24  We will place pacemaker this afternoon      Kristine Miles MD

## 2023-12-24 NOTE — DISCHARGE INSTRUCTIONS
Follow up with Dr. Carlos Vargas in 1 month  Follow up with device clinic in 1-2 weeks   F/U OV with Lance Machado PA-C in 1-2 weeks (same day as device interrogated)

## 2023-12-24 NOTE — PROGRESS NOTES
Patient ambulated in blank about 500 feet and tolerated well. No complaints of dizziness, lightheadedness, or palpitations.

## 2023-12-25 LAB — BACTERIA SPEC AEROBE CULT: NORMAL

## 2023-12-26 ENCOUNTER — CARE COORDINATION (OUTPATIENT)
Dept: CASE MANAGEMENT | Age: 78
End: 2023-12-26

## 2023-12-26 ENCOUNTER — TELEPHONE (OUTPATIENT)
Dept: CARDIOLOGY CLINIC | Age: 78
End: 2023-12-26

## 2023-12-26 DIAGNOSIS — R00.1 SYMPTOMATIC BRADYCARDIA: Primary | ICD-10-CM

## 2023-12-26 LAB
EKG ATRIAL RATE: 44 BPM
EKG P AXIS: 83 DEGREES
EKG Q-T INTERVAL: 538 MS
EKG QRS DURATION: 128 MS
EKG QTC CALCULATION (BAZETT): 438 MS
EKG R AXIS: -58 DEGREES
EKG T AXIS: 53 DEGREES
EKG VENTRICULAR RATE: 40 BPM

## 2023-12-26 PROCEDURE — 1111F DSCHRG MED/CURRENT MED MERGE: CPT | Performed by: INTERNAL MEDICINE

## 2023-12-26 NOTE — TELEPHONE ENCOUNTER
[employer] Dual Pacer implanted 12/24/23 for 3rd degree heart block by CT surgery, Dr Roma Silver. Needs follow up.  LMOM

## 2023-12-26 NOTE — TELEPHONE ENCOUNTER
Appt scheduled with pacer and cardiology. Reviewed with Christina Guzmna with CT surgery, no need for CT surgery follow up.

## 2023-12-26 NOTE — CARE COORDINATION
Care Transitions Outreach Attempt    Call within 2 business days of discharge: Yes   Attempted to reach patient for transitions of care follow up. Unable to reach patient. Patient: Mahamed Nava Patient : 1945 MRN: [id number]    Last Discharge Facility       Date Complaint Diagnosis Description Type Department Provider    23 Dizziness Symptomatic bradycardia . .. ED to Hosp-Admission (Discharged) (ADMITTED) STRZ 3B ISABEL De Luna. Was this an external facility discharge? No Discharge Facility Name: [facility]    Noted following upcoming appointments from discharge chart review:   [facility] follow up appointment(s):   Future Appointments   Date Time Provider [address]   2023  2:30 PM Negro Randall [address] [facility]   2024  9:30 AM SCHEDULE, SRPX PACER NURSE N SRPX PACER [facility]   2024  3:30 PM Ayala Santana MD PhD N Oncology [facility]   2024 10:30 AM Rafa Drake MD SRPX Physic [facility]   1/15/2024  9:30 AM STR MRI RM1 STRZ MRI STR Rad/Card   2024 10:30 AM NARAYAN George - CNP N SRPX Heart [facility]   2024  2:00 PM Rachel Jang [address] [facility]   7/15/2024  9:30 AM Rafa Drake - [facility]     1st attempt to contact pt for initial care transition follow up. Unable to reach pt. Left message with contact information and request for call back.       Keke Perez, RN
cardiology on 1/22/24. Discussed importance of following up with the cardiothoracic surgeon. She states she thinks her granddaughter called today but office was closed. Will try again on another day. She does not have any questions, concerns or needs at this time. She is agreeable to follow up calls. Care Transition Nurse reviewed discharge instructions and medical action plan with patient who verbalized understanding. The patient was given an opportunity to ask questions and does not have any further questions or concerns at this time. Were discharge instructions available to patient? Yes. Reviewed appropriate site of care based on symptoms and resources available to patient including: PCP  Specialist  When to call 911. The patient agrees to contact the PCP office for questions related to their healthcare. Advance Care Planning:   Does patient have an Advance Directive:  on file . Medication reconciliation was performed with patient, who verbalizes understanding of administration of home medications. Medications reviewed, 1111F entered: yes    Was patient discharged with a pulse oximeter?  no    Scheduled appointment with PCP-appt on 12/27/23   Scheduled appointment with Specialist-device check on 1/2/24, oncology appt on 1/8/24, cardiology appt on 1/22/24  Obtained and reviewed discharge summary and/or continuity of care documents    Offered patient enrollment in the Remote Patient Monitoring (RPM) program for in-home monitoring: Patient is not eligible for RPM program.    Care Transitions 24 Hour Call    Do you have a copy of your discharge instructions?: Yes  Do you have all of your prescriptions and are they filled?: Yes  Have you been contacted by a [facility] Pharmacist?: No  Have you scheduled your follow up appointment?: Yes  How are you going to get to your appointment?: Car - family or friend to transport  Post Acute Services: Bojorquez Kelly (Comment: [facility] [place] [facility])  Patient DME: Alyx laboy  Do

## 2023-12-27 ENCOUNTER — NURSE ONLY (OUTPATIENT)
Dept: LAB | Age: 78
End: 2023-12-27

## 2023-12-27 ENCOUNTER — OFFICE VISIT (OUTPATIENT)
Dept: INTERNAL MEDICINE CLINIC | Age: 78
End: 2023-12-27
Payer: MEDICARE

## 2023-12-27 VITALS
DIASTOLIC BLOOD PRESSURE: 80 MMHG | HEART RATE: 68 BPM | TEMPERATURE: 97.6 F | BODY MASS INDEX: 27.06 KG/M2 | SYSTOLIC BLOOD PRESSURE: 150 MMHG | WEIGHT: 162.4 LBS | HEIGHT: 65 IN

## 2023-12-27 DIAGNOSIS — E78.2 MIXED HYPERLIPIDEMIA: ICD-10-CM

## 2023-12-27 DIAGNOSIS — H69.83 EUSTACHIAN TUBE DYSFUNCTION, BILATERAL: ICD-10-CM

## 2023-12-27 DIAGNOSIS — I44.2 THIRD DEGREE HEART BLOCK (HCC): ICD-10-CM

## 2023-12-27 DIAGNOSIS — R79.89 ABNORMAL THYROID BLOOD TEST: ICD-10-CM

## 2023-12-27 DIAGNOSIS — I10 ESSENTIAL HYPERTENSION: ICD-10-CM

## 2023-12-27 DIAGNOSIS — E03.9 HYPOTHYROIDISM, UNSPECIFIED TYPE: ICD-10-CM

## 2023-12-27 DIAGNOSIS — C71.6 MALIGNANT NEOPLASM OF CEREBELLUM (HCC): ICD-10-CM

## 2023-12-27 DIAGNOSIS — R00.1 SYMPTOMATIC BRADYCARDIA: Primary | ICD-10-CM

## 2023-12-27 DIAGNOSIS — E87.6 HYPOKALEMIA: ICD-10-CM

## 2023-12-27 DIAGNOSIS — N32.81 OVERACTIVE BLADDER: ICD-10-CM

## 2023-12-27 LAB
T4 FREE SERPL-MCNC: 2.03 NG/DL (ref 0.93–1.76)
TSH SERPL DL<=0.005 MIU/L-ACNC: 0.09 UIU/ML (ref 0.4–4.2)

## 2023-12-27 PROCEDURE — 1123F ACP DISCUSS/DSCN MKR DOCD: CPT

## 2023-12-27 PROCEDURE — 99214 OFFICE O/P EST MOD 30 MIN: CPT

## 2023-12-27 PROCEDURE — 3077F SYST BP >= 140 MM HG: CPT

## 2023-12-27 PROCEDURE — 3079F DIAST BP 80-89 MM HG: CPT

## 2023-12-27 RX ORDER — AMLODIPINE BESYLATE 10 MG/1
TABLET ORAL
Qty: 90 TABLET | Refills: 1 | Status: SHIPPED | OUTPATIENT
Start: 2023-12-27

## 2023-12-27 RX ORDER — POTASSIUM CHLORIDE 20 MEQ/1
TABLET, EXTENDED RELEASE ORAL
Qty: 90 TABLET | Refills: 1 | Status: SHIPPED | OUTPATIENT
Start: 2023-12-27

## 2023-12-27 RX ORDER — OXYBUTYNIN CHLORIDE 5 MG/1
TABLET ORAL
Qty: 180 TABLET | Refills: 1 | Status: SHIPPED | OUTPATIENT
Start: 2023-12-27

## 2023-12-27 RX ORDER — PRAVASTATIN SODIUM 40 MG
TABLET ORAL
Qty: 90 TABLET | Refills: 1 | Status: SHIPPED | OUTPATIENT
Start: 2023-12-27

## 2023-12-27 RX ORDER — LOSARTAN POTASSIUM AND HYDROCHLOROTHIAZIDE 12.5; 5 MG/1; MG/1
1 TABLET ORAL DAILY
Qty: 90 TABLET | Refills: 1 | Status: SHIPPED | OUTPATIENT
Start: 2023-12-27

## 2023-12-27 NOTE — PROGRESS NOTES
[facility]   [address] [place] [address] [address] [facility], [address]  Dept: [phone]  Dept Fax: [phone]    Chief complaint(s):  Follow-Up from Hospital (D/c 12/24 - bradycardia , completed heart block , pacer insertion )    ASSESSMENT AND PLAN     1. Symptomatic bradycardia    2. Essential hypertension    3. Overactive bladder    4. Hypokalemia    5. Mixed hyperlipidemia    6. Third degree heart block ([address])    7. Hypothyroidism, unspecified type    8. Eustachian tube dysfunction, bilateral    9. 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 Directed patient on proper use Flonase by pointing it towards the corner of your eyes through your nose. Start trial of guaifenesin and continue allergy medication. Patient to report back on symptom change or improvement. Brain neoplasm: Continue with MRI on 1/15/23. Please show pacemaker ID card to MRI center. Symptomatic bradycardia: Patient reports lightheadedness for the past 3 weeks. EKG revealing third-degree heart block status post permanent pacemaker placement 12/23 with Dr. Hilary Butt. Pacemaker interrogation done prior to discharge, within normal limits. Follow-up with Dr. Jovani Mariano on 1/22/24 10:30 am     Third-degree heart block: status post permanent place maker placement 12/23 due to complete heart block with Dr. Hilary Butt. See above    Hypertension: Continue losartan-hydroCHLOROthiazide, amLODIPine. Continue potassium tablets. Overactive bladder: Continue oxybutynin     Hyperlipidemia: Continue statin     Hypothyroidism: 12/22 TSH 0.12 with free T4 of 1.87 during hospitalization. TSH and free T4 ordered for patient on ~12/31/23. Patient to call office once results are in for further evaluation and treatment. Will require repeat testing in 6 weeks once starting levothyroxine if warranted on next visit. Refills provided.      The risks and

## 2023-12-27 NOTE — OP NOTE
Operative Note      Patient: Dina Song  YOB: 1945  MRN: [id number]    Date of Procedure: 12/23/2023    Pre-Op Diagnosis Codes:     * Complete heart block ([address]) [I44.2]    Post-Op Diagnosis: Same       Procedure(s): Insert PPM dual    Surgeon(s):  Physician, Natalya Estrella MD  Physician, Natalya Estrella MD    Assistant:   * No surgical staff found *    Anesthesia: General    Estimated Blood Loss (mL): Minimal    Complications: None    Specimens:   * No specimens in log *    Implants:  Implant Name Type Inv. Item Serial No.  Lot No. LRB No. Used Action   CABLE PACE L12FT ALGTR CLP DISP FOR PACE [facility] MERLIN - [id number] Cardiac Lead CABLE PACE L12FT ALGTR CLP DISP FOR PACE SYS ANALZR [facility]  [facility]-WD [id number] N/A 1 Implanted   [id number] - [id number]   [id number]   N/A 1 Implanted   PACEMAKER CARD 22.5GM W50.8XH46.6MM D7.4MM TI POLYUR MARLEN - [id number] Cardiac PPM/CRT-P PACEMAKER CARD 22.5GM W50.8XH46.6MM D7.4MM TI POLYUR MARLEN [id number] MEDTR[employer] CARDIAC RTHYM T-WD  N/A 1 Implanted         Drains:   [REMOVED] External Urinary Catheter (Removed)       [REMOVED] External Urinary Catheter (Removed)   Site Assessment Clean,dry & intact 12/24/23 0800   Placement Repositioned 12/24/23 0800   Catheter Care Catheter/Wick replaced 12/23/23 2000   Perineal Care Yes 12/23/23 2000   Suction 40 mmgHg continuous 12/23/23 2000   Urine Color Yellow 12/23/23 2000   Urine Appearance Cloudy 12/23/23 2000   Output (mL) 500 mL 12/24/23 1235       Findings: Left subclavian dual chamber pacemaker placed. Medtronic  serial # [id number]        Detailed Description of Procedure:   Artrial and ventricular  leads placed under fluro parameters checked and were found to be great. Pacemaker pocket closed in layers and dressing applied.    The a lead is medClearStream with serial [id number]  with a P wave of 2.2 and a theshold of 1.0  The  v lead  serial #[id number]  with R wave of 17.2

## 2023-12-27 NOTE — PROGRESS NOTES
Physician Progress Note      PATIENTDaboom Castillo  CSN #:                  [id number]  :                       1945  ADMIT DATE:       2023 11:38 AM  [address] [place] [address] DATE:        2023 2:05 PM  RESPONDING  PROVIDER #:        Rafael Bailey DO          QUERY TEXT:    Patient admitted with bradycardia/complete heart block, noted to have elevated   troponin high sensitivity 19, 23, 23. If possible, please document in   progress notes and discharge summary if you are evaluating and/or treating any   of the following: The medical record reflects the following:    Risk Factors: bradycardia, complete heart block  Clinical Indicators: troponin high sensitivity 19, 23, 23, presented with   dizziness/symptomatic bradycardia  Treatment: PPM inserted    Thank you! Dolly Chino, CRCR  RN Clinical   Options provided:  -- Elevated troponin due to demand ischemia  -- Elevated troponin not clinically significant  -- Other - I will add my own diagnosis  -- Disagree - Not applicable / Not valid  -- Disagree - Clinically unable to determine / Unknown  -- Refer to Clinical Documentation Reviewer    PROVIDER RESPONSE TEXT:    Elevated troponin is not clinically significant.     Query created by: Jagdeep Gavin on 2023 9:27 AM      Electronically signed by:  Rafael Bailey DO 2023 2:39 PM

## 2023-12-27 NOTE — PATIENT INSTRUCTIONS
Continue to follow up with Cardiology Dr. Marlon Mireles on 1/22/24 10:30 am.   Use Flonase by pointing it towards the corner of your eyes through your nose. Take Mucinex for a short course. Continue with MRI on 1/15/23. Please show pacemaker ID card to MRI center. Please get lab work for Thyroid function.

## 2024-01-01 NOTE — PROGRESS NOTES
[facility] [place] [facility]  [facility]  [address]  [address]  [place] [id number]  Dept: [phone]  Loc: [phone]   Hematology/Oncology Consult (Clinic)        01/08/24       Terri Thakkar   1945     No ref. provider found   Norma Montoya MD       Reason:   Chief Complaint   Patient presents with    Follow-up     Ductal carcinoma in situ (DCIS) of left breast        HPI: Terri Thakkar is a 78 y.o. female with past medical history significant for previous \"uterine tumor\" status post hysterectomy that was benign, endometriosis, hypertension, hypercholesterolemia, bronchitis, GERD, frequent urinary tract infections, arthritis who presents in follow-up for her ductal carcinoma in situ of the left breast    -7/13/2018 patient had a right breast biopsy at 6:00 for a mass that it showed benign breast cores with dense fibrosis and mild fibrocystic changes.  Also microcalcifications, negative for malignancy.    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  This was a WHO grade one 1.9 x 1.4 x 0.5 cm tumor.  MRI done on 7/4/2019 showed the craniotomy defect at the site of the previous enhancing lesion and no residual enhancing lesion was identified.  She has had no currents of this problem since that time.  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  She is under surveillance by ophthalmology.  She is due for another MRI of her brain in November 2023.    -9/2019.  ([facility]) Screening mammogram showed a new cluster of amorphous and round microcalcifications in the upper outer posterior aspect of the left breast.  -9/26/2019. ([facility]) Diagnostic mammogram confirmed a cluster of amorphous rounded punctate

## 2024-01-02 ENCOUNTER — NURSE ONLY (OUTPATIENT)
Dept: CARDIOLOGY CLINIC | Age: 79
End: 2024-01-02
Payer: MEDICARE

## 2024-01-02 DIAGNOSIS — Z95.0 PACEMAKER: Primary | ICD-10-CM

## 2024-01-02 PROCEDURE — 93280 PM DEVICE PROGR EVAL DUAL: CPT | Performed by: NUCLEAR MEDICINE

## 2024-01-02 NOTE — PROGRESS NOTES
DR THURSTON IMPLANT ON 12/23/23     DR STARKS PT     MEDTRONIC DUAL PACER REMOTE  BATTERY 5.7 YRS REMAINING  PRESENTS IN ASVP 71  UNDERLYING AS DEPENDENT IN VENTRICLES           DDDR     A PACED 46.8%  V PACED 98.1%    P WAVES 1  RV WAVES DEPENDENT AT 35    ATRIAL IMPEDENCE 361  VENT IMPEDENCE 532    ATRIAL THRESHOLD 1.5 @ 1 HIGH  VENT THRESHOLD 0.75 @ 0.4      ATRIAL AMPLITUDE 5 @ 1  RV AMPLITUDE 3.5 @ 0.4      INCISION LINE TO LT UPPER CHEST WITH STERI STRIPS REMOVED. INCISION LINE WELL APPROX WITHOUT ANY REDNESS, DRAINAGE OR WARMTH    AREA CLEANED WITH CHLORA PREP AND NEW STERI STRIPS APPLIED . TOLD PT SHE CAN TAKE THEM OFF OFF IF THEY DO NOT FALL OFF IN FIVE DAYS     ALL POST OP INSTRUCTIONS REVIEWED WITH PT AND DAUGHTER AND HAND OUTS GIVEN     PT HAS CRISTINE ON HER PHONE

## 2024-01-03 ENCOUNTER — CARE COORDINATION (OUTPATIENT)
Dept: CASE MANAGEMENT | Age: 79
End: 2024-01-03

## 2024-01-03 ENCOUNTER — TELEPHONE (OUTPATIENT)
Age: 79
End: 2024-01-03

## 2024-01-03 NOTE — TELEPHONE ENCOUNTER
Patient called in, requesting to schedule an appt with Dr. Medrano s/p pacemaker placement on 12/23/23- informed her that he does not see patients in the clinic.  All care would be deferred to the pacer clinic.   Confirmed this with Azul.  Patient was seen yesterday for incision and device check.  Informed patient that she does not need to be seen in CTS clinic.  She voiced understanding.

## 2024-01-03 NOTE — CARE COORDINATION
Care Transitions Follow Up Call    Patient Current Location:  Home: [address] [place] [address]    Care Transition Nurse contacted the patient by telephone to follow up after admission on 23.  Verified name and  with patient as identifiers.    Patient: Terri Thakkar  Patient : 1945   MRN: [id number]  Reason for Admission: dizziness bradycardia  Discharge Date: 23 RARS: Readmission Risk Score: 9.3      Needs to be reviewed by the provider   Additional needs identified to be addressed with provider: No  none             Method of communication with provider: none.    Terri returned CTN call today saying she is feeling a little tired today. She did work at taking [employer] down yesterday. She's been more active this week.  Denies chest pain, sob, malaise, dizziness, swelling, n/v/d, fever, chills, palps.  Pacer check 24-> bandaid to L pacemaker site, may shower  Declines assistance w/ HFU w/ CT surgery. She has the # and forgot to follow through on calling to schedule ([phone]).  Onc f/u 24  Cardio 24  PCP 24  MRI brain 24 w/ Neurosurgery f/u 24  Denies need for transportation.  DB=146-237/60's  HR=65-81  Education on at home BP monitoring completed. BP log to be mailed to pt.  Eating, drinking & sleeping ok. Denies problems w/ urination/bowels.  Discussed benefits of Protein supplement daily. She will consider.  No other concerns voiced at this time. Will continue to follow.    Addressed changes since last contact:  none  Discussed follow-up appointments. If no appointment was previously scheduled, appointment scheduling offered: Yes.   Is follow up appointment scheduled within 7 days of discharge? Yes.    Follow Up  Future Appointments   Date Time Provider Department Center   2024  3:30 PM Ella Kilpatrick MD PhD N Oncology [facility]   2024 10:30 AM Norma Montoya MD SRPX Physic [facility]   2024 10:30 AM Mirian

## 2024-01-04 ENCOUNTER — NURSE ONLY (OUTPATIENT)
Dept: CARDIOLOGY CLINIC | Age: 79
End: 2024-01-04

## 2024-01-04 NOTE — PROGRESS NOTES
Pt called office today with complaints of red rash around her incision site.     Implanted 12/23/23 by Dr Medrano    Pt was here 1/2/24 for initial incision check. New steri strips were applied.    Rash from irritation from steri strips? Steri strips removed and left open. Gauze with paper tape lightly put over incision.  Pt aware if rash gets worse to call office and make us aware.

## 2024-01-08 ENCOUNTER — OFFICE VISIT (OUTPATIENT)
Dept: ONCOLOGY | Age: 79
End: 2024-01-08
Payer: MEDICARE

## 2024-01-08 ENCOUNTER — HOSPITAL ENCOUNTER (OUTPATIENT)
Dept: INFUSION THERAPY | Age: 79
Discharge: HOME | End: 2024-01-08
Payer: MEDICARE

## 2024-01-08 VITALS
SYSTOLIC BLOOD PRESSURE: 137 MMHG | DIASTOLIC BLOOD PRESSURE: 70 MMHG | HEART RATE: 74 BPM | RESPIRATION RATE: 18 BRPM | TEMPERATURE: 97.2 F

## 2024-01-08 VITALS
SYSTOLIC BLOOD PRESSURE: 137 MMHG | BODY MASS INDEX: 26.82 KG/M2 | TEMPERATURE: 97.2 F | HEART RATE: 74 BPM | HEIGHT: 65 IN | RESPIRATION RATE: 18 BRPM | DIASTOLIC BLOOD PRESSURE: 70 MMHG | WEIGHT: 161 LBS | OXYGEN SATURATION: 97 %

## 2024-01-08 DIAGNOSIS — Z79.811 ENCOUNTER FOR MONITORING AROMATASE INHIBITOR THERAPY: ICD-10-CM

## 2024-01-08 DIAGNOSIS — Z17.0 MALIGNANT NEOPLASM OF BREAST IN FEMALE, ESTROGEN RECEPTOR POSITIVE, UNSPECIFIED LATERALITY, UNSPECIFIED SITE OF BREAST (HCC): ICD-10-CM

## 2024-01-08 DIAGNOSIS — Z17.0 MALIGNANT NEOPLASM OF RIGHT BREAST IN FEMALE, ESTROGEN RECEPTOR POSITIVE, UNSPECIFIED SITE OF BREAST (HCC): ICD-10-CM

## 2024-01-08 DIAGNOSIS — C71.6 MALIGNANT NEOPLASM OF CEREBELLUM (HCC): ICD-10-CM

## 2024-01-08 DIAGNOSIS — Z12.31 ENCOUNTER FOR SCREENING MAMMOGRAM FOR MALIGNANT NEOPLASM OF BREAST: ICD-10-CM

## 2024-01-08 DIAGNOSIS — Z51.81 ENCOUNTER FOR MONITORING AROMATASE INHIBITOR THERAPY: ICD-10-CM

## 2024-01-08 DIAGNOSIS — C50.911 MALIGNANT NEOPLASM OF RIGHT BREAST IN FEMALE, ESTROGEN RECEPTOR POSITIVE, UNSPECIFIED SITE OF BREAST (HCC): ICD-10-CM

## 2024-01-08 DIAGNOSIS — C50.919 MALIGNANT NEOPLASM OF BREAST IN FEMALE, ESTROGEN RECEPTOR POSITIVE, UNSPECIFIED LATERALITY, UNSPECIFIED SITE OF BREAST (HCC): ICD-10-CM

## 2024-01-08 DIAGNOSIS — D05.12 DUCTAL CARCINOMA IN SITU (DCIS) OF LEFT BREAST: Primary | ICD-10-CM

## 2024-01-08 DIAGNOSIS — C50.912 MALIGNANT NEOPLASM OF LEFT FEMALE BREAST, UNSPECIFIED ESTROGEN RECEPTOR STATUS, UNSPECIFIED SITE OF BREAST (HCC): ICD-10-CM

## 2024-01-08 PROCEDURE — 99214 OFFICE O/P EST MOD 30 MIN: CPT | Performed by: INTERNAL MEDICINE

## 2024-01-08 PROCEDURE — 99211 OFF/OP EST MAY X REQ PHY/QHP: CPT

## 2024-01-08 PROCEDURE — 1123F ACP DISCUSS/DSCN MKR DOCD: CPT | Performed by: INTERNAL MEDICINE

## 2024-01-08 PROCEDURE — 3078F DIAST BP <80 MM HG: CPT | Performed by: INTERNAL MEDICINE

## 2024-01-08 PROCEDURE — 3075F SYST BP GE 130 - 139MM HG: CPT | Performed by: INTERNAL MEDICINE

## 2024-01-08 RX ORDER — LETROZOLE 2.5 MG/1
2.5 TABLET, FILM COATED ORAL DAILY
Qty: 90 TABLET | Refills: 3 | Status: SHIPPED | OUTPATIENT
Start: 2024-01-08 | End: 2024-01-08

## 2024-01-08 RX ORDER — LETROZOLE 2.5 MG/1
2.5 TABLET, FILM COATED ORAL DAILY
Qty: 90 TABLET | Refills: 3 | Status: SHIPPED | OUTPATIENT
Start: 2024-01-08 | End: 2025-01-02

## 2024-01-08 NOTE — PATIENT INSTRUCTIONS
1) letrozole sent to your pharmacy of choice  2) agree with MRI Brain and follow up with neurosurgery in the next month or so  3) RTC in 5 months for breast exam and AI tox check

## 2024-01-09 ENCOUNTER — CARE COORDINATION (OUTPATIENT)
Dept: CARE COORDINATION | Age: 79
End: 2024-01-09

## 2024-01-10 ENCOUNTER — CARE COORDINATION (OUTPATIENT)
Dept: CASE MANAGEMENT | Age: 79
End: 2024-01-10

## 2024-01-10 NOTE — CARE COORDINATION
Care Transitions Outreach Attempt    Call within 2 business days of discharge: Yes   Attempted to reach patient for transitions of care follow up. Unable to reach patient.  Left HIPAA compliant message w/ CTN # to return call.  Pt. Texted CTN # saying she cannot talk today.  Will try tomorrow.    Patient: Terri Thakkar Patient : 1945 MRN: [id number]    Last Discharge Facility       Date Complaint Diagnosis Description Type Department Provider    23 Dizziness Symptomatic bradycardia ... ED to Hosp-Admission (Discharged) (ADMITTED) STRZ 3B Gen Fuller, DO; Otoniel Estes D...              Was this an external facility discharge? No Discharge Facility Name:     Noted following upcoming appointments from discharge chart review:   [facility] follow up appointment(s):   Future Appointments   Date Time Provider Department Center   2024 10:30 AM Norma Montoya MD SRPX Physic [facility] [place]   2024 10:30 AM Lynne Vela, APRN - CNP N SRPX Heart [facility]   2024 11:00 AM STR MRI RM1 STRZ MRI STR Rad/Card   2024  9:30 AM Troy Ramriez PA-C N SRPXNEURSU [facility] - [place]   2024 10:30 AM SCHEDULE, SRPX PACER NURSE N SRPX PACER [facility] - [place]   6/10/2024 11:30 AM Ella Kilpatrick MD PhD N Oncology [facility] [place]   7/15/2024  9:30 AM Norma Montoya MD SRPX Physic [facility] [place]       follow up appointment(s):

## 2024-01-11 ENCOUNTER — OFFICE VISIT (OUTPATIENT)
Dept: INTERNAL MEDICINE CLINIC | Age: 79
End: 2024-01-11
Payer: MEDICARE

## 2024-01-11 ENCOUNTER — CARE COORDINATION (OUTPATIENT)
Dept: CASE MANAGEMENT | Age: 79
End: 2024-01-11

## 2024-01-11 VITALS
WEIGHT: 159.6 LBS | SYSTOLIC BLOOD PRESSURE: 132 MMHG | BODY MASS INDEX: 26.59 KG/M2 | TEMPERATURE: 98 F | HEIGHT: 65 IN | HEART RATE: 68 BPM | DIASTOLIC BLOOD PRESSURE: 80 MMHG

## 2024-01-11 DIAGNOSIS — I44.2 THIRD DEGREE HEART BLOCK (HCC): ICD-10-CM

## 2024-01-11 DIAGNOSIS — J30.89 NON-SEASONAL ALLERGIC RHINITIS DUE TO OTHER ALLERGIC TRIGGER: ICD-10-CM

## 2024-01-11 DIAGNOSIS — E83.41 HYPERMAGNESEMIA: ICD-10-CM

## 2024-01-11 DIAGNOSIS — R79.89 ABNORMAL LFTS: ICD-10-CM

## 2024-01-11 DIAGNOSIS — E05.90 HYPERTHYROIDISM: Primary | ICD-10-CM

## 2024-01-11 PROCEDURE — 1123F ACP DISCUSS/DSCN MKR DOCD: CPT | Performed by: INTERNAL MEDICINE

## 2024-01-11 PROCEDURE — 99214 OFFICE O/P EST MOD 30 MIN: CPT | Performed by: INTERNAL MEDICINE

## 2024-01-11 PROCEDURE — 3075F SYST BP GE 130 - 139MM HG: CPT | Performed by: INTERNAL MEDICINE

## 2024-01-11 PROCEDURE — 3079F DIAST BP 80-89 MM HG: CPT | Performed by: INTERNAL MEDICINE

## 2024-01-11 ASSESSMENT — PATIENT HEALTH QUESTIONNAIRE - PHQ9
SUM OF ALL RESPONSES TO PHQ QUESTIONS 1-9: 0
SUM OF ALL RESPONSES TO PHQ QUESTIONS 1-9: 0
2. FEELING DOWN, DEPRESSED OR HOPELESS: 0
1. LITTLE INTEREST OR PLEASURE IN DOING THINGS: 0
SUM OF ALL RESPONSES TO PHQ QUESTIONS 1-9: 0
SUM OF ALL RESPONSES TO PHQ QUESTIONS 1-9: 0
SUM OF ALL RESPONSES TO PHQ9 QUESTIONS 1 & 2: 0

## 2024-01-11 NOTE — CARE COORDINATION
Care Transitions Outreach Attempt    Attempted to reach patient for transitions of care follow up. Unable to reach patient.    Patient: Terri Thakkar Patient : 1945 MRN: [id number]    Last Discharge Facility       Date Complaint Diagnosis Description Type Department Provider    23 Dizziness Symptomatic bradycardia ... ED to Hosp-Admission (Discharged) (ADMITTED) STRZ 3B Gen Fuller, DO; Otoniel Estes D...            Noted following upcoming appointments from discharge chart review:   [facility] follow up appointment(s):   Future Appointments   Date Time Provider Department Center   2024 10:30 AM Lynne Vela, APRN - CNP N SRPX Heart [facility] [place]   2024 11:30 AM Norma Montoya MD SRPX Physic [facility]   2024 11:00 AM STR MRI RM1 STRZ MRI STR Rad/Card   2024  9:30 AM Troy Ramirez PA-C N SRPXNEURSU [facility] [place]   2024 10:30 AM SCHEDULE, SRPX PACER NURSE N SRPX PACER P - [place]   6/10/2024 11:30 AM Ella Kilpatrick MD PhD N Oncology [facility] [place]   7/15/2024  9:30 AM Norma Montoya MD SRPX Physic [facility] [place]     2nd attempt to contact pt for care transition follow up.  Unable to reach pt.  Left message with contact information and request for call back.      Episode/program to be resolved and CTN to sign off if return call is not received from the patient.      Baylee Marrero RN

## 2024-01-11 NOTE — PATIENT INSTRUCTIONS
Told her she could use hydrocortisone cream (cortisone 10 OTC) to area but avoid the area of the scab/incision.  Continue Allegra 180 mg daily and Tylenol pm at night.      Stop Biotin now - repeat labs Monday - call me Tues for instructions.      Continue Allegra and nasal sprays every day.      Wash sheets and mattress pad every week in hot water to kill dust mites.  If you can - vacuum mattress.

## 2024-01-11 NOTE — PROGRESS NOTES
1945    Chief Complaint   Patient presents with    Hypertension     6 months     Hyperlipidemia    OAB    Hypokalemia        Pt is a 78 y.o. female who presents for an follow up visit.    HPI    She was in hospital for complete heart block and had pacemaker placed 12/23/23.  Since last visit the pacemaker clinic changed steri strips and appears she had a reaction to cleaning agent or adhesive.  She only has a small scab at most medial part of incision.  Told her wash area with soap and water and use hydrocortisone cream to area but avoid the area of the scab.  Continue Allegra 180 mg daily.   She is asking about cause of heart block - deferred question to cardiology.  If not suspecting cardiac ischemia, or electrolyte abnormality, could consider amyloidosis, sarcoidosis, Lyme disease, COVID, thyroid disease.  Noted to have hyperthyroidism but on Biotin with current labs.    Hyperthyroidism - repeat TSH still suppressed and elevated FT4, but was on biotin at the time of testing.  Will hold Biotin x 4 days and repeat studies on Monday to see if makes difference.  If still hyperthyroid will start methimazole and refer to Dr. Espinoza.  She is down 9# in the last one month - 168->159#.  But no anxiety, tremors, diarrhea (but no longer constipated), tachycardia.      Mild elevation of LFT - repeat labs.    Elevated magnesium - repeat level likely was due to magnesium given in hospital but would like to see normalized now.     Pacer clinic told her to wait 6 weeks prior to getting MRI.  It is due now for Neurosurgery.     Allergies - still cough, pnd - on Allegra, Flonase, Atrovent ns - maximize avoiding known allergens - ie dust mites.  May need allergy shots.  Won't get rid of dog- known allergy to dog.      Past Medical History:   Diagnosis Date    Abnormal EKG     normal stress test 3/2009    Breast cancer (HCC) 10/11/2019    Left DCIS    Cancer (HCC)     basal cell skin    Complete heart block (HCC) 12/2023    S/p

## 2024-01-15 ENCOUNTER — NURSE ONLY (OUTPATIENT)
Dept: CARDIOLOGY CLINIC | Age: 79
End: 2024-01-15

## 2024-01-15 NOTE — PROGRESS NOTES
THIS PT CAME TO THE OFFICE SO WE COULD LOOK AT HER PACER SITE. SHE SAID HER SITE WAS VERY RED THE LAST TIME SHE WAS HERE AND SHE JUST WANTED TO MAKE SURE EVERYTHING WAS OK    INCISION LINE IS CLOSED BUT THE AREA IS BLOTCHY RED AND SHE SAID IT WAS VERY ITCHY AT ONE POINT . SHE SAID IT IS NOT ITCHING AS MUCH ANYMORE. TOLD HER TO CONTINUE TO TAKE BENADRYL AS NEED PER LABEL DIRECTIONS IF IT CONTINUES TO ITCH       DR WHITFIELD COULD SHE USE BENADRYL CREAM OR CORTISONE FOR THE REDNESS SINCE THE INCISION LINE LOOKS HEALED ?? PT IS ASKING

## 2024-01-16 NOTE — PROGRESS NOTES
[facility] [place] [facility]  [facility]  [address]  [address]  [place] [address]  Dept: [phone]  Dept Fax: [phone]  Loc: [phone]    Visit Date: 1/22/2024    Ms. Thakkar is a 78 y.o. female  who presented for:  Chief Complaint   Patient presents with    Follow-Up from Hospital         Cardiologist:  Dr. Batista       Last office visit:none- hospital f/u       Today's Visit   HPI:  78 y.o. F with PMH HTN, HLD, GERD, breast Ca, hemangioblastoma s/p resection in 2019 who presents for hospital follow up. Recent hospitalization for evaluation of dizziness/near syncope where she was found to have CHB. Underwent dual chamber PPM placement per Dr. Medrano on12/23/23. No prior cardiac history. +FH of early CAD (mother, aunt, uncle with MI at 36)     Today reports doing well since hospital discharge, but has not returned to baseline activity yet. Denies chest pain/SOB; with some fatigue but no recurrences of near syncope/dizziness. Pacer incision healing well- did have suspected allergic reaction to steri strips but improved. Reports SBP running 120-130s at home; is unsure if she is taking losartan-hctz vs lisinopril-hctz at home- will call office after reviewing pills to update.     Subjective       Review of Systems   Constitutional: Negative for chills and fever. +fatigue  HENT: Negative for congestion, sinus pressure, sneezing and sore throat.    Eyes: Negative for pain, discharge, redness and itching.   Respiratory: Negative for shortness of breath, cough, or sputum production  Cardiac: Negative for chest pain, pressure, or palpitations  Gastrointestinal: Negative for blood in stool, constipation, diarrhea   Endocrine: Negative for cold intolerance, heat intolerance, polydipsia.  Genitourinary: Negative for dysuria, enuresis, flank pain and hematuria.   Musculoskeletal: Negative for arthralgias, joint swelling and neck pain.   Neurological: Negative for

## 2024-01-18 ENCOUNTER — CARE COORDINATION (OUTPATIENT)
Dept: CASE MANAGEMENT | Age: 79
End: 2024-01-18

## 2024-01-18 NOTE — CARE COORDINATION
Received incoming call from pt.  Left message stating she is returning the call.  States doing well, only glitch is developed a rash from steri strips around the wound area but recovering now.  Can be reached at [phone].    CTN attempted to call pt back for care transition follow up.  Unable to reach pt.  CTN did not leave another message.  CTN signing off and closing program.    Baylee Marrero RN

## 2024-01-22 ENCOUNTER — OFFICE VISIT (OUTPATIENT)
Dept: CARDIOLOGY CLINIC | Age: 79
End: 2024-01-22
Payer: MEDICARE

## 2024-01-22 VITALS
DIASTOLIC BLOOD PRESSURE: 78 MMHG | BODY MASS INDEX: 26.73 KG/M2 | HEART RATE: 72 BPM | SYSTOLIC BLOOD PRESSURE: 140 MMHG | WEIGHT: 160.4 LBS | HEIGHT: 65 IN

## 2024-01-22 DIAGNOSIS — I10 PRIMARY HYPERTENSION: ICD-10-CM

## 2024-01-22 DIAGNOSIS — Z82.49 FAMILY HISTORY OF EARLY CAD: ICD-10-CM

## 2024-01-22 DIAGNOSIS — R53.83 FATIGUE, UNSPECIFIED TYPE: ICD-10-CM

## 2024-01-22 DIAGNOSIS — R94.31 ABNORMAL EKG: ICD-10-CM

## 2024-01-22 DIAGNOSIS — Z95.0 S/P PLACEMENT OF CARDIAC PACEMAKER: Primary | ICD-10-CM

## 2024-01-22 PROCEDURE — 1123F ACP DISCUSS/DSCN MKR DOCD: CPT | Performed by: REGISTERED NURSE

## 2024-01-22 PROCEDURE — 3077F SYST BP >= 140 MM HG: CPT | Performed by: REGISTERED NURSE

## 2024-01-22 PROCEDURE — 3078F DIAST BP <80 MM HG: CPT | Performed by: REGISTERED NURSE

## 2024-01-22 PROCEDURE — 99214 OFFICE O/P EST MOD 30 MIN: CPT | Performed by: REGISTERED NURSE

## 2024-01-22 RX ORDER — LISINOPRIL AND HYDROCHLOROTHIAZIDE 25; 20 MG/1; MG/1
1 TABLET ORAL DAILY
COMMUNITY
End: 2024-01-23

## 2024-01-22 NOTE — PROGRESS NOTES
Follow-up.   Losartan-HCT 50/25mg on medlist in computer, but Lisinopril-HCT 20-25mg daily on patient's med list and states that is what she is taking.   S/P Pacemaker.   She states she feels great without any chest pain, shortness of breath or dizziness.

## 2024-01-22 NOTE — PATIENT INSTRUCTIONS
Have the stress test done to evaluate the blood vessels of your heart     Continue current medications as prescribed.    Continue the following:  Daily weights  Fluid restriction of 2 Liters per day  Limit sodium in diet to around 7182-9061 mg/day  Monitor BP  Activity as tolerated       Follow-up with your PCP as scheduled.    Follow-up with Dr. Batista   as scheduled or sooner if need.      You may receive a survey regarding the care you received during your visit.  Your input is valuable to us.  We encourage you to complete and return your survey.  We hope you will choose us in the future for your healthcare needs.

## 2024-01-23 ENCOUNTER — TELEPHONE (OUTPATIENT)
Dept: CARDIOLOGY CLINIC | Age: 79
End: 2024-01-23

## 2024-01-23 RX ORDER — LOSARTAN POTASSIUM AND HYDROCHLOROTHIAZIDE 12.5; 5 MG/1; MG/1
1 TABLET ORAL DAILY
COMMUNITY

## 2024-01-23 NOTE — TELEPHONE ENCOUNTER
Pt calling with update to medication list.  She is not on Lisinopril HCTZ.   She IS on Losartan HCTZ 50-12.5 daily.   Medlist updated.

## 2024-02-12 ENCOUNTER — OFFICE VISIT (OUTPATIENT)
Dept: INTERNAL MEDICINE CLINIC | Age: 79
End: 2024-02-12
Payer: MEDICARE

## 2024-02-12 ENCOUNTER — NURSE ONLY (OUTPATIENT)
Dept: LAB | Age: 79
End: 2024-02-12

## 2024-02-12 VITALS
HEART RATE: 74 BPM | WEIGHT: 160 LBS | DIASTOLIC BLOOD PRESSURE: 78 MMHG | BODY MASS INDEX: 26.66 KG/M2 | TEMPERATURE: 97.3 F | HEIGHT: 65 IN | SYSTOLIC BLOOD PRESSURE: 146 MMHG

## 2024-02-12 DIAGNOSIS — E05.90 HYPERTHYROIDISM: Primary | ICD-10-CM

## 2024-02-12 DIAGNOSIS — J30.9 ALLERGIC RHINITIS, UNSPECIFIED SEASONALITY, UNSPECIFIED TRIGGER: ICD-10-CM

## 2024-02-12 DIAGNOSIS — E05.90 HYPERTHYROIDISM: ICD-10-CM

## 2024-02-12 DIAGNOSIS — I44.2 THIRD DEGREE HEART BLOCK (HCC): ICD-10-CM

## 2024-02-12 DIAGNOSIS — I10 ESSENTIAL HYPERTENSION: ICD-10-CM

## 2024-02-12 LAB
T4 FREE SERPL-MCNC: 2.81 NG/DL (ref 0.93–1.76)
TSH SERPL DL<=0.005 MIU/L-ACNC: < 0.005 UIU/ML (ref 0.4–4.2)

## 2024-02-12 PROCEDURE — 3078F DIAST BP <80 MM HG: CPT | Performed by: INTERNAL MEDICINE

## 2024-02-12 PROCEDURE — 99214 OFFICE O/P EST MOD 30 MIN: CPT | Performed by: INTERNAL MEDICINE

## 2024-02-12 PROCEDURE — 3077F SYST BP >= 140 MM HG: CPT | Performed by: INTERNAL MEDICINE

## 2024-02-12 PROCEDURE — 1123F ACP DISCUSS/DSCN MKR DOCD: CPT | Performed by: INTERNAL MEDICINE

## 2024-02-12 NOTE — PROGRESS NOTES
1945    Chief Complaint   Patient presents with    Hyperthyroidism     4 weeks     Rash    Hypertension       Pt is a 78 y.o. female who presents for an follow up visit.    HPI    She was in hospital for complete heart block and had pacemaker placed 12/23/23.  Then the pacemaker clinic changed steri strips and appears she had a reaction to cleaning agent or adhesive.  She only has a small scab at most medial part of incision.  Told her wash area with soap and water and use hydrocortisone cream to area but avoid the area of the scab.  Continue Allegra 180 mg daily.  Today the area around her pacemaker incision has much less redness and healing well.  She is asking about cause of heart block - deferred question to cardiology.  If not suspecting cardiac ischemia, or electrolyte abnormality, could consider amyloidosis, sarcoidosis, Lyme disease, COVID, thyroid disease.  Noted to have hyperthyroidism but on Biotin with current labs.  TSH suppressed and FT4, FT3 high - 1/15/23 but was on Biotin in ICE drink.  Repeat today - if still high - start methimazole and see Dr. Espinoza.      Hyperthyroidism - repeat TSH still suppressed and elevated FT4, but was on still on biotin at the time of testing. Will check level now as she stopped biotin supplement and hasn't had ICE drink in last several days - to see if makes a difference.  If still hyperthyroid, will start methimazole and refer to Dr. Espinoza.  She is down 9# in the last one month - 168->159#, now up 1# in the last one month.  But no anxiety, tremors, diarrhea (but no longer constipated), tachycardia.      Mild elevation of LFT - repeat labs normal 1/15/24.    Elevated magnesium - repeat level likely was due to magnesium given in hospital but would like to see normalized now.   Repeat level was 1.9 1/15/24.    Pacer clinic told her to wait 6 weeks prior to getting MRI.  It is due now for Neurosurgery.     Allergies - still has cough, PND - on Allegra, Flonase,

## 2024-02-13 LAB — T3FREE SERPL-MCNC: 7.48 PG/ML (ref 2.02–4.43)

## 2024-02-14 LAB — TSI SER-ACNC: 0.45 IU/L

## 2024-02-16 ENCOUNTER — HOSPITAL ENCOUNTER (OUTPATIENT)
Dept: NUCLEAR MEDICINE | Age: 79
Discharge: HOME | End: 2024-02-16
Payer: MEDICARE

## 2024-02-16 ENCOUNTER — HOSPITAL ENCOUNTER (OUTPATIENT)
Age: 79
End: 2024-02-16
Payer: MEDICARE

## 2024-02-16 VITALS — WEIGHT: 160 LBS | BODY MASS INDEX: 26.66 KG/M2 | HEIGHT: 65 IN

## 2024-02-16 DIAGNOSIS — R53.83 FATIGUE, UNSPECIFIED TYPE: ICD-10-CM

## 2024-02-16 DIAGNOSIS — Z82.49 FAMILY HISTORY OF EARLY CAD: ICD-10-CM

## 2024-02-16 DIAGNOSIS — D48.19 GIANT CELL TUMOR OF TENDON SHEATH: ICD-10-CM

## 2024-02-16 DIAGNOSIS — R94.31 ABNORMAL EKG: ICD-10-CM

## 2024-02-16 DIAGNOSIS — G93.89 BRAIN MASS: Primary | ICD-10-CM

## 2024-02-16 LAB
ECHO BSA: 1.82 M2
NUC STRESS EJECTION FRACTION: 60 %
STRESS BASELINE DIAS BP: 66 MMHG
STRESS BASELINE HR: 83 BPM
STRESS BASELINE SYS BP: 140 MMHG
STRESS STAGE 1 BP: NORMAL MMHG
STRESS STAGE 1 DURATION: 1 MIN:SEC
STRESS STAGE 1 HR: 107 BPM
STRESS STAGE 2 BP: NORMAL MMHG
STRESS STAGE 2 DURATION: 1 MIN:SEC
STRESS STAGE 2 HR: 113 BPM
STRESS STAGE 3 BP: NORMAL MMHG
STRESS STAGE 3 DURATION: 1 MIN:SEC
STRESS STAGE 3 HR: 108 BPM
STRESS STAGE RECOVERY 1 BP: NORMAL MMHG
STRESS STAGE RECOVERY 1 DURATION: 1 MIN:SEC
STRESS STAGE RECOVERY 1 HR: 99 BPM
STRESS STAGE RECOVERY 2 BP: NORMAL MMHG
STRESS STAGE RECOVERY 2 DURATION: 1 MIN:SEC
STRESS STAGE RECOVERY 2 HR: 98 BPM
STRESS STAGE RECOVERY 3 BP: NORMAL MMHG
STRESS STAGE RECOVERY 3 DURATION: 1 MIN:SEC
STRESS STAGE RECOVERY 3 HR: 99 BPM
STRESS STAGE RECOVERY 4 BP: NORMAL MMHG
STRESS STAGE RECOVERY 4 DURATION: 2 MIN:SEC
STRESS STAGE RECOVERY 4 HR: 86 BPM
STRESS TARGET HR: 142 BPM

## 2024-02-16 PROCEDURE — 3430000000 HC RX DIAGNOSTIC RADIOPHARMACEUTICAL: Performed by: REGISTERED NURSE

## 2024-02-16 PROCEDURE — A9500 TC99M SESTAMIBI: HCPCS | Performed by: REGISTERED NURSE

## 2024-02-16 PROCEDURE — 6360000002 HC RX W HCPCS: Performed by: REGISTERED NURSE

## 2024-02-16 PROCEDURE — 93017 CV STRESS TEST TRACING ONLY: CPT

## 2024-02-16 PROCEDURE — 78452 HT MUSCLE IMAGE SPECT MULT: CPT

## 2024-02-16 RX ORDER — TETRAKIS(2-METHOXYISOBUTYLISOCYANIDE)COPPER(I) TETRAFLUOROBORATE 1 MG/ML
9.1 INJECTION, POWDER, LYOPHILIZED, FOR SOLUTION INTRAVENOUS
Status: COMPLETED | OUTPATIENT
Start: 2024-02-16 | End: 2024-02-16

## 2024-02-16 RX ORDER — TETRAKIS(2-METHOXYISOBUTYLISOCYANIDE)COPPER(I) TETRAFLUOROBORATE 1 MG/ML
29.1 INJECTION, POWDER, LYOPHILIZED, FOR SOLUTION INTRAVENOUS
Status: COMPLETED | OUTPATIENT
Start: 2024-02-16 | End: 2024-02-16

## 2024-02-16 RX ORDER — REGADENOSON 0.08 MG/ML
0.4 INJECTION, SOLUTION INTRAVENOUS
Status: COMPLETED | OUTPATIENT
Start: 2024-02-16 | End: 2024-02-16

## 2024-02-16 RX ADMIN — Medication 29.1 MILLICURIE: at 12:04

## 2024-02-16 RX ADMIN — REGADENOSON 0.4 MG: 0.08 INJECTION, SOLUTION INTRAVENOUS at 12:04

## 2024-02-16 RX ADMIN — Medication 9.1 MILLICURIE: at 11:10

## 2024-02-19 ENCOUNTER — HOSPITAL ENCOUNTER (OUTPATIENT)
Dept: MRI IMAGING | Age: 79
Discharge: HOME | End: 2024-02-19
Payer: MEDICARE

## 2024-02-19 ENCOUNTER — TELEPHONE (OUTPATIENT)
Dept: CARDIOLOGY CLINIC | Age: 79
End: 2024-02-19

## 2024-02-19 DIAGNOSIS — E05.90 HYPERTHYROIDISM: Primary | ICD-10-CM

## 2024-02-19 DIAGNOSIS — G93.89 BRAIN MASS: ICD-10-CM

## 2024-02-19 DIAGNOSIS — G93.89 BRAIN MASS: Primary | ICD-10-CM

## 2024-02-19 DIAGNOSIS — D49.6 BRAIN TUMOR (HCC): ICD-10-CM

## 2024-02-19 DIAGNOSIS — C71.6 MALIGNANT NEOPLASM OF CEREBELLUM (HCC): ICD-10-CM

## 2024-02-19 DIAGNOSIS — D48.19 HEMANGIOBLASTOMA: ICD-10-CM

## 2024-02-19 PROCEDURE — 6360000004 HC RX CONTRAST MEDICATION: Performed by: PHYSICIAN ASSISTANT

## 2024-02-19 PROCEDURE — 70553 MRI BRAIN STEM W/O & W/DYE: CPT

## 2024-02-19 PROCEDURE — A9579 GAD-BASE MR CONTRAST NOS,1ML: HCPCS | Performed by: PHYSICIAN ASSISTANT

## 2024-02-19 RX ORDER — METHIMAZOLE 5 MG/1
5 TABLET ORAL 3 TIMES DAILY
Qty: 90 TABLET | Refills: 1 | OUTPATIENT
Start: 2024-02-19 | End: 2024-02-20 | Stop reason: SDUPTHER

## 2024-02-19 RX ADMIN — GADOTERIDOL 20 ML: 279.3 INJECTION, SOLUTION INTRAVENOUS at 12:16

## 2024-02-19 NOTE — PROGRESS NOTES
I phoned this in to her pharmacy Saturday.  I left message on her voice mail regarding this.  I sent a message to Dr. Espinoza about further work up.

## 2024-02-20 RX ORDER — METHIMAZOLE 5 MG/1
5 TABLET ORAL 3 TIMES DAILY
Qty: 90 TABLET | Refills: 1 | Status: SHIPPED | OUTPATIENT
Start: 2024-02-20 | End: 2024-04-20

## 2024-02-21 ENCOUNTER — HOSPITAL ENCOUNTER (OUTPATIENT)
Dept: ULTRASOUND IMAGING | Age: 79
Discharge: HOME | End: 2024-02-21
Attending: INTERNAL MEDICINE
Payer: MEDICARE

## 2024-02-21 ENCOUNTER — TELEPHONE (OUTPATIENT)
Dept: INTERNAL MEDICINE CLINIC | Age: 79
End: 2024-02-21

## 2024-02-21 DIAGNOSIS — E05.90 HYPERTHYROIDISM: ICD-10-CM

## 2024-02-21 PROCEDURE — 76536 US EXAM OF HEAD AND NECK: CPT

## 2024-02-21 NOTE — TELEPHONE ENCOUNTER
----- Message from Norma Montoya MD sent at 2/18/2024  2:44 PM EST -----  Please call patient and let them know results show she is becoming more hyperthyroid - I called in script for methimazole 5 mg Q8 hours to her pharmacy and left message for her.  Please call and confirm with her that she started this.  Will be contacting Dr. Espinoza for additional work up.

## 2024-02-23 ENCOUNTER — OFFICE VISIT (OUTPATIENT)
Dept: NEUROSURGERY | Age: 79
End: 2024-02-23

## 2024-02-23 VITALS
DIASTOLIC BLOOD PRESSURE: 72 MMHG | BODY MASS INDEX: 26.66 KG/M2 | SYSTOLIC BLOOD PRESSURE: 136 MMHG | HEART RATE: 72 BPM | HEIGHT: 65 IN | WEIGHT: 160 LBS

## 2024-02-23 DIAGNOSIS — Z98.890 STATUS POST CRANIOTOMY: Primary | ICD-10-CM

## 2024-02-23 NOTE — PROGRESS NOTES
[facility] [place] [facility]  [facility] NEUROSURGERY  [address]  [facility] [address]  Dept: [phone]  Dept Fax: [phone]  Loc: [phone]    Post Op Follow Visit  Visit Date: 2/23/2024      Terri  is a 78 y.o. female who is returning to the office today for a post-op follow-up visit. She had surgery on  07/03/2019 to undergo suboccipital craniectomy for resection of cerebellar lesion performed by Dr. Griffith without complication.  Patient was most recently seen and evaluated in our office setting on June 5, 2020 where she arrived accompanied by her daughter and remain happy with the outcome of that procedure listed above.  She related near complete resolution of her prior symptom presentation and presented without complaints with a well-healed incision.  She was intact on exam having completed radiation treatments with no additional follow-ups indicated.  Today's appointment is to include a new image as a comparison to rule out recurrence.  She presents today with an MRI of the brain image with and without contrast on 2/19/2024 revealing stable linear enhancement to the right of the surgical site, unlikely to be recurrent tumor.  There are no areas of susceptibility artifact and no other areas of abnormal enhancement and no new enhancement noted.                Imaging is consistent with prior studies noted above as a comparison.    She presents today unaccompanied and unassisted and is without complaint or deficits.  We reviewed the latest image findings along with comparisons and initiated discussions along with Dr. Griffith/neurosurgeon centered on the status of her oncology treatments with encouragement for her to reach out to her oncologist to ascertain the necessity for further comparison imaging.    An order for a repeat MRI of the brain with and without contrast is scheduled in 6 months with a follow-up with our service in 6 months to review the comparison with

## 2024-03-04 ENCOUNTER — OFFICE VISIT (OUTPATIENT)
Dept: CARDIOLOGY CLINIC | Age: 79
End: 2024-03-04
Payer: MEDICARE

## 2024-03-04 VITALS
SYSTOLIC BLOOD PRESSURE: 141 MMHG | WEIGHT: 156.6 LBS | HEIGHT: 65 IN | BODY MASS INDEX: 26.09 KG/M2 | DIASTOLIC BLOOD PRESSURE: 73 MMHG | HEART RATE: 75 BPM

## 2024-03-04 DIAGNOSIS — Z95.0 S/P PLACEMENT OF CARDIAC PACEMAKER: ICD-10-CM

## 2024-03-04 DIAGNOSIS — I10 PRIMARY HYPERTENSION: ICD-10-CM

## 2024-03-04 DIAGNOSIS — R00.2 PALPITATIONS: Primary | ICD-10-CM

## 2024-03-04 PROCEDURE — 3077F SYST BP >= 140 MM HG: CPT | Performed by: REGISTERED NURSE

## 2024-03-04 PROCEDURE — 1123F ACP DISCUSS/DSCN MKR DOCD: CPT | Performed by: REGISTERED NURSE

## 2024-03-04 PROCEDURE — 3078F DIAST BP <80 MM HG: CPT | Performed by: REGISTERED NURSE

## 2024-03-04 PROCEDURE — 99214 OFFICE O/P EST MOD 30 MIN: CPT | Performed by: REGISTERED NURSE

## 2024-03-04 RX ORDER — METOPROLOL SUCCINATE 25 MG/1
12.5 TABLET, EXTENDED RELEASE ORAL DAILY
Qty: 30 TABLET | Refills: 3 | Status: SHIPPED | OUTPATIENT
Start: 2024-03-04

## 2024-03-04 NOTE — PATIENT INSTRUCTIONS
Begin the metoprolol to help control your feeling of heart racing.     Wear the heart monitor to evaluate your heart rhythm     Continue current medications as prescribed.    Continue the following:  Daily weights  Monitor BP  Activity as tolerated   Try the mediterranean diet       Follow-up with your PCP as scheduled.    Follow-up with Dr. Batista in August as scheduled or sooner if need.

## 2024-03-04 NOTE — PROGRESS NOTES
Pt here to discuss stress test    EKG done 12-22-23    Pt c/o palpitations   
Normal sized aortic root and ascending aorta.   IVC/Hepatic Veins IVC diameter is less than or equal to 21 mm and decreases greater than 50% during inspiration; therefore the estimated right atrial pressure is normal (~3 mmHg). IVC size is normal.   Pericardium No pericardial effusion.     Study Details    Image quality: suboptimal. The view(s) performed were parasternal, apical, subcostal and suprasternal. Procedure performed with the patient in a supine position, color flow Doppler was performed and pulse wave and/or continuous wave Doppler was performed. No contrast was given.     Volumes and Function (Range)     ESV   LA Biplane 54 mL  (22 - 52) Abnormal          LA A4C MOD 43 mL  (22 - 52)         LA A2C MOD 65 mL  (22 - 52) Abnormal            Left Ventricle Measurements    Dimensions   Fractional Shortening 2D 31 %  (Range: 28 - 44)         LVIDd 4.5 cm  (Range: 3.9 - 5.3)         LVIDs 3.1 cm         IVSd 0.9 cm  (Range: 0.6 - 0.9)         LVPWd 1.4 cm  (Range: 0.6 - 0.9) Abnormal          LV RWT Ratio 0.62         LV Mass 2D 186.4 g  (Range: 67 - 162) Abnormal                 Right Ventricle Measurements    Dimensions   RVIDd 2.9 cm               Atrial Measurements    Left Atrium   LA Area 2C 21.3 cm2         LA Area 4C 16.7 cm2         LA Diameter 3.8 cm         LA Major [place] 5.2 cm         LA Minor Axis 5.6 cm                 Aortic Valve Measurements    Structure   AV Cusp Mmode 1.9 cm          Stenosis   AV Peak Gradient 16 mmHg         AV Peak Velocity 2 m/s         AV Velocity Ratio 0.8          LVOT   LVOT Peak Gradient 10 mmHg         LVOT Peak Velocity 1.6 m/s               Pulmonary Measurements    Stenosis   PV Max Velocity 1.1 m/s         PV Peak Gradient 5 mmHg                  Diastolic Filling/Shunts    Diastolic Filling   MV E Velocity 1.54 m/s         MV A Velocity 1.15 m/s         MV E/A 1.34         E/E' Ratio (Averaged) 13.62         LV E' Lateral Velocity 13 cm/s         E/E' Lateral

## 2024-03-20 RX ORDER — LOSARTAN POTASSIUM AND HYDROCHLOROTHIAZIDE 12.5; 5 MG/1; MG/1
1 TABLET ORAL DAILY
Qty: 90 TABLET | Refills: 1 | Status: SHIPPED | OUTPATIENT
Start: 2024-03-20

## 2024-04-09 ENCOUNTER — NURSE ONLY (OUTPATIENT)
Dept: CARDIOLOGY CLINIC | Age: 79
End: 2024-04-09
Payer: MEDICARE

## 2024-04-09 DIAGNOSIS — R00.2 PALPITATIONS: Primary | ICD-10-CM

## 2024-04-09 PROCEDURE — 93280 PM DEVICE PROGR EVAL DUAL: CPT | Performed by: NUCLEAR MEDICINE

## 2024-04-09 NOTE — PROGRESS NOTES
In Office Medtronic Dual Pacemaker   Patient of Lester    Implanted 12/23/23  3m chronic values -- incision healed    Battery 11-13.4 years     Presenting rhythm AP   Underlying AS  HB dependent in ventricular    A Impedance 570  RV Impedance 589    P wave sensing 0.8  R wave sensing -    A Threshold 1.5 @ 0.40  A Amplitude 3.5 @ 0.40- decreased to 3  RV Thresholds 0.75 @ 0.40  RV Amplitude 3.5 @ 0.40- decreased to 2.5      A Paced 38.2%  V Paced 98.1%    Programmed Mode DDDR       Afib [place] <0.1%    Episodes   2/22/24- 9 beats VT rates 188  3/10/24- -7 beats VT rates 231

## 2024-04-11 RX ORDER — FLUTICASONE PROPIONATE 50 MCG
2 SPRAY, SUSPENSION (ML) NASAL DAILY
Qty: 16 G | Refills: 5 | Status: SHIPPED | OUTPATIENT
Start: 2024-04-11

## 2024-04-23 DIAGNOSIS — E78.00 HYPERCHOLESTEROLEMIA: ICD-10-CM

## 2024-04-23 DIAGNOSIS — E05.90 HYPERTHYROIDISM: Primary | ICD-10-CM

## 2024-04-23 DIAGNOSIS — E78.2 MIXED HYPERLIPIDEMIA: ICD-10-CM

## 2024-04-24 ENCOUNTER — NURSE ONLY (OUTPATIENT)
Dept: LAB | Age: 79
End: 2024-04-24

## 2024-04-24 DIAGNOSIS — E05.90 HYPERTHYROIDISM: ICD-10-CM

## 2024-04-24 DIAGNOSIS — E78.00 HYPERCHOLESTEROLEMIA: ICD-10-CM

## 2024-04-24 LAB
LDLC SERPL DIRECT ASSAY-MCNC: 127.51 MG/DL
T3FREE SERPL-MCNC: 2 PG/ML (ref 2–4.4)
T4 FREE SERPL-MCNC: 0.52 NG/DL (ref 0.93–1.68)
TSH SERPL DL<=0.005 MIU/L-ACNC: 41.78 UIU/ML (ref 0.4–4.2)

## 2024-04-26 ENCOUNTER — TELEPHONE (OUTPATIENT)
Dept: INTERNAL MEDICINE CLINIC | Age: 79
End: 2024-04-26

## 2024-04-26 DIAGNOSIS — E04.1 THYROID NODULE: Primary | ICD-10-CM

## 2024-04-26 DIAGNOSIS — E05.90 HYPERTHYROIDISM: Primary | ICD-10-CM

## 2024-04-26 RX ORDER — METHIMAZOLE 5 MG/1
5 TABLET ORAL 3 TIMES DAILY
Qty: 90 TABLET | Refills: 1 | OUTPATIENT
Start: 2024-04-26 | End: 2024-06-25

## 2024-04-26 NOTE — TELEPHONE ENCOUNTER
I spoke with Dr. Espinoza and he recommended holding methimazole x 5 days and restarting 5 mg daily.  Will have her get labs just prior to his visit 5/8/24.  He agreed with thyroid biopsy.  I contacted patient and she is aware of change of plan.

## 2024-04-26 NOTE — TELEPHONE ENCOUNTER
I called patient and told her to decrease methimazole 5 mg TID to BID due low FT4, low normal FT3 and high TSH.  She is aware to repeat labs in 10 days, so she will have labs just prior to seeing Dr. Espinoza.  Will set up thyroid nodule biopsy now that we know she is no longer hyperthyroid.

## 2024-05-06 ENCOUNTER — NURSE ONLY (OUTPATIENT)
Dept: LAB | Age: 79
End: 2024-05-06

## 2024-05-06 DIAGNOSIS — E05.90 HYPERTHYROIDISM: ICD-10-CM

## 2024-05-06 LAB
T3FREE SERPL-MCNC: 2.9 PG/ML (ref 2–4.4)
T4 FREE SERPL-MCNC: 1.1 NG/DL (ref 0.93–1.68)
TSH SERPL DL<=0.005 MIU/L-ACNC: 0.97 UIU/ML (ref 0.4–4.2)

## 2024-05-08 ENCOUNTER — OFFICE VISIT (OUTPATIENT)
Age: 79
End: 2024-05-08
Payer: MEDICARE

## 2024-05-08 VITALS
BODY MASS INDEX: 27.76 KG/M2 | HEART RATE: 68 BPM | RESPIRATION RATE: 16 BRPM | WEIGHT: 166.6 LBS | HEIGHT: 65 IN | DIASTOLIC BLOOD PRESSURE: 70 MMHG | SYSTOLIC BLOOD PRESSURE: 118 MMHG

## 2024-05-08 DIAGNOSIS — E05.90 HYPERTHYROIDISM: Primary | ICD-10-CM

## 2024-05-08 DIAGNOSIS — Z86.39 H/O THYROID NODULE: ICD-10-CM

## 2024-05-08 PROCEDURE — 99204 OFFICE O/P NEW MOD 45 MIN: CPT | Performed by: INTERNAL MEDICINE

## 2024-05-08 PROCEDURE — 3078F DIAST BP <80 MM HG: CPT | Performed by: INTERNAL MEDICINE

## 2024-05-08 PROCEDURE — 3074F SYST BP LT 130 MM HG: CPT | Performed by: INTERNAL MEDICINE

## 2024-05-08 PROCEDURE — 1123F ACP DISCUSS/DSCN MKR DOCD: CPT | Performed by: INTERNAL MEDICINE

## 2024-05-08 ASSESSMENT — ENCOUNTER SYMPTOMS
BLOOD IN STOOL: 0
BACK PAIN: 0
WHEEZING: 0
DIARRHEA: 0
COUGH: 0
NAUSEA: 0
SHORTNESS OF BREATH: 0
EYE PAIN: 0
VOMITING: 0
TROUBLE SWALLOWING: 0
CONSTIPATION: 0

## 2024-05-08 NOTE — PROGRESS NOTES
continue Methimazole 5 mg daily. Repeated TSH, free T3, free T4. Will adjust medication if abnormal lab result  Recommend Final biopsy of thyroid nodule.  Follow up in 3 months   Encourage taking medication consistency        The patient is in agreement with and verbalizes understanding of the plan of care today and has no additional questions or complaints. The patient was instructed to follow-up in 3 months or to contact our office sooner if problems should arise. Laboratory studies will be completed prior to next visit.    All findings, labs and other data reviewed, assessment and plan created with review by Dr. Olga WEBB.    Electronically signed by: Frida Sotelo DO on 5/8/2024 at 3:47 PM  (Please note that portions of this note were completed with a voice recognition program and electronically transcribed. Efforts were made to edit the dictations but occasionally words are mis-transcribed. This transcription may contain errors not detected in proofreading. This transcription was electronically signed.)

## 2024-05-14 ENCOUNTER — HOSPITAL ENCOUNTER (OUTPATIENT)
Dept: ULTRASOUND IMAGING | Age: 79
Discharge: HOME | End: 2024-05-14
Payer: MEDICARE

## 2024-05-14 DIAGNOSIS — E04.1 THYROID NODULE: ICD-10-CM

## 2024-05-14 PROCEDURE — 88172 CYTP DX EVAL FNA 1ST EA SITE: CPT

## 2024-05-14 PROCEDURE — 88173 CYTOPATH EVAL FNA REPORT: CPT

## 2024-05-14 PROCEDURE — 76942 ECHO GUIDE FOR BIOPSY: CPT

## 2024-05-14 PROCEDURE — 88177 CYTP FNA EVAL EA ADDL: CPT

## 2024-05-21 ENCOUNTER — TELEPHONE (OUTPATIENT)
Age: 79
End: 2024-05-21

## 2024-05-21 ENCOUNTER — NURSE ONLY (OUTPATIENT)
Dept: LAB | Age: 79
End: 2024-05-21

## 2024-05-21 DIAGNOSIS — E05.90 HYPERTHYROIDISM: ICD-10-CM

## 2024-05-21 DIAGNOSIS — Z86.39 H/O THYROID NODULE: ICD-10-CM

## 2024-05-21 LAB
T3FREE SERPL-MCNC: 3.1 PG/ML (ref 2–4.4)
T4 FREE SERPL-MCNC: 1.24 NG/DL (ref 0.93–1.68)
TSH SERPL DL<=0.005 MIU/L-ACNC: 1.64 UIU/ML (ref 0.4–4.2)

## 2024-05-28 ENCOUNTER — TELEPHONE (OUTPATIENT)
Age: 79
End: 2024-05-28

## 2024-05-28 NOTE — TELEPHONE ENCOUNTER
----- Message from Yonatan Espinoza MD sent at 5/27/2024  1:34 PM EDT -----  Benign thyroid biopsy.  Call patient.

## 2024-07-15 ENCOUNTER — OFFICE VISIT (OUTPATIENT)
Dept: INTERNAL MEDICINE CLINIC | Age: 79
End: 2024-07-15
Payer: MEDICARE

## 2024-07-15 VITALS
HEART RATE: 60 BPM | DIASTOLIC BLOOD PRESSURE: 72 MMHG | TEMPERATURE: 98.2 F | BODY MASS INDEX: 27.82 KG/M2 | WEIGHT: 167 LBS | HEIGHT: 65 IN | SYSTOLIC BLOOD PRESSURE: 120 MMHG

## 2024-07-15 DIAGNOSIS — N32.81 OVERACTIVE BLADDER: ICD-10-CM

## 2024-07-15 DIAGNOSIS — E87.6 HYPOKALEMIA: ICD-10-CM

## 2024-07-15 DIAGNOSIS — I10 ESSENTIAL HYPERTENSION: ICD-10-CM

## 2024-07-15 DIAGNOSIS — E78.2 MIXED HYPERLIPIDEMIA: ICD-10-CM

## 2024-07-15 DIAGNOSIS — Z00.00 MEDICARE ANNUAL WELLNESS VISIT, SUBSEQUENT: Primary | ICD-10-CM

## 2024-07-15 PROBLEM — R00.1 SYMPTOMATIC BRADYCARDIA: Status: RESOLVED | Noted: 2023-12-22 | Resolved: 2024-07-15

## 2024-07-15 PROBLEM — R55 PRE-SYNCOPE: Status: RESOLVED | Noted: 2023-12-23 | Resolved: 2024-07-15

## 2024-07-15 PROBLEM — G93.89 BRAIN MASS: Status: RESOLVED | Noted: 2019-06-28 | Resolved: 2024-07-15

## 2024-07-15 PROBLEM — B02.9 HERPES ZOSTER WITHOUT COMPLICATION: Status: RESOLVED | Noted: 2019-07-10 | Resolved: 2024-07-15

## 2024-07-15 PROCEDURE — 1123F ACP DISCUSS/DSCN MKR DOCD: CPT | Performed by: INTERNAL MEDICINE

## 2024-07-15 PROCEDURE — 3078F DIAST BP <80 MM HG: CPT | Performed by: INTERNAL MEDICINE

## 2024-07-15 PROCEDURE — G0439 PPPS, SUBSEQ VISIT: HCPCS | Performed by: INTERNAL MEDICINE

## 2024-07-15 PROCEDURE — 3074F SYST BP LT 130 MM HG: CPT | Performed by: INTERNAL MEDICINE

## 2024-07-15 RX ORDER — POTASSIUM CHLORIDE 20 MEQ/1
TABLET, EXTENDED RELEASE ORAL
Qty: 90 TABLET | Refills: 1 | Status: SHIPPED | OUTPATIENT
Start: 2024-07-15

## 2024-07-15 RX ORDER — METHIMAZOLE 5 MG/1
5 TABLET ORAL DAILY
COMMUNITY

## 2024-07-15 RX ORDER — LOSARTAN POTASSIUM AND HYDROCHLOROTHIAZIDE 12.5; 5 MG/1; MG/1
1 TABLET ORAL DAILY
Qty: 90 TABLET | Refills: 1 | Status: SHIPPED | OUTPATIENT
Start: 2024-07-15

## 2024-07-15 RX ORDER — OXYBUTYNIN CHLORIDE 5 MG/1
TABLET ORAL
Qty: 180 TABLET | Refills: 1 | Status: SHIPPED | OUTPATIENT
Start: 2024-07-15

## 2024-07-15 RX ORDER — PRAVASTATIN SODIUM 40 MG
TABLET ORAL
Qty: 90 TABLET | Refills: 1 | Status: SHIPPED | OUTPATIENT
Start: 2024-07-15

## 2024-07-15 SDOH — ECONOMIC STABILITY: FOOD INSECURITY: WITHIN THE PAST 12 MONTHS, YOU WORRIED THAT YOUR FOOD WOULD RUN OUT BEFORE YOU GOT MONEY TO BUY MORE.: NEVER TRUE

## 2024-07-15 SDOH — ECONOMIC STABILITY: FOOD INSECURITY: WITHIN THE PAST 12 MONTHS, THE FOOD YOU BOUGHT JUST DIDN'T LAST AND YOU DIDN'T HAVE MONEY TO GET MORE.: NEVER TRUE

## 2024-07-15 SDOH — ECONOMIC STABILITY: INCOME INSECURITY: HOW HARD IS IT FOR YOU TO PAY FOR THE VERY BASICS LIKE FOOD, HOUSING, MEDICAL CARE, AND HEATING?: NOT HARD AT ALL

## 2024-07-15 ASSESSMENT — PATIENT HEALTH QUESTIONNAIRE - PHQ9
SUM OF ALL RESPONSES TO PHQ9 QUESTIONS 1 & 2: 0
2. FEELING DOWN, DEPRESSED OR HOPELESS: NOT AT ALL
SUM OF ALL RESPONSES TO PHQ QUESTIONS 1-9: 0
1. LITTLE INTEREST OR PLEASURE IN DOING THINGS: NOT AT ALL
SUM OF ALL RESPONSES TO PHQ QUESTIONS 1-9: 0

## 2024-07-15 ASSESSMENT — LIFESTYLE VARIABLES
HOW OFTEN DO YOU HAVE A DRINK CONTAINING ALCOHOL: MONTHLY OR LESS
HOW MANY STANDARD DRINKS CONTAINING ALCOHOL DO YOU HAVE ON A TYPICAL DAY: 1 OR 2

## 2024-07-15 NOTE — PATIENT INSTRUCTIONS
your review.    Other Preventive Recommendations:    A preventive eye exam performed by an eye specialist is recommended every 1-2 years to screen for glaucoma; cataracts, macular degeneration, and other eye disorders.  A preventive dental visit is recommended every 6 months.  Try to get at least 150 minutes of exercise per week or 10,000 steps per day on a pedometer .  Order or download the FREE \"Exercise & Physical Activity: Your Everyday Guide\" from [employer] [place] [employer]. Call 1-606.256.2919 or search [employer] [place] [employer] online.  You need 7602-5495 mg of calcium and 7333-3091 IU of vitamin D per day. It is possible to meet your calcium requirement with diet alone, but a vitamin D supplement is usually necessary to meet this goal.  When exposed to the sun, use a sunscreen that protects against both UVA and UVB radiation with an SPF of 30 or greater. Reapply every 2 to 3 hours or after sweating, drying off with a towel, or swimming.  Always wear a seat belt when traveling in a car. Always wear a helmet when riding a bicycle or motorcycle.

## 2024-07-15 NOTE — PROGRESS NOTES
as needed  Yes ProviderEvelina MD   famotidine (PEPCID) 10 MG tablet Take 1 tablet by mouth as needed Yes ProviderEvelina MD   Calcium Carb-Cholecalciferol 500-400 MG-UNIT TABS Take 1 tablet by mouth 2 times daily Yes ProviderEvelina MD   Cholecalciferol (VITAMIN D3) 2000 units CAPS Take 1 capsule by mouth daily Yes ProviderEvelina MD       CareTeam (Including outside providers/suppliers regularly involved in providing care):   Patient Care Team:  Norma Montoya MD as PCP - General (Internal Medicine)  Norma Montoya MD as PCP - Empaneled Provider  Norma Montoya MD (Internal Medicine)  Ella Kilpatrick MD PhD as Consulting Physician (Oncology)      Reviewed and updated this visit:  Tobacco  Allergies  Meds  Problems  Med Hx  Surg Hx  Soc Hx  Fam Hx           Norma Montoya MD    [facility]  [address] [place] [id number]

## 2024-07-19 ENCOUNTER — OFFICE VISIT (OUTPATIENT)
Dept: ONCOLOGY | Age: 79
End: 2024-07-19
Payer: MEDICARE

## 2024-07-19 ENCOUNTER — HOSPITAL ENCOUNTER (OUTPATIENT)
Dept: INFUSION THERAPY | Age: 79
Discharge: HOME | End: 2024-07-19
Payer: MEDICARE

## 2024-07-19 VITALS
TEMPERATURE: 97.5 F | WEIGHT: 166.6 LBS | RESPIRATION RATE: 18 BRPM | HEIGHT: 65 IN | HEART RATE: 78 BPM | BODY MASS INDEX: 27.76 KG/M2 | SYSTOLIC BLOOD PRESSURE: 157 MMHG | OXYGEN SATURATION: 98 % | DIASTOLIC BLOOD PRESSURE: 72 MMHG

## 2024-07-19 VITALS
SYSTOLIC BLOOD PRESSURE: 157 MMHG | HEART RATE: 78 BPM | RESPIRATION RATE: 18 BRPM | TEMPERATURE: 97.5 F | DIASTOLIC BLOOD PRESSURE: 72 MMHG | OXYGEN SATURATION: 98 %

## 2024-07-19 DIAGNOSIS — Z17.0 MALIGNANT NEOPLASM OF RIGHT BREAST IN FEMALE, ESTROGEN RECEPTOR POSITIVE, UNSPECIFIED SITE OF BREAST (HCC): Primary | ICD-10-CM

## 2024-07-19 DIAGNOSIS — C50.911 MALIGNANT NEOPLASM OF RIGHT BREAST IN FEMALE, ESTROGEN RECEPTOR POSITIVE, UNSPECIFIED SITE OF BREAST (HCC): Primary | ICD-10-CM

## 2024-07-19 PROCEDURE — 3078F DIAST BP <80 MM HG: CPT | Performed by: INTERNAL MEDICINE

## 2024-07-19 PROCEDURE — 99211 OFF/OP EST MAY X REQ PHY/QHP: CPT

## 2024-07-19 PROCEDURE — 3077F SYST BP >= 140 MM HG: CPT | Performed by: INTERNAL MEDICINE

## 2024-07-19 PROCEDURE — 99214 OFFICE O/P EST MOD 30 MIN: CPT | Performed by: INTERNAL MEDICINE

## 2024-07-19 PROCEDURE — 1123F ACP DISCUSS/DSCN MKR DOCD: CPT | Performed by: INTERNAL MEDICINE

## 2024-07-19 NOTE — PATIENT INSTRUCTIONS
Orders Placed This Encounter   Procedures    US WHOLE BREAST SCREENING COMPLETE BI    Hepatic Function Panel    CBC with Auto Differential    POC PANEL BMP W/IOCA     Return in about 6 months (around 1/27/2025).MD visit to review the US+ labs

## 2024-07-24 PROCEDURE — 93296 REM INTERROG EVL PM/IDS: CPT | Performed by: INTERNAL MEDICINE

## 2024-07-24 PROCEDURE — 93294 REM INTERROG EVL PM/LDLS PM: CPT | Performed by: INTERNAL MEDICINE

## 2024-07-25 ENCOUNTER — PROCEDURE VISIT (OUTPATIENT)
Dept: CARDIOLOGY CLINIC | Age: 79
End: 2024-07-25
Payer: MEDICARE

## 2024-07-25 DIAGNOSIS — Z95.0 S/P PLACEMENT OF CARDIAC PACEMAKER: Primary | ICD-10-CM

## 2024-07-25 NOTE — PROGRESS NOTES
[facility] Medtronic Dual Pacemaker   Patient of Dr. Montoya    Battery 10.8 years    Presenting rhythm AR AP VS     A Impedance 589  RV Impedance 589    P wave sensing 1.1  R wave sensing 6.8    A Threshold 1.625 @ 0.4  A Amplitude 3.25 @ 0.4  RV Thresholds 0.625 @ 0.4  RV Amplitude 2 @ 0.4      A Paced 59.2%  V Paced 98.7%    Programmed Mode DDDR       Afib [place] 0%    Episodes

## 2024-08-05 ENCOUNTER — LAB (OUTPATIENT)
Dept: LAB | Age: 79
End: 2024-08-05

## 2024-08-05 DIAGNOSIS — I10 ESSENTIAL HYPERTENSION: ICD-10-CM

## 2024-08-05 DIAGNOSIS — E78.2 MIXED HYPERLIPIDEMIA: ICD-10-CM

## 2024-08-05 LAB
ALBUMIN SERPL BCG-MCNC: 4.5 G/DL (ref 3.5–5.1)
ALP SERPL-CCNC: 92 U/L (ref 38–126)
ALT SERPL W/O P-5'-P-CCNC: 13 U/L (ref 11–66)
ANION GAP SERPL CALC-SCNC: 12 MEQ/L (ref 8–16)
AST SERPL-CCNC: 16 U/L (ref 5–40)
BILIRUB SERPL-MCNC: 0.5 MG/DL (ref 0.3–1.2)
BUN SERPL-MCNC: 14 MG/DL (ref 7–22)
CALCIUM SERPL-MCNC: 9.8 MG/DL (ref 8.5–10.5)
CHLORIDE SERPL-SCNC: 106 MEQ/L (ref 98–111)
CHOLEST SERPL-MCNC: 162 MG/DL (ref 100–199)
CO2 SERPL-SCNC: 30 MEQ/L (ref 23–33)
CREAT SERPL-MCNC: 0.6 MG/DL (ref 0.4–1.2)
GFR SERPL CREATININE-BSD FRML MDRD: > 90 ML/MIN/1.73M2
GLUCOSE SERPL-MCNC: 102 MG/DL (ref 70–108)
HDLC SERPL-MCNC: 54 MG/DL
LDLC SERPL CALC-MCNC: 84 MG/DL
POTASSIUM SERPL-SCNC: 4.1 MEQ/L (ref 3.5–5.2)
PROT SERPL-MCNC: 7.1 G/DL (ref 6.1–8)
SODIUM SERPL-SCNC: 148 MEQ/L (ref 135–145)
TRIGL SERPL-MCNC: 121 MG/DL (ref 0–199)

## 2024-08-14 ENCOUNTER — CLINICAL DOCUMENTATION (OUTPATIENT)
Age: 79
End: 2024-08-14

## 2024-08-14 ENCOUNTER — TELEPHONE (OUTPATIENT)
Age: 79
End: 2024-08-14

## 2024-08-14 DIAGNOSIS — I10 ESSENTIAL HYPERTENSION: ICD-10-CM

## 2024-08-14 DIAGNOSIS — E05.90 HYPERTHYROIDISM: Primary | ICD-10-CM

## 2024-08-14 RX ORDER — AMLODIPINE BESYLATE 10 MG/1
TABLET ORAL
Qty: 90 TABLET | Refills: 1 | OUTPATIENT
Start: 2024-08-14

## 2024-08-14 NOTE — TELEPHONE ENCOUNTER
We had to reschedule appt on 08/14. Patient would like to know if you wanted updated blood work since its been since May. Next available appt was in October.

## 2024-08-14 NOTE — TELEPHONE ENCOUNTER
Called script to [facility] , left prescription information on prescriber's voicemail .

## 2024-08-16 ENCOUNTER — OFFICE VISIT (OUTPATIENT)
Dept: CARDIOLOGY CLINIC | Age: 79
End: 2024-08-16

## 2024-08-16 VITALS
DIASTOLIC BLOOD PRESSURE: 80 MMHG | WEIGHT: 166.3 LBS | SYSTOLIC BLOOD PRESSURE: 144 MMHG | HEART RATE: 83 BPM | HEIGHT: 65 IN | BODY MASS INDEX: 27.71 KG/M2

## 2024-08-16 DIAGNOSIS — Z95.0 PACEMAKER: Primary | ICD-10-CM

## 2024-08-16 DIAGNOSIS — I10 PRIMARY HYPERTENSION: ICD-10-CM

## 2024-08-16 NOTE — PROGRESS NOTES
ARTHROSCOPY Left 01/2014    Dr. Simpson    MALIGNANT SKIN LESION EXCISION Left 07/31/2013    Left inner thigh    KILO STEROTACTIC LOC BREAST BIOPSY LEFT Left 05/26/2020    LMH, benign    KILO STEROTACTIC LOC BREAST BIOPSY LEFT Left 10/11/2019    DCIS left breast    PRE-MALIGNANT / BENIGN SKIN LESION EXCISION Right 2009    ankle - skin graft, Dr. Key    SKIN BIOPSY      SKIN BIOPSY  07/2020    SKIN CANCER EXCISION      right leg melanoma    SKIN CANCER EXCISION  06/27/2013    r arm    SKIN CANCER EXCISION      melanoma of chest - in situ    SKIN CANCER EXCISION Left 03/24/2017    Upper Back--Melanoma    TONSILLECTOMY  1966    TOTAL KNEE ARTHROPLASTY Right 09/2017    Dr. Gonzalez - [place]    TOTAL KNEE ARTHROPLASTY Left 06/17/2015    total - Dr. Pierce    US BREAST BIOPSY W LOC DEVICE 1ST LESION RIGHT Right 07/12/2018    LMH,benign    US THYROID CYST ASPIRATION AND OR INJECTION  5/14/2024    US THYROID CYST ASPIRATION AND OR INJECTION 5/14/2024 [facility] ULTRASOUND     Family History   Problem Relation Age of Onset    Hypertension Mother     Diabetes Mother     Heart Failure Mother     Kidney Disease Mother     Cancer Father         lung    Hypertension Sister     Diabetes Sister     Hypertension Brother     Diabetes Brother      Social History     Tobacco Use    Smoking status: Never    Smokeless tobacco: Never   Substance Use Topics    Alcohol use: Not Currently     Comment: not very much      Current Outpatient Medications   Medication Sig Dispense Refill    amLODIPine (NORVASC) 10 MG tablet Take 1 tablet by mouth daily 90 tablet 1    methIMAzole (TAPAZOLE) 5 MG tablet Take 1 tablet by mouth daily      losartan-hydroCHLOROthiazide (HYZAAR) 50-12.5 MG per tablet Take 1 tablet by mouth daily 90 tablet 1    pravastatin (PRAVACHOL) 40 MG tablet take 1 tablet by mouth every evening take 1 tablet by mouth at bedtime 90 tablet 1    oxyBUTYnin (DITROPAN) 5 MG tablet take 1 tablet by mouth twice a day 180 tablet 1

## 2024-08-17 ENCOUNTER — LAB (OUTPATIENT)
Dept: LAB | Age: 79
End: 2024-08-17

## 2024-08-17 ENCOUNTER — CLINICAL DOCUMENTATION (OUTPATIENT)
Age: 79
End: 2024-08-17

## 2024-08-17 DIAGNOSIS — E05.90 HYPERTHYROIDISM: Primary | ICD-10-CM

## 2024-08-17 DIAGNOSIS — E05.90 HYPERTHYROIDISM: ICD-10-CM

## 2024-08-17 LAB
T4 FREE SERPL-MCNC: 1.11 NG/DL (ref 0.93–1.68)
TSH SERPL DL<=0.005 MIU/L-ACNC: 7.02 UIU/ML (ref 0.4–4.2)

## 2024-08-17 NOTE — PROGRESS NOTES
Abnormal thyroid labs.  Change Tapazole to 5 mg daily for 6 days and none on the seventh day.  Get free T4 and TSH in 4 weeks.

## 2024-08-18 LAB — T3FREE SERPL-MCNC: 2.8 PG/ML (ref 2–4.4)

## 2024-08-19 ENCOUNTER — TELEPHONE (OUTPATIENT)
Age: 79
End: 2024-08-19

## 2024-08-19 NOTE — TELEPHONE ENCOUNTER
Pt informed and verbalized understanding with no further questions at this time. Patient plans to complete labs at [facility].

## 2024-08-19 NOTE — TELEPHONE ENCOUNTER
----- Message from Dr. Yonatan Espinoza MD sent at 8/17/2024  5:16 PM EDT -----  Abnormal thyroid labs.  Change Tapazole to 5 mg daily for 6 days and none on the seventh day.  Get free T4 and TSH in 4 weeks.

## 2024-09-08 DIAGNOSIS — E87.0 HYPERNATREMIA: Primary | ICD-10-CM

## 2024-09-09 ENCOUNTER — TELEPHONE (OUTPATIENT)
Dept: INTERNAL MEDICINE CLINIC | Age: 79
End: 2024-09-09

## 2024-09-17 DIAGNOSIS — R00.2 PALPITATIONS: ICD-10-CM

## 2024-09-17 RX ORDER — METOPROLOL SUCCINATE 25 MG/1
25 TABLET, EXTENDED RELEASE ORAL DAILY
Qty: 90 TABLET | Refills: 1 | Status: SHIPPED | OUTPATIENT
Start: 2024-09-17

## 2024-09-18 DIAGNOSIS — F41.9 ANXIETY: Primary | ICD-10-CM

## 2024-09-18 RX ORDER — ALPRAZOLAM 0.25 MG
0.25 TABLET ORAL 3 TIMES DAILY PRN
Qty: 4 TABLET | Refills: 0 | Status: SHIPPED | OUTPATIENT
Start: 2024-09-18 | End: 2024-10-18

## 2024-09-24 RX ORDER — METHIMAZOLE 5 MG/1
TABLET ORAL
Qty: 26 TABLET | Refills: 1 | Status: SHIPPED | OUTPATIENT
Start: 2024-09-24

## 2024-10-07 ENCOUNTER — HOSPITAL ENCOUNTER (OUTPATIENT)
Dept: MRI IMAGING | Age: 79
Discharge: HOME | End: 2024-10-07
Payer: MEDICARE

## 2024-10-07 DIAGNOSIS — Z98.890 STATUS POST CRANIOTOMY: ICD-10-CM

## 2024-10-07 LAB — POC CREATININE WHOLE BLOOD: 0.7 MG/DL (ref 0.5–1.2)

## 2024-10-07 PROCEDURE — A9579 GAD-BASE MR CONTRAST NOS,1ML: HCPCS | Performed by: PHYSICIAN ASSISTANT

## 2024-10-07 PROCEDURE — 70553 MRI BRAIN STEM W/O & W/DYE: CPT

## 2024-10-07 PROCEDURE — 6360000004 HC RX CONTRAST MEDICATION: Performed by: PHYSICIAN ASSISTANT

## 2024-10-07 PROCEDURE — 82565 ASSAY OF CREATININE: CPT

## 2024-10-07 RX ADMIN — GADOTERIDOL 15 ML: 279.3 INJECTION, SOLUTION INTRAVENOUS at 16:05

## 2024-10-09 ENCOUNTER — OFFICE VISIT (OUTPATIENT)
Dept: NEUROSURGERY | Age: 79
End: 2024-10-09
Payer: MEDICARE

## 2024-10-09 VITALS — HEART RATE: 89 BPM | DIASTOLIC BLOOD PRESSURE: 79 MMHG | SYSTOLIC BLOOD PRESSURE: 152 MMHG

## 2024-10-09 DIAGNOSIS — Z98.890 STATUS POST CRANIOTOMY: Primary | ICD-10-CM

## 2024-10-09 PROCEDURE — 1123F ACP DISCUSS/DSCN MKR DOCD: CPT | Performed by: PHYSICIAN ASSISTANT

## 2024-10-09 PROCEDURE — 3077F SYST BP >= 140 MM HG: CPT | Performed by: PHYSICIAN ASSISTANT

## 2024-10-09 PROCEDURE — 3078F DIAST BP <80 MM HG: CPT | Performed by: PHYSICIAN ASSISTANT

## 2024-10-09 PROCEDURE — 99214 OFFICE O/P EST MOD 30 MIN: CPT | Performed by: PHYSICIAN ASSISTANT

## 2024-10-09 NOTE — PROGRESS NOTES
[facility] [place] [facility]  [facility] NEUROSURGERY  [address]  [facility] [address]  Dept: [phone]  Dept Fax: [phone]  Loc: [phone]    Post Op Follow Visit  Visit Date: 10/9/2024      Terri  is a 79 y.o. female who is returning to the office today for a post-op follow-up visit. She had surgery on 07/03/2019 to undergo suboccipital craniectomy for resection of cerebellar lesion performed by Dr. Griffith without complication.  She was most recently seen and evaluated in our office setting on 2/23/2024 where she presented with a well-healed incision and imaging comparisons to rule out recurrence.  An MRI of the brain image with and without contrast in February revealed stable linear enhancement in the right surgical site.  Discussions with myself and with Dr. Griffith centered on status of oncology treatments with today's appointment to include a new MRI of the brain with and without contrast to be reviewed in comparison.    She arrives today having undergone a new MRI of the brain with and without contrast on 10/8/2024 which is stable, absent any interval changes since the study performed in February.    At today's appointment she arrives accompanied by family and is comfortable absent any significant complaints or discomfort.  Her incisions are all well-healed as she presented with clear appropriate speech and participated in discussions involving her care quite appropriately.  We reviewed the latest imaging and absence any significant changes or concerns, we have agreed to follow her as needed moving forward with encouragement for her to keep and maintain appointments with any and all other subspecialties.  She is in agreement with this plan moving forward and remains very happy with the outcome of her procedure and her care to date.     Patient was evaluated today and is doing very well overall.  No new complaints were voiced.  Patient  lives with their

## 2024-10-12 ENCOUNTER — LAB (OUTPATIENT)
Dept: LAB | Age: 79
End: 2024-10-12

## 2024-10-12 DIAGNOSIS — E87.0 HYPERNATREMIA: ICD-10-CM

## 2024-10-12 LAB
ANION GAP SERPL CALC-SCNC: 12 MEQ/L (ref 8–16)
BUN SERPL-MCNC: 12 MG/DL (ref 7–22)
CALCIUM SERPL-MCNC: 9.6 MG/DL (ref 8.5–10.5)
CHLORIDE SERPL-SCNC: 102 MEQ/L (ref 98–111)
CO2 SERPL-SCNC: 31 MEQ/L (ref 23–33)
CREAT SERPL-MCNC: 0.6 MG/DL (ref 0.4–1.2)
GFR SERPL CREATININE-BSD FRML MDRD: > 90 ML/MIN/1.73M2
GLUCOSE SERPL-MCNC: 119 MG/DL (ref 70–108)
POTASSIUM SERPL-SCNC: 3.8 MEQ/L (ref 3.5–5.2)
SODIUM SERPL-SCNC: 145 MEQ/L (ref 135–145)

## 2024-10-14 ENCOUNTER — OFFICE VISIT (OUTPATIENT)
Dept: INTERNAL MEDICINE CLINIC | Age: 79
End: 2024-10-14
Payer: MEDICARE

## 2024-10-14 VITALS
BODY MASS INDEX: 28.06 KG/M2 | SYSTOLIC BLOOD PRESSURE: 132 MMHG | HEART RATE: 68 BPM | WEIGHT: 168.4 LBS | HEIGHT: 65 IN | TEMPERATURE: 98.5 F | DIASTOLIC BLOOD PRESSURE: 74 MMHG

## 2024-10-14 DIAGNOSIS — I10 ESSENTIAL HYPERTENSION: Primary | ICD-10-CM

## 2024-10-14 DIAGNOSIS — E05.90 HYPERTHYROIDISM: ICD-10-CM

## 2024-10-14 DIAGNOSIS — E78.2 MIXED HYPERLIPIDEMIA: ICD-10-CM

## 2024-10-14 DIAGNOSIS — I44.2 THIRD DEGREE HEART BLOCK (HCC): ICD-10-CM

## 2024-10-14 DIAGNOSIS — N32.81 OVERACTIVE BLADDER: ICD-10-CM

## 2024-10-14 DIAGNOSIS — Z23 NEED FOR INFLUENZA VACCINATION: ICD-10-CM

## 2024-10-14 DIAGNOSIS — E87.6 HYPOKALEMIA: ICD-10-CM

## 2024-10-14 DIAGNOSIS — K59.04 CHRONIC IDIOPATHIC CONSTIPATION: ICD-10-CM

## 2024-10-14 DIAGNOSIS — J30.9 ALLERGIC RHINITIS, UNSPECIFIED SEASONALITY, UNSPECIFIED TRIGGER: ICD-10-CM

## 2024-10-14 PROCEDURE — 3078F DIAST BP <80 MM HG: CPT | Performed by: INTERNAL MEDICINE

## 2024-10-14 PROCEDURE — 1123F ACP DISCUSS/DSCN MKR DOCD: CPT | Performed by: INTERNAL MEDICINE

## 2024-10-14 PROCEDURE — 99214 OFFICE O/P EST MOD 30 MIN: CPT | Performed by: INTERNAL MEDICINE

## 2024-10-14 PROCEDURE — 90653 IIV ADJUVANT VACCINE IM: CPT | Performed by: INTERNAL MEDICINE

## 2024-10-14 PROCEDURE — 3075F SYST BP GE 130 - 139MM HG: CPT | Performed by: INTERNAL MEDICINE

## 2024-10-14 PROCEDURE — G0008 ADMIN INFLUENZA VIRUS VAC: HCPCS | Performed by: INTERNAL MEDICINE

## 2024-10-14 RX ORDER — PRAVASTATIN SODIUM 40 MG
TABLET ORAL
Qty: 90 TABLET | Refills: 1 | Status: SHIPPED | OUTPATIENT
Start: 2024-10-14

## 2024-10-14 RX ORDER — METHIMAZOLE 5 MG/1
TABLET ORAL
Qty: 26 TABLET | Refills: 1 | Status: CANCELLED | OUTPATIENT
Start: 2024-10-14

## 2024-10-14 RX ORDER — AMLODIPINE BESYLATE 10 MG/1
TABLET ORAL
Qty: 90 TABLET | Refills: 1 | Status: SHIPPED | OUTPATIENT
Start: 2024-10-14

## 2024-10-14 RX ORDER — POTASSIUM CHLORIDE 1500 MG/1
TABLET, EXTENDED RELEASE ORAL
Qty: 90 TABLET | Refills: 1 | Status: SHIPPED | OUTPATIENT
Start: 2024-10-14

## 2024-10-14 RX ORDER — OXYBUTYNIN CHLORIDE 5 MG/1
TABLET ORAL
Qty: 180 TABLET | Refills: 1 | Status: SHIPPED | OUTPATIENT
Start: 2024-10-14

## 2024-10-14 RX ORDER — LOSARTAN POTASSIUM AND HYDROCHLOROTHIAZIDE 12.5; 5 MG/1; MG/1
1 TABLET ORAL DAILY
Qty: 90 TABLET | Refills: 1 | Status: SHIPPED | OUTPATIENT
Start: 2024-10-14

## 2024-10-14 NOTE — PROGRESS NOTES
After obtaining consent, and per orders of Dr. Montoya, injection of flu vaccine given in Left deltoid by NEDA VELOZ LPN. Patient instructed to remain in clinic for 20 minutes afterwards, and to report any adverse reaction to me immediately.  
continue stool softener daily and Miralax prn.  I don't believe she ever followed up with GI for flex sig.    OAB - stable on Ditropan - will continue.    Allergic rhinitis - stable, continue Allegra and nasal sprays every day.  Wash sheets and mattress pad every week in hot water to kill dust mites.  If you can - vacuum mattress.  She won't re-home dog.    Hyperthyroidism - uncontrolled -FT4 low normal and TSH elevated 8/2024 - methimazole adjusted 3 weeks ago - ordered labs to be done 10/21 (4 weeks after change in meds) just prior to next visit with Endo.    3rd degree AVB/NSVT - s/p pacemaker placement - Defer pacemaker to cardiology.  Tolerating increased dose of metoprolol.    Hypokalemia - stable, continue potassium supplement as above and follow BMP Q 6 months.    HM - given flu vaccine today.    Follow up in 6 months.  Labs due in a week.    Norma Montoya MD    [facility] PROFESSIONAL SERVS  [facility]  [address] [place] [address]  Dept: [phone]  Dept Fax: [phone]  Loc: [phone]

## 2024-10-21 ENCOUNTER — LAB (OUTPATIENT)
Dept: LAB | Age: 79
End: 2024-10-21

## 2024-10-21 DIAGNOSIS — E05.90 HYPERTHYROIDISM: ICD-10-CM

## 2024-10-21 LAB
T3FREE SERPL-MCNC: 2.4 PG/ML (ref 2–4.4)
T4 FREE SERPL-MCNC: 1.18 NG/DL (ref 0.93–1.68)
TSH SERPL DL<=0.005 MIU/L-ACNC: 5.58 UIU/ML (ref 0.4–4.2)

## 2024-10-23 ENCOUNTER — OFFICE VISIT (OUTPATIENT)
Age: 79
End: 2024-10-23
Payer: MEDICARE

## 2024-10-23 VITALS
HEIGHT: 65 IN | BODY MASS INDEX: 28.09 KG/M2 | DIASTOLIC BLOOD PRESSURE: 78 MMHG | SYSTOLIC BLOOD PRESSURE: 126 MMHG | WEIGHT: 168.6 LBS | HEART RATE: 68 BPM

## 2024-10-23 DIAGNOSIS — Z86.39 H/O THYROID NODULE: ICD-10-CM

## 2024-10-23 DIAGNOSIS — E05.90 HYPERTHYROIDISM: Primary | ICD-10-CM

## 2024-10-23 PROCEDURE — 1159F MED LIST DOCD IN RCRD: CPT | Performed by: INTERNAL MEDICINE

## 2024-10-23 PROCEDURE — 99214 OFFICE O/P EST MOD 30 MIN: CPT | Performed by: INTERNAL MEDICINE

## 2024-10-23 PROCEDURE — 1123F ACP DISCUSS/DSCN MKR DOCD: CPT | Performed by: INTERNAL MEDICINE

## 2024-10-23 PROCEDURE — 3078F DIAST BP <80 MM HG: CPT | Performed by: INTERNAL MEDICINE

## 2024-10-23 PROCEDURE — 3074F SYST BP LT 130 MM HG: CPT | Performed by: INTERNAL MEDICINE

## 2024-10-23 RX ORDER — METHIMAZOLE 5 MG/1
TABLET ORAL
Qty: 30 TABLET | Refills: 3 | Status: SHIPPED | OUTPATIENT
Start: 2024-10-23

## 2024-10-23 NOTE — PATIENT INSTRUCTIONS
Take tapazole 1 tablet (5 mg) on Monday, Wednesday, Friday and Sunday and skip on Tuesday, Thursday and Saturday  Check blood level in 1 month  Return in 3 months

## 2024-10-23 NOTE — PROGRESS NOTES
This Encounter   Procedures    TSH     Standing Status:   Future     Standing Expiration Date:   1/23/2025    T4, Free     Standing Status:   Future     Standing Expiration Date:   1/23/2025    T3, Free     Standing Status:   Future     Standing Expiration Date:   1/23/2025    Thyroid Stimulating Immunoglobulin     Standing Status:   Future     Standing Expiration Date:   1/23/2025    CBC with Auto Differential     Standing Status:   Future     Standing Expiration Date:   10/23/2025     The risks and benefits of my recommendations, as well as other treatment options were discussed with the patient today. Questions were answered.  Follow up: 3 months and as needed.         Electronically signed by Yonatan Espinoza MD 10/23/2024 10:49 AM     **This report has been created using voice recognition software. It may   contain minor errors which are inherent in voice recognition technology.*Thank

## 2024-10-31 ENCOUNTER — TELEPHONE (OUTPATIENT)
Dept: CARDIOLOGY CLINIC | Age: 79
End: 2024-10-31

## 2024-10-31 NOTE — TELEPHONE ENCOUNTER
If completely asymptomatic keep an eye and will see what the next check looks like  If having any CP, SOB, discomfort etc we might need to see to consider a cath

## 2024-11-08 NOTE — TELEPHONE ENCOUNTER
Pt returned call denying CP, SOB. Pt mentioned feeling heart pounding sensation/palpitations if she falls asleep sitting up and is woken up by these palpitations. Pt aware to call the office if she starts having symptoms

## 2024-11-22 DIAGNOSIS — E87.6 HYPOKALEMIA: ICD-10-CM

## 2024-11-23 RX ORDER — POTASSIUM CHLORIDE 1500 MG/1
TABLET, EXTENDED RELEASE ORAL
Qty: 90 TABLET | Refills: 1 | Status: SHIPPED | OUTPATIENT
Start: 2024-11-23

## 2025-01-13 ENCOUNTER — HOSPITAL ENCOUNTER (OUTPATIENT)
Dept: WOMENS IMAGING | Age: 80
Discharge: HOME | End: 2025-01-13
Payer: MEDICARE

## 2025-01-13 ENCOUNTER — HOSPITAL ENCOUNTER (OUTPATIENT)
Dept: WOMENS IMAGING | Age: 80
Discharge: HOME | End: 2025-01-13
Attending: INTERNAL MEDICINE
Payer: MEDICARE

## 2025-01-13 DIAGNOSIS — Z17.0 MALIGNANT NEOPLASM OF RIGHT BREAST IN FEMALE, ESTROGEN RECEPTOR POSITIVE, UNSPECIFIED SITE OF BREAST (HCC): ICD-10-CM

## 2025-01-13 DIAGNOSIS — Z12.31 VISIT FOR SCREENING MAMMOGRAM: ICD-10-CM

## 2025-01-13 DIAGNOSIS — R92.30 BREAST DENSITY: ICD-10-CM

## 2025-01-13 DIAGNOSIS — C50.911 MALIGNANT NEOPLASM OF RIGHT BREAST IN FEMALE, ESTROGEN RECEPTOR POSITIVE, UNSPECIFIED SITE OF BREAST (HCC): ICD-10-CM

## 2025-01-13 PROCEDURE — 77063 BREAST TOMOSYNTHESIS BI: CPT

## 2025-02-02 NOTE — PROGRESS NOTES
Procedures    US HEAD NECK SOFT TISSUE     Standing Status:   Future     Standing Expiration Date:   2/3/2026    TSH     Standing Status:   Future     Standing Expiration Date:   5/2/2025    T4, Free     Standing Status:   Future     Standing Expiration Date:   5/2/2025    T3, Free     Standing Status:   Future     Standing Expiration Date:   5/2/2025     The risks and benefits of my recommendations, as well as other treatment options were discussed with the patient today. Questions were answered.  Follow up: 3 months and as needed.         Electronically signed by Yonatan Espinoza MD 2/3/2025 11:43 AM     **This report has been created using voice recognition software. It may   contain minor errors which are inherent in voice recognition technology.*Thank

## 2025-02-03 ENCOUNTER — OFFICE VISIT (OUTPATIENT)
Age: 80
End: 2025-02-03
Payer: MEDICARE

## 2025-02-03 ENCOUNTER — LAB (OUTPATIENT)
Dept: LAB | Age: 80
End: 2025-02-03

## 2025-02-03 VITALS
SYSTOLIC BLOOD PRESSURE: 122 MMHG | WEIGHT: 168.5 LBS | HEIGHT: 65 IN | DIASTOLIC BLOOD PRESSURE: 78 MMHG | BODY MASS INDEX: 28.07 KG/M2 | HEART RATE: 62 BPM

## 2025-02-03 DIAGNOSIS — E04.2 MULTINODULAR GOITER: ICD-10-CM

## 2025-02-03 DIAGNOSIS — Z17.0 MALIGNANT NEOPLASM OF RIGHT BREAST IN FEMALE, ESTROGEN RECEPTOR POSITIVE, UNSPECIFIED SITE OF BREAST (HCC): ICD-10-CM

## 2025-02-03 DIAGNOSIS — E05.90 HYPERTHYROIDISM: Primary | ICD-10-CM

## 2025-02-03 DIAGNOSIS — Z79.811 ENCOUNTER FOR MONITORING AROMATASE INHIBITOR THERAPY: ICD-10-CM

## 2025-02-03 DIAGNOSIS — E05.90 HYPERTHYROIDISM: ICD-10-CM

## 2025-02-03 DIAGNOSIS — C50.911 MALIGNANT NEOPLASM OF RIGHT BREAST IN FEMALE, ESTROGEN RECEPTOR POSITIVE, UNSPECIFIED SITE OF BREAST (HCC): ICD-10-CM

## 2025-02-03 DIAGNOSIS — D05.12 DUCTAL CARCINOMA IN SITU (DCIS) OF LEFT BREAST: ICD-10-CM

## 2025-02-03 DIAGNOSIS — Z51.81 ENCOUNTER FOR MONITORING AROMATASE INHIBITOR THERAPY: ICD-10-CM

## 2025-02-03 LAB
ALBUMIN SERPL BCG-MCNC: 4.6 G/DL (ref 3.5–5.1)
ALP SERPL-CCNC: 78 U/L (ref 38–126)
ALT SERPL W/O P-5'-P-CCNC: 13 U/L (ref 11–66)
AST SERPL-CCNC: 17 U/L (ref 5–40)
BASOPHILS ABSOLUTE: 0.1 THOU/MM3 (ref 0–0.1)
BASOPHILS NFR BLD AUTO: 0.9 %
BILIRUB CONJ SERPL-MCNC: 0.2 MG/DL (ref 0.1–13.8)
BILIRUB SERPL-MCNC: 0.4 MG/DL (ref 0.3–1.2)
DEPRECATED RDW RBC AUTO: 38.8 FL (ref 35–45)
EOSINOPHIL NFR BLD AUTO: 2.8 %
EOSINOPHILS ABSOLUTE: 0.2 THOU/MM3 (ref 0–0.4)
ERYTHROCYTE [DISTWIDTH] IN BLOOD BY AUTOMATED COUNT: 12.2 % (ref 11.5–14.5)
HCT VFR BLD AUTO: 43.6 % (ref 37–47)
HGB BLD-MCNC: 13.8 GM/DL (ref 12–16)
IMM GRANULOCYTES # BLD AUTO: 0.02 THOU/MM3 (ref 0–0.07)
IMM GRANULOCYTES NFR BLD AUTO: 0.3 %
LYMPHOCYTES ABSOLUTE: 1 THOU/MM3 (ref 1–4.8)
LYMPHOCYTES NFR BLD AUTO: 13.8 %
MCH RBC QN AUTO: 27.9 PG (ref 26–33)
MCHC RBC AUTO-ENTMCNC: 31.7 GM/DL (ref 32.2–35.5)
MCV RBC AUTO: 88.1 FL (ref 81–99)
MONOCYTES ABSOLUTE: 0.6 THOU/MM3 (ref 0.4–1.3)
MONOCYTES NFR BLD AUTO: 7.7 %
NEUTROPHILS ABSOLUTE: 5.7 THOU/MM3 (ref 1.8–7.7)
NEUTROPHILS NFR BLD AUTO: 74.5 %
NRBC BLD AUTO-RTO: 0 /100 WBC
PLATELET # BLD AUTO: 217 THOU/MM3 (ref 130–400)
PMV BLD AUTO: 11.2 FL (ref 9.4–12.4)
PROT SERPL-MCNC: 7.1 G/DL (ref 6.1–8)
RBC # BLD AUTO: 4.95 MILL/MM3 (ref 4.2–5.4)
T4 FREE SERPL-MCNC: 1.43 NG/DL (ref 0.93–1.68)
TSH SERPL DL<=0.005 MIU/L-ACNC: 0.91 UIU/ML (ref 0.4–4.2)
WBC # BLD AUTO: 7.6 THOU/MM3 (ref 4.8–10.8)

## 2025-02-03 PROCEDURE — 1123F ACP DISCUSS/DSCN MKR DOCD: CPT | Performed by: INTERNAL MEDICINE

## 2025-02-03 PROCEDURE — 3074F SYST BP LT 130 MM HG: CPT | Performed by: INTERNAL MEDICINE

## 2025-02-03 PROCEDURE — 1159F MED LIST DOCD IN RCRD: CPT | Performed by: INTERNAL MEDICINE

## 2025-02-03 PROCEDURE — 99214 OFFICE O/P EST MOD 30 MIN: CPT | Performed by: INTERNAL MEDICINE

## 2025-02-03 PROCEDURE — 3078F DIAST BP <80 MM HG: CPT | Performed by: INTERNAL MEDICINE

## 2025-02-03 RX ORDER — LETROZOLE 2.5 MG/1
2.5 TABLET, FILM COATED ORAL DAILY
Qty: 90 TABLET | Refills: 3 | Status: SHIPPED | OUTPATIENT
Start: 2025-02-03 | End: 2026-01-29

## 2025-02-03 RX ORDER — TETRAHYDROZOLINE HCL 0.05 %
DROPS OPHTHALMIC (EYE)
COMMUNITY
End: 2025-02-03 | Stop reason: ALTCHOICE

## 2025-02-04 LAB — T3FREE SERPL-MCNC: 3.04 PG/ML (ref 2–4.4)

## 2025-02-14 ENCOUNTER — OFFICE VISIT (OUTPATIENT)
Dept: ONCOLOGY | Age: 80
End: 2025-02-14
Payer: MEDICARE

## 2025-02-14 ENCOUNTER — HOSPITAL ENCOUNTER (OUTPATIENT)
Dept: INFUSION THERAPY | Age: 80
Discharge: HOME | End: 2025-02-14
Payer: MEDICARE

## 2025-02-14 VITALS
BODY MASS INDEX: 28.12 KG/M2 | HEIGHT: 65 IN | DIASTOLIC BLOOD PRESSURE: 65 MMHG | RESPIRATION RATE: 20 BRPM | WEIGHT: 168.8 LBS | HEART RATE: 83 BPM | OXYGEN SATURATION: 96 % | SYSTOLIC BLOOD PRESSURE: 134 MMHG | TEMPERATURE: 97.8 F

## 2025-02-14 VITALS
RESPIRATION RATE: 20 BRPM | HEART RATE: 83 BPM | SYSTOLIC BLOOD PRESSURE: 134 MMHG | BODY MASS INDEX: 28.12 KG/M2 | HEIGHT: 65 IN | WEIGHT: 168.8 LBS | DIASTOLIC BLOOD PRESSURE: 65 MMHG | TEMPERATURE: 97.8 F | OXYGEN SATURATION: 96 %

## 2025-02-14 DIAGNOSIS — D05.12 DUCTAL CARCINOMA IN SITU (DCIS) OF LEFT BREAST: Primary | ICD-10-CM

## 2025-02-14 DIAGNOSIS — D05.12 DUCTAL CARCINOMA IN SITU (DCIS) OF LEFT BREAST: ICD-10-CM

## 2025-02-14 PROCEDURE — 1126F AMNT PAIN NOTED NONE PRSNT: CPT | Performed by: INTERNAL MEDICINE

## 2025-02-14 PROCEDURE — 99211 OFF/OP EST MAY X REQ PHY/QHP: CPT

## 2025-02-14 PROCEDURE — 1123F ACP DISCUSS/DSCN MKR DOCD: CPT | Performed by: INTERNAL MEDICINE

## 2025-02-14 PROCEDURE — 1159F MED LIST DOCD IN RCRD: CPT | Performed by: INTERNAL MEDICINE

## 2025-02-14 PROCEDURE — 3078F DIAST BP <80 MM HG: CPT | Performed by: INTERNAL MEDICINE

## 2025-02-14 PROCEDURE — 3075F SYST BP GE 130 - 139MM HG: CPT | Performed by: INTERNAL MEDICINE

## 2025-02-14 PROCEDURE — 99214 OFFICE O/P EST MOD 30 MIN: CPT | Performed by: INTERNAL MEDICINE

## 2025-02-14 NOTE — PATIENT INSTRUCTIONS
Return in about 11 months (around 1/21/2026).MD visit to review MMG.    Orders Placed This Encounter   Procedures    KILO DIGITAL SCREEN W OR WO CAD BILATERAL

## 2025-02-14 NOTE — PROGRESS NOTES
[facility] [place] [facility]  [facility] CANCER [facility]ENTER  [address]  [address]  [place] [id number]  Dept: [phone]  Loc: [phone]   Hematology/Oncology Consult (Clinic)        02/14/25       Terri Thakkar   1945     No ref. provider found   Norma Montoya MD       Reason:   Chief Complaint   Patient presents with    Follow-up     Malignant neoplasm of right breast in female, estrogen receptor positive, unspecified site of breast (HCC)        HPI: Terri Thakkar is a 78 y.o. female with past medical history significant for previous \"uterine tumor\" status post hysterectomy that was benign, endometriosis, hypertension, hypercholesterolemia, bronchitis, GERD, frequent urinary tract infections, arthritis who presents in follow-up for her ductal carcinoma in situ of the left breast    -7/13/2018 patient had a right breast biopsy at 6:00 for a mass that it showed benign breast cores with dense fibrosis and mild fibrocystic changes.  Also microcalcifications, negative for malignancy.    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  This was a WHO grade one 1.9 x 1.4 x 0.5 cm tumor.  MRI done on 7/4/2019 showed the craniotomy defect at the site of the previous enhancing lesion and no residual enhancing lesion was identified.  She has had no currents of this problem since that time.  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  She is under surveillance by ophthalmology.  She is due for another MRI of her brain in November 2023.    -9/2019.  ([facility]) Screening mammogram showed a new cluster of amorphous and round microcalcifications in the upper outer posterior aspect of the left breast.  -9/26/2019. ([facility]) Diagnostic mammogram

## 2025-03-21 DIAGNOSIS — R00.2 PALPITATIONS: ICD-10-CM

## 2025-03-21 RX ORDER — METOPROLOL SUCCINATE 25 MG/1
25 TABLET, EXTENDED RELEASE ORAL DAILY
Qty: 90 TABLET | Refills: 1 | Status: SHIPPED | OUTPATIENT
Start: 2025-03-21

## 2025-03-26 NOTE — PATIENT INSTRUCTIONS
How was your appointment at the device clinic today?  Did one of our pacemaker nurses make your day?  Is there something we can do to make your appointment better?    Please let us know about it on the survey you receive in the mail or let us know on a MyCBrndstrt message!    Heck, we even love Post it notes!    We appreciate you and want to make your appointment with us the best we can!       Thank you!  Jamaica Larson and Morgan

## 2025-04-08 ENCOUNTER — CLINICAL SUPPORT (OUTPATIENT)
Dept: CARDIOLOGY CLINIC | Age: 80
End: 2025-04-08

## 2025-04-08 DIAGNOSIS — Z95.0 PACEMAKER: Primary | ICD-10-CM

## 2025-04-08 DIAGNOSIS — I47.20 VENTRICULAR TACHYCARDIA (HCC): Primary | ICD-10-CM

## 2025-04-08 NOTE — TELEPHONE ENCOUNTER
In office device check showing frequent runs of NSVT; verified she takes Toprol XL 25mg QHS. Patient stated she is mostly asymptomatic, but does notice palpitations if she falls asleep in her chair and wakes up.

## 2025-04-08 NOTE — TELEPHONE ENCOUNTER
Not sure if this Is v tach if lasting only less than a sec  If there are strips let me see them tomorrow

## 2025-04-09 RX ORDER — METOPROLOL SUCCINATE 50 MG/1
50 TABLET, EXTENDED RELEASE ORAL DAILY
Qty: 90 TABLET | Refills: 3 | Status: SHIPPED | OUTPATIENT
Start: 2025-04-09

## 2025-04-09 RX ORDER — METOPROLOL SUCCINATE 50 MG/1
50 TABLET, EXTENDED RELEASE ORAL DAILY
COMMUNITY
End: 2025-04-09 | Stop reason: SDUPTHER

## 2025-04-09 NOTE — TELEPHONE ENCOUNTER
Reviewed strips/encounter with Lester--VO to get BMP/Mg level and increase Toprol to 50mg QD    Need to notify patient

## 2025-04-09 NOTE — TELEPHONE ENCOUNTER
Called and spoke to this pt   Informed of her the ns vt episodes    Bmp,mg orders placed. [facility] go to [facility]    Instructed her to increase her toprol from 25 mg daily to 50 mg daily . Rx pended for sig

## 2025-04-11 ENCOUNTER — LAB (OUTPATIENT)
Dept: LAB | Age: 80
End: 2025-04-11

## 2025-04-11 DIAGNOSIS — I47.20 VENTRICULAR TACHYCARDIA (HCC): ICD-10-CM

## 2025-04-11 LAB
ANION GAP SERPL CALC-SCNC: 11 MEQ/L (ref 8–16)
BUN SERPL-MCNC: 15 MG/DL (ref 8–23)
CALCIUM SERPL-MCNC: 10.1 MG/DL (ref 8.8–10.2)
CHLORIDE SERPL-SCNC: 103 MEQ/L (ref 98–111)
CO2 SERPL-SCNC: 30 MEQ/L (ref 22–29)
GLUCOSE SERPL-MCNC: 132 MG/DL (ref 74–109)
MAGNESIUM SERPL-MCNC: 1.8 MG/DL (ref 1.6–2.6)
POTASSIUM SERPL-SCNC: 3.9 MEQ/L (ref 3.5–5.2)
SODIUM SERPL-SCNC: 144 MEQ/L (ref 135–145)

## 2025-04-14 ENCOUNTER — OFFICE VISIT (OUTPATIENT)
Dept: INTERNAL MEDICINE CLINIC | Age: 80
End: 2025-04-14
Payer: MEDICARE

## 2025-04-14 VITALS
TEMPERATURE: 98 F | HEART RATE: 60 BPM | BODY MASS INDEX: 27.76 KG/M2 | HEIGHT: 65 IN | SYSTOLIC BLOOD PRESSURE: 130 MMHG | DIASTOLIC BLOOD PRESSURE: 66 MMHG | WEIGHT: 166.6 LBS

## 2025-04-14 DIAGNOSIS — J30.9 ALLERGIC RHINITIS, UNSPECIFIED SEASONALITY, UNSPECIFIED TRIGGER: ICD-10-CM

## 2025-04-14 DIAGNOSIS — I10 ESSENTIAL HYPERTENSION: Primary | ICD-10-CM

## 2025-04-14 DIAGNOSIS — Z87.898 HISTORY OF BRAIN TUMOR: ICD-10-CM

## 2025-04-14 DIAGNOSIS — E05.90 HYPERTHYROIDISM: ICD-10-CM

## 2025-04-14 DIAGNOSIS — E87.6 HYPOKALEMIA: ICD-10-CM

## 2025-04-14 DIAGNOSIS — E78.2 MIXED HYPERLIPIDEMIA: ICD-10-CM

## 2025-04-14 DIAGNOSIS — N32.81 OVERACTIVE BLADDER: ICD-10-CM

## 2025-04-14 DIAGNOSIS — Z86.000 HISTORY OF DUCTAL CARCINOMA IN SITU (DCIS) OF BREAST: ICD-10-CM

## 2025-04-14 DIAGNOSIS — I44.2 THIRD DEGREE HEART BLOCK (HCC): ICD-10-CM

## 2025-04-14 PROCEDURE — 3075F SYST BP GE 130 - 139MM HG: CPT | Performed by: INTERNAL MEDICINE

## 2025-04-14 PROCEDURE — 3078F DIAST BP <80 MM HG: CPT | Performed by: INTERNAL MEDICINE

## 2025-04-14 PROCEDURE — 1123F ACP DISCUSS/DSCN MKR DOCD: CPT | Performed by: INTERNAL MEDICINE

## 2025-04-14 PROCEDURE — 99214 OFFICE O/P EST MOD 30 MIN: CPT | Performed by: INTERNAL MEDICINE

## 2025-04-14 PROCEDURE — 1159F MED LIST DOCD IN RCRD: CPT | Performed by: INTERNAL MEDICINE

## 2025-04-14 RX ORDER — AMLODIPINE BESYLATE 10 MG/1
TABLET ORAL
Qty: 90 TABLET | Refills: 1 | Status: SHIPPED | OUTPATIENT
Start: 2025-04-14

## 2025-04-14 RX ORDER — POTASSIUM CHLORIDE 1500 MG/1
TABLET, EXTENDED RELEASE ORAL
Qty: 90 TABLET | Refills: 1 | Status: SHIPPED | OUTPATIENT
Start: 2025-04-14

## 2025-04-14 RX ORDER — PRAVASTATIN SODIUM 40 MG
TABLET ORAL
Qty: 90 TABLET | Refills: 1 | Status: SHIPPED | OUTPATIENT
Start: 2025-04-14

## 2025-04-14 RX ORDER — OXYBUTYNIN CHLORIDE 5 MG/1
TABLET ORAL
Qty: 180 TABLET | Refills: 1 | Status: SHIPPED | OUTPATIENT
Start: 2025-04-14

## 2025-04-14 RX ORDER — LOSARTAN POTASSIUM AND HYDROCHLOROTHIAZIDE 12.5; 5 MG/1; MG/1
1 TABLET ORAL DAILY
Qty: 90 TABLET | Refills: 1 | Status: SHIPPED | OUTPATIENT
Start: 2025-04-14

## 2025-04-14 RX ORDER — FLUTICASONE PROPIONATE 50 MCG
2 SPRAY, SUSPENSION (ML) NASAL DAILY
Qty: 16 G | Refills: 5 | Status: SHIPPED | OUTPATIENT
Start: 2025-04-14

## 2025-04-14 RX ORDER — FEXOFENADINE HCL 60 MG/1
60 TABLET, FILM COATED ORAL 2 TIMES DAILY
COMMUNITY

## 2025-04-14 SDOH — ECONOMIC STABILITY: FOOD INSECURITY: WITHIN THE PAST 12 MONTHS, YOU WORRIED THAT YOUR FOOD WOULD RUN OUT BEFORE YOU GOT MONEY TO BUY MORE.: NEVER TRUE

## 2025-04-14 SDOH — ECONOMIC STABILITY: FOOD INSECURITY: WITHIN THE PAST 12 MONTHS, THE FOOD YOU BOUGHT JUST DIDN'T LAST AND YOU DIDN'T HAVE MONEY TO GET MORE.: NEVER TRUE

## 2025-04-14 SDOH — ECONOMIC STABILITY: INCOME INSECURITY: IN THE LAST 12 MONTHS, WAS THERE A TIME WHEN YOU WERE NOT ABLE TO PAY THE MORTGAGE OR RENT ON TIME?: NO

## 2025-04-14 ASSESSMENT — PATIENT HEALTH QUESTIONNAIRE - PHQ9
SUM OF ALL RESPONSES TO PHQ QUESTIONS 1-9: 0
SUM OF ALL RESPONSES TO PHQ9 QUESTIONS 1 & 2: 0
1. LITTLE INTEREST OR PLEASURE IN DOING THINGS: NOT AT ALL
1. LITTLE INTEREST OR PLEASURE IN DOING THINGS: NOT AT ALL
2. FEELING DOWN, DEPRESSED OR HOPELESS: NOT AT ALL
SUM OF ALL RESPONSES TO PHQ QUESTIONS 1-9: 0
SUM OF ALL RESPONSES TO PHQ QUESTIONS 1-9: 0
2. FEELING DOWN, DEPRESSED OR HOPELESS: NOT AT ALL
SUM OF ALL RESPONSES TO PHQ QUESTIONS 1-9: 0

## 2025-04-14 NOTE — PROGRESS NOTES
1945    Chief Complaint   Patient presents with    Hyperlipidemia     6 months     Hyperthyroidism    Allergic Rhinitis     third degree heart block     Hypertension       Pt is a 79 y.o. female who presents for a 6 month follow up visit.    HPI    Hypertension - BP controlled today on Hyzaar, metoprolol, and Norvasc.  No headache, syncope, lightheadedness.  BMP 4/2025 - normal except glucose 132, bicarb 30.      Hyperglycemia - cut back on jelly beans and pasta.  Check HgA1c next visit.     Hyperlipidemia - LDL 84, HDL 54, trig 121 8/5/24 with normal LFTs - on pravastatin.  No myalgia.    Overactive bladder - stable on Ditropan.    Constipation - intermittent issue- on stool softener and Miralax prn.  She is aware to drink more water as well.     Allergies - still has cough, PND - on Allegra, Flonase, Atrovent NS - maximize avoiding known allergens - ie dust mites.  May need allergy shots.  Won't get rid of dog- known allergy to dog.  Worse the spring - getting back on meds (was able to hold in winter).      GERD - stable on Pepcid prn.     Mild rectal wall thickening on CT 6/2019 - just had colonoscopy 2/2019 - was to have sigmoidoscopy with Dr. Nye.  She cancelled this and never went back.  Her follow up CT 8/2020 did not show rectal thickening.  She is having issues with constipation.  She does well if takes polyethylene glycol weekly prn.    She had her colonoscopy done - 2/18/19, impressively long and looping colon, hypertrophied anal papilla, grade 2 internal hemorrhoids without hemorrhage, repeat in 10 year but will be 83 and would not do unless very good health as difficult with looping colon.     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 She saw them and had normal cystoscopy, CT urogram and cytology. Urology - [facility] - following imaging yearly with cytology and stable at visit 8/2020.   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

## 2025-04-23 DIAGNOSIS — K59.01 SLOW TRANSIT CONSTIPATION: ICD-10-CM

## 2025-04-23 NOTE — PROGRESS NOTES
[facility] [place] [facility]  [facility] CANCER CENTER  [address]  [address]  [place] [id number]  Dept: [phone]  Loc: [phone]   Hematology/Oncology Consult (Clinic)        07/19/24       Terri Thakkar   1945     No ref. provider found   Norma Montoya MD       Reason:   Chief Complaint   Patient presents with    Follow-up     Ductal carcinoma in situ (DCIS) of left breast        HPI: Terri Thakkar is a 78 y.o. female with past medical history significant for previous \"uterine tumor\" status post hysterectomy that was benign, endometriosis, hypertension, hypercholesterolemia, bronchitis, GERD, frequent urinary tract infections, arthritis who presents in follow-up for her ductal carcinoma in situ of the left breast    -7/13/2018 patient had a right breast biopsy at 6:00 for a mass that it showed benign breast cores with dense fibrosis and mild fibrocystic changes.  Also microcalcifications, negative for malignancy.    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  This was a WHO grade one 1.9 x 1.4 x 0.5 cm tumor.  MRI done on 7/4/2019 showed the craniotomy defect at the site of the previous enhancing lesion and no residual enhancing lesion was identified.  She has had no currents of this problem since that time.  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  She is under surveillance by ophthalmology.  She is due for another MRI of her brain in November 2023.    -9/2019.  ([facility]) Screening mammogram showed a new cluster of amorphous and round microcalcifications in the upper outer posterior aspect of the left breast.  -9/26/2019. ([facility]) Diagnostic mammogram confirmed a cluster of amorphous rounded punctate 
Adult

## 2025-04-24 RX ORDER — POLYETHYLENE GLYCOL 3350 17 G/17G
POWDER, FOR SOLUTION ORAL
Qty: 510 G | Refills: 1 | Status: SHIPPED | OUTPATIENT
Start: 2025-04-24

## 2025-04-25 ENCOUNTER — TELEPHONE (OUTPATIENT)
Dept: ONCOLOGY | Age: 80
End: 2025-04-25

## 2025-04-25 ENCOUNTER — TELEPHONE (OUTPATIENT)
Dept: CARDIOLOGY CLINIC | Age: 80
End: 2025-04-25

## 2025-04-25 NOTE — TELEPHONE ENCOUNTER
Multiple short episodes VT 4/15/25 looks like 4-5-6-7 beats each occurrence   98% Rv paced

## 2025-04-25 NOTE — TELEPHONE ENCOUNTER
I called to notify the patient of their one year f/u with us she didn't answer so I left a voicemail with appt details on there.

## 2025-04-26 PROBLEM — R79.89 ABNORMAL THYROID BLOOD TEST: Status: RESOLVED | Noted: 2023-12-24 | Resolved: 2025-04-26

## 2025-04-26 PROBLEM — E05.90 HYPERTHYROIDISM: Status: ACTIVE | Noted: 2025-04-26

## 2025-04-26 PROBLEM — C71.9 BRAIN NEOPLASM MALIGNANT (HCC): Status: RESOLVED | Noted: 2019-07-22 | Resolved: 2025-04-26

## 2025-04-26 PROBLEM — D49.6 BRAIN TUMOR (HCC): Status: RESOLVED | Noted: 2019-07-08 | Resolved: 2025-04-26

## 2025-04-26 PROBLEM — L65.9 HAIR LOSS: Status: RESOLVED | Noted: 2019-12-01 | Resolved: 2025-04-26

## 2025-04-26 PROBLEM — D05.10 DUCTAL CARCINOMA IN SITU (DCIS) OF BREAST: Status: RESOLVED | Noted: 2023-02-06 | Resolved: 2025-04-26

## 2025-04-26 PROBLEM — Z87.898 HISTORY OF BRAIN TUMOR: Status: ACTIVE | Noted: 2025-04-26

## 2025-04-26 PROBLEM — Z86.000 HISTORY OF DUCTAL CARCINOMA IN SITU (DCIS) OF BREAST: Status: ACTIVE | Noted: 2025-04-26

## 2025-04-26 PROBLEM — C50.911 MALIGNANT NEOPLASM OF UNSPECIFIED SITE OF RIGHT FEMALE BREAST (HCC): Status: RESOLVED | Noted: 2024-01-08 | Resolved: 2025-04-26

## 2025-05-02 NOTE — CARE COORDINATION
Attempted to reach Johnathan Szymanski today for care coordination f/u. No answer. Message left to return call.
Home

## 2025-05-07 ENCOUNTER — OFFICE VISIT (OUTPATIENT)
Age: 80
End: 2025-05-07
Payer: MEDICARE

## 2025-05-07 ENCOUNTER — LAB (OUTPATIENT)
Dept: LAB | Age: 80
End: 2025-05-07

## 2025-05-07 VITALS
BODY MASS INDEX: 27.72 KG/M2 | DIASTOLIC BLOOD PRESSURE: 68 MMHG | HEIGHT: 65 IN | SYSTOLIC BLOOD PRESSURE: 120 MMHG | HEART RATE: 63 BPM | WEIGHT: 166.4 LBS

## 2025-05-07 DIAGNOSIS — E05.90 HYPERTHYROIDISM: Primary | ICD-10-CM

## 2025-05-07 DIAGNOSIS — E04.2 MULTINODULAR GOITER: ICD-10-CM

## 2025-05-07 DIAGNOSIS — E05.90 HYPERTHYROIDISM: ICD-10-CM

## 2025-05-07 LAB
T4 FREE SERPL-MCNC: 1.3 NG/DL (ref 0.92–1.68)
TSH SERPL DL<=0.05 MIU/L-ACNC: 0.58 UIU/ML (ref 0.27–4.2)

## 2025-05-07 PROCEDURE — 3078F DIAST BP <80 MM HG: CPT | Performed by: INTERNAL MEDICINE

## 2025-05-07 PROCEDURE — 3074F SYST BP LT 130 MM HG: CPT | Performed by: INTERNAL MEDICINE

## 2025-05-07 PROCEDURE — 1159F MED LIST DOCD IN RCRD: CPT | Performed by: INTERNAL MEDICINE

## 2025-05-07 PROCEDURE — 1123F ACP DISCUSS/DSCN MKR DOCD: CPT | Performed by: INTERNAL MEDICINE

## 2025-05-07 PROCEDURE — 1160F RVW MEDS BY RX/DR IN RCRD: CPT | Performed by: INTERNAL MEDICINE

## 2025-05-07 PROCEDURE — 99214 OFFICE O/P EST MOD 30 MIN: CPT | Performed by: INTERNAL MEDICINE

## 2025-05-07 RX ORDER — LETROZOLE 2.5 MG/1
2.5 TABLET, FILM COATED ORAL DAILY
COMMUNITY

## 2025-05-07 RX ORDER — METHIMAZOLE 5 MG/1
TABLET ORAL
Qty: 30 TABLET | Refills: 5 | Status: SHIPPED | OUTPATIENT
Start: 2025-05-07

## 2025-05-07 NOTE — PROGRESS NOTES
[facility] [place] [facility]  [facility] ENDOCRINOLOGY  [address]  [place] [address]  Dept: [phone]  Loc: [phone]     Visit Date:5/6/2025    Terri Thakkar is a 79 y.o. female who presents today for:  Chief Complaint   Patient presents with    Follow-up     Hyperthyroidism.             Subjective:    Terri Thakkar is a 79 y.o. , female who comes for follow up for thyroid.    Norma Montoya MD  Terri Thakkar is being followed for multinodular goiter associated with hyperthyroidism.  She is taking 5 mg of Tapazole daily for 4 days and skipping 3 DAYS a week.  The patient had normal thyroid levels at the last visit.  Today she denies having any signs or symptoms of thyrotoxicosis.  In addition there is no pain, choking or hoarseness of the voice.  We had ordered ultrasound for follow-up of the thyroid but she did not get that study completed.  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 Key - margins clear - neg sentinal lymph node    Melanoma in situ of lower extremity (HCC)     excision- x2    Metabolic syndrome     much improved - normal triglycerides, weight loss of 50#    OA (osteoarthritis)     left knee      Past Surgical History:   Procedure Laterality Date    BLADDER SURGERY      BRAIN SURGERY  2019    BREAST LUMPECTOMY Left 11/05/2019    [facility]    CARDIAC PACEMAKER PLACEMENT      CATARACT REMOVAL WITH IMPLANT Bilateral

## 2025-05-08 LAB — T3FREE SERPL-MCNC: 3.41 PG/ML (ref 2–4.4)

## 2025-05-09 ENCOUNTER — RESULTS FOLLOW-UP (OUTPATIENT)
Age: 80
End: 2025-05-09

## 2025-08-01 ENCOUNTER — HOSPITAL ENCOUNTER (EMERGENCY)
Age: 80
Discharge: HOME | End: 2025-08-01
Payer: MEDICARE

## 2025-08-01 VITALS
HEIGHT: 65 IN | BODY MASS INDEX: 26.82 KG/M2 | RESPIRATION RATE: 16 BRPM | SYSTOLIC BLOOD PRESSURE: 128 MMHG | TEMPERATURE: 97.5 F | WEIGHT: 161 LBS | HEART RATE: 68 BPM | DIASTOLIC BLOOD PRESSURE: 68 MMHG | OXYGEN SATURATION: 95 %

## 2025-08-01 DIAGNOSIS — N30.01 ACUTE CYSTITIS WITH HEMATURIA: Primary | ICD-10-CM

## 2025-08-01 LAB
BILIRUB UR STRIP.AUTO-MCNC: NEGATIVE MG/DL
CHARACTER UR: ABNORMAL
COLOR, UA: YELLOW
GLUCOSE UR QL STRIP.AUTO: NEGATIVE MG/DL
KETONES UR QL STRIP.AUTO: NEGATIVE
NITRITE UR QL STRIP.AUTO: NEGATIVE
PH UR STRIP.AUTO: 7 [PH] (ref 5–9)
PROT UR STRIP.AUTO-MCNC: NEGATIVE MG/DL
RBC #/AREA URNS HPF: ABNORMAL /[HPF]
SP GR UR STRIP.AUTO: 1.01 (ref 1–1.03)
UROBILINOGEN, URINE: 0.2 EU/DL (ref 0.2–1)
WBC #/AREA URNS HPF: ABNORMAL /[HPF]

## 2025-08-01 PROCEDURE — 99213 OFFICE O/P EST LOW 20 MIN: CPT

## 2025-08-01 PROCEDURE — 87086 URINE CULTURE/COLONY COUNT: CPT

## 2025-08-01 PROCEDURE — 81003 URINALYSIS AUTO W/O SCOPE: CPT

## 2025-08-01 RX ORDER — CEFDINIR 300 MG/1
300 CAPSULE ORAL 2 TIMES DAILY
Qty: 14 CAPSULE | Refills: 0 | Status: SHIPPED | OUTPATIENT
Start: 2025-08-01 | End: 2025-08-08

## 2025-08-01 RX ORDER — CIPROFLOXACIN 500 MG/1
500 TABLET, FILM COATED ORAL 2 TIMES DAILY
Qty: 6 TABLET | Refills: 0 | Status: SHIPPED | OUTPATIENT
Start: 2025-08-01 | End: 2025-08-01

## 2025-08-01 ASSESSMENT — LIFESTYLE VARIABLES
HOW MANY STANDARD DRINKS CONTAINING ALCOHOL DO YOU HAVE ON A TYPICAL DAY: PATIENT DOES NOT DRINK
HOW OFTEN DO YOU HAVE A DRINK CONTAINING ALCOHOL: NEVER

## 2025-08-01 ASSESSMENT — ENCOUNTER SYMPTOMS
RESPIRATORY NEGATIVE: 1
GASTROINTESTINAL NEGATIVE: 1

## 2025-08-01 NOTE — DISCHARGE INSTRUCTIONS
Rest.  Drink plenty of fluids.  Medications as directed.  Follow-up with primary care provider for any new or worsening symptoms Go to emergency department for any other symptoms or concerns deemed emergent.

## 2025-08-01 NOTE — ED PROVIDER NOTES
[facility] URGENT CARE  UrgentCare Encounter      CHIEFCOMPLAINT       Chief Complaint   Patient presents with    Urinary Burning       Nurses Notes reviewed and I agree except as noted in the HPI.  HISTORY OF PRESENT ILLNESS   Terri Thakkar is a 80 y.o. female who presents today for urinary discomfort lower abdominal pain.  States this started on Tuesday.  Patient states she does take cranberry pills daily that is seem to keep urinary tract infections away.  States approximately 1 week ago she did have an episode of incontinence where she had a bowel movement on herself and had to drive home and sit in it.  Is questioning if that UTI could have came from that.    REVIEW OF SYSTEMS     Review of Systems   Respiratory: Negative.     Cardiovascular: Negative.    Gastrointestinal: Negative.    Genitourinary:  Positive for dysuria, frequency and pelvic pain.   Skin: Negative.    Neurological: Negative.    Psychiatric/Behavioral: Negative.         PAST MEDICAL HISTORY         Diagnosis Date    Abnormal EKG     normal stress test 3/2009    Complete heart block (HCC) 12/2023    S/p PPM    Complication of anesthesia     difficulty breathing after surgery- transferrred from [facility] to [facility] for 3 days, O2 for about 5 days    Constipation     Ductal carcinoma in situ (DCIS) of breast 02/06/2023    Herpes zoster without complication 07/10/2019    History of basal cell carcinoma (BCC) of skin     basal cell skin    History of brain tumor 04/26/2025    date is actually 2019 - unable to cancel the 2025 date    History of ductal carcinoma in situ (DCIS) of breast 10/11/2019    Left DCIS    History of melanoma in situ 06/2012    of chest - Dr. Key - margins clear - neg sentinal lymph node, 2 on lower extremities    Hyperglycemia     History type 2 DM - lost weight - diet controlled    Hyperlipidemia     Hypertension     Metabolic syndrome     much improved - normal triglycerides, weight loss of 50#    OA (osteoarthritis)     Patient  reports that she has never smoked. She has never been exposed to tobacco smoke. She has never used smokeless tobacco. She reports that she does not currently use alcohol. She reports that she does not use drugs.    PHYSICAL EXAM     ED TRIAGE VITALS  BP: 128/68, Temp: 97.5 °F (36.4 °C), Pulse: 68, Respirations: 16, SpO2: 95 %  Physical Exam  Cardiovascular:      Rate and Rhythm: Normal rate and regular rhythm.   Pulmonary:      Effort: Pulmonary effort is normal.   Abdominal:      General: Bowel sounds are normal.      Palpations: Abdomen is soft.      Tenderness: There is abdominal tenderness in the suprapubic area. There is no right CVA tenderness or left CVA tenderness.   Musculoskeletal:         General: Normal range of motion.   Skin:     General: Skin is warm and dry.   Neurological:      Mental Status: She is alert and oriented to person, place, and time.   Psychiatric:         Behavior: Behavior normal.         DIAGNOSTIC RESULTS   Labs:  Results for orders placed or performed during the hospital encounter of 08/01/25   Urinalysis   Result Value Ref Range    Glucose, Ur Negative NEGATIVE mg/dl    Bilirubin, Urine Negative NEGATIVE    Ketones, Urine Negative NEGATIVE    Specific Gravity, UA 1.015 1.002 - 1.030    Blood, Urine Trace-lysed NEGATIVE    pH, Urine 7.00 5.0 - 9.0    Protein, Urine Negative NEGATIVE mg/dl    Urobilinogen, Urine 0.20 0.2 - 1.0 eu/dl    Nitrite, Urine Negative NEGATIVE    Leukocyte Esterase, Urine Small (A) NEGATIVE    Color, UA Yellow STRAW-YELLOW    Character, Urine Slightly Cloudy CLEAR-SL CLOUD       IMAGING:  No orders to display     URGENT CARE COURSE:         Medications - No data to display  PROCEDURES:  FINALIMPRESSION      1. Acute cystitis with hematuria        DISPOSITION/PLAN   DISPOSITION Decision To Discharge 08/01/2025 02:38:00 PM   DISPOSITION CONDITION Stable   Discussed plan of care.  Patient instructed to  rest.  Drink plenty of fluids.  Medications as

## 2025-08-03 LAB
BACTERIA UR CULT: ABNORMAL
ORGANISM: ABNORMAL

## 2025-08-14 ENCOUNTER — TELEPHONE (OUTPATIENT)
Dept: INTERNAL MEDICINE CLINIC | Age: 80
End: 2025-08-14

## 2025-08-14 ENCOUNTER — LAB (OUTPATIENT)
Dept: LAB | Age: 80
End: 2025-08-14

## 2025-08-14 DIAGNOSIS — R35.0 URINARY FREQUENCY: ICD-10-CM

## 2025-08-14 DIAGNOSIS — R10.2 PERINEUM PAIN, FEMALE: ICD-10-CM

## 2025-08-14 DIAGNOSIS — R30.0 DYSURIA: ICD-10-CM

## 2025-08-14 DIAGNOSIS — R30.0 DYSURIA: Primary | ICD-10-CM

## 2025-08-14 LAB
BACTERIA URNS QL MICRO: ABNORMAL /HPF
BILIRUB UR QL STRIP.AUTO: NEGATIVE
CASTS #/AREA URNS LPF: ABNORMAL /LPF
CASTS 2: ABNORMAL /LPF
CHARACTER UR: CLEAR
COLOR, UA: YELLOW
CRYSTALS URNS MICRO: ABNORMAL
EPITHELIAL CELLS, UA: ABNORMAL /HPF
GLUCOSE UR QL STRIP.AUTO: NEGATIVE MG/DL
HGB UR QL STRIP.AUTO: NEGATIVE
KETONES UR QL STRIP.AUTO: NEGATIVE
MISCELLANEOUS 2: ABNORMAL
NITRITE UR QL STRIP: NEGATIVE
PH UR STRIP.AUTO: 7.5 [PH] (ref 5–9)
PROT UR STRIP.AUTO-MCNC: NEGATIVE MG/DL
RBC URINE: ABNORMAL /HPF
RENAL EPI CELLS #/AREA URNS HPF: ABNORMAL /[HPF]
SP GR UR REFRACT.AUTO: 1.01 (ref 1–1.03)
UROBILINOGEN, URINE: 0.2 EU/DL (ref 0–1)
WBC #/AREA URNS HPF: ABNORMAL /HPF
WBC #/AREA URNS HPF: ABNORMAL /[HPF]
YEAST LIKE FUNGI URNS QL MICRO: ABNORMAL

## 2025-08-15 ENCOUNTER — TELEPHONE (OUTPATIENT)
Dept: INTERNAL MEDICINE CLINIC | Age: 80
End: 2025-08-15

## (undated) DEVICE — CARBIDE MATCH HEAD

## (undated) DEVICE — TOTAL TRAY, 16FR 10ML SIL FOLEY, URN: Brand: MEDLINE

## (undated) DEVICE — SET CATH 20GA L1.75IN RAD ART POLYUR RADPQ W/ INTEGR

## (undated) DEVICE — TIDISHIELD SURGICAL PATIENT DRAPE WITH INVASIVE APERTURES BLUE STERILE UNIVERSAL NAVIGATIONAL CRANIOTOMY 8 PER CASE: Brand: TIDISHIELD

## (undated) DEVICE — INTENDED FOR TISSUE SEPARATION, AND OTHER PROCEDURES THAT REQUIRE A SHARP SURGICAL BLADE TO PUNCTURE OR CUT.: Brand: BARD-PARKER ® CARBON RIB-BACK BLADES

## (undated) DEVICE — SOLUTION IV 500ML 0.9% SOD CHL PH 5 INJ USP VIAFLX PLAS

## (undated) DEVICE — SPONGE GZ W4XL4IN COT 12 PLY TYP VII WVN C FLD DSGN

## (undated) DEVICE — BLADE CLIPPER GEN PURP NS

## (undated) DEVICE — CODMAN® SURGICAL PATTIES 1/2" X 1/2" (1.27CM X 1.27CM): Brand: CODMAN®

## (undated) DEVICE — YANKAUER,BULB TIP,W/O VENT,RIGID,STERILE: Brand: MEDLINE

## (undated) DEVICE — SOLUTION IV IRRIG POUR BRL 0.9% SODIUM CHL 2F7124

## (undated) DEVICE — SPETZLER/BARRACUDA TIP, UNIVERSAL

## (undated) DEVICE — ZEISS MD MICROSCOPE DRAPE 54 X 120 - GLASS LENS: Brand: OPTICS ONE

## (undated) DEVICE — DISPOSABLE STANDARD BIPOLAR FORCEPS, NON-STICK,: Brand: SPETZLER-MALIS

## (undated) DEVICE — PATIENT RETURN ELECTRODE, SINGLE-USE, CONTACT QUALITY MONITORING, ADULT, WITH 9FT CORD, FOR PATIENTS WEIGING OVER 33LBS. (15KG): Brand: MEGADYNE

## (undated) DEVICE — PAD,NON-ADHERENT,3X8,STERILE,LF,1/PK: Brand: MEDLINE

## (undated) DEVICE — PREP SOL PVP IODINE 4%  4 OZ/BTL

## (undated) DEVICE — MARKER,SKIN,WI/RULER AND LABELS: Brand: MEDLINE

## (undated) DEVICE — PRESSURE MONITORING SET: Brand: TRUWAVE

## (undated) DEVICE — 3M™ BAIR HUGGER® MULTI ACCESS BLANKET, PEDIATRIC, FULL BODY, 10 PER CASE 31000: Brand: BAIR HUGGER™

## (undated) DEVICE — SUTURE VCRL SZ 0 L45CM ABSRB VLT OS-6 L36.4MM 1/2 CIR REV J711T

## (undated) DEVICE — SOCK SPEC L9IN WHT UNIV W/ STD PRT FOR FLD MGMT

## (undated) DEVICE — SPONGE,NEURO,1"X3",XR,STRL,LF,10/PK: Brand: MEDLINE

## (undated) DEVICE — SYRINGE MED 10ML LUERLOCK TIP W/O SFTY DISP

## (undated) DEVICE — WAX SURG 2.5GM HEMSTAT BNE BEESWAX PARAFFIN ISO PALMITATE

## (undated) DEVICE — 3M™ MICROFOAM™ TAPE 1528-4: Brand: 3M™ MICROFOAM™

## (undated) DEVICE — GLOVE ORANGE PI 8   MSG9080

## (undated) DEVICE — DRAPE THER FLUID WARMING 66X44 IN FLAT SLUSH DBL DISC ORS

## (undated) DEVICE — Device

## (undated) DEVICE — BANDAGE,GAUZE,4.5"X4.1YD,STERILE,LF: Brand: MEDLINE

## (undated) DEVICE — TUBE LUKENS 20CC 6 1/4": Brand: ALLEGIANCE

## (undated) DEVICE — CRANIALMASK TRACKER: Brand: CRANIALMASK TRACKER

## (undated) DEVICE — 1.1MM X 6.0MM STRAIGHT ROUTER

## (undated) DEVICE — INTEGRA® MILTEX® OSTRUM PUNCH 4", BACK-BITING, STRAIGHT BITE: Brand: INTEGRA® MILTEX®

## (undated) DEVICE — 1010 S-DRAPE TOWEL DRAPE 10/BX: Brand: STERI-DRAPE™

## (undated) DEVICE — TOWEL,OR,DSP,ST,BLUE,STD,4/PK,20PK/CS: Brand: MEDLINE

## (undated) DEVICE — CONMED DISPOSABLE BIPOLAR CABLE, 10' (3.05M): Brand: CONMED

## (undated) DEVICE — GLOVE SURG SZ 85 L12IN THK75MIL DK GRN LTX FREE

## (undated) DEVICE — STRIP,CLOSURE,WOUND,MEDI-STRIP,1/2X4: Brand: MEDLINE

## (undated) DEVICE — GLOVE ORANGE PI 8 1/2   MSG9085

## (undated) DEVICE — TUBING, SUCTION, 1/4" X 20', STRAIGHT: Brand: MEDLINE INDUSTRIES, INC.

## (undated) DEVICE — 3M™ STERI-STRIP™ COMPOUND BENZOIN TINCTURE 40 BAGS/CARTON 4 CARTONS/CASE C1544: Brand: 3M™ STERI-STRIP™

## (undated) DEVICE — PIN ADLT MAYFIELD RIGID MOLD FINGER

## (undated) DEVICE — BLADE LARYNSCP SZ 3 ENH DIR INTUB GLIDESCOPE MCGRATH MAC

## (undated) DEVICE — 3M™ TRANSPORE™ WHITE SURGICAL TAPE 1534-2, 2 INCH X 10 YARD (5CM X 9,1M), 6 ROLLS/CARTON 10 CARTONS/CASE: Brand: 3M™ TRANSPORE™

## (undated) DEVICE — SUTURE VCRL SZ 0 L27IN ABSRB UD L36MM CT-1 1/2 CIR J260H

## (undated) DEVICE — PAD,O.R.,TABLE,CONV FOAM,2X20X72: Brand: MEDLINE

## (undated) DEVICE — SUTURE NRLN SZ 4-0 L18IN NONABSORBABLE BLK L13MM TF 1/2 CIR C584D

## (undated) DEVICE — TAPE,CLOTH/SILK,CURAD,3"X10YD,LF,40/CS: Brand: CURAD

## (undated) DEVICE — ROYAL SILK SURGICAL GOWN, XXL: Brand: CONVERTORS

## (undated) DEVICE — PACK PROCEDURE SURG SET UP SRMC

## (undated) DEVICE — SYRINGE IRRIG 60ML SFT PLIABLE BLB EZ TO GRP 1 HND USE W/

## (undated) DEVICE — GLOVE SURG SZ 65 THK91MIL LTX FREE SYN POLYISOPRENE

## (undated) DEVICE — GAUZE,SPONGE,8"X4",12PLY,XRAY,STRL,LF: Brand: MEDLINE

## (undated) DEVICE — SOLUTION IV 1000ML 0.9% SOD CHL PH 5 INJ USP VIAFLX PLAS

## (undated) DEVICE — GOWN,SIRUS,NON REINFRCD,LARGE,SET IN SL: Brand: MEDLINE

## (undated) DEVICE — GOWN,SIRUS,NONRNF,SETINSLV,XL,20/CS: Brand: MEDLINE

## (undated) DEVICE — DIFFUSER: Brand: CORE, MAESTRO

## (undated) DEVICE — GAUZE,SPONGE,4"X4",12PLY,STERILE,LF,2'S: Brand: MEDLINE

## (undated) DEVICE — FLOSEAL HEMOSTATIC MATRIX, 5 ML: Brand: FLOSEAL

## (undated) DEVICE — DISPOSABLE TUBING SET AND EXTENDER FILTER TUBING

## (undated) DEVICE — SUTURE VCRL SZ 3-0 L18IN ABSRB VLT L26MM SH 1/2 CIR J774D

## (undated) DEVICE — BLADE CLP TAPR HD WET DRY CAPABILITY GTT IN CHARGING USE

## (undated) DEVICE — CODMAN® DISPOSABLE PERFORATOR 14MM: Brand: CODMAN®

## (undated) DEVICE — GLOVE ORANGE PI 7   MSG9070

## (undated) DEVICE — CONTAINER,SPECIMEN,PNEU TUBE,4OZ,OR STRL: Brand: MEDLINE

## (undated) DEVICE — BASIC SINGLE BASIN BTC-LF: Brand: MEDLINE INDUSTRIES, INC.

## (undated) DEVICE — TUBING PRSS 36 M F

## (undated) DEVICE — SEALANT TISSEEL PRIMA FBRN 4ML 6EA/ORDER

## (undated) DEVICE — AGENT HEMSTAT W2XL14IN OXIDIZED REGENERATED CELOS ABSRB FOR

## (undated) DEVICE — FISH HOOKS, W/SUTURE, RUBBER BAND BLUNT, BARBLESS: Brand: DEROYAL

## (undated) DEVICE — NON COATED ELECTROSURGICAL NEEDLE ELECTRODE, 2.75 INCH (7 CM): Brand: MEGADYNE

## (undated) DEVICE — OIL CARTRIDGE: Brand: CORE, MAESTRO

## (undated) DEVICE — SOLUTION SURG PREP POV IOD 7.5% 4 OZ

## (undated) DEVICE — CODMAN® SURGICAL PATTIES 1/2" X1 1/2" (1.27CM X 3.81CM): Brand: CODMAN®

## (undated) DEVICE — TOTAL TRAY, DB, 100% SILI FOLEY, 16FR 10: Brand: MEDLINE

## (undated) DEVICE — SET CATH 20GA L1.5IN RAD ART POLYUR RADPQ W/ INTEGR 0.018IN